# Patient Record
Sex: FEMALE | Race: BLACK OR AFRICAN AMERICAN | Employment: FULL TIME | URBAN - METROPOLITAN AREA
[De-identification: names, ages, dates, MRNs, and addresses within clinical notes are randomized per-mention and may not be internally consistent; named-entity substitution may affect disease eponyms.]

---

## 2017-01-13 ENCOUNTER — HOSPITAL ENCOUNTER (OUTPATIENT)
Dept: MRI IMAGING | Age: 37
Discharge: HOME | End: 2017-01-13
Attending: ORTHOPAEDIC SURGERY
Payer: COMMERCIAL

## 2017-01-13 DIAGNOSIS — M79.641 RIGHT HAND PAIN: ICD-10-CM

## 2017-01-13 PROCEDURE — 73218 MRI UPPER EXTREMITY W/O DYE: CPT

## 2017-08-09 ENCOUNTER — HOSPITAL ENCOUNTER (OUTPATIENT)
Dept: PHYSICAL THERAPY | Age: 37
Discharge: HOME | End: 2017-08-09
Payer: MEDICAID

## 2017-08-09 PROCEDURE — 97530 THERAPEUTIC ACTIVITIES: CPT

## 2017-08-09 PROCEDURE — 97162 PT EVAL MOD COMPLEX 30 MIN: CPT

## 2017-08-09 NOTE — PROGRESS NOTES
PT DAILY TREATMENT NOTE/FOOT AND ANKLE EVAL 3-16    Patient Name: Frances Angelucci  Date:2017  : 1980  [x]  Patient  Verified  Payor: Constance Franco / Plan: Ion Xie / Product Type: HMO /    In time:2:30  Out time: 3:20  Total Treatment Time (min): 50  Visit #: 1 of 16    Treatment Area: Bilateral ankle pain [M25.571, M25.572]    SUBJECTIVE  Pain Level (0-10 scale): 8 right ankle  [x]constant []intermittent []improving []worsening [x]no change since onset    Any medication changes, allergies to medications, adverse drug reactions, diagnosis change, or new procedure performed?: [x] No    [] Yes (see summary sheet for update)  Subjective functional status/changes:     PLOF: Workout 3 times a week with . Limitations to PLOF: Not able to go workout  Mechanism of Injury: Pt reports left foot/ankle has been hurting for a while now with insidious onset and on 17 pt woke up and couldn't put pressure on right foot. Went to PCP 17. Got sent home with ace wrap and pain meds but didn't help. Went to ER 17 due to pain and X ray taken with arthritis result per pt report. Per pt report PCP reported achilles tendonitis in right ankle. Current symptoms/Complaints: Pain increases to 10/10 with all activities. Was given tramadol at ER but not helping. Per pt report to wear boot for 1 mo or D/c per therapist.    Previous Treatment/Compliance: None  PMHx/Surgical Hx:  , Arthroscopic sx right knee  . 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 \"  Barriers: [x]pain []financial []time []transportation []other  Motivation: Fair    OBJECTIVE/EXAMINATION      Modality rationale: decrease edema, decrease inflammation, decrease pain and increase tissue extensibility to improve the patients ability to perform ADLs with decreased pain and symptoms.    Min Type Additional Details    [] Estim:  []Unatt       []IFC  []Premod                        []Other:  []w/ice   []w/heat  Position:  Location:    [] Estim: []Att    []TENS instruct  []NMES                    []Other:  []w/US   []w/ice   []w/heat  Position:  Location:    []  Traction: [] Cervical       []Lumbar                       [] Prone          []Supine                       []Intermittent   []Continuous Lbs:  [] before manual  [] after manual    []  Ultrasound: []Continuous   [] Pulsed                           []1MHz   []3MHz Location:  W/cm2:    []  Iontophoresis with dexamethasone         Location: [] Take home patch   [] In clinic   10 [x]  Ice     []  heat  []  Ice massage  []  Laser   []  Anodyne Position:supine  Location: both ankles    []  Laser with stim  []  Other: Position:  Location:    []  Vasopneumatic Device Pressure:       [] lo [] med [] hi   Temperature: [] lo [] med [] hi   [x] Skin assessment post-treatment:  [x]intact []redness- no adverse reaction    []redness  adverse reaction:     30 min [x]Eval                  []Re-Eval       10 min Therapeutic Activity:  []  See flow sheet : Pt education on anatomy, exam findings, POC. Dispensed HEP. Rationale: increase ROM, increase strength, improve coordination, improve balance and increase proprioception  to improve the patients ability to perform ADLs with decreased pain. With   [] TE   [] TA   [] neuro   [] other: Patient Education: [x] Review HEP    [] Progressed/Changed HEP based on:   [] positioning   [] body mechanics   [] transfers   [] heat/ice application    [] other:      Other Objective/Functional Measures: See Eval    Physical Therapy Evaluation  - Foot and Ankle    Gait: [] Normal    [] Abnormal    [x] Antalgic    [] NWB    Device: CAM boot on right   Describe:Decreased zach, step length bilaterally.  Dec wt shift on right LE    ROM/Strength  [] Unable to assess at this time      AROM        PROM            Strength (1-5)   Left Right Left Right Left  Right   Dorsiflexion -2 -20   4+/5 2/5 p! Plantarflexion 60 34   1 HR p! 0 HR p! Inversion 22 8   4+/5 2/5 p! Eversion 10 12   4+/5 2/5 p! Great Toe Ext Grossly 80* Grossly 45*   4+/5 2/5 p! Flexibility: [] Unable to assess at this time  Gastroc:    (L) Tightness [] WNL   [] Min   [x] Mod   [] Severe    (R) Tightness [] WNL   [] Min   [x] Mod   [] Severe  Soleus:    (L) Tightness [] WNL   [] Min   [x] Mod   [] Severe    (R) Tightness [] WNL   [] Min   [x] Mod   [] Severe    Palpation:   Location:all dorsal aspect of right foot  Patient's Pain Response: [] Min   [] Mod   [x] Severe    Optional Tests:  Balance/Stork Test: touches/60sec (L): 2sec (R):0 sec - pt unable    Single Leg Hop:   (L) Distance(ft):  (R) Distance(ft):  (L)/(R)%:   (L) Time(sec):  (R) Time(sec): (L)/(R)%:        Swelling:   Left (cm) Right (cm)   Figure 8: 54 55.5     Anterior Drawer: [] Neg    [] Pos  Posterior Drawer:  [] Neg    [] Pos  Inversion Stress:  [] Neg    [] Pos  Talar Tilt:   [] Neg    [] Pos  Eversion Stress:  [] Neg    [] Pos  Collins's Sign:  [] Neg    [] Pos  Cisneros Test: [x] Neg    [] Pos    Other tests/ comments: Unable to perform other special tests due to pain and limited ROM bilateraly       Pain Level (0-10 scale) post treatment: 8    ASSESSMENT/Changes in Function: Pt is a 44yo female presenting with bilateral ankle pain right>left that spiked on 7/28/17. Pt reports she woke up that day with the inability to bear weight on right foot and has been in pain since. Pt demonstrates significant swelling in both ankles/feet with right>left with pitting edema. Pt reports she has stage IV CKD as well waiting to on a list for dialysis. Pt demonstrates decreased ankle AROM bilaterally with right > left and pain being a limiting factor. Strength is decreased as well in all planes with pain in all directions on right.  Pt is negative bilaterally for Cisneros's test with no divots palpable on achilles tendon bilaterally. Unable to complete other special tests for ankle/foot due to pain and limited ROM. Pt is unable to complete SLS on either LE >2 seconds without pain or LOB. Pt demonstrates antalgic gait with decreased wt shift on right foot. . Due to the above deficits pt would benefit from PT to allow pt to return to PLOF and working out 3 times a week without pain. Patient will continue to benefit from skilled PT services to modify and progress therapeutic interventions, address functional mobility deficits, address ROM deficits, address strength deficits, analyze and address soft tissue restrictions, analyze and cue movement patterns, analyze and modify body mechanics/ergonomics, assess and modify postural abnormalities, address imbalance/dizziness and instruct in home and community integration to attain remaining goals. [x]  See Plan of Care  []  See progress note/recertification  []  See Discharge Summary         Progress towards goals / Updated goals:  Short Term Goals: STG- To be accomplished in 2 week(s):  1. Pt will be independent with HEP to encourage prophylaxis. Eval: HEP dispensed  Current: NA    Long Term Goals: LTG- To be accomplished in 8 week(s):  1. Pt will demonstrate ability to complete SLS on right/left for >60sec to allow pt to return to gym without pain. Eval: right: 0sec, left 2sec  Current: NA    2. Pt will increase right and left AROM to [facility] to be able to complete ADLs without pain. Eval:   Left Right   Dorsiflexion -2 -20   Plantarflexion 60 34   Inversion 22 8   Eversion 10 12     Current: NA    3. Pt right and left ankle strength will improve to >=4+/5  to allow pt to return to the gym. Eval:  Left  Right   4+/5 2/5 p!   1 HR p! 0 HR p!   4+/5 2/5 p!   4+/5 2/5 p!   4+/5 2/5 p! Current: NA    4. Pt will be able to workout at the gym with pain bilaterally <1/10 in order to return to PLOF. Eval: Pt unable to go to gym due to pain  Current: NA    5.   Pt FOTO score will increase by >12 points to show improvement in subjective function.   Eval:32  Current: will address at visit 5      PLAN  [x]  Upgrade activities as tolerated     [x]  Continue plan of care  []  Update interventions per flow sheet       []  Discharge due to:_  []  Other:_      Felix Last 8/9/2017  2:36 PM

## 2017-08-10 ENCOUNTER — HOSPITAL ENCOUNTER (OUTPATIENT)
Dept: PHYSICAL THERAPY | Age: 37
Discharge: HOME | End: 2017-08-10
Payer: MEDICAID

## 2017-08-10 PROCEDURE — 97110 THERAPEUTIC EXERCISES: CPT

## 2017-08-10 PROCEDURE — 97530 THERAPEUTIC ACTIVITIES: CPT

## 2017-08-10 NOTE — PROGRESS NOTES
PT DAILY TREATMENT NOTE     Patient Name: Elie Talavera  Date:8/10/2017  : 1980  [x]  Patient  Verified  Payor: [employer] / Plan: Roxanna Oak / Product Type: Managed Care Medicaid /    In time:5:50  Out time:7:00  Total Treatment Time (min): 70  Visit #: 2 of 16    Treatment Area: Bilateral ankle pain [M25.571, M25.572]    SUBJECTIVE  Pain Level (0-10 scale): 6  Any medication changes, allergies to medications, adverse drug reactions, diagnosis change, or new procedure performed?: [x] No    [] Yes (see summary sheet for update)  Subjective functional status/changes:   [] No changes reported  \"I did the exercises last night and it was painful but I was able to move my feet better this morning. \"    OBJECTIVE    Modality rationale: decrease edema, decrease inflammation, decrease pain, increase tissue extensibility and increase muscle contraction/control to improve the patients ability to perform ADLs with decreased pain.     Min Type Additional Details    [] Estim:  []Unatt       []IFC  []Premod                        []Other:  []w/ice   []w/heat  Position:  Location:    [] Estim: []Att    []TENS instruct  []NMES                    []Other:  []w/US   []w/ice   []w/heat  Position:  Location:    []  Traction: [] Cervical       []Lumbar                       [] Prone          []Supine                       []Intermittent   []Continuous Lbs:  [] before manual  [] after manual    []  Ultrasound: []Continuous   [] Pulsed                           []1MHz   []3MHz W/cm2:  Location:    []  Iontophoresis with dexamethasone         Location: [] Take home patch   [] In clinic    []  Ice     []  heat  []  Ice massage  []  Laser   []  Anodyne Position:  Location:    []  Laser with stim  []  Other:  Position:  Location:   10 [x]  Vasopneumatic Device Pressure:       [] lo [x] med [] hi   Temperature: [] lo [x] med [] hi   [x] Skin assessment post-treatment:  [x]intact []redness- no adverse reaction    []redness  adverse reaction:       40 min Therapeutic Exercise:  [x] See flow sheet :   Rationale: increase ROM, increase strength and improve coordination to improve the patients ability to perform ADLs with decreased pain    20 min Therapeutic Activity:  [x]  See flow sheet :   Rationale: increase ROM, increase strength, improve coordination, improve balance and increase proprioception  to improve the patients ability to perform ADLs with decreased pain. With   [] TE   [] TA   [] neuro   [] other: Patient Education: [x] Review HEP    [] Progressed/Changed HEP based on:   [] positioning   [] body mechanics   [] transfers   [] heat/ice application    [] other:      Other Objective/Functional Measures: Increased AROM noted at end of session, decreased midfoot mobility bilaterally right > left     Pain Level (0-10 scale) post treatment: 0    ASSESSMENT/Changes in Function: Pt tolerated exercises well with increased AROM noted. Pt had difficulty with marble pickup and towel scrunches due to decreased midfoot mobility. Will start SL balance next visit. Patient will continue to benefit from skilled PT services to modify and progress therapeutic interventions, address functional mobility deficits, address ROM deficits, address strength deficits, analyze and address soft tissue restrictions, analyze and cue movement patterns, analyze and modify body mechanics/ergonomics, assess and modify postural abnormalities, address imbalance/dizziness and instruct in home and community integration to attain remaining goals. [x]  See Plan of Care  []  See progress note/recertification  []  See Discharge Summary         Progress towards goals / Updated goals:  Short Term Goals: STG- To be accomplished in 2 week(s):  1.  Pt will be independent with HEP to encourage prophylaxis.   Eval: HEP dispensed  Current: Compliance per pt report      Long Term Goals: LTG- To be accomplished in 8 week(s):  1.  Pt will demonstrate ability to complete SLS on right/left for >60sec to allow pt to return to gym without pain. Eval: right: 0sec, left 2sec  Current: NA      2.  Pt will increase right and left AROM to [facility] to be able to complete ADLs without pain. Eval:     Left Right   Dorsiflexion -2 -20   Plantarflexion 60 34   Inversion 22 8   Eversion 10 12       Current: NA      3.  Pt right and left ankle strength will improve to >=4+/5  to allow pt to return to the gym. Eval:  Left  Right   4+/5 2/5 p!   1 HR p! 0 HR p!   4+/5 2/5 p!   4+/5 2/5 p!   4+/5 2/5 p! Current: NA      4. Pt will be able to workout at the gym with pain bilaterally <1/10 in order to return to PLOF. Eval: Pt unable to go to gym due to pain  Current: NA      5.  Pt FOTO score will increase by >12 points to show improvement in subjective function.   Eval:32  Current: will address at visit 5         PLAN  [x]  Upgrade activities as tolerated     [x]  Continue plan of care  []  Update interventions per flow sheet       []  Discharge due to:_  []  Other:_      Tamara Palomino 8/10/2017  5:56 PM    Future Appointments  Date Time Provider [place] Olmos   8/10/2017 6:00 PM [facility] PT [facility]ANS 2 [facility]   8/14/2017 6:00 PM [facility] PT MEANS 2 [facility]   8/21/2017 6:00 PM [facility] PT MEANS 2 [facility]   8/24/2017 6:00 PM [facility] PT MEANS 2 [facility]   8/28/2017 6:00 PM [facility] PT MEANS 2 [facility]   8/31/2017 6:00 PM [facility] PT MEANS 2 [facility]

## 2017-08-10 NOTE — PROGRESS NOTES
[facility] at the [address], [place], [address] [facility]  Phone: [phone]      Fax:  [phone]       Plan of Care/ Statement of Necessity for Physical Therapy Services      Patient name: Frances Angelucci Start of Care: 8/10/2017   Referral source: Matt Cancino MD : 1980    Medical Diagnosis: Bilateral ankle pain [M25.571, M25.572]   Onset Date:17    Treatment Diagnosis: bilateral ankle pain   Prior Hospitalization: see medical history Provider#: [id number]   Medications: Verified on Patient summary List    Comorbidities: allergies, arthritis, asthma, CHF, diabetes, heart attack, high blood pressure, stage IV CKD,knee sx, stroke   Prior Level of Function: Completeing daily activites without ankle pain      The Plan of Care and following information is based on the information from the initial evaluation. Assessment/ key information: Pt is a 44yo female presenting with bilateral ankle pain right>left that spiked on 17. Pt reports she woke up that day with the inability to bear weight on right foot and has been in pain since. Pt demonstrates significant swelling in both ankles/feet with right>left with pitting edema. Pt reports she has stage IV CKD as well waiting to on a list for dialysis. Pt demonstrates decreased ankle AROM bilaterally with right > left and pain being a limiting factor. Strength is decreased as well in all planes with pain in all directions on right. Pt is negative bilaterally for Cisneros's test with no divots palpable on achilles tendon bilaterally. Unable to complete other special tests for ankle/foot due to pain and limited ROM. Pt is unable to complete SLS on either LE >2 seconds without pain or LOB. Pt demonstrates antalgic gait with decreased wt shift on right foot. . Due to the above deficits pt would benefit from PT to allow pt to return to PLOF and working out 3 times a week without pain.      Evaluation Complexity History HIGH Complexity :3+ comorbidities / personal factors will impact the outcome/ POC ; Examination MEDIUM Complexity : 3 Standardized tests and measures addressing body structure, function, activity limitation and / or participation in recreation  ;Presentation HIGH Complexity : Unstable and unpredictable characteristics  ; Clinical Decision Making MEDIUM Complexity : FOTO score of 26-74  Overall Complexity Rating: MEDIUM  Problem List: pain affecting function, decrease ROM, decrease strength, edema affecting function, impaired gait/ balance, decrease ADL/ functional abilitiies, decrease activity tolerance, decrease flexibility/ joint mobility and decrease transfer abilities   Treatment Plan may include any combination of the following: Therapeutic exercise, Therapeutic activities, Neuromuscular re-education, Physical agent/modality, Gait/balance training, Manual therapy, Patient education, Self Care training, Functional mobility training, Home safety training and Stair training  Patient / Family readiness to learn indicated by: asking questions  Persons(s) to be included in education: patient (P)  Barriers to Learning/Limitations: None  Patient Goal (s): Get rid of the pain  Patient Self Reported Health Status: good  Rehabilitation Potential: fair     Short Term Goals: STG- To be accomplished in 2 week(s):  1. Pt will be independent with HEP to encourage prophylaxis. Eval: HEP dispensed  Current: NA     Long Term Goals: LTG- To be accomplished in 8 week(s):  1. Pt will demonstrate ability to complete SLS on right/left for >60sec to allow pt to return to gym without pain. Eval: right: 0sec, left 2sec  Current: NA     2.  Pt will increase right and left AROM to [facility] to be able to complete ADLs without pain.   Eval:    Left Right   Dorsiflexion -2 -20   Plantarflexion 60 34   Inversion 22 8   Eversion 10 12      Current: NA     3.  Pt right and left ankle strength will improve to >=4+/5  to allow pt to return to the gym. Eval:  Left  Right   4+/5 2/5 p!   1 HR p! 0 HR p!   4+/5 2/5 p!   4+/5 2/5 p!   4+/5 2/5 p! Current: NA     4. Pt will be able to workout at the gym with pain bilaterally <1/10 in order to return to PLOF. Eval: Pt unable to go to gym due to pain  Current: NA     5. Pt FOTO score will increase by >12 points to show improvement in subjective function. Eval:32  Current: will address at visit 5      Frequency / Duration: Patient to be seen 2 times per week for 8 weeks. Patient/ Caregiver education and instruction: Diagnosis, prognosis, self care, activity modification and exercises   [x]  Plan of care has been reviewed with TARSHA COFFEY Remesic 8/10/2017 2:04 PM  _____________________________________________________________________  I certify that the above Therapy Services are being furnished while the patient is under my care. I agree with the treatment plan and certify that this therapy is necessary.     Physician's Signature:____________________  Date:__________Time:______    Please sign and return to [facility] at the [address], [address] [facility]       Phone: [phone]      Fax:  [phone]

## 2017-08-14 ENCOUNTER — APPOINTMENT (OUTPATIENT)
Dept: PHYSICAL THERAPY | Age: 37
End: 2017-08-14
Payer: MEDICAID

## 2017-08-21 ENCOUNTER — HOSPITAL ENCOUNTER (OUTPATIENT)
Dept: PHYSICAL THERAPY | Age: 37
Discharge: HOME | End: 2017-08-21
Payer: MEDICAID

## 2017-08-21 PROCEDURE — 97110 THERAPEUTIC EXERCISES: CPT

## 2017-08-21 PROCEDURE — 97140 MANUAL THERAPY 1/> REGIONS: CPT

## 2017-08-21 PROCEDURE — 97530 THERAPEUTIC ACTIVITIES: CPT

## 2017-08-21 NOTE — PROGRESS NOTES
PT DAILY TREATMENT NOTE     Patient Name: Nedra Olson  Date:2017  : 1980  [x]  Patient  Verified  Payor: BLUE CROSS MEDICAID / Plan: John Ortiz / Product Type: Managed Care Medicaid /    In time:5:45  Out time:7:00  Total Treatment Time (min): 75  Visit #: 3 of 16    Treatment Area: Bilateral ankle pain [M25.571, M25.572]    SUBJECTIVE  Pain Level (0-10 scale): 0  Any medication changes, allergies to medications, adverse drug reactions, diagnosis change, or new procedure performed?: [x] No    [] Yes (see summary sheet for update)  Subjective functional status/changes:   [] No changes reported  \"My ankles feel so my better. It's like a switch went off.\"    OBJECTIVE    Modality rationale: decrease inflammation, decrease pain and increase tissue extensibility to improve the patients ability to perform daily activities with decreased pain.     Min Type Additional Details    [] Estim:  []Unatt       []IFC  []Premod                        []Other:  []w/ice   []w/heat  Position:  Location:    [] Estim: []Att    []TENS instruct  []NMES                    []Other:  []w/US   []w/ice   []w/heat  Position:  Location:    []  Traction: [] Cervical       []Lumbar                       [] Prone          []Supine                       []Intermittent   []Continuous Lbs:  [] before manual  [] after manual    []  Ultrasound: []Continuous   [] Pulsed                           []1MHz   []3MHz W/cm2:  Location:    []  Iontophoresis with dexamethasone         Location: [] Take home patch   [] In clinic   10 [x]  Ice     []  heat  []  Ice massage  []  Laser   []  Anodyne Position: supine  Location: bilateral ankles    []  Laser with stim  []  Other:  Position:  Location:    []  Vasopneumatic Device Pressure:       [] lo [] med [] hi   Temperature: [] lo [] med [] hi   [x] Skin assessment post-treatment:  [x]intact []redness- no adverse reaction    []redness  adverse reaction:       42 min Therapeutic Exercise:  [x] See flow sheet :   Rationale: increase ROM and increase strength to improve the patients ability to perform daily activities with decreased pain and symptom levels. 15 min Therapeutic Activity:  [x]  See flow sheet :   Rationale: increase strength, improve coordination, improve balance and increase proprioception  to improve the patients ability to perform daily activities with decreased pain and symptom levels. 8 min Manual Therapy:  PROM right ankle, big toe post mob into extension bilaterlaly   Rationale: decrease pain, increase ROM and increase tissue extensibility to perform daily activities with decreased pain and symptom levels. With   [] TE   [] TA   [] neuro   [] other: Patient Education: [x] Review HEP    [] Progressed/Changed HEP based on:   [] positioning   [] body mechanics   [] transfers   [] heat/ice application    [] other:      Other Objective/Functional Measures: Improved gait mechanics today with increased zach and wt shift, improved bilateral ankle AROM during exercises     Pain Level (0-10 scale) post treatment: 0    ASSESSMENT/Changes in Function: Pt is progressing well with increased bilateral ankle AROM and decreased pain. Added SL balance and ankle 4 way today with no reports of pain. Will assess strength and AROM in 1-2 visits. Patient will continue to benefit from skilled PT services to modify and progress therapeutic interventions, address functional mobility deficits, address ROM deficits, address strength deficits, analyze and address soft tissue restrictions, address imbalance/dizziness and instruct in home and community integration to attain remaining goals. []  See Plan of Care  []  See progress note/recertification  []  See Discharge Summary         Progress towards goals / Updated goals:  Short Term Goals: STG- To be accomplished in 2 week(s):  1.  Pt will be independent with HEP to encourage prophylaxis.   Eval: HEP dispensed  Current: Compliance per pt report      Long Term Goals: LTG- To be accomplished in 8 week(s):  1.  Pt will demonstrate ability to complete SLS on right/left for >60sec to allow pt to return to gym without pain. Eval: right: 0sec, left 2sec  Current: 30 seconds on left with 1-2 corrections, 30 seconds on right with at least 4 corrections - progressing      2.  Pt will increase right and left AROM to [facility] to be able to complete ADLs without pain. Eval:     Left Right   Dorsiflexion -2 -20   Plantarflexion 60 34   Inversion 22 8   Eversion 10 12       Current: NA      3.  Pt right and left ankle strength will improve to >=4+/5  to allow pt to return to the gym. Eval:  Left  Right   4+/5 2/5 p!   1 HR p! 0 HR p!   4+/5 2/5 p!   4+/5 2/5 p!   4+/5 2/5 p! Current: NA      4. Pt will be able to workout at the gym with pain bilaterally <1/10 in order to return to PLOF. Eval: Pt unable to go to gym due to pain  Current: NA      5.  Pt FOTO score will increase by >12 points to show improvement in subjective function.   Eval:32  Current: will address at visit 5      PLAN  [x]  Upgrade activities as tolerated     [x]  Continue plan of care  []  Update interventions per flow sheet       []  Discharge due to:_  []  Other:_      Adam Salazar 8/21/2017  6:11 PM    Future Appointments  Date Time Provider Yang Olmos   8/24/2017 6:00 PM THE [facility] PT MEANS 2 [facility]PT[facility]   8/28/2017 6:00 PM THE [facility] PT MEANS 2 [facility]   8/31/2017 6:00 PM THE [facility] PT MEANS 2 [facility]PTBW THE [facility]

## 2017-08-24 ENCOUNTER — HOSPITAL ENCOUNTER (OUTPATIENT)
Dept: PHYSICAL THERAPY | Age: 37
Discharge: HOME | End: 2017-08-24
Payer: MEDICAID

## 2017-08-24 PROCEDURE — 97530 THERAPEUTIC ACTIVITIES: CPT

## 2017-08-24 PROCEDURE — 97110 THERAPEUTIC EXERCISES: CPT

## 2017-08-24 NOTE — PROGRESS NOTES
PT DAILY TREATMENT NOTE     Patient Name: Jeremiah Bazzi  Date:2017  : 1980  [x]  Patient  Verified  Payor: BLUE CROSS MEDICAID / Plan: Deontejason Holliswaldemar / Product Type: Managed Care Medicaid /    In time:5:58  Out time:7:05  Total Treatment Time (min): 79  Visit #: 4 of 16    Treatment Area: Bilateral ankle pain [M25.571, M25.572]    SUBJECTIVE  Pain Level (0-10 scale): 6  Any medication changes, allergies to medications, adverse drug reactions, diagnosis change, or new procedure performed?: [x] No    [] Yes (see summary sheet for update)  Subjective functional status/changes:   [] No changes reported  \"My right foot really hurts today. \"    OBJECTIVE    Modality rationale: decrease edema, decrease inflammation, decrease pain, increase tissue extensibility and increase muscle contraction/control to improve the patients ability to perform daily activities with decreased pain and symptom levels.     Min Type Additional Details    [] Estim:  []Unatt       []IFC  []Premod                        []Other:  []w/ice   []w/heat  Position:  Location:    [] Estim: []Att    []TENS instruct  []NMES                    []Other:  []w/US   []w/ice   []w/heat  Position:  Location:    []  Traction: [] Cervical       []Lumbar                       [] Prone          []Supine                       []Intermittent   []Continuous Lbs:  [] before manual  [] after manual    []  Ultrasound: []Continuous   [] Pulsed                           []1MHz   []3MHz W/cm2:  Location:    []  Iontophoresis with dexamethasone         Location: [] Take home patch   [] In clinic   10 [x]  Ice     []  heat  []  Ice massage  []  Laser   []  Anodyne Position:sitting  Location:left ankle    []  Laser with stim  []  Other:  Position:  Location:   10 [x]  Vasopneumatic Device Pressure:       [] lo [x] med [] hi   Temperature: [] lo [x] med [] hi   [x] Skin assessment post-treatment:  [x]intact []redness- no adverse reaction []redness  adverse reaction:     42 min Therapeutic Exercise:  [x] See flow sheet :   Rationale: increase ROM, increase strength and improve coordination to improve the patients ability to perform daily activities with decreased pain. 15 min Therapeutic Activity:  [x]  See flow sheet :   Rationale: increase ROM, increase strength, improve coordination, improve balance and increase proprioception  to improve the patients ability to perform daily activities        With   [] TE   [] TA   [] neuro   [] other: Patient Education: [x] Review HEP    [] Progressed/Changed HEP based on:   [] positioning   [] body mechanics   [] transfers   [] heat/ice application    [] other:      Other Objective/Functional Measures: Continued swelling in bilateral ankles right > left     Pain Level (0-10 scale) post treatment: 3    ASSESSMENT/Changes in Function: Pt reported increased right foot pain today with unsure of cause. Pt reports she is still on the waiting list for a kidney transplant. Pt educated to ambulate with heel to toe walking versus dragging right foot along. Updated  HEP to include ankle 4 way with band. Patient will continue to benefit from skilled PT services to modify and progress therapeutic interventions, address functional mobility deficits, address ROM deficits, address strength deficits, analyze and cue movement patterns, address imbalance/dizziness and instruct in home and community integration to attain remaining goals. []  See Plan of Care  []  See progress note/recertification  []  See Discharge Summary         Progress towards goals / Updated goals:  Short Term Goals: STG- To be accomplished in 2 week(s):  1.  Pt will be independent with HEP to encourage prophylaxis.   Eval: HEP dispensed  Current: Compliance per pt report      Long Term Goals: LTG- To be accomplished in 8 week(s):  1.  Pt will demonstrate ability to complete SLS on right/left for >60sec to allow pt to return to gym without pain.  Eval: right: 0sec, left 2sec  Current: 30 seconds on left with 1-2 corrections, 30 seconds on right with at least 4 corrections - progressing      2.  Pt will increase right and left AROM to [facility] to be able to complete ADLs without pain. Eval:     Left Right   Dorsiflexion -2 -20   Plantarflexion 60 34   Inversion 22 8   Eversion 10 12       Current: NA      3.  Pt right and left ankle strength will improve to >=4+/5  to allow pt to return to the gym. Eval:  Left  Right   4+/5 2/5 p!   1 HR p! 0 HR p!   4+/5 2/5 p!   4+/5 2/5 p!   4+/5 2/5 p! Current: NA      4. Pt will be able to workout at the gym with pain bilaterally <1/10 in order to return to [facility]. Eval: Pt unable to go to gym due to pain  Current: Continues to have pain even with everyday walking       5.  Pt FOTO score will increase by >12 points to show improvement in subjective function.   Eval:32  Current: will address at visit 5         PLAN  [x]  Upgrade activities as tolerated     [x]  Continue plan of care  []  Update interventions per flow sheet       []  Discharge due to:_  []  Other:_      Adam Salazar 8/24/2017  6:59 PM    Future Appointments  Date Time Provider Yang Olmos   8/28/2017 6:00 PM Carlos Alberto Mustard Remesic [facility]PT[facility]   9/7/2017 6:00 PM Carlos Alberto Mustard Remesic [facility]   9/11/2017 6:00 PM Carlos Alberto Mustard Remesic [facility]   9/14/2017 6:00 PM Carlos Alberto Mustard Remesic [facility]   9/18/2017 6:00 PM Carlos Alberto Mustard Remesic [facility]PTBW [facility]   9/21/2017 6:00 PM Carlos Alberto Mustard Remesic [facility]   9/25/2017 6:00 PM Carlos Alberto Mustard Remesic [facility]PT[facility]   9/28/2017 6:00 PM Carlos Alberto Palomino [facility]PTBW [facility]

## 2017-08-28 ENCOUNTER — HOSPITAL ENCOUNTER (OUTPATIENT)
Dept: PHYSICAL THERAPY | Age: 37
Discharge: HOME | End: 2017-08-28
Payer: MEDICAID

## 2017-08-28 PROCEDURE — 97530 THERAPEUTIC ACTIVITIES: CPT

## 2017-08-28 PROCEDURE — 97110 THERAPEUTIC EXERCISES: CPT

## 2017-08-28 NOTE — PROGRESS NOTES
PT DAILY TREATMENT NOTE     Patient Name: Wilfredo Koenig  Date:2017  : 1980  [x]  Patient  Verified  Payor: BLUE CROSS MEDICAID / Plan: Horacio Velasco / Product Type: Managed Care Medicaid /    In time:6:03  Out time:7:07  Total Treatment Time (min): 65  Visit #: 5 of 16    Treatment Area: Bilateral ankle pain [M25.571, M25.572]    SUBJECTIVE  Pain Level (0-10 scale): 0  Any medication changes, allergies to medications, adverse drug reactions, diagnosis change, or new procedure performed?: [x] No    [] Yes (see summary sheet for update)  Subjective functional status/changes:   [] No changes reported  \"I went for a 1 mile run/jog today and it felt great. My ankles/feet feel so much better and I was able to roll around in the bed last night without pain. I was also able to drive myself today too. \"    OBJECTIVE    Modality rationale: decrease inflammation, decrease pain and increase tissue extensibility to improve the patients ability to perform daily activities with decreased pain and symptom levels   Min Type Additional Details    [] Estim:  []Unatt       []IFC  []Premod                        []Other:  []w/ice   []w/heat  Position:  Location:    [] Estim: []Att    []TENS instruct  []NMES                    []Other:  []w/US   []w/ice   []w/heat  Position:  Location:    []  Traction: [] Cervical       []Lumbar                       [] Prone          []Supine                       []Intermittent   []Continuous Lbs:  [] before manual  [] after manual    []  Ultrasound: []Continuous   [] Pulsed                           []1MHz   []3MHz W/cm2:  Location:    []  Iontophoresis with dexamethasone         Location: [] Take home patch   [] In clinic   10 [x]  Ice     []  heat  []  Ice massage  []  Laser   []  Anodyne Position:long sitting  Location: both ankles     []  Laser with stim  []  Other:  Position:  Location:    []  Vasopneumatic Device Pressure:       [] lo [] med [] hi Temperature: [] lo [] med [] hi   [x] Skin assessment post-treatment:  [x]intact []redness- no adverse reaction    []redness  adverse reaction:     45 min Therapeutic Exercise:  [x] See flow sheet :   Rationale: increase ROM, increase strength and improve coordination to improve the patients ability to perform daily activities with decreased pain and symptom levels. 10 min Therapeutic Activity:  [x]  See flow sheet :   Rationale: increase strength, improve coordination, improve balance and increase proprioception  to improve the patients ability to perform daily activities with decreased pain and symptom levels. With   [] TE   [] TA   [] neuro   [] other: Patient Education: [x] Review HEP    [] Progressed/Changed HEP based on:   [] positioning   [] body mechanics   [] transfers   [] heat/ice application    [] other:      Other Objective/Functional Measures: good AROM with baps board, improved AROM in bilateral ankles, FOTO 83 meeting goal     Pain Level (0-10 scale) post treatment: 0    ASSESSMENT/Changes in Function: Pt able to tolerate all exercises today with no reports of pain. Improved gait noted as well with non antalgic gait pattern, increased zach and step length. Pt reported running/joggin 1 mile without pain today. Improved bilateral ankle AROM progressing towards goal. Will assess strength NV. Patient will continue to benefit from skilled PT services to modify and progress therapeutic interventions, address functional mobility deficits, address ROM deficits, address strength deficits, analyze and address soft tissue restrictions, address imbalance/dizziness and instruct in home and community integration to attain remaining goals.      []  See Plan of Care  []  See progress note/recertification  []  See Discharge Summary         Progress towards goals / Updated goals:  Short Term Goals: STG- To be accomplished in 2 week(s):  1.  Pt will be independent with HEP to encourage prophylaxis. Eval: HEP dispensed  Current: Compliance per pt report      Long Term Goals: LTG- To be accomplished in 8 week(s):  1.  Pt will demonstrate ability to complete SLS on right/left for >60sec to allow pt to return to gym without pain. Eval: right: 0sec, left 2sec  Current: 30 seconds on left with 1-2 corrections, 30 seconds on right with at least 4 corrections - progressing      2.  Pt will increase right and left AROM to [facility] to be able to complete ADLs without pain. Eval:     Left Right   Dorsiflexion -2 -20   Plantarflexion 60 34   Inversion 22 8   Eversion 10 12       Current:      Left Right   Dorsiflexion 2 -2   Plantarflexion 55 50   Inversion 22 18   Eversion 10 25         3.  Pt right and left ankle strength will improve to >=4+/5  to allow pt to return to the gym. Eval:  Left  Right   4+/5 2/5 p!   1 HR p! 0 HR p!   4+/5 2/5 p!   4+/5 2/5 p!   4+/5 2/5 p! Current: NA      4. Pt will be able to workout at the gym with pain bilaterally <1/10 in order to return to OF. Eval: Pt unable to go to gym due to pain  Current: Pt reports 1 mile run/jog today without pain - progressing       5.  Pt FOTO score will increase by >12 points to show improvement in subjective function.   Eval:32  Current: 83 - MET          PLAN   [x]  Upgrade activities as tolerated     [x]  Continue plan of care  []  Update interventions per flow sheet       []  Discharge due to:_  []  Other:_      Casey Chavez 8/28/2017  6:27 PM    Future Appointments  Date Time Provider Yang Olmos   9/7/2017 6:00 PM Nicholas HONEYCUTT [facility]   9/11/2017 6:00 PM Nicholas Palomino [facility]   9/14/2017 6:00 PM Nicholas Palomino [facility]   9/18/2017 6:00 PM Nicholas RICHARDSONHPSYDNEE [facility]   9/21/2017 6:00 PM [facility]   9/25/2017 6:00 PM Nicholas [facility]omino [facility]   9/28/2017 6:00 PM Nicholas Palomino [facility]TO[facility]

## 2017-08-31 ENCOUNTER — APPOINTMENT (OUTPATIENT)
Dept: PHYSICAL THERAPY | Age: 37
End: 2017-08-31
Payer: MEDICAID

## 2017-09-07 ENCOUNTER — HOSPITAL ENCOUNTER (OUTPATIENT)
Dept: PHYSICAL THERAPY | Age: 37
Discharge: HOME | End: 2017-09-07
Payer: MEDICAID

## 2017-09-07 PROCEDURE — 97140 MANUAL THERAPY 1/> REGIONS: CPT

## 2017-09-07 PROCEDURE — 97530 THERAPEUTIC ACTIVITIES: CPT

## 2017-09-07 PROCEDURE — 97110 THERAPEUTIC EXERCISES: CPT

## 2017-09-07 NOTE — PROGRESS NOTES
PT DAILY TREATMENT NOTE     Patient Name: Elie Talavera  Date:2017  : 1980  [x]  Patient  Verified  Payor: BLUE CROSS MEDICAID / Plan: Roxanna Oak / Product Type: Managed Care Medicaid /    In time:5:50  Out time:7:08  Total Treatment Time (min): 78  Visit #: 6 of 16    Treatment Area: Bilateral ankle pain [M25.571, M25.572]    SUBJECTIVE  Pain Level (0-10 scale): 2  Any medication changes, allergies to medications, adverse drug reactions, diagnosis change, or new procedure performed?: [x] No    [] Yes (see summary sheet for update)  Subjective functional status/changes:   [] No changes reported  \"I still feel like I am walking with a limp but my feet are feeling better. My right foot still hurts in the arch\"    OBJECTIVE    Modality rationale: decrease edema, decrease inflammation, decrease pain and increase tissue extensibility to improve the patients ability to tolerate daily activities.     Min Type Additional Details    [] Estim:  []Unatt       []IFC  []Premod                        []Other:  []w/ice   []w/heat  Position:  Location:    [] Estim: []Att    []TENS instruct  []NMES                    []Other:  []w/US   []w/ice   []w/heat  Position:  Location:    []  Traction: [] Cervical       []Lumbar                       [] Prone          []Supine                       []Intermittent   []Continuous Lbs:  [] before manual  [] after manual    []  Ultrasound: []Continuous   [] Pulsed                           []1MHz   []3MHz W/cm2:  Location:    []  Iontophoresis with dexamethasone         Location: [] Take home patch   [] In clinic   10 [x]  Ice     []  heat  []  Ice massage  []  Laser   []  Anodyne Position: long sitting  Location:right ankle     []  Laser with stim  []  Other:  Position:  Location:    []  Vasopneumatic Device Pressure:       [] lo [] med [] hi   Temperature: [] lo [] med [] hi   [x] Skin assessment post-treatment:  [x]intact []redness- no adverse reaction []redness  adverse reaction:     52 min Therapeutic Exercise:  [x] See flow sheet :   Rationale: increase ROM, increase strength and improve coordination to improve the patients ability to complete daily activities with decreased pain and symptom levels. 15 min Therapeutic Activity:  [x]  See flow sheet :   Rationale: increase strength, improve coordination, improve balance and increase proprioception  to improve the patients ability to complete daily activities with decreased pain and symptom levels. 11 min Manual Therapy:  STM to right plantar fascia with retrograde massage of right foot to decrease swelling, big toe mobilization into ext   Rationale: decrease pain, increase ROM, increase tissue extensibility and decrease trigger points to complete daily activities with decreased pain and symptom levels. With   [] TE   [] TA   [] neuro   [] other: Patient Education: [x] Review HEP    [] Progressed/Changed HEP based on:   [] positioning   [] body mechanics   [] transfers   [] heat/ice application    [] other:      Other Objective/Functional Measures: Improved ankle strength bilaterally, pitting edema noted in bilateral feet with right >left     Pain Level (0-10 scale) post treatment: 0    ASSESSMENT/Changes in Function: Pt is progressing well in therapy with decreased pain and improved AROM and strength in both ankles. Pt reports only having pain now in arch of right foot especially when completing SL activities. Difficult to distinguish whether pain is musculoskeletal in nature or from swelling in bilateral LE due to CHF and CKF. Tortuous vein noted in right distal LE as well with inability to palpate dorsalis pedis artery on right. Pt demonstrates pitting edema bilaterally in both feet and ankles with right > left.  Pt educated to discuss with PCP if gets worse with pt reported being followed by MD. Pt will benefit from continued skilled PT to decreased pain and improve AROM in both ankles/feet to allow pt to return to PLOF and playing with son. Patient will continue to benefit from skilled PT services to modify and progress therapeutic interventions, address functional mobility deficits, address ROM deficits, analyze and address soft tissue restrictions, analyze and cue movement patterns, address imbalance/dizziness and instruct in home and community integration to attain remaining goals. []  See Plan of Care  []  See progress note/recertification  []  See Discharge Summary         Progress towards goals / Updated goals:  Short Term Goals: STG- To be accomplished in 2 week(s):  1.  Pt will be independent with HEP to encourage prophylaxis. Eval: HEP dispensed  Current: Compliance per pt report      Long Term Goals: LTG- To be accomplished in 8 week(s):  1.  Pt will demonstrate ability to complete SLS on right/left for >60sec to allow pt to return to gym without pain. Eval: right: 0sec, left 2sec  Current: 30 seconds on left with 1-2 corrections, 30 seconds on right with at least 4 corrections - progressing      2.  Pt will increase right and left AROM to [facility] to be able to complete ADLs without pain. Eval:     Left Right   Dorsiflexion -2 -20   Plantarflexion 60 34   Inversion 22 8   Eversion 10 12       Current:      Left Right   Dorsiflexion 2 -2   Plantarflexion 55 50   Inversion 22 18   Eversion 10 25          3.  Pt right and left ankle strength will improve to >=4+/5  to allow pt to return to the gym. Eval:  Left  Right   4+/5 2/5 p!   1 HR p! 0 HR p!   4+/5 2/5 p!   4+/5 2/5 p!   4+/5 2/5 p! Current: 4+/5 in all planes with p! Only with IR on right      4. Pt will be able to workout at the gym with pain bilaterally <1/10 in order to return to PLOF. Eval: Pt unable to go to gym due to pain  Current: Pt reports 1 mile run/jog today without pain - progressing       5.  Pt FOTO score will increase by >12 points to show improvement in subjective function.   Eval:32  Current: 83 - MET      PLAN  [x]  Upgrade activities as tolerated     [x]  Continue plan of care  []  Update interventions per flow sheet       []  Discharge due to:_  []  Other:_      Suzanne Brown 9/7/2017  5:55 PM    Future Appointments  Date Time Provider Yang Olmos   9/7/2017 6:00 PM [place] Remesic [facility]   9/11/2017 6:00 PM [place] Hillary Remesic [facility]   9/14/2017 6:00 PM [place] Hillary Remesic [facility]   9/18/2017 6:00 PM [place] Hillary Remesic [facility]   9/21/2017 6:00 PM [place] Hillary Remesic [facility]   9/25/2017 6:00 PM [place] Hillary Remesic [facility]   9/28/2017 6:00 PM [place] Hillary Remesic [facility]

## 2017-09-07 NOTE — PROGRESS NOTES
[facility] at the [address], [place], [address] [facility]  Phone: [phone]      Fax:  [phone]    Progress Note  Patient name: Mina Chiang Start of Care: 8/10/17   Referral source: Patrick Rutledge MD : 1980   Medical/Treatment Diagnosis: Bilateral ankle pain [M25.571, M25.572] Onset Date:17     Prior Hospitalization: see medical history Provider#: [id number]   Medications: Verified on Patient Summary List    Comorbidities: allergies, arthritis, asthma, CHF, diabetes, heart attack, high blood pressure, stage IV CKD,knee sx, stroke  Prior Level of Function:completing daily activities without ankle pain    Visits from Start of Care: 6    Missed Visits: 1      Key Functional Changes: Pt is progressing well in therapy with decreased pain and improved AROM and strength in both ankles. Pt reports only having pain now in arch of right foot especially when completing SL activities. Difficult to distinguish whether pain is musculoskeletal in nature or from swelling in bilateral LE due to CHF and CKF. Tortuous vein noted in right distal LE as well with inability to palpate dorsalis pedis artery on right. Pt demonstrates pitting edema bilaterally in both feet and ankles with right > left. Pt educated to discuss with PCP if gets worse with pt reported being followed by MD. Pt will benefit from continued skilled PT to decreased pain and improve AROM in both ankles/feet to allow pt to return to PLOF and playing with son. Updated Goals: to be achieved in 5 weeks:   Short Term Goals: STG- To be accomplished in 2 week(s):  1.  Pt will be independent with HEP to encourage prophylaxis. Eval: HEP dispensed  Current: Compliance per pt report      Long Term Goals: LTG- To be accomplished in 8 week(s):  1.  Pt will demonstrate ability to complete SLS on right/left for >60sec to allow pt to return to gym without pain.   Eval: right: 0sec, left 2sec  Current: 30 seconds on left with 1-2 corrections, 30 seconds on right with at least 4 corrections - progressing      2.  Pt will increase right and left AROM to [facility] to be able to complete ADLs without pain. Eval:     Left Right   Dorsiflexion -2 -20   Plantarflexion 60 34   Inversion 22 8   Eversion 10 12       Current:      Left Right   Dorsiflexion 2 -2   Plantarflexion 55 50   Inversion 22 18   Eversion 10 25           3.  Pt right and left ankle strength will improve to >=4+/5  to allow pt to return to the gym. Eval:  Left  Right   4+/5 2/5 p!   1 HR p! 0 HR p!   4+/5 2/5 p!   4+/5 2/5 p!   4+/5 2/5 p! Current: 4+/5 in all planes with p! Only with IR on right - progressing      4. Pt will be able to workout at the gym with pain bilaterally <1/10 in order to return to PLOF. Eval: Pt unable to go to gym due to pain  Current: Pt reports 1 mile run/jog today without pain - progressing       5.  Pt FOTO score will increase by >12 points to show improvement in subjective function.   Eval:32  Current: 80 - MET    ASSESSMENT/RECOMMENDATIONS:  [x]Continue therapy per initial plan/protocol at a frequency of  2 x per week for 5 weeks  []Continue therapy with the following recommended changes:_____________________      _____________________________________________________________________  []Discontinue therapy progressing towards or have reached established goals  []Discontinue therapy due to lack of appreciable progress towards goals  []Discontinue therapy due to lack of attendance or compliance  []Await Physician's recommendations/decisions regarding therapy  []Other:________________________________________________________________    Thank you for this referral.   Didi COFFEY Remesic 9/7/2017 7:06 PM  NOTE TO PHYSICIAN:  [address]   FAX TO [facility]: [phone]  If you are unable to process this request in 24 hours please contact our office: [phone]        []  I have read the above report and request that my patient continue as recommended. []  I have read the above report and request that my patient continue therapy with the following changes/special instructions:________________________________________  []I have read the above report and request that my patient be discharged from therapy.     Physicians signature: ______________________________Date: ______Time:______

## 2017-09-11 ENCOUNTER — HOSPITAL ENCOUNTER (OUTPATIENT)
Dept: PHYSICAL THERAPY | Age: 37
End: 2017-09-11
Payer: MEDICAID

## 2017-09-14 ENCOUNTER — HOSPITAL ENCOUNTER (OUTPATIENT)
Dept: PHYSICAL THERAPY | Age: 37
Discharge: HOME | End: 2017-09-14
Payer: MEDICAID

## 2017-09-14 PROCEDURE — 97110 THERAPEUTIC EXERCISES: CPT

## 2017-09-14 PROCEDURE — 97530 THERAPEUTIC ACTIVITIES: CPT

## 2017-09-14 NOTE — PROGRESS NOTES
PT DAILY TREATMENT NOTE     Patient Name: Jami Demarco  Date:2017  : 1980  [x]  Patient  Verified  Payor: BLUE CROSS MEDICAID / Plan: [place] [facility] Genia Liz / Product Type: Managed Care Medicaid /    In time:6:03  Out time:6:57  Total Treatment Time (min): 54  Visit #: 7 of 16    Treatment Area: Bilateral ankle pain [M25.571, M25.572]    SUBJECTIVE  Pain Level (0-10 scale): 0  Any medication changes, allergies to medications, adverse drug reactions, diagnosis change, or new procedure performed?: [x] No    [] Yes (see summary sheet for update)  Subjective functional status/changes:   [] No changes reported  \"I feel great today. No pain in either ankle and I have been going to the gym once a week now. \"    OBJECTIVE    44 min Therapeutic Exercise:  [x] See flow sheet :   Rationale: increase ROM and increase strength to improve the patients ability to complete daily activities with decreased pain and symptom levels. 10 min Therapeutic Activity:  [x]  See flow sheet :   Rationale: increase strength, improve coordination, improve balance and increase proprioception  to improve the patients ability to complete daily activities with decreased pain and symptom levels. With   [] TE   [] TA   [] neuro   [] other: Patient Education: [x] Review HEP    [] Progressed/Changed HEP based on:   [] positioning   [] body mechanics   [] transfers   [] heat/ice application    [] other:      Other Objective/Functional Measures: improved ankle AROM with BAPS board     Pain Level (0-10 scale) post treatment: 0    ASSESSMENT/Changes in Function: Pt is progressing well with reports of no pain today and after Rx session. Pt starting to resume gym once per week.      Patient will continue to benefit from skilled PT services to modify and progress therapeutic interventions, address functional mobility deficits, address ROM deficits, address strength deficits, address imbalance/dizziness and instruct in home and community integration to attain remaining goals. []  See Plan of Care  []  See progress note/recertification  []  See Discharge Summary         Progress towards goals / Updated goals:    Long Term Goals: LTG- To be accomplished in 8 week(s):  1.  Pt will demonstrate ability to complete SLS on right/left for >60sec to allow pt to return to gym without pain. Eval: right: 0sec, left 2sec  Last PN: 30 seconds on left with 1-2 corrections, 30 seconds on right with at least 4 corrections - progressing  Current: NA      2.  Pt will increase right and left AROM to [facility] to be able to complete ADLs without pain. Eval:     Left Right   Dorsiflexion -2 -20   Plantarflexion 60 34   Inversion 22 8   Eversion 10 12       Last PN:      Left Right   Dorsiflexion 2 -2   Plantarflexion 55 50   Inversion 22 18   Eversion 10 25     Current: NA        3.  Pt right and left ankle strength will improve to >=4+/5  to allow pt to return to the gym. Eval:  Left  Right   4+/5 2/5 p!   1 HR p! 0 HR p!   4+/5 2/5 p!   4+/5 2/5 p!   4+/5 2/5 p! Current: 4+/5 in all planes with p! Only with IR on right - progressing      4. Pt will be able to workout at the gym with pain bilaterally <1/10 in order to return to PLOF. Eval: Pt unable to go to gym due to pain  Last PN: Pt reports 1 mile run/jog today without pain - progressing   Current: Gym 1x per week without pain - progressing       5.  Pt FOTO score will increase by >12 points to show improvement in subjective function.   Eval:32  LastPN: 83 - MET      PLAN  [x]  Upgrade activities as tolerated     [x]  Continue plan of care  []  Update interventions per flow sheet       []  Discharge due to:_   []  Other:_      Micaela Palomino 9/14/2017  6:58 PM    Future Appointments  Date Time Provider [place]   9/18/2017 6:00 PM Micaela Palomino [facility]   9/21/2017 6:00 PM Maik HONEYCUTT THE [facility]   9/25/2017 6:00 [facility]   9/28/2017 6:00 PM Micaela Kilgore Candelario [facility]PSYDNEE THE [facility]

## 2017-09-18 ENCOUNTER — HOSPITAL ENCOUNTER (OUTPATIENT)
Dept: PHYSICAL THERAPY | Age: 37
Discharge: HOME | End: 2017-09-18
Payer: MEDICAID

## 2017-09-18 PROCEDURE — 97530 THERAPEUTIC ACTIVITIES: CPT

## 2017-09-18 PROCEDURE — 97110 THERAPEUTIC EXERCISES: CPT

## 2017-09-18 NOTE — PROGRESS NOTES
PT DAILY TREATMENT NOTE     Patient Name: Radha Rock  Date:2017  : 1980  [x]  Patient  Verified  Payor: BLUE CROSS MEDICAID / Plan: Aaron Fredricks / Product Type: Managed Care Medicaid /    In time:6:00  Out time:6:50  Total Treatment Time (min): 50  Visit #: 8 of 16    Treatment Area: Bilateral ankle pain [M25.571, M25.572]    SUBJECTIVE  Pain Level (0-10 scale): 8  Any medication changes, allergies to medications, adverse drug reactions, diagnosis change, or new procedure performed?: [x] No    [] Yes (see summary sheet for update)  Subjective functional status/changes:   [] No changes reported  \"My left foot really hurts today and has been since Sat for no reason. My doctor thinks I might diabetic neuropathy so I have an appt soon with my PCP for that. \"    OBJECTIVE      37 min Therapeutic Exercise:  [x] See flow sheet :   Rationale: increase ROM, increase strength and improve coordination to improve the patients ability to complete daily activities with decreased pain and symptom levels. 13 min Therapeutic Activity:  [x]  See flow sheet :   Rationale: increase ROM, increase strength, improve coordination and increase proprioception  to improve the patients ability to complete daily activities with decreased pain and symptom levels. With   [] TE   [] TA   [] neuro   [] other: Patient Education: [x] Review HEP    [] Progressed/Changed HEP based on:   [] positioning   [] body mechanics   [] transfers   [] heat/ice application    [] other:      Other Objective/Functional Measures: 2+ pitting edema bilaterally      Pain Level (0-10 scale) post treatment: 8    ASSESSMENT/Changes in Function: Pt reported increased pain today so held off on step ups and SL balance on left today. Will resume next visit if pain decreases.      Patient will continue to benefit from skilled PT services to modify and progress therapeutic interventions, address functional mobility deficits, address ROM deficits, address strength deficits, analyze and modify body mechanics/ergonomics, address imbalance/dizziness and instruct in home and community integration to attain remaining goals. []  See Plan of Care  []  See progress note/recertification  []  See Discharge Summary         Progress towards goals / Updated goals:  Long Term Goals: LTG- To be accomplished in 8 week(s):  1.  Pt will demonstrate ability to complete SLS on right/left for >60sec to allow pt to return to gym without pain. Eval: right: 0sec, left 2sec  Last PN: 30 seconds on left with 1-2 corrections, 30 seconds on right with at least 4 corrections - progressing  Current: NA      2.  Pt will increase right and left AROM to [facility] to be able to complete ADLs without pain. Eval:     Left Right   Dorsiflexion -2 -20   Plantarflexion 60 34   Inversion 22 8   Eversion 10 12       Last PN:      Left Right   Dorsiflexion 2 -2   Plantarflexion 55 50   Inversion 22 18   Eversion 10 25      Current: NA        3.  Pt right and left ankle strength will improve to >=4+/5  to allow pt to return to the gym. Eval:  Left  Right   4+/5 2/5 p!   1 HR p! 0 HR p!   4+/5 2/5 p!   4+/5 2/5 p!   4+/5 2/5 p! Current: 4+/5 in all planes with p! Only with IR on right - progressing      4. Pt will be able to workout at the gym with pain bilaterally <1/10 in order to return to OF. Eval: Pt unable to go to gym due to pain  Last PN: Pt reports 1 mile run/jog today without pain - progressing   Current: Gym 1x per week without pain - progressing       5.  Pt FOTO score will increase by >12 points to show improvement in subjective function.   Eval:32  LastPN: 80 - MET            PLAN  [x]  Upgrade activities as tolerated     [x]  Continue plan of care  []  Update interventions per flow sheet       []  Discharge due to:_  []  Other:_      Jossue Escobar 9/18/2017  6:59 PM    Future Appointments  Date Time Provider [place]   9/21/2017 6:00 PM Thai Palomino [facility]PTBW THE [facility]   9/25/2017 6:00 PM Thai Palomino [facility]   9/28/2017 6:00 PM Thai Palomino [facility]

## 2017-09-21 ENCOUNTER — HOSPITAL ENCOUNTER (OUTPATIENT)
Dept: PHYSICAL THERAPY | Age: 37
Discharge: HOME | End: 2017-09-21
Payer: MEDICAID

## 2017-09-21 PROCEDURE — 97530 THERAPEUTIC ACTIVITIES: CPT

## 2017-09-21 PROCEDURE — 97110 THERAPEUTIC EXERCISES: CPT

## 2017-09-21 NOTE — PROGRESS NOTES
PT DAILY TREATMENT NOTE     Patient Name: Emmy Dan  Date:2017  : 1980  [x]  Patient  Verified  Payor: BLUE CROSS MEDICAID / [facility] / Product Type: Managed Care Medicaid /    In time:5:52  Out time: 6:45  Total Treatment Time (min): 53  Visit #: 9 of 16    Treatment Area: Bilateral ankle pain [M25.571, M25.572]    SUBJECTIVE  Pain Level (0-10 scale): 0  Any medication changes, allergies to medications, adverse drug reactions, diagnosis change, or new procedure performed?: [x] No    [] Yes (see summary sheet for update)  Subjective functional status/changes:   [] No changes reported  \"Today is a good day since I have no pain. It's crazy how it switches like that. \"    OBJECTIVE  41 min Therapeutic Exercise:  [x] See flow sheet :   Rationale: increase ROM, increase strength and improve coordination to improve the patients ability to complete daily activities with decreased pain and symptom levels. 12 min Therapeutic Activity:  [x]  See flow sheet :   Rationale: increase ROM, increase strength, improve coordination, improve balance and increase proprioception  to improve the patients ability to complete daily activities with decreased pain and symptom levels. With   [] TE   [] TA   [] neuro   [] other: Patient Education: [x] Review HEP    [] Progressed/Changed HEP based on:   [] positioning   [] body mechanics   [] transfers   [] heat/ice application    [] other:      Other Objective/Functional Measures: Improved SL balance bilaterally, able to indep move big toe with left > right     Pain Level (0-10 scale) post treatment: 0    ASSESSMENT/Changes in Function: SL balance is improving with 1-2 corrections for each leg. Added foam to increase challenge today. Updated HEP today as well to include midfoot mobility exercises and plantar fascia stretch. Pt reports she has appt with PCP about possible neuropathy in both feet 10/24/17.      Patient will continue to benefit from skilled PT services to modify and progress therapeutic interventions, address functional mobility deficits, address ROM deficits, address strength deficits, analyze and modify body mechanics/ergonomics, address imbalance/dizziness and instruct in home and community integration to attain remaining goals. []  See Plan of Care  []  See progress note/recertification  []  See Discharge Summary         Progress towards goals / Updated goals:  Long Term Goals: LTG- To be accomplished in 8 week(s):  1.  Pt will demonstrate ability to complete SLS on right/left for >60sec to allow pt to return to gym without pain. Eval: right: 0sec, left 2sec  Last PN: 30 seconds on left with 1-2 corrections, 30 seconds on right with at least 4 corrections - progressing  Current: left 1 correction, right 2 corrections - progressing        2.  Pt will increase right and left AROM to [facility] to be able to complete ADLs without pain. Eval:     Left Right   Dorsiflexion -2 -20   Plantarflexion 60 34   Inversion 22 8   Eversion 10 12       Last PN:      Left Right   Dorsiflexion 2 -2   Plantarflexion 55 50   Inversion 22 18   Eversion 10 25       Current: NA        3.  Pt right and left ankle strength will improve to >=4+/5  to allow pt to return to the gym. Eval:  Left  Right   4+/5 2/5 p!   1 HR p! 0 HR p!   4+/5 2/5 p!   4+/5 2/5 p!   4+/5 2/5 p! Current: 4+/5 in all planes with p! Only with IR on right - progressing      4. Pt will be able to workout at the gym with pain bilaterally <1/10 in order to return to PLOF. Eval: Pt unable to go to gym due to pain  Last PN: Pt reports 1 mile run/jog today without pain - progressing   Current: Gym 1x per week without pain - progressing       5.  Pt FOTO score will increase by >12 points to show improvement in subjective function.   Eval:32  LastPN: 83 - MET      PLAN  [x]  Upgrade activities as tolerated     [x]  Continue plan of care  []  Update interventions per flow sheet       []  Discharge due to:_  []  Other:_      Leon Palomino 9/21/2017  5:55 PM    Future Appointments  Date Time Provider [place]   9/21/2017 6:00 PM Maik HONEYCUTT THE [facility]   9/25/2017 6:00 PM Leon HONE[facility]CUTT THE [facility]   9/28/2017 6:00 PM Leon HONEYCUTT THE [facility]

## 2017-09-25 ENCOUNTER — HOSPITAL ENCOUNTER (OUTPATIENT)
Dept: PHYSICAL THERAPY | Age: 37
Discharge: HOME | End: 2017-09-25
Payer: MEDICAID

## 2017-09-28 ENCOUNTER — HOSPITAL ENCOUNTER (OUTPATIENT)
Dept: PHYSICAL THERAPY | Age: 37
Discharge: HOME | End: 2017-09-28
Payer: MEDICAID

## 2017-09-28 PROCEDURE — 97110 THERAPEUTIC EXERCISES: CPT

## 2017-09-28 PROCEDURE — 97530 THERAPEUTIC ACTIVITIES: CPT

## 2017-09-28 NOTE — PROGRESS NOTES
PT DAILY TREATMENT NOTE     Patient Name: Eunice Marie  Date:2017  : 1980  [x]  Patient  Verified  Payor: BLUE CROSS MEDICAID / Plan: Kavita Cote / Product Type: Managed Care Medicaid /    In time:6:00  Out time:6:53  Total Treatment Time (min): 53  Visit #: 10 of 16    Treatment Area: Bilateral ankle pain [M25.571, M25.572]    SUBJECTIVE  Pain Level (0-10 scale): 0  Any medication changes, allergies to medications, adverse drug reactions, diagnosis change, or new procedure performed?: [x] No    [] Yes (see summary sheet for update)  Subjective functional status/changes:   [] No changes reported  \"When I don;t have any pain I feel great and can do anything. I am still going to the gym 1-2 times a week. \"    OBJECTIVE    37 min Therapeutic Exercise:  [x] See flow sheet :   Rationale: increase ROM, increase strength and improve coordination to improve the patients ability to complete daily activities with decreased pain and symptom levels. 16 min Therapeutic Activity:  [x]  See flow sheet :   Rationale: increase ROM, increase strength, improve coordination, improve balance and increase proprioception  to improve the patients ability to complete daily activities with decreased pain and symptom levels. With   [] TE   [] TA   [] neuro   [] other: Patient Education: [x] Review HEP    [] Progressed/Changed HEP based on:   [] positioning   [] body mechanics   [] transfers   [] heat/ice application    [] other:      Other Objective/Functional Measures: dec midfoot mobility left > right, Pitting edema in bilateral ankles and feet 2+    Pain Level (0-10 scale) post treatment: 0    ASSESSMENT/Changes in Function: Continues to tolerate current program without pain. Midfoot mobility still decreased possibly due to swelling. Discussed decreasing to once per week if continued no pain.       Patient will continue to benefit from skilled PT services to modify and progress therapeutic interventions, address functional mobility deficits, address ROM deficits, address strength deficits, analyze and modify body mechanics/ergonomics, address imbalance/dizziness and instruct in home and community integration to attain remaining goals. []  See Plan of Care  []  See progress note/recertification  []  See Discharge Summary         Progress towards goals / Updated goals:  Long Term Goals: LTG- To be accomplished in 8 week(s):  1.  Pt will demonstrate ability to complete SLS on right/left for >60sec to allow pt to return to gym without pain. Eval: right: 0sec, left 2sec  Last PN: 30 seconds on left with 1-2 corrections, 30 seconds on right with at least 4 corrections - progressing  Current: left 1 correction, right 2 corrections with foam- progressing         2.  Pt will increase right and left AROM to [facility] to be able to complete ADLs without pain. Eval:     Left Right   Dorsiflexion -2 -20   Plantarflexion 60 34   Inversion 22 8   Eversion 10 12       Last PN:      Left Right   Dorsiflexion 2 -2   Plantarflexion 55 50   Inversion 22 18   Eversion 10 25       Current: NA        3.  Pt right and left ankle strength will improve to >=4+/5  to allow pt to return to the gym. Eval:  Left  Right   4+/5 2/5 p!   1 HR p! 0 HR p!   4+/5 2/5 p!   4+/5 2/5 p!   4+/5 2/5 p! Current: 4+/5 in all planes with p! Only with IR on right - progressing      4. Pt will be able to workout at the gym with pain bilaterally <1/10 in order to return to PLOF. Eval: Pt unable to go to gym due to pain  Last PN: Pt reports 1 mile run/jog today without pain - progressing   Current: Gym 1x per week without pain - progressing       5.  Pt FOTO score will increase by >12 points to show improvement in subjective function.   Eval:32  LastPN: 83 - MET  Current: 80 - slight regression       PLAN  [x]  Upgrade activities as tolerated     [x]  Continue plan of care  []  Update interventions per flow sheet       []  Discharge due to:_  []  Other:_      Kortney Cornea Remesic 9/28/2017  6:59 PM    No future appointments.

## 2017-11-07 NOTE — PROGRESS NOTES
[facility] at the [address], [place], [address] [facility]  Phone: [phone]      Fax:  [phone]    Discharge Summary    Patient name: Carissa Robles     Start of Care: 8/10/17  Referral source: Yaakov Prader., MD    : 1980  Medical/Treatment Diagnosis: Bilateral ankle pain [M25.571, M25.572]  Onset Date:17  Prior Hospitalization: see medical history   Provider#: [id number]  Comorbidities: allergies, arthritis, asthma, CHF, diabetes, heart attack, high blood pressure, stage IV CKD,knee sx, stroke  Prior Level of Function:completing daily activities without ankle pain  Medications: Verified on Patient Summary List    Visits from Start of Care: 10    Missed Visits: 3  Reporting Period : 17 to 17    Long Term Goals: LTG- To be accomplished in 8 week(s):  1.  Pt will demonstrate ability to complete SLS on right/left for >60sec to allow pt to return to gym without pain. Current: left 1 correction, right 2 corrections with foam- progressing          2.  Pt will increase right and left AROM to [facility] to be able to complete ADLs without pain. Current: progressing      Left Right   Dorsiflexion 2 -2   Plantarflexion 55 50   Inversion 22 18   Eversion 10 25             3.  Pt right and left ankle strength will improve to >=4+/5  to allow pt to return to the gym. Current: 4+/5 in all planes with p! Only with IR on right - progressing      4. Pt will be able to workout at the gym with pain bilaterally <1/10 in order to return to PLOF. Current: Gym 1x per week without pain - progressing       5.  Pt FOTO score will increase by >12 points to show improvement in subjective function. Eval:32  Current: 80 - MET       Assessment/ Summary of Care: Pt is a 46yo female that presented to therapy with c/c bilateral ankle pain right > left starting 17.  Pt attended 10 sessions making progress towards goals such as improved bilateral ankle AROM, strength, SL balance and being able to return to the gym with less pain. Pt is ready to be discharged at this time due to progress towards goals and pt wanting to be done with therapy at current time. Pt educated to continue with HEP at home and call if any questions or concerns regarding symptoms.   Thanks for the referral!    RECOMMENDATIONS:  [x]Discontinue therapy: [x]Patient has reached or is progressing toward set goals      []Patient is non-compliant or has abdicated      []Due to lack of appreciable progress towards set goals    Jsoe COFFEY Remesic 11/7/2017 9:22 AM

## 2018-09-11 ENCOUNTER — HOSPITAL ENCOUNTER (EMERGENCY)
Age: 38
Discharge: HOME | End: 2018-09-11
Attending: EMERGENCY MEDICINE
Payer: COMMERCIAL

## 2018-09-11 VITALS
HEART RATE: 111 BPM | RESPIRATION RATE: 16 BRPM | BODY MASS INDEX: 33.43 KG/M2 | WEIGHT: 213 LBS | HEIGHT: 67 IN | DIASTOLIC BLOOD PRESSURE: 83 MMHG | SYSTOLIC BLOOD PRESSURE: 148 MMHG | TEMPERATURE: 98.7 F | OXYGEN SATURATION: 100 %

## 2018-09-11 DIAGNOSIS — D64.9 CHRONIC ANEMIA: ICD-10-CM

## 2018-09-11 DIAGNOSIS — R00.0 TACHYCARDIA: ICD-10-CM

## 2018-09-11 DIAGNOSIS — R10.13 ABDOMINAL PAIN, EPIGASTRIC: Primary | ICD-10-CM

## 2018-09-11 DIAGNOSIS — N18.4 CKD (CHRONIC KIDNEY DISEASE) STAGE 4, GFR 15-29 ML/MIN (HCC): ICD-10-CM

## 2018-09-11 LAB
ALBUMIN SERPL-MCNC: 3.5 G/DL (ref 3.4–5)
ALBUMIN/GLOB SERPL: 0.7 {RATIO} (ref 0.8–1.7)
ALP SERPL-CCNC: 76 U/L (ref 45–117)
ALT SERPL-CCNC: 47 U/L (ref 13–56)
ANION GAP SERPL CALC-SCNC: 15 MMOL/L (ref 3–18)
AST SERPL-CCNC: 42 U/L (ref 15–37)
BASOPHILS # BLD: 0 K/UL (ref 0–0.1)
BASOPHILS NFR BLD: 0 % (ref 0–2)
BILIRUB SERPL-MCNC: 0.2 MG/DL (ref 0.2–1)
BUN SERPL-MCNC: 60 MG/DL (ref 7–18)
BUN/CREAT SERPL: 14 (ref 12–20)
CALCIUM SERPL-MCNC: 10.8 MG/DL (ref 8.5–10.1)
CHLORIDE SERPL-SCNC: 96 MMOL/L (ref 100–108)
CK MB CFR SERPL CALC: 1.1 % (ref 0–4)
CK MB SERPL-MCNC: 10.6 NG/ML (ref 5–25)
CK SERPL-CCNC: 991 U/L (ref 26–192)
CO2 SERPL-SCNC: 23 MMOL/L (ref 21–32)
CREAT SERPL-MCNC: 4.17 MG/DL (ref 0.6–1.3)
DIFFERENTIAL METHOD BLD: ABNORMAL
EOSINOPHIL # BLD: 0.2 K/UL (ref 0–0.4)
EOSINOPHIL NFR BLD: 3 % (ref 0–5)
ERYTHROCYTE [DISTWIDTH] IN BLOOD BY AUTOMATED COUNT: 16 % (ref 11.6–14.5)
GLOBULIN SER CALC-MCNC: 4.8 G/DL (ref 2–4)
GLUCOSE SERPL-MCNC: 319 MG/DL (ref 74–99)
HCT VFR BLD AUTO: 29.6 % (ref 35–45)
HGB BLD-MCNC: 9.6 G/DL (ref 12–16)
LYMPHOCYTES # BLD: 3.3 K/UL (ref 0.9–3.6)
LYMPHOCYTES NFR BLD: 35 % (ref 21–52)
MCH RBC QN AUTO: 24.9 PG (ref 24–34)
MCHC RBC AUTO-ENTMCNC: 32.4 G/DL (ref 31–37)
MCV RBC AUTO: 76.9 FL (ref 74–97)
MONOCYTES # BLD: 0.6 K/UL (ref 0.05–1.2)
MONOCYTES NFR BLD: 6 % (ref 3–10)
NEUTS SEG # BLD: 5.3 K/UL (ref 1.8–8)
NEUTS SEG NFR BLD: 56 % (ref 40–73)
PLATELET # BLD AUTO: 330 K/UL (ref 135–420)
PMV BLD AUTO: 10.6 FL (ref 9.2–11.8)
POTASSIUM SERPL-SCNC: 3.8 MMOL/L (ref 3.5–5.5)
PROT SERPL-MCNC: 8.3 G/DL (ref 6.4–8.2)
RBC # BLD AUTO: 3.85 M/UL (ref 4.2–5.3)
SODIUM SERPL-SCNC: 134 MMOL/L (ref 136–145)
TROPONIN I SERPL-MCNC: 0.04 NG/ML (ref 0–0.06)
WBC # BLD AUTO: 9.4 K/UL (ref 4.6–13.2)

## 2018-09-11 PROCEDURE — 80053 COMPREHEN METABOLIC PANEL: CPT | Performed by: EMERGENCY MEDICINE

## 2018-09-11 PROCEDURE — 82550 ASSAY OF CK (CPK): CPT | Performed by: EMERGENCY MEDICINE

## 2018-09-11 PROCEDURE — 99285 EMERGENCY DEPT VISIT HI MDM: CPT

## 2018-09-11 PROCEDURE — 74011000250 HC RX REV CODE- 250: Performed by: EMERGENCY MEDICINE

## 2018-09-11 PROCEDURE — 85025 COMPLETE CBC W/AUTO DIFF WBC: CPT | Performed by: EMERGENCY MEDICINE

## 2018-09-11 PROCEDURE — 96375 TX/PRO/DX INJ NEW DRUG ADDON: CPT

## 2018-09-11 PROCEDURE — 96374 THER/PROPH/DIAG INJ IV PUSH: CPT

## 2018-09-11 PROCEDURE — 93005 ELECTROCARDIOGRAM TRACING: CPT

## 2018-09-11 PROCEDURE — 74011250637 HC RX REV CODE- 250/637: Performed by: EMERGENCY MEDICINE

## 2018-09-11 RX ORDER — METOPROLOL TARTRATE 5 MG/5ML
5 INJECTION INTRAVENOUS
Status: COMPLETED | OUTPATIENT
Start: 2018-09-11 | End: 2018-09-11

## 2018-09-11 RX ORDER — RANITIDINE 150 MG/1
300 TABLET, FILM COATED ORAL 2 TIMES DAILY
Qty: 30 TAB | Refills: 0 | Status: SHIPPED | OUTPATIENT
Start: 2018-09-11 | End: 2018-09-14

## 2018-09-11 RX ORDER — FAMOTIDINE 10 MG/ML
20 INJECTION INTRAVENOUS
Status: COMPLETED | OUTPATIENT
Start: 2018-09-11 | End: 2018-09-11

## 2018-09-11 RX ADMIN — METOPROLOL TARTRATE 5 MG: 5 INJECTION, SOLUTION INTRAVENOUS at 05:55

## 2018-09-11 RX ADMIN — FAMOTIDINE 20 MG: 10 INJECTION, SOLUTION INTRAVENOUS at 05:55

## 2018-09-11 RX ADMIN — Medication 30 ML: at 05:55

## 2018-09-11 NOTE — ED NOTES
Pt hourly rounding competed. Safety Pt (x) resting on stretcher with side rails up and call bell in reach. () in chair 
  () in parents arms. Toileting Pt offered ()Bedpan 
   ()Assistance to Restroom 
   ()Urinal 
Ongoing Updates Updated on plan of care and status of test results. Pain Management Inquired as to comfort and offered comfort measures: 
  () warm blankets (x) dimmed lights

## 2018-09-11 NOTE — ED PROVIDER NOTES
EMERGENCY DEPARTMENT HISTORY AND PHYSICAL EXAM 
 
Date: 9/11/2018 Patient Name: Cole Cook History of Presenting Illness Chief Complaint Patient presents with  Chest Pain History Provided By: Patient Chief Complaint: CP 
Duration: 4 Days Timing:  Acute Location: Central 
Quality: Burning Severity: Mild Modifying Factors: No modifying factors Associated Symptoms: tingling in bilateral arms Additional History (Context):  
4:50 AM 
Cole Cook is a 40 y.o. female with PMHX of HTN, Chronic kidney disease who presents via EMS to the emergency department C/O burning central CP onset days ago. Associated sxs include tingling in bilateral arms. Pt states that she had a stress test 5 months ago. Pt denies leg swelling, cough, diaphoresis, n/v, SOB, and any other sxs or complaints. PCP: Shawanda Almendarez MD 
 
 
 
Past History Past Medical History: 
Past Medical History:  
Diagnosis Date  Chronic kidney disease  Hypertension  Tachycardia Past Surgical History: 
History reviewed. No pertinent surgical history. Family History: 
History reviewed. No pertinent family history. Social History: 
Social History Substance Use Topics  Smoking status: Never Smoker  Smokeless tobacco: Never Used  Alcohol use Yes Comment: rarely Allergies: Allergies Allergen Reactions  Coreg [Carvedilol] Nausea and Vomiting  Lortab [Hydrocodone-Acetaminophen] Nausea and Vomiting  Zofran (As Hydrochloride) [Ondansetron Hcl] Hives Review of Systems Review of Systems Constitutional: Negative for chills, diaphoresis and fever. Respiratory: Negative for cough and shortness of breath. Cardiovascular: Positive for chest pain. Negative for leg swelling. Gastrointestinal: Negative for nausea and vomiting. Neurological:  
     (+) tingling in bilateral arms All other systems reviewed and are negative. Physical Exam  
 
Vitals: 09/11/18 0410 09/11/18 8787 09/11/18 0440 09/11/18 5326 BP: 152/81 134/77 148/74 148/83 Pulse: (!) 102 100 100 (!) 103 Resp: 14 Temp:      
SpO2: 99% 99% 99% Weight:      
Height:      
 
Physical Exam  
Constitutional: She is oriented to person, place, and time. She appears well-developed and well-nourished. No distress. HENT:  
Head: Normocephalic and atraumatic. Right Ear: External ear normal.  
Left Ear: External ear normal.  
Mouth/Throat: Oropharynx is clear and moist. No oropharyngeal exudate. Eyes: Conjunctivae and EOM are normal. Pupils are equal, round, and reactive to light. No scleral icterus. No pallor Neck: Normal range of motion. Neck supple. No JVD present. No tracheal deviation present. No thyromegaly present. Cardiovascular: Regular rhythm and normal heart sounds. Tachycardia present. Pulmonary/Chest: Effort normal and breath sounds normal. No stridor. No respiratory distress. Abdominal: Soft. Bowel sounds are normal. She exhibits no distension. There is no tenderness. There is no rebound and no guarding. Musculoskeletal: Normal range of motion. She exhibits no edema or tenderness. No soft tissue injuries Lymphadenopathy:  
  She has no cervical adenopathy. Neurological: She is alert and oriented to person, place, and time. She has normal reflexes. No cranial nerve deficit. Coordination normal.  
Skin: Skin is warm and dry. No rash noted. She is not diaphoretic. No erythema. Psychiatric: She has a normal mood and affect. Her behavior is normal. Judgment and thought content normal.  
Nursing note and vitals reviewed. Diagnostic Study Results Labs - Recent Results (from the past 12 hour(s)) EKG, 12 LEAD, INITIAL Collection Time: 09/11/18  2:29 AM  
Result Value Ref Range Ventricular Rate 114 BPM  
 Atrial Rate 114 BPM  
 P-R Interval 140 ms QRS Duration 104 ms Q-T Interval 364 ms QTC Calculation (Bezet) 501 ms Calculated P Axis 73 degrees Calculated R Axis -38 degrees Calculated T Axis 73 degrees Diagnosis Sinus tachycardia Left axis deviation Abnormal ECG No previous ECGs available CBC WITH AUTOMATED DIFF Collection Time: 09/11/18  2:34 AM  
Result Value Ref Range WBC 9.4 4.6 - 13.2 K/uL  
 RBC 3.85 (L) 4.20 - 5.30 M/uL HGB 9.6 (L) 12.0 - 16.0 g/dL HCT 29.6 (L) 35.0 - 45.0 % MCV 76.9 74.0 - 97.0 FL  
 MCH 24.9 24.0 - 34.0 PG  
 MCHC 32.4 31.0 - 37.0 g/dL  
 RDW 16.0 (H) 11.6 - 14.5 % PLATELET 586 763 - 944 K/uL MPV 10.6 9.2 - 11.8 FL  
 NEUTROPHILS 56 40 - 73 % LYMPHOCYTES 35 21 - 52 % MONOCYTES 6 3 - 10 % EOSINOPHILS 3 0 - 5 % BASOPHILS 0 0 - 2 %  
 ABS. NEUTROPHILS 5.3 1.8 - 8.0 K/UL  
 ABS. LYMPHOCYTES 3.3 0.9 - 3.6 K/UL  
 ABS. MONOCYTES 0.6 0.05 - 1.2 K/UL  
 ABS. EOSINOPHILS 0.2 0.0 - 0.4 K/UL  
 ABS. BASOPHILS 0.0 0.0 - 0.1 K/UL  
 DF AUTOMATED METABOLIC PANEL, COMPREHENSIVE Collection Time: 09/11/18  2:34 AM  
Result Value Ref Range Sodium 134 (L) 136 - 145 mmol/L Potassium 3.8 3.5 - 5.5 mmol/L Chloride 96 (L) 100 - 108 mmol/L  
 CO2 23 21 - 32 mmol/L Anion gap 15 3.0 - 18 mmol/L Glucose 319 (H) 74 - 99 mg/dL BUN 60 (H) 7.0 - 18 MG/DL Creatinine 4.17 (H) 0.6 - 1.3 MG/DL  
 BUN/Creatinine ratio 14 12 - 20 GFR est AA 15 (L) >60 ml/min/1.73m2 GFR est non-AA 12 (L) >60 ml/min/1.73m2 Calcium 10.8 (H) 8.5 - 10.1 MG/DL Bilirubin, total 0.2 0.2 - 1.0 MG/DL  
 ALT (SGPT) 47 13 - 56 U/L  
 AST (SGOT) 42 (H) 15 - 37 U/L Alk. phosphatase 76 45 - 117 U/L Protein, total 8.3 (H) 6.4 - 8.2 g/dL Albumin 3.5 3.4 - 5.0 g/dL Globulin 4.8 (H) 2.0 - 4.0 g/dL A-G Ratio 0.7 (L) 0.8 - 1.7 CARDIAC PANEL,(CK, CKMB & TROPONIN) Collection Time: 09/11/18  2:34 AM  
Result Value Ref Range  (H) 26 - 192 U/L  
 CK - MB 10.6 (H) <3.6 ng/ml CK-MB Index 1.1 0.0 - 4.0 %  Troponin-I, Qt. 0.04 0.00 - 0.06 NG/ML  
 
 
 Radiologic Studies - No orders to display CT Results  (Last 48 hours) None CXR Results  (Last 48 hours) None Medications given in the ED- Medications  
metoprolol (LOPRESSOR) injection 5 mg (5 mg IntraVENous Given 9/11/18 0555) GI COCKTAIL [facility]) (30 mL Oral Given 9/11/18 0555) famotidine (PF) (PEPCID) injection 20 mg (20 mg IntraVENous Given 9/11/18 0555) Medical Decision Making I am the first provider for this patient. I reviewed the vital signs, available nursing notes, past medical history, past surgical history, family history and social history. Vital Signs-Reviewed the patient's vital signs. Pulse Oximetry Analysis - 99% on RA Cardiac Monitor: 
Rate: 114 bpm 
Rhythm: Sinus Tachycardia EKG interpretation: (Preliminary) 4:39 AM  
Sinus tachycardia, 114 bpm, Left axis deviation, Borderline QRS 
EKG read by Hever Olivo. Genia Petersen MD at 5:15 AM  
 
Records Reviewed: Nursing Notes Provider Notes (Medical Decision Making):  
Ddx: Sxs are likely gastritis and reflux however renal disease, tachycardia are concerning for PE, ACS, arrhythmia,  
 
Procedures: 
Procedures ED Course:  
4:50 AM Initial assessment performed. The patients presenting problems have been discussed, and they are in agreement with the care plan formulated and outlined with them. I have encouraged them to ask questions as they arise throughout their visit. Diagnosis and Disposition DISCHARGE NOTE: 
5:59 AM 
Imani Alexander's  results have been reviewed with her. She has been counseled regarding her diagnosis, treatment, and plan. She verbally conveys understanding and agreement of the signs, symptoms, diagnosis, treatment and prognosis and additionally agrees to follow up as discussed. She also agrees with the care-plan and conveys that all of her questions have been answered.   I have also provided discharge instructions for her that include: educational information regarding their diagnosis and treatment, and list of reasons why they would want to return to the ED prior to their follow-up appointment, should her condition change. She has been provided with education for proper emergency department utilization. CLINICAL IMPRESSION: 
 
1. Abdominal pain, epigastric 2. Tachycardia 3. Chronic anemia 4. CKD (chronic kidney disease) stage 4, GFR 15-29 ml/min ([facility]) PLAN: 
1. D/C Home 2. Current Discharge Medication List  
  
START taking these medications Details  
raNITIdine (ZANTAC) 150 mg tablet Take 2 Tabs by mouth two (2) times a day for 3 days. Then as needed 
Qty: 30 Tab, Refills: 0  
  
  
 
3. Follow-up Information Follow up With Details Comments Contact Info Bennie Sandoval MD Schedule an appointment as soon as possible for a visit in 2 days For follow up with cardiologist [address] [place] [id number] [phone] [facility] EMERGENCY DEPT Go to As needed, if symptoms worsen [address] [place] [id number] 
[phone]  
  
 
_______________________________ Attestations: This note is prepared by Kareen Mc, acting as Scribe for Prabhu Mujica. Natalya Cox MD. Prabhu Mujica. Natalya Cox MD:  The scribe's documentation has been prepared under my direction and personally reviewed by me in its entirety. I confirm that the note above accurately reflects all work, treatment, procedures, and medical decision making performed by me. 
_______________________________

## 2018-09-11 NOTE — ED TRIAGE NOTES
Pt brought to ED by EMS c/c intermittent mid sternal chesdt pain onset Friday. Pt reports pain is a burning sensation that radiates to bilateral arms.

## 2018-09-11 NOTE — DISCHARGE INSTRUCTIONS
Abdominal Pain: Care Instructions  Your Care Instructions    Abdominal pain has many possible causes. Some aren't serious and get better on their own in a few days. Others need more testing and treatment. If your pain continues or gets worse, you need to be rechecked and may need more tests to find out what is wrong. You may need surgery to correct the problem. Don't ignore new symptoms, such as fever, nausea and vomiting, urination problems, pain that gets worse, and dizziness. These may be signs of a more serious problem. Your doctor may have recommended a follow-up visit in the next 8 to 12 hours. If you are not getting better, you may need more tests or treatment. The doctor has checked you carefully, but problems can develop later. If you notice any problems or new symptoms, get medical treatment right away. Follow-up care is a key part of your treatment and safety. Be sure to make and go to all appointments, and call your doctor if you are having problems. It's also a good idea to know your test results and keep a list of the medicines you take. How can you care for yourself at home? · Rest until you feel better. · To prevent dehydration, drink plenty of fluids, enough so that your urine is light yellow or clear like water. Choose water and other caffeine-free clear liquids until you feel better. If you have kidney, heart, or liver disease and have to limit fluids, talk with your doctor before you increase the amount of fluids you drink. · If your stomach is upset, eat mild foods, such as rice, dry toast or crackers, bananas, and applesauce. Try eating several small meals instead of two or three large ones. · Wait until 48 hours after all symptoms have gone away before you have spicy foods, alcohol, and drinks that contain caffeine. · Do not eat foods that are high in fat. · Avoid anti-inflammatory medicines such as aspirin, ibuprofen (Advil, Motrin), and naproxen (Aleve).  These can cause stomach upset. Talk to your doctor if you take daily aspirin for another health problem. When should you call for help? Call 911 anytime you think you may need emergency care. For example, call if:    · You passed out (lost consciousness).     · You pass maroon or very bloody stools.     · You vomit blood or what looks like coffee grounds.     · You have new, severe belly pain.    Call your doctor now or seek immediate medical care if:    · Your pain gets worse, especially if it becomes focused in one area of your belly.     · You have a new or higher fever.     · Your stools are black and look like tar, or they have streaks of blood.     · You have unexpected vaginal bleeding.     · You have symptoms of a urinary tract infection. These may include:  ¨ Pain when you urinate. ¨ Urinating more often than usual.  ¨ Blood in your urine.     · You are dizzy or lightheaded, or you feel like you may faint.    Watch closely for changes in your health, and be sure to contact your doctor if:    · You are not getting better after 1 day (24 hours). Where can you learn more? Go to http://[facility]arrison.info/. Enter I176 in the search box to learn more about \"Abdominal Pain: Care Instructions. \"  Current as of: November 20, 2017  Content Version: 11.7  © 5183-5548 [employer]. Care instructions adapted under license by [employer] (which disclaims liability or warranty for this information). If you have questions about a medical condition or this instruction, always ask your healthcare professional. [place] [address] any warranty or liability for your use of this information. Anemia: Care Instructions  Your Care Instructions    Anemia is a low level of red blood cells, which carry oxygen throughout your body. Many things can cause anemia. Lack of iron is one of the most common causes.  Your body needs iron to make hemoglobin, a substance in red blood cells that carries oxygen from the lungs to your body's cells. Without enough iron, the body produces fewer and smaller red blood cells. As a result, your body's cells do not get enough oxygen, and you feel tired and weak. And you may have trouble concentrating. Bleeding is the most common cause of a lack of iron. You may have heavy menstrual bleeding or bleeding caused by conditions such as ulcers, hemorrhoids, or cancer. Regular use of aspirin or other anti-inflammatory medicines (such as ibuprofen) also can cause bleeding in some people. A lack of iron in your diet also can cause anemia, especially at times when the body needs more iron, such as during pregnancy, infancy, and the teen years. Your doctor may have prescribed iron pills. It may take several months of treatment for your iron levels to return to normal. Your doctor also may suggest that you eat foods that are rich in iron, such as meat and beans. There are many other causes of anemia. It is not always due to a lack of iron. Finding the specific cause of your anemia will help your doctor find the right treatment for you. Follow-up care is a key part of your treatment and safety. Be sure to make and go to all appointments, and call your doctor if you are having problems. It's also a good idea to know your test results and keep a list of the medicines you take. How can you care for yourself at home? · Take your medicines exactly as prescribed. Call your doctor if you think you are having a problem with your medicine. · If your doctor recommends iron pills, take them as directed:  ¨ Try to take the pills on an empty stomach about 1 hour before or 2 hours after meals. But you may need to take iron with food to avoid an upset stomach. ¨ Do not take antacids or drink milk or caffeine drinks (such as coffee, tea, or cola) at the same time or within 2 hours of the time that you take your iron.  They can make it hard for your body to absorb the iron.  ¨ Vitamin C (from food or supplements) helps your body absorb iron. Try taking iron pills with a glass of orange juice or some other food that is high in vitamin C, such as citrus fruits. ¨ Iron pills may cause stomach problems, such as heartburn, nausea, diarrhea, constipation, and cramps. Be sure to drink plenty of fluids, and include fruits, vegetables, and fiber in your diet each day. Iron pills often make your bowel movements dark or green. ¨ If you forget to take an iron pill, do not take a double dose of iron the next time you take a pill. ¨ Keep iron pills out of the reach of small children. An overdose of iron can be very dangerous. · Follow your doctor's advice about eating iron-rich foods. These include red meat, shellfish, poultry, eggs, beans, raisins, whole-grain bread, and leafy green vegetables. · Steam vegetables to help them keep their iron content. When should you call for help? Call 911 anytime you think you may need emergency care. For example, call if:    · You have symptoms of a heart attack. These may include:  ¨ Chest pain or pressure, or a strange feeling in the chest.  ¨ Sweating. ¨ Shortness of breath. ¨ Nausea or vomiting. ¨ Pain, pressure, or a strange feeling in the back, neck, jaw, or upper belly or in one or both shoulders or arms. ¨ Lightheadedness or sudden weakness. ¨ A fast or irregular heartbeat. After you call 911, the  may tell you to chew 1 adult-strength or 2 to 4 low-dose aspirin. Wait for an ambulance. Do not try to drive yourself.     · You passed out (lost consciousness).    Call your doctor now or seek immediate medical care if:    · You have new or increased shortness of breath.     · You are dizzy or lightheaded, or you feel like you may faint.     · Your fatigue and weakness continue or get worse.     · You have any abnormal bleeding, such as:  ¨ Nosebleeds.   ¨ Vaginal bleeding that is different (heavier, more frequent, at a different time of the month) than what you are used to. ¨ Bloody or black stools, or rectal bleeding. ¨ Bloody or pink urine.    Watch closely for changes in your health, and be sure to contact your doctor if:    · You do not get better as expected. Where can you learn more? Go to http://pedro-harrison.info/. Enter R301 in the search box to learn more about \"Anemia: Care Instructions. \"  Current as of: October 9, 2017  Content Version: 11.7  © 2825-1742 [employer]. Care instructions adapted under license by [employer] (which disclaims liability or warranty for this information). If you have questions about a medical condition or this instruction, always ask your healthcare professional. [address] any warranty or liability for your use of this information.

## 2018-10-07 LAB
ATRIAL RATE: 114 BPM
CALCULATED P AXIS, ECG09: 73 DEGREES
CALCULATED R AXIS, ECG10: -38 DEGREES
CALCULATED T AXIS, ECG11: 73 DEGREES
DIAGNOSIS, 93000: NORMAL
P-R INTERVAL, ECG05: 140 MS
Q-T INTERVAL, ECG07: 364 MS
QRS DURATION, ECG06: 104 MS
QTC CALCULATION (BEZET), ECG08: 501 MS
VENTRICULAR RATE, ECG03: 114 BPM

## 2022-11-15 ENCOUNTER — APPOINTMENT (OUTPATIENT)
Dept: CT IMAGING | Age: 42
DRG: 190 | End: 2022-11-15
Attending: EMERGENCY MEDICINE
Payer: MEDICAID

## 2022-11-15 ENCOUNTER — APPOINTMENT (OUTPATIENT)
Dept: GENERAL RADIOLOGY | Age: 42
DRG: 190 | End: 2022-11-15
Attending: EMERGENCY MEDICINE
Payer: MEDICAID

## 2022-11-15 ENCOUNTER — HOSPITAL ENCOUNTER (INPATIENT)
Age: 42
LOS: 3 days | Discharge: HOME | DRG: 190 | End: 2022-11-18
Attending: EMERGENCY MEDICINE | Admitting: INTERNAL MEDICINE
Payer: MEDICAID

## 2022-11-15 ENCOUNTER — APPOINTMENT (OUTPATIENT)
Dept: NON INVASIVE DIAGNOSTICS | Age: 42
DRG: 190 | End: 2022-11-15
Attending: INTERNAL MEDICINE
Payer: MEDICAID

## 2022-11-15 DIAGNOSIS — J81.0 ACUTE PULMONARY EDEMA (HCC): ICD-10-CM

## 2022-11-15 DIAGNOSIS — R07.9 ACUTE CHEST PAIN: Primary | ICD-10-CM

## 2022-11-15 PROBLEM — I42.9 CARDIOMYOPATHY (HCC): Status: ACTIVE | Noted: 2022-11-15

## 2022-11-15 PROBLEM — N18.6 ESRD (END STAGE RENAL DISEASE) ON DIALYSIS (HCC): Status: ACTIVE | Noted: 2022-11-15

## 2022-11-15 PROBLEM — I10 HTN (HYPERTENSION): Status: ACTIVE | Noted: 2022-11-15

## 2022-11-15 PROBLEM — J81.1 PULMONARY EDEMA: Status: ACTIVE | Noted: 2022-11-15

## 2022-11-15 PROBLEM — I73.9 PAD (PERIPHERAL ARTERY DISEASE) (HCC): Status: ACTIVE | Noted: 2022-11-15

## 2022-11-15 PROBLEM — Z99.2 ESRD (END STAGE RENAL DISEASE) ON DIALYSIS (HCC): Status: ACTIVE | Noted: 2022-11-15

## 2022-11-15 PROBLEM — I25.10 CAD (CORONARY ARTERY DISEASE): Status: ACTIVE | Noted: 2022-11-15

## 2022-11-15 PROBLEM — E11.9 DM (DIABETES MELLITUS) (HCC): Status: ACTIVE | Noted: 2022-11-15

## 2022-11-15 LAB
ALBUMIN SERPL-MCNC: 3.3 G/DL (ref 3.4–5)
ALBUMIN/GLOB SERPL: 0.8 {RATIO} (ref 0.8–1.7)
ALP SERPL-CCNC: 139 U/L (ref 45–117)
ALT SERPL-CCNC: 20 U/L (ref 13–56)
ANION GAP SERPL CALC-SCNC: 14 MMOL/L (ref 3–18)
APTT PPP: 29.4 SEC (ref 23–36.4)
AST SERPL-CCNC: 19 U/L (ref 10–38)
ATRIAL RATE: 106 BPM
B PERT DNA SPEC QL NAA+PROBE: NOT DETECTED
BASOPHILS # BLD: 0.1 K/UL (ref 0–0.1)
BASOPHILS # BLD: 0.1 K/UL (ref 0–0.1)
BASOPHILS NFR BLD: 1 % (ref 0–2)
BASOPHILS NFR BLD: 1 % (ref 0–2)
BILIRUB SERPL-MCNC: 0.4 MG/DL (ref 0.2–1)
BNP SERPL-MCNC: ABNORMAL PG/ML (ref 0–450)
BORDETELLA PARAPERTUSSIS PCR, BORPAR: NOT DETECTED
BUN SERPL-MCNC: 97 MG/DL (ref 7–18)
BUN/CREAT SERPL: 5 (ref 12–20)
C PNEUM DNA SPEC QL NAA+PROBE: NOT DETECTED
CALCIUM SERPL-MCNC: 10.1 MG/DL (ref 8.5–10.1)
CALCULATED P AXIS, ECG09: 61 DEGREES
CALCULATED R AXIS, ECG10: -42 DEGREES
CALCULATED T AXIS, ECG11: 116 DEGREES
CHLORIDE SERPL-SCNC: 96 MMOL/L (ref 100–111)
CO2 SERPL-SCNC: 19 MMOL/L (ref 21–32)
CREAT SERPL-MCNC: 18.3 MG/DL (ref 0.6–1.3)
DIAGNOSIS, 93000: NORMAL
DIFFERENTIAL METHOD BLD: ABNORMAL
DIFFERENTIAL METHOD BLD: ABNORMAL
ECHO AO ROOT DIAM: 2.8 CM
ECHO AO ROOT INDEX: 1.4 CM/M2
ECHO AV AREA PEAK VELOCITY: 1.4 CM2
ECHO AV AREA PEAK VELOCITY: 1.5 CM2
ECHO AV AREA VTI: 1.6 CM2
ECHO AV AREA/BSA VTI: 0.8 CM2/M2
ECHO AV MEAN GRADIENT: 18 MMHG
ECHO AV MEAN VELOCITY: 2 M/S
ECHO AV PEAK GRADIENT: 30 MMHG
ECHO AV PEAK GRADIENT: 31 MMHG
ECHO AV PEAK VELOCITY: 2.7 M/S
ECHO AV PEAK VELOCITY: 2.8 M/S
ECHO AV VTI: 58.9 CM
ECHO EST RA PRESSURE: 3 MMHG
ECHO LA DIAMETER INDEX: 2.2 CM/M2
ECHO LA DIAMETER: 4.4 CM
ECHO LA TO AORTIC ROOT RATIO: 1.57
ECHO LA VOL 2C: 107 ML (ref 22–52)
ECHO LA VOL 4C: 137 ML (ref 22–52)
ECHO LA VOL BP: 123 ML (ref 22–52)
ECHO LA VOL/BSA BIPLANE: 62 ML/M2 (ref 16–34)
ECHO LA VOLUME AREA LENGTH: 131 ML
ECHO LA VOLUME INDEX A2C: 54 ML/M2 (ref 16–34)
ECHO LA VOLUME INDEX A4C: 69 ML/M2 (ref 16–34)
ECHO LA VOLUME INDEX AREA LENGTH: 66 ML/M2 (ref 16–34)
ECHO LV E' LATERAL VELOCITY: 7 CM/S
ECHO LV E' SEPTAL VELOCITY: 4 CM/S
ECHO LV EDV A2C: 185 ML
ECHO LV EDV A4C: 186 ML
ECHO LV EDV BP: 192 ML (ref 56–104)
ECHO LV EDV INDEX A4C: 93 ML/M2
ECHO LV EDV INDEX BP: 96 ML/M2
ECHO LV EDV NDEX A2C: 93 ML/M2
ECHO LV EJECTION FRACTION A2C: 29 %
ECHO LV EJECTION FRACTION A4C: 39 %
ECHO LV EJECTION FRACTION BIPLANE: 35 % (ref 55–100)
ECHO LV ESV A2C: 131 ML
ECHO LV ESV A4C: 114 ML
ECHO LV ESV BP: 125 ML (ref 19–49)
ECHO LV ESV INDEX A2C: 66 ML/M2
ECHO LV ESV INDEX A4C: 57 ML/M2
ECHO LV ESV INDEX BP: 63 ML/M2
ECHO LV FRACTIONAL SHORTENING: 33 % (ref 28–44)
ECHO LV INTERNAL DIMENSION DIASTOLE INDEX: 2.3 CM/M2
ECHO LV INTERNAL DIMENSION DIASTOLIC: 4.6 CM (ref 3.9–5.3)
ECHO LV INTERNAL DIMENSION SYSTOLIC INDEX: 1.55 CM/M2
ECHO LV INTERNAL DIMENSION SYSTOLIC: 3.1 CM
ECHO LV IVSD: 1.9 CM (ref 0.6–0.9)
ECHO LV MASS 2D: 430.6 G (ref 67–162)
ECHO LV MASS INDEX 2D: 215.3 G/M2 (ref 43–95)
ECHO LV POSTERIOR WALL DIASTOLIC: 2 CM (ref 0.6–0.9)
ECHO LV RELATIVE WALL THICKNESS RATIO: 0.87
ECHO LVOT AREA: 2.8 CM2
ECHO LVOT AV VTI INDEX: 0.55
ECHO LVOT DIAM: 1.9 CM
ECHO LVOT MEAN GRADIENT: 5 MMHG
ECHO LVOT PEAK GRADIENT: 8 MMHG
ECHO LVOT PEAK VELOCITY: 1.4 M/S
ECHO LVOT STROKE VOLUME INDEX: 46.1 ML/M2
ECHO LVOT SV: 92.1 ML
ECHO LVOT VTI: 32.5 CM
ECHO MV A VELOCITY: 1.73 M/S
ECHO MV AREA PHT: 4.3 CM2
ECHO MV AREA VTI: 1.6 CM2
ECHO MV E DECELERATION TIME (DT): 128.7 MS
ECHO MV E VELOCITY: 2.01 M/S
ECHO MV E/A RATIO: 1.16
ECHO MV E/E' LATERAL: 28.71
ECHO MV E/E' RATIO (AVERAGED): 39.48
ECHO MV E/E' SEPTAL: 50.25
ECHO MV LVOT VTI INDEX: 1.79
ECHO MV MAX VELOCITY: 2.1 M/S
ECHO MV MEAN GRADIENT: 10 MMHG
ECHO MV MEAN VELOCITY: 1.5 M/S
ECHO MV PEAK GRADIENT: 18 MMHG
ECHO MV PRESSURE HALF TIME (PHT): 51 MS
ECHO MV REGURGITANT ALIASING (NYQUIST) VELOCITY: 35 CM/S
ECHO MV REGURGITANT PEAK GRADIENT: 92 MMHG
ECHO MV REGURGITANT PEAK VELOCITY: 4.8 M/S
ECHO MV REGURGITANT VTIA: 158.7 CM
ECHO MV VTI: 58.3 CM
ECHO PULMONARY ARTERY END DIASTOLIC PRESSURE: 10 MMHG
ECHO PULMONARY ARTERY SYSTOLIC PRESSURE (PASP): 40 MMHG
ECHO PV ACCELERATION TIME (AT): 45.7 MS
ECHO PV MAX VELOCITY: 1.4 M/S
ECHO PV PEAK GRADIENT: 7 MMHG
ECHO PV REGURGITANT MAX VELOCITY: 1.6 M/S
ECHO RIGHT VENTRICULAR SYSTOLIC PRESSURE (RVSP): 40 MMHG
ECHO RV FREE WALL PEAK S': 15 CM/S
ECHO RV INTERNAL DIMENSION: 4.5 CM
ECHO RV TAPSE: 2.2 CM (ref 1.7–?)
ECHO TV REGURGITANT MAX VELOCITY: 3.05 M/S
ECHO TV REGURGITANT PEAK GRADIENT: 37 MMHG
EOSINOPHIL # BLD: 0.5 K/UL (ref 0–0.4)
EOSINOPHIL # BLD: 0.8 K/UL (ref 0–0.4)
EOSINOPHIL NFR BLD: 5 % (ref 0–5)
EOSINOPHIL NFR BLD: 9 % (ref 0–5)
ERYTHROCYTE [DISTWIDTH] IN BLOOD BY AUTOMATED COUNT: 14.9 % (ref 11.6–14.5)
ERYTHROCYTE [DISTWIDTH] IN BLOOD BY AUTOMATED COUNT: 15.4 % (ref 11.6–14.5)
EST. AVERAGE GLUCOSE BLD GHB EST-MCNC: 97 MG/DL
FLUAV RNA SPEC QL NAA+PROBE: NOT DETECTED
FLUAV SUBTYP SPEC NAA+PROBE: NOT DETECTED
FLUBV RNA SPEC QL NAA+PROBE: NOT DETECTED
FLUBV RNA SPEC QL NAA+PROBE: NOT DETECTED
GLOBULIN SER CALC-MCNC: 4.2 G/DL (ref 2–4)
GLUCOSE BLD STRIP.AUTO-MCNC: 122 MG/DL (ref 70–110)
GLUCOSE BLD STRIP.AUTO-MCNC: 135 MG/DL (ref 70–110)
GLUCOSE BLD STRIP.AUTO-MCNC: 233 MG/DL (ref 70–110)
GLUCOSE SERPL-MCNC: 105 MG/DL (ref 74–99)
HADV DNA SPEC QL NAA+PROBE: NOT DETECTED
HBA1C MFR BLD: 5 % (ref 4.2–5.6)
HCG SERPL QL: NEGATIVE
HCOV 229E RNA SPEC QL NAA+PROBE: NOT DETECTED
HCOV HKU1 RNA SPEC QL NAA+PROBE: NOT DETECTED
HCOV NL63 RNA SPEC QL NAA+PROBE: NOT DETECTED
HCOV OC43 RNA SPEC QL NAA+PROBE: NOT DETECTED
HCT VFR BLD AUTO: 18.8 % (ref 35–45)
HCT VFR BLD AUTO: 22.7 % (ref 35–45)
HGB BLD-MCNC: 5.8 G/DL (ref 12–16)
HGB BLD-MCNC: 7.3 G/DL (ref 12–16)
HISTORY CHECKED?,CKHIST: NORMAL
HISTORY CHECKED?,CKHIST: NORMAL
HMPV RNA SPEC QL NAA+PROBE: NOT DETECTED
HPIV1 RNA SPEC QL NAA+PROBE: NOT DETECTED
HPIV2 RNA SPEC QL NAA+PROBE: NOT DETECTED
HPIV3 RNA SPEC QL NAA+PROBE: NOT DETECTED
HPIV4 RNA SPEC QL NAA+PROBE: NOT DETECTED
IMM GRANULOCYTES # BLD AUTO: 0 K/UL (ref 0–0.04)
IMM GRANULOCYTES # BLD AUTO: 0.1 K/UL (ref 0–0.04)
IMM GRANULOCYTES NFR BLD AUTO: 1 % (ref 0–0.5)
IMM GRANULOCYTES NFR BLD AUTO: 1 % (ref 0–0.5)
LYMPHOCYTES # BLD: 0.7 K/UL (ref 0.9–3.6)
LYMPHOCYTES # BLD: 1 K/UL (ref 0.9–3.6)
LYMPHOCYTES NFR BLD: 12 % (ref 21–52)
LYMPHOCYTES NFR BLD: 6 % (ref 21–52)
M PNEUMO DNA SPEC QL NAA+PROBE: NOT DETECTED
MAGNESIUM SERPL-MCNC: 3.3 MG/DL (ref 1.6–2.6)
MCH RBC QN AUTO: 28 PG (ref 24–34)
MCH RBC QN AUTO: 28.2 PG (ref 24–34)
MCHC RBC AUTO-ENTMCNC: 30.9 G/DL (ref 31–37)
MCHC RBC AUTO-ENTMCNC: 32.2 G/DL (ref 31–37)
MCV RBC AUTO: 87.6 FL (ref 78–100)
MCV RBC AUTO: 90.8 FL (ref 78–100)
MONOCYTES # BLD: 0.2 K/UL (ref 0.05–1.2)
MONOCYTES # BLD: 0.5 K/UL (ref 0.05–1.2)
MONOCYTES NFR BLD: 2 % (ref 3–10)
MONOCYTES NFR BLD: 6 % (ref 3–10)
NEUTS SEG # BLD: 5.9 K/UL (ref 1.8–8)
NEUTS SEG # BLD: 9 K/UL (ref 1.8–8)
NEUTS SEG NFR BLD: 71 % (ref 40–73)
NEUTS SEG NFR BLD: 86 % (ref 40–73)
NRBC # BLD: 0 K/UL (ref 0–0.01)
NRBC # BLD: 0 K/UL (ref 0–0.01)
NRBC BLD-RTO: 0 PER 100 WBC
NRBC BLD-RTO: 0 PER 100 WBC
P-R INTERVAL, ECG05: 168 MS
PLATELET # BLD AUTO: 321 K/UL (ref 135–420)
PLATELET # BLD AUTO: 372 K/UL (ref 135–420)
PMV BLD AUTO: 10.3 FL (ref 9.2–11.8)
PMV BLD AUTO: 9.9 FL (ref 9.2–11.8)
POTASSIUM SERPL-SCNC: 5.2 MMOL/L (ref 3.5–5.5)
PROT SERPL-MCNC: 7.5 G/DL (ref 6.4–8.2)
Q-T INTERVAL, ECG07: 370 MS
QRS DURATION, ECG06: 116 MS
QTC CALCULATION (BEZET), ECG08: 491 MS
RBC # BLD AUTO: 2.07 M/UL (ref 4.2–5.3)
RBC # BLD AUTO: 2.59 M/UL (ref 4.2–5.3)
RSV RNA SPEC QL NAA+PROBE: NOT DETECTED
RV+EV RNA SPEC QL NAA+PROBE: NOT DETECTED
SARS-COV-2 PCR, COVPCR: NOT DETECTED
SARS-COV-2, COV2: NOT DETECTED
SODIUM SERPL-SCNC: 129 MMOL/L (ref 136–145)
TROPONIN-HIGH SENSITIVITY: 114 NG/L (ref 0–54)
TROPONIN-HIGH SENSITIVITY: 1164 NG/L (ref 0–54)
TROPONIN-HIGH SENSITIVITY: 1318 NG/L (ref 0–54)
TROPONIN-HIGH SENSITIVITY: 1355 NG/L (ref 0–54)
TROPONIN-HIGH SENSITIVITY: 396 NG/L (ref 0–54)
TROPONIN-HIGH SENSITIVITY: 790 NG/L (ref 0–54)
VENTRICULAR RATE, ECG03: 106 BPM
WBC # BLD AUTO: 10.5 K/UL (ref 4.6–13.2)
WBC # BLD AUTO: 8.3 K/UL (ref 4.6–13.2)

## 2022-11-15 PROCEDURE — A4722 DIALYS SOL FLD VOL > 1999CC: HCPCS | Performed by: INTERNAL MEDICINE

## 2022-11-15 PROCEDURE — 36415 COLL VENOUS BLD VENIPUNCTURE: CPT

## 2022-11-15 PROCEDURE — 30233N1 TRANSFUSION OF NONAUTOLOGOUS RED BLOOD CELLS INTO PERIPHERAL VEIN, PERCUTANEOUS APPROACH: ICD-10-PCS | Performed by: INTERNAL MEDICINE

## 2022-11-15 PROCEDURE — 93005 ELECTROCARDIOGRAM TRACING: CPT

## 2022-11-15 PROCEDURE — 86923 COMPATIBILITY TEST ELECTRIC: CPT

## 2022-11-15 PROCEDURE — 96365 THER/PROPH/DIAG IV INF INIT: CPT

## 2022-11-15 PROCEDURE — 74011250636 HC RX REV CODE- 250/636: Performed by: INTERNAL MEDICINE

## 2022-11-15 PROCEDURE — 94762 N-INVAS EAR/PLS OXIMTRY CONT: CPT

## 2022-11-15 PROCEDURE — 74011250636 HC RX REV CODE- 250/636: Performed by: EMERGENCY MEDICINE

## 2022-11-15 PROCEDURE — 84703 CHORIONIC GONADOTROPIN ASSAY: CPT

## 2022-11-15 PROCEDURE — 71045 X-RAY EXAM CHEST 1 VIEW: CPT

## 2022-11-15 PROCEDURE — 85025 COMPLETE CBC W/AUTO DIFF WBC: CPT

## 2022-11-15 PROCEDURE — 74011000636 HC RX REV CODE- 636: Performed by: INTERNAL MEDICINE

## 2022-11-15 PROCEDURE — 77010033678 HC OXYGEN DAILY

## 2022-11-15 PROCEDURE — 74011250637 HC RX REV CODE- 250/637: Performed by: INTERNAL MEDICINE

## 2022-11-15 PROCEDURE — 96366 THER/PROPH/DIAG IV INF ADDON: CPT

## 2022-11-15 PROCEDURE — 96375 TX/PRO/DX INJ NEW DRUG ADDON: CPT

## 2022-11-15 PROCEDURE — 84484 ASSAY OF TROPONIN QUANT: CPT

## 2022-11-15 PROCEDURE — 83880 ASSAY OF NATRIURETIC PEPTIDE: CPT

## 2022-11-15 PROCEDURE — 74011250637 HC RX REV CODE- 250/637: Performed by: EMERGENCY MEDICINE

## 2022-11-15 PROCEDURE — 99285 EMERGENCY DEPT VISIT HI MDM: CPT

## 2022-11-15 PROCEDURE — 0202U NFCT DS 22 TRGT SARS-COV-2: CPT

## 2022-11-15 PROCEDURE — C9113 INJ PANTOPRAZOLE SODIUM, VIA: HCPCS | Performed by: INTERNAL MEDICINE

## 2022-11-15 PROCEDURE — 83735 ASSAY OF MAGNESIUM: CPT

## 2022-11-15 PROCEDURE — 74011000250 HC RX REV CODE- 250: Performed by: INTERNAL MEDICINE

## 2022-11-15 PROCEDURE — 83036 HEMOGLOBIN GLYCOSYLATED A1C: CPT

## 2022-11-15 PROCEDURE — 65610000006 HC RM INTENSIVE CARE

## 2022-11-15 PROCEDURE — 87636 SARSCOV2 & INF A&B AMP PRB: CPT

## 2022-11-15 PROCEDURE — 82962 GLUCOSE BLOOD TEST: CPT

## 2022-11-15 PROCEDURE — 85730 THROMBOPLASTIN TIME PARTIAL: CPT

## 2022-11-15 PROCEDURE — 80053 COMPREHEN METABOLIC PANEL: CPT

## 2022-11-15 PROCEDURE — 86900 BLOOD TYPING SEROLOGIC ABO: CPT

## 2022-11-15 PROCEDURE — 74011636637 HC RX REV CODE- 636/637: Performed by: INTERNAL MEDICINE

## 2022-11-15 PROCEDURE — 93306 TTE W/DOPPLER COMPLETE: CPT

## 2022-11-15 PROCEDURE — 74011000250 HC RX REV CODE- 250: Performed by: EMERGENCY MEDICINE

## 2022-11-15 PROCEDURE — 74011000258 HC RX REV CODE- 258: Performed by: EMERGENCY MEDICINE

## 2022-11-15 PROCEDURE — 71275 CT ANGIOGRAPHY CHEST: CPT

## 2022-11-15 RX ORDER — ONDANSETRON 4 MG/1
4 TABLET, ORALLY DISINTEGRATING ORAL
Status: DISCONTINUED | OUTPATIENT
Start: 2022-11-15 | End: 2022-11-18 | Stop reason: HOSPADM

## 2022-11-15 RX ORDER — ACETAMINOPHEN 325 MG/1
650 TABLET ORAL
Status: DISCONTINUED | OUTPATIENT
Start: 2022-11-15 | End: 2022-11-18 | Stop reason: HOSPADM

## 2022-11-15 RX ORDER — CLOPIDOGREL BISULFATE 75 MG/1
75 TABLET ORAL DAILY
Status: DISCONTINUED | OUTPATIENT
Start: 2022-11-15 | End: 2022-11-18 | Stop reason: HOSPADM

## 2022-11-15 RX ORDER — SODIUM CHLORIDE 9 MG/ML
250 INJECTION, SOLUTION INTRAVENOUS AS NEEDED
Status: DISCONTINUED | OUTPATIENT
Start: 2022-11-15 | End: 2022-11-18 | Stop reason: HOSPADM

## 2022-11-15 RX ORDER — SODIUM CHLORIDE 9 MG/ML
250 INJECTION, SOLUTION INTRAVENOUS AS NEEDED
Status: DISCONTINUED | OUTPATIENT
Start: 2022-11-15 | End: 2022-11-17 | Stop reason: SDUPTHER

## 2022-11-15 RX ORDER — SODIUM CHLORIDE 0.9 % (FLUSH) 0.9 %
5-40 SYRINGE (ML) INJECTION AS NEEDED
Status: DISCONTINUED | OUTPATIENT
Start: 2022-11-15 | End: 2022-11-18 | Stop reason: HOSPADM

## 2022-11-15 RX ORDER — DEXTROSE MONOHYDRATE 100 MG/ML
0-250 INJECTION, SOLUTION INTRAVENOUS AS NEEDED
Status: DISCONTINUED | OUTPATIENT
Start: 2022-11-15 | End: 2022-11-18 | Stop reason: HOSPADM

## 2022-11-15 RX ORDER — BUMETANIDE 0.25 MG/ML
1 INJECTION INTRAMUSCULAR; INTRAVENOUS EVERY 12 HOURS
Status: DISCONTINUED | OUTPATIENT
Start: 2022-11-15 | End: 2022-11-18 | Stop reason: HOSPADM

## 2022-11-15 RX ORDER — NITROGLYCERIN 0.4 MG/1
0.4 TABLET SUBLINGUAL
Status: COMPLETED | OUTPATIENT
Start: 2022-11-15 | End: 2022-11-15

## 2022-11-15 RX ORDER — INSULIN LISPRO 100 [IU]/ML
INJECTION, SOLUTION INTRAVENOUS; SUBCUTANEOUS
Status: DISCONTINUED | OUTPATIENT
Start: 2022-11-15 | End: 2022-11-18 | Stop reason: HOSPADM

## 2022-11-15 RX ORDER — MORPHINE SULFATE 10 MG/ML
6 INJECTION, SOLUTION INTRAMUSCULAR; INTRAVENOUS
Status: COMPLETED | OUTPATIENT
Start: 2022-11-15 | End: 2022-11-15

## 2022-11-15 RX ORDER — ONDANSETRON 2 MG/ML
4 INJECTION INTRAMUSCULAR; INTRAVENOUS
Status: DISCONTINUED | OUTPATIENT
Start: 2022-11-15 | End: 2022-11-18 | Stop reason: HOSPADM

## 2022-11-15 RX ORDER — HEPARIN SODIUM 5000 [USP'U]/ML
5000 INJECTION, SOLUTION INTRAVENOUS; SUBCUTANEOUS EVERY 8 HOURS
Status: DISCONTINUED | OUTPATIENT
Start: 2022-11-15 | End: 2022-11-15

## 2022-11-15 RX ORDER — METOPROLOL SUCCINATE 100 MG/1
100 TABLET, EXTENDED RELEASE ORAL DAILY
Status: DISCONTINUED | OUTPATIENT
Start: 2022-11-15 | End: 2022-11-15

## 2022-11-15 RX ORDER — ACETAMINOPHEN 650 MG/1
650 SUPPOSITORY RECTAL
Status: DISCONTINUED | OUTPATIENT
Start: 2022-11-15 | End: 2022-11-18 | Stop reason: HOSPADM

## 2022-11-15 RX ORDER — POLYETHYLENE GLYCOL 3350 17 G/17G
17 POWDER, FOR SOLUTION ORAL DAILY PRN
Status: DISCONTINUED | OUTPATIENT
Start: 2022-11-15 | End: 2022-11-18 | Stop reason: HOSPADM

## 2022-11-15 RX ORDER — METOPROLOL SUCCINATE 100 MG/1
100 TABLET, EXTENDED RELEASE ORAL DAILY
Status: DISCONTINUED | OUTPATIENT
Start: 2022-11-15 | End: 2022-11-18 | Stop reason: HOSPADM

## 2022-11-15 RX ORDER — ONDANSETRON 2 MG/ML
4 INJECTION INTRAMUSCULAR; INTRAVENOUS
Status: COMPLETED | OUTPATIENT
Start: 2022-11-15 | End: 2022-11-15

## 2022-11-15 RX ORDER — GUAIFENESIN 100 MG/5ML
81 LIQUID (ML) ORAL DAILY
Status: DISCONTINUED | OUTPATIENT
Start: 2022-11-15 | End: 2022-11-18 | Stop reason: HOSPADM

## 2022-11-15 RX ORDER — SODIUM CHLORIDE 0.9 % (FLUSH) 0.9 %
5-40 SYRINGE (ML) INJECTION EVERY 8 HOURS
Status: DISCONTINUED | OUTPATIENT
Start: 2022-11-15 | End: 2022-11-18 | Stop reason: HOSPADM

## 2022-11-15 RX ORDER — HEPARIN SODIUM 10000 [USP'U]/100ML
11-25 INJECTION, SOLUTION INTRAVENOUS
Status: DISCONTINUED | OUTPATIENT
Start: 2022-11-15 | End: 2022-11-16

## 2022-11-15 RX ORDER — ATORVASTATIN CALCIUM 20 MG/1
80 TABLET, FILM COATED ORAL
Status: DISCONTINUED | OUTPATIENT
Start: 2022-11-15 | End: 2022-11-18 | Stop reason: HOSPADM

## 2022-11-15 RX ORDER — ISOSORBIDE MONONITRATE 60 MG/1
120 TABLET, EXTENDED RELEASE ORAL DAILY
Status: DISCONTINUED | OUTPATIENT
Start: 2022-11-15 | End: 2022-11-18 | Stop reason: HOSPADM

## 2022-11-15 RX ORDER — MAGNESIUM SULFATE 100 %
4 CRYSTALS MISCELLANEOUS AS NEEDED
Status: DISCONTINUED | OUTPATIENT
Start: 2022-11-15 | End: 2022-11-18 | Stop reason: HOSPADM

## 2022-11-15 RX ORDER — DOXYCYCLINE 100 MG/1
100 CAPSULE ORAL EVERY 12 HOURS
Status: DISCONTINUED | OUTPATIENT
Start: 2022-11-15 | End: 2022-11-18 | Stop reason: HOSPADM

## 2022-11-15 RX ORDER — ACETAMINOPHEN 325 MG/1
650 TABLET ORAL
Status: COMPLETED | OUTPATIENT
Start: 2022-11-15 | End: 2022-11-15

## 2022-11-15 RX ADMIN — BUMETANIDE 1 MG: 0.25 INJECTION INTRAMUSCULAR; INTRAVENOUS at 06:14

## 2022-11-15 RX ADMIN — ONDANSETRON 4 MG: 2 INJECTION INTRAMUSCULAR; INTRAVENOUS at 02:09

## 2022-11-15 RX ADMIN — SODIUM CHLORIDE, PRESERVATIVE FREE 10 ML: 5 INJECTION INTRAVENOUS at 13:53

## 2022-11-15 RX ADMIN — SODIUM CHLORIDE, SODIUM LACTATE, CALCIUM CHLORIDE, MAGNESIUM CHLORIDE AND DEXTROSE 2000 ML: 2.5; 538; 448; 18.3; 5.08 INJECTION, SOLUTION INTRAPERITONEAL at 18:58

## 2022-11-15 RX ADMIN — ASPIRIN 81 MG: 81 TABLET, CHEWABLE ORAL at 11:23

## 2022-11-15 RX ADMIN — SODIUM CHLORIDE 5 MG/HR: 9 INJECTION, SOLUTION INTRAVENOUS at 02:22

## 2022-11-15 RX ADMIN — EPOETIN ALFA-EPBX 20000 UNITS: 20000 INJECTION, SOLUTION INTRAVENOUS; SUBCUTANEOUS at 11:24

## 2022-11-15 RX ADMIN — SODIUM CHLORIDE, SODIUM LACTATE, CALCIUM CHLORIDE, MAGNESIUM CHLORIDE AND DEXTROSE 2500 ML: 2.5; 538; 448; 18.3; 5.08 INJECTION, SOLUTION INTRAPERITONEAL at 13:42

## 2022-11-15 RX ADMIN — SODIUM CHLORIDE, PRESERVATIVE FREE 40 MG: 5 INJECTION INTRAVENOUS at 11:24

## 2022-11-15 RX ADMIN — NITROGLYCERIN 0.4 MG: 0.4 TABLET, ORALLY DISINTEGRATING SUBLINGUAL at 01:47

## 2022-11-15 RX ADMIN — BUMETANIDE 1 MG: 0.25 INJECTION INTRAMUSCULAR; INTRAVENOUS at 17:57

## 2022-11-15 RX ADMIN — SODIUM CHLORIDE 5 MG/HR: 9 INJECTION, SOLUTION INTRAVENOUS at 07:40

## 2022-11-15 RX ADMIN — ISOSORBIDE MONONITRATE 120 MG: 60 TABLET, EXTENDED RELEASE ORAL at 11:23

## 2022-11-15 RX ADMIN — IOPAMIDOL 100 ML: 755 INJECTION, SOLUTION INTRAVENOUS at 06:45

## 2022-11-15 RX ADMIN — SODIUM CHLORIDE, SODIUM LACTATE, CALCIUM CHLORIDE, MAGNESIUM CHLORIDE AND DEXTROSE 2000 ML: 2.5; 538; 448; 18.3; 5.08 INJECTION, SOLUTION INTRAPERITONEAL at 11:05

## 2022-11-15 RX ADMIN — HEPARIN SODIUM 5000 UNITS: 5000 INJECTION INTRAVENOUS; SUBCUTANEOUS at 13:58

## 2022-11-15 RX ADMIN — HEPARIN SODIUM 11 UNITS/KG/HR: 10000 INJECTION, SOLUTION INTRAVENOUS at 18:58

## 2022-11-15 RX ADMIN — CLOPIDOGREL BISULFATE 75 MG: 75 TABLET ORAL at 11:23

## 2022-11-15 RX ADMIN — INSULIN LISPRO 4 UNITS: 100 INJECTION, SOLUTION INTRAVENOUS; SUBCUTANEOUS at 13:58

## 2022-11-15 RX ADMIN — SODIUM CHLORIDE 2.5 MG/HR: 9 INJECTION, SOLUTION INTRAVENOUS at 13:52

## 2022-11-15 RX ADMIN — DOXYCYCLINE 100 MG: 100 CAPSULE ORAL at 11:23

## 2022-11-15 RX ADMIN — NIFEDIPINE 90 MG: 30 TABLET, FILM COATED, EXTENDED RELEASE ORAL at 17:57

## 2022-11-15 RX ADMIN — MORPHINE SULFATE 6 MG: 10 INJECTION INTRAVENOUS at 02:09

## 2022-11-15 RX ADMIN — NITROGLYCERIN 0.4 MG: 0.4 TABLET, ORALLY DISINTEGRATING SUBLINGUAL at 01:41

## 2022-11-15 RX ADMIN — ACETAMINOPHEN 650 MG: 325 TABLET ORAL at 01:43

## 2022-11-15 NOTE — CONSULTS
[facility] Consult Note      Patient: Klever Beyer MRN: [id number]  SSN: [id number]    YOB: 1980  Age: 39 y.o. Sex: female    Date of Consultation: 11/15/2020    Reason for Consultation: CP/NSTEMI    HPI:  I was asked by Artis Ratliff to see this pleasant patient for chest pain. Klever Beyer is a 45-year-old pleasant patient with multiple comorbidities came to the hospital with chest pain burning in character along with uncontrolled hypertension diagnosed with non-STEMI. CTA chest was negative for pulmonary embolism. Cardiology consult was called. I have discussed with Dr. Thang Manuel they will management plan at [phone] AM including starting heparin, and a Cardene and CTA chest.  Patient in ICU chest pain-free and comfortable on a Cardene drip. Patient history is significant for CAD, multiple stent recent cardiac catheterization was done on November 4, 2022 diagnosed with progressive CAD not intervene able recommended medical treatment. Patient also have history of cardiomyopathy ejection fraction 40% with history of end-stage renal disease on dialysis with hypertension. Patient is following with [facility] for cardiac standpoint. Past Medical History:   Diagnosis Date    Cardiomyopathy [facility])     Chronic kidney disease     Coronary arteriosclerosis     DM (diabetes mellitus) ([facility] [address]     Hypertension     Tachycardia      No past surgical history on file.   Current Facility-Administered Medications   Medication Dose Route Frequency    niCARdipine (CARDENE) 25 mg in 0.9% sodium chloride 250 mL infusion  0-15 mg/hr IntraVENous TITRATE    0.9% sodium chloride infusion 250 mL  250 mL IntraVENous PRN    bumetanide (BUMEX) injection 1 mg  1 mg IntraVENous Q12H    pantoprazole (PROTONIX) 40 mg in 0.9% sodium chloride 10 mL injection  40 mg IntraVENous Q12H    insulin lispro (HUMALOG) injection   SubCUTAneous AC&HS    glucose chewable tablet 16 g  4 Tablet Oral PRN    glucagon (GLUCAGEN) injection 1 mg  1 mg IntraMUSCular PRN    dextrose 10% infusion 0-250 mL  0-250 mL IntraVENous PRN    sodium chloride (NS) flush 5-40 mL  5-40 mL IntraVENous Q8H    sodium chloride (NS) flush 5-40 mL  5-40 mL IntraVENous PRN    acetaminophen (TYLENOL) tablet 650 mg  650 mg Oral Q6H PRN    Or    acetaminophen (TYLENOL) suppository 650 mg  650 mg Rectal Q6H PRN    polyethylene glycol (MIRALAX) packet 17 g  17 g Oral DAILY PRN    ondansetron (ZOFRAN ODT) tablet 4 mg  4 mg Oral Q8H PRN    Or    ondansetron (ZOFRAN) injection 4 mg  4 mg IntraVENous Q6H PRN    clopidogreL (PLAVIX) tablet 75 mg  75 mg Oral DAILY    aspirin chewable tablet 81 mg  81 mg Oral DAILY    atorvastatin (LIPITOR) tablet 80 mg  80 mg Oral QHS    isosorbide mononitrate ER (IMDUR) tablet 120 mg  120 mg Oral DAILY    peritoneal dialysis DEXTROSE 2.5% (2.5 mEq/L low calcium) solution 2,500 mL  2,500 mL IntraPERitoneal QID    doxycycline (MONODOX) capsule 100 mg  100 mg Oral Q12H    epoetin lucinda-epbx (RETACRIT) injection 20,000 Units  20,000 Units SubCUTAneous Q7D    metoprolol succinate (TOPROL-XL) tablet 100 mg  100 mg Oral DAILY    NIFEdipine ER (PROCARDIA XL) tablet 90 mg  90 mg Oral Q24H    heparin 25,000 units in D5W 250 ml infusion  12-25 Units/kg/hr IntraVENous TITRATE       Allergies and Intolerances: Allergies   Allergen Reactions    Coreg [Carvedilol] Nausea and Vomiting    Lortab [Hydrocodone-Acetaminophen] Nausea and Vomiting       Family History:   Family History   Problem Relation Age of Onset    Diabetes Mother     Heart Disease Mother     Heart Attack Mother     Heart Attack Father     Heart Disease Father     Diabetes Father        Social History:   She  reports that she has never smoked. She has never used smokeless tobacco.  She  reports current alcohol use. Review of Systems  Gen: No fever, chills, malaise, weight loss/gain.    Heent: No headache, rhinorrhea, epistaxis, ear pain, hearing loss, sinus pain, neck pain/stiffness, sore throat. Heart: + chest pain, palpitations, COUCH, pnd, or orthopnea. Resp: No cough, hemoptysis, wheezing and shortness of breath. GI: No nausea, vomiting, diarrhea, constipation, melena or hematochezia. : No urinary obstruction, dysuria or hematuria. Derm: No rash, new skin lesion or pruritis. Musc/skeletal: no bone or joint complains. Vasc: No edema, cyanosis or claudication. Endo: No heat/cold intolerance, no polyuria,polydipsia or polyphagia. Neuro: No unilateral weakness, numbness, tingling. No seizures. Heme: No easy bruising or bleeding. Physical:   Patient Vitals for the past 6 hrs:   Temp Pulse Resp BP SpO2   11/15/22 1630 -- 86 12 138/73 96 %   11/15/22 1600 97.8 °F (36.6 °C) 83 11 132/81 100 %   11/15/22 1530 -- 84 12 128/78 100 %   11/15/22 1500 -- 84 14 (!) 146/79 100 %   11/15/22 1430 -- 80 12 (!) 147/79 100 %   11/15/22 1417 -- 78 -- (!) 162/77 --   11/15/22 1352 -- 78 -- (!) 146/96 --   11/15/22 1123 97.9 °F (36.6 °C) 82 14 (!) 142/78 98 %         Exam:   General Appearance: Comfortable, not using accessory muscles of respiration. HEENT: SARMAD. HEAD: Atraumatic  NECK: No JVD, no thyroidomeglay. LUNGS: Clear bilaterally. HEART: H2+P1, ejection systolic murmur grade 1 in A2 and systolic murmur in the mitral/tricuspid area    ABD: Non-tender, BS Audible    EXT: No edema, and no cysnosis. VASCULAR EXAM: Pulses are intact. PSYCHIATRIC EXAM: Mood is appropriate. MUSCULOSKELETAL: Grossly no joint deformity. NEUROLOGICAL: Motor and sensory sytem intact and Cranial nerves II-XII intact.     Review of Data:   LABS:   Lab Results   Component Value Date/Time    WBC 10.5 11/15/2022 01:45 AM    HGB 7.3 (L) 11/15/2022 01:45 AM    HCT 22.7 (L) 11/15/2022 01:45 AM    PLATELET 535 59/76/6389 01:45 AM     Lab Results   Component Value Date/Time    Sodium 129 (L) 11/15/2022 01:45 AM    Potassium 5.2 11/15/2022 01:45 AM    Chloride 96 (L) 11/15/2022 01:45 AM    CO2 19 (L) 11/15/2022 01:45 AM    Glucose 105 (H) 11/15/2022 01:45 AM    BUN 97 (H) 11/15/2022 01:45 AM    Creatinine 18.30 (H) 11/15/2022 01:45 AM     No results found for: CHOL, CHOLX, CHLST, CHOLV, HDL, HDLP, LDL, LDLC, DLDLP, TGLX, TRIGL, TRIGP  No components found for: GPT  Lab Results   Component Value Date/Time    Hemoglobin A1c 5.0 11/15/2022 01:45 AM       RADIOLOGY:  CT Results  (Last 48 hours)                 11/15/22 0644  CTA CHEST W OR W WO CONT Final result    Impression:      1. No evidence of pulmonary embolism or right heart strain. 2.  Cardiomegaly, bilateral diffuse groundglass opacity, bilateral small pleural   effusions, right more than left and bibasilar dependent atelectasis. Findings   most consistent with pulmonary edema. 3. Small lipid Rich left adrenal adenoma. Nonspecific 7 mm hypodensity gastric   fundus, possible small aneurysm or bowel content. Narrative:  CTA chest       INDICATION: Pain. COMPARISON: None       TECHNIQUE: Axial CT imaging from the thoracic inlet through the diaphragm with   intravenous contrast. Coronal and sagittal MIP reformats were generated. One or   more dose reduction techniques were used on this CT: automated exposure control,   adjustment of the mAs and/or kVp according to patient size, and iterative   reconstruction techniques. The specific techniques used on this CT exam have   been documented in the patient's electronic medical record. Digital Imaging and   Communications in Medicine (DICOM) format image data are available to   nonaffiliated external healthcare facilities or entities on a secure, media   free, reciprocally searchable basis with patient authorization for at least a   12-month period after this study. _______________       FINDINGS:       EXAM QUALITY: Adequate       PULMONARY ARTERIES: Mild motion degradation. No central or segmental filling   defects identified to suggest pulmonary embolism.  Peripheral branches are obscured by motion and airspace disease. MEDIASTINUM: Cardiomegaly without evidence for pericardial effusion. Coronary   atherosclerosis noted. There is no evidence for right heart strain. Aorta is   normal in caliber with mild atherosclerosis. No evidence for dissection. LUNGS: Diffuse groundglass opacity with mild patchy opacities in the upper lung   zone. There is bilateral dependent consolidation. PLEURA: Small bilateral pleural effusions, right more than left. AIRWAY: Patent. LYMPH NODES: No enlarged nodes. Slightly prominent mediastinal lymph nodes,   likely reactive. No axillary or hilar adenopathy. UPPER ABDOMEN: Small left adrenal lipid rich adenoma. 7 mm hypodensity at the   gallbladder fundus, possible aneurysm or bowel content. OTHER: No acute or aggressive osseous abnormalities identified. _______________                 CXR Results  (Last 48 hours)                 11/15/22 0200  XR CHEST PORT Final result    Impression:      Cardiomegaly with findings of pulmonary edema from CHF versus fluid overload. Narrative:  EXAM: XR CHEST PORT       CLINICAL INDICATION/HISTORY: CP   -Additional: None       COMPARISON: None       TECHNIQUE: Frontal view of the chest       _______________       FINDINGS:       HEART AND MEDIASTINUM: Cardiomegaly with hilar congestion and cephalization       LUNGS AND PLEURAL SPACES: Diffuse bilateral indistinct interstitial and hazy   confluent alveolar opacities. No dense consolidation, effusion or pneumothorax. BONY THORAX AND SOFT TISSUES: No acute or destructive osseous abnormality.        _______________                     Cardiology Procedures:   Results for orders placed or performed during the hospital encounter of 11/15/22   EKG, 12 LEAD, INITIAL   Result Value Ref Range    Ventricular Rate 106 BPM    Atrial Rate 106 BPM    P-R Interval 168 ms    QRS Duration 116 ms    Q-T Interval 370 ms    QTC Calculation (Bezet) 491 ms    Calculated P Axis 61 degrees    Calculated R Axis -42 degrees    Calculated T Axis 116 degrees    Diagnosis       Sinus tachycardia  Possible Left atrial enlargement  Left axis deviation  Left ventricular hypertrophy with QRS widening ( Sulaiman product )  ST & T wave abnormality, consider lateral ischemia  Abnormal ECG  When compared with ECG of 11-SEP-2018 02:29,  No significant change was found        Echo Results  (Last 48 hours)      None         Cardiolite (Tc-99m Sestamibi) stress test        Impression / Plan:    Patient Active Problem List   Diagnosis Code    Acute pulmonary edema ([facility]) J81.0    Chest pain R07.9    ESRD (end stage renal disease) on dialysis ([facility]) N18.6, Z99.2    Pulmonary edema J81.1    Cardiomyopathy ([facility]) I42.9    HTN (hypertension) I10    DM (diabetes mellitus) ([facility]) E11.9    PAD (peripheral artery disease) ([facility]) I73.9    CAD (coronary artery disease) I25.10       A/P    NSTEMI  Severe hypertension  Acute pulmonary edema  End-stage renal disease on dialysis  CAD with history of multiple stent and not intervene-able diffuse disease recent cath 11/4/2022  Cardiomyopathy ejection fraction 40%  Mild to moderate mitral valve gradient on the echocardiogram possible mild mitral stenosis  Moderate MR  Minimal aortic valve gradient      Plan. Continue with heparin, no Cardene drip, aspirin Plavix antihypertensive treatment  I have discussed in detail with patient and I have recommended cardiac cath possible PCI if indicated  At this point patient preferred not to undergo cath procedure she preferred medical treatment and subsequently she wants to have a follow-up with her primary cardiologist after discharge from this hospital  I will resume her home metoprolol succinate, nifedipine  Continue with aspirin, Plavix  Continue with statin  Thank you for allowing me to participate in management of this pleasant patient.   I will continue to follow this patient during this hospitalization. He is feel free to contact me if you have any questions or concerns.         Signed By: Aime Peres MD     November 15, 2022

## 2022-11-15 NOTE — ACP (ADVANCE CARE PLANNING)
Advance Care Planning   Advance Care Planning Inpatient Note  [address] Department    Today's Date: 11/15/2022  Unit: THE [facility] INTENSIVE CARE    Received request from rounding. Upon review of chart and communication with care team, patient's decision making abilities are not in question. Patient was alone in the room during visit. Goals of ACP Conversation:  Discuss Advance Care planning documents    Health Care Decision Makers:    No healthcare decision makers have been documented. Click here to complete [address] including selection of the Healthcare Decision Maker Relationship (ie \"Primary\")    Summary:  No Decision Maker named by patient at this time    Advance Care Planning Documents (Patient Wishes) on file:  None     Assessment:    Patient was overwhelmed about all that is going on with her. She is supposed to be closing on a house tomorrow. She has rescheduled that. Patient stated that she is not  and her children all below 18. Patient's next of kin therefore are her mother and father. She is fine with them being her \"people\" if needed but will consider completing an Advance Medical Directive at another time. Patient states that her family know her medical issues and wishes.       Interventions:  Provided education on documents for clarity and greater understanding  Discussed and provided education on state decision maker hierarchy  Encouraged ongoing ACP conversation with future decision makers and loved ones    Care Preferences Communicated:  No    Outcomes/Plan:  ACP Discussion Postponed    MALINI Matta on 11/15/2022 at 3:48 PM no abdominal distension/no blood in stool/no burning urination/no chills/no diarrhea/no dysuria/no fever/no hematuria/no vomiting

## 2022-11-15 NOTE — PROGRESS NOTES
TRANSFER - IN REPORT:    Verbal report received from POLO Gr RN(name) on Imani Benjamin being received from ED(unit) for routine progression of care      Report consisted of patients Situation, Background, Assessment and   Recommendations(SBAR). Information from the following report(s) SBAR, Kardex, ED Summary, Intake/Output, MAR, Recent Results, Med Rec Status, Cardiac Rhythm NSR, and Alarm Parameters  was reviewed with the receiving nurse. Opportunity for questions and clarification was provided. Assessment completed upon patients arrival to unit and care assumed. 0645: Pt arrived from ED/CT via stretcher with RNx2. Assessment completed. Pt A&Ox4, tolerating 3LNC with SPo2>95%. Pt denies chest pain/discomfort, generalized pain, hemoptysis, hematemesis, hematuria. N/V witnessed at bedside. ED RN stated pt desat on RA to 80s during transport to CT. Pt is great historian and self-advocate. Skin intact, PIVx2 in place and patent. Pt resting comfortably at this time. 0710: Bedside and Verbal shift change report given to Americo Siddiqi RN (oncoming nurse) by Ramos Larose RN (offgoing nurse). Report included the following information SBAR, Kardex, ED Summary, Intake/Output, MAR, Recent Results, Med Rec Status, Cardiac Rhythm NSR, and Alarm Parameters .

## 2022-11-15 NOTE — H&P
History & Physical    Patient: Abhay Miranda MRN: [id number]  CSN: [id number]    YOB: 1980  Age: 39 y.o. Sex: female      DOA: 11/15/2022    Chief Complaint:   Chief Complaint   Patient presents with    Chest Pain (Angina)          HPI:     Abhay Miranda is a 39 y.o.  female who diabetes, end-stage renal disease on peritoneal dialysis, cardiomyopathy EF of 36, coronary artery disease with failed stenting recommending medical management presents to the emergency room with crushing chest painRadiating to her left arm and blood pressure that was elevated at 275 systolic; she recently had diagnostic and not therapeutic cath recommending medical management please see report below  In the emergency room she was given nitro, morphine Zofran and started immediately on a Cardene drip this improved her chest pain chest x-ray suggested fluid overload and EKG showed no acute ST elevation initial troponin was elevated around 100  Patient states that she has been compliant with her dialysis 4 times a day  She also states that she took all her hypertensive regimen but did receive some bad news about her father and is anxious over closing on her house tomorrow she is currently chest pain-free denies smoking alcohol or drugs for her diabetes she takes quick acting insulin 15 units 3 times daily there is a strong family history of diabetes high blood pressure and heart disease on both sides of her family but no one is on dialysis except for her      Cardiac catheter results diagnostic from November 4      Findings:     Hemodynamics: Ao: 158/81 mmHg. Mean 112 mmHg. LV: 162/12 mmHg. LVEDP: 25   mmHg. No aortic valve gradient was noted on the pullback of the   pigtail catheter. Coronary Arteries:        Left Main Artery: Widely patent     Left Anterior Descending Artery: Proximal segments patent. Mid   vessel contains prior stents which are patent. Distal segments   patent.   Moderate sized first diagonal branch which is patent. Moderate-sized second diagonal branch which contains prior stents   is 100% occluded. Left Circumflex Artery: Moderate size AV vessel. Proximal and   mid are patent. Distal segment contains a 20% stenosis just   after the origin of an obtuse marginal branch. Large branching   obtuse marginal branch from the mid AV groove vessel. Proximal   segment contains a concentric discrete 70% stenosis. Distal   vessel contains luminal irregularities. Right Coronary Artery: Dominant, moderate size vessel. Proximal    and mid segments patent. Distal vessel bifurcates into 2   branches at the acute margin. The ongoing right branch is   diffusely diseased with a long 80% stenosis, followed by a   discrete 90% stenosis. The acute marginal branch contains   proximal and mid 99% stenoses. Coronary Intervention: None     Left ventriculogram: EF 40% moderate anterolateral hypokinesis. Mild mitral regurgitation     EBL: minimal     Blood products: None   Past Medical History:   Diagnosis Date    Chronic kidney disease     Hypertension     Tachycardia        No past surgical history on file. No family history on file. Social History     Socioeconomic History    Marital status:    Tobacco Use    Smoking status: Never    Smokeless tobacco: Never   Substance and Sexual Activity    Alcohol use: Yes     Comment: rarely       Prior to Admission medications    Not on File       Allergies   Allergen Reactions    Coreg [Carvedilol] Nausea and Vomiting    Lortab [Hydrocodone-Acetaminophen] Nausea and Vomiting         Review of Systems  GENERAL: Patient alert, awake and oriented times 3, able to communicate full sentences and not in distress. HEENT: No change in vision, no earache, tinnitus, sore throat or sinus congestion. NECK: No pain or stiffness. PULMONARY: + shortness of breath, cough or wheeze.    Cardiovascular: no pnd or orthopnea, +CP  GASTROINTESTINAL: No abdominal pain, nausea, vomiting or diarrhea, melena or bright red blood per rectum. GENITOURINARY: No urinary frequency, urgency, hesitancy or dysuria. MUSCULOSKELETAL: No joint or muscle pain, no back pain, no recent trauma. DERMATOLOGIC: No rash, no itching, no lesions. ENDOCRINE: No polyuria, polydipsia, no heat or cold intolerance. +recent change in weight. Weight gain with edema  HEMATOLOGICAL: No anemia or easy bruising or bleeding. NEUROLOGIC: No headache, seizures, numbness, tingling or weakness. Physical Exam:     Physical Exam:  Visit Vitals  BP (!) 144/80   Pulse 79   Temp 98 °F (36.7 °C)   Resp 12   Ht 5' 7\" (1.702 m)   Wt 88.9 kg (196 lb)   SpO2 100%   BMI 30.70 kg/m²      O2 Device: Nasal cannula (for comfort)    Temp (24hrs), Av °F (36.7 °C), Min:98 °F (36.7 °C), Max:98 °F (36.7 °C)    No intake/output data recorded. No intake/output data recorded. General:  Alert, cooperative, no distress, appears stated age. Head: Normocephalic, without obvious abnormality, atraumatic. Eyes:  Conjunctivae/corneas clear. PERRL, EOMs intact. Nose: Nares normal. No drainage or sinus tenderness. Neck: Supple, symmetrical, trachea midline, no adenopathy, thyroid: no enlargement, no carotid bruit and no JVD. Lungs:   Clear to auscultation bilaterally. Diminished sounds throughout   Heart:  Regular rate and rhythm, S1, S2 normal.  Soft systolic murmur     Abdomen: Soft, non-tender. Bowel sounds normal.  No distention   Extremities: Extremities normal, atraumatic, no cyanosis +3 edema venous stasis changes noted edema. Pulses: 2+ and symmetric all extremities. Skin:  No rashes or lesions   Neurologic: AAOx3, No focal motor or sensory deficit. Labs Reviewed    All lab results for the last 24 hours reviewed.    and EKG    Procedures/imaging: see electronic medical records for all procedures/Xrays and details which were not copied into this note but were reviewed prior to creation of Plan      Assessment/Plan     Active Problems:    Acute pulmonary edema ([facility] [address] (11/15/2022)      Chest pain (11/15/2022)      ESRD (end stage renal disease) on dialysis [facility]) (11/15/2022)      Pulmonary edema (11/15/2022)    Cardiovascular:  NSTEMI with cardiomyopathy fluid overload  Obtaining CTA to exclude dissection before starting heparin  Trend cardiac enzymes  Repeat echo  Cardiology consulted her recent catheterization that was diagnostic and not therapeutic showed occlusion of her previous stents and small vessel disease not amenable to PCI or CABG without being high risk she is normally followed by the heart failure clinic in [place]  Discussed with cardiology who will try to medically optimize her  She is currently on a Cardene drip  Pulmonary:  Currently on 2 L of oxygen with fluid overload nephrology is consulted for dialysis    : End-stage renal disease  On PD she appears to be a candidate that may be better with HD   Will defer to nephrology  She has been on the transplant list since 2017 and on dialysis for a year and a half    Endocrine:  Diabetes placed on sliding scale insulin TSH diabetic diet  Resume Synthroid  Hold insulin sliding scale and Lantus diabetic education  FEN: +  Electrolyte protocol renal dosing    Hematology: Likely will start on heparin drip pending her second troponin and CTA that is still pending for PE and dissection  Patient is anemic at 7.2 likely will need blood and Procrit check stool cards not on menses    ID: No evidence of infection  Monitor for fever leukocytosis hold off on antibiotics  DVT/GI Prophylaxis: Hep SQ/PPI        Raj Alva MD  11/15/2022 5:54 AM

## 2022-11-15 NOTE — CONSULTS
Consult Note  Consult requested by:Dr Enrique Leal Benjamin is a 39 y.o. female BLACK/ who is being seen on consult for ESRD. Chief Complaint   Patient presents with    Chest Pain (Angina)     Admission diagnosis: Acute pulmonary edema (HCC)    HPI:  38 yo PMH DM, HTN, CM EF 40%,CAD hx failed stent, ESRD on CAPD followed by [facility] in [place] admitted for chest pain, SOB, HTN urgency admitted to ICU. Pt started on cardene drip, BP controlled today. She has been compliant with dialysis, has been under a lot of stress at home. Past Medical History:   Diagnosis Date    Cardiomyopathy [facility])     Chronic kidney disease     Coronary arteriosclerosis     DM (diabetes mellitus) ([facility] [address]     Hypertension     Tachycardia      No past surgical history on file.   Social History     Socioeconomic History    Marital status:      Spouse name: Not on file    Number of children: Not on file    Years of education: Not on file    Highest education level: Not on file   Occupational History    Not on file   Tobacco Use    Smoking status: Never    Smokeless tobacco: Never   Substance and Sexual Activity    Alcohol use: Yes     Comment: rarely    Drug use: Not on file    Sexual activity: Not on file   Other Topics Concern    Not on file   Social History Narrative    Not on file     Social Determinants of Health     Financial Resource Strain: Not on file   Food Insecurity: Not on file   Transportation Needs: Not on file   Physical Activity: Not on file   Stress: Not on file   Social Connections: Not on file   Intimate Partner Violence: Not on file   Housing Stability: Not on file   No tobacco or Etoh  Lives with fiance and son  Family Hx:+kidney disease  Allergies   Allergen Reactions    Coreg [Carvedilol] Nausea and Vomiting    Lortab [Hydrocodone-Acetaminophen] Nausea and Vomiting       Home Medications:     reviewed  Review of Systems:       Visit Vitals  BP (!) 144/80   Pulse 86   Temp 97.7 °F (36.5 °C) Resp 12   Ht 5' 7\" (1.702 m)   Wt 88.9 kg (196 lb)   SpO2 100%   BMI 30.70 kg/m²   GENERAL: Patient alert, awake and oriented times 3, able to communicate full sentences and not in distress. HEENT: No change in vision, no earache, tinnitus, sore throat or sinus congestion. NECK: No pain or stiffness. PULMONARY: + shortness of breath, cough or wheeze. Cardiovascular: no pnd or orthopnea, +CP  GASTROINTESTINAL: No abdominal pain, nausea, vomiting or diarrhea, melena or bright red blood per rectum. GENITOURINARY: No urinary frequency, urgency, hesitancy or dysuria. MUSCULOSKELETAL: No joint or muscle pain, no back pain, no recent trauma. DERMATOLOGIC: No rash, no itching, no lesions. ENDOCRINE: No polyuria, polydipsia, no heat or cold intolerance. +recent change in weight. Weight gain with edema  HEMATOLOGICAL: No anemia or easy bruising or bleeding. NEUROLOGIC: No headache, seizures, numbness, tingling or weakness        Physical Assessment:     Wt Readings from Last 3 Encounters:   11/15/22 88.9 kg (196 lb)   09/11/18 96.6 kg (213 lb)   01/13/17 100.7 kg (222 lb)     Temp Readings from Last 3 Encounters:   11/15/22 97.7 °F (36.5 °C)   09/11/18 98.7 °F (37.1 °C)     BP Readings from Last 3 Encounters:   11/15/22 (!) 144/80   09/11/18 148/83     Pulse Readings from Last 3 Encounters:   11/15/22 86   09/11/18 (!) 111      General:  Alert, cooperative, no distress, appears stated age. Head: Normocephalic, without obvious abnormality, atraumatic. Eyes:  Conjunctivae/corneas clear. PERRL, EOMs intact. Nose: Nares normal. No drainage or sinus tenderness. Neck: Supple, symmetrical, trachea midline, no adenopathy, thyroid: no enlargement, no carotid bruit and no JVD. Lungs:   Clear to auscultation bilaterally. Diminished sounds throughout   Heart:  Regular rate and rhythm, S1, S2 normal.  Soft systolic murmur     Abdomen: Soft, non-tender.  Bowel sounds normal.  No distention Extremities: Extremities normal, atraumatic, no cyanosis +3 edema venous stasis changes noted edema. Pulses: 2+ and symmetric all extremities.    Skin:  No rashes or lesions   Neurologic: AAOx3, No focal motor or sensory deficit     WBC 10.5 H/H 7.3/22.7 plt 372  Na 129 K 5.2 Cl 96 CO2 19 BUN/Cr 97/18.3  Ca 10.1  BNP 64,352  Trop 114=>396  Impression:   ESRD on CAPD at home  NSTEMI  HTN urgency  DM  CAD hx failed stents  Anemia  CM EF 40%      Plan:  Resume PD 2.5% 4XD  Epo  Cardiology consulted  Wean raza Rios MD  W- [phone]  [phone]

## 2022-11-15 NOTE — CONSULTS
[facility]  Pulmonary, Critical Care, and Sleep Medicine    Name: Abhay Miranda MRN: [id number]   : 1980 Hospital: [facility] [place]    Date: 11/15/2022  Room: [address] [place] [address] Note                                              Consult requesting physician: Dr. Molly Dubois  Reason for Consult: Acute pulmonary edema and chest pain    IMPRESSION:       Active Hospital Problems    Diagnosis Date Noted    Acute pulmonary edema ([address] 11/15/2022    Chest pain 11/15/2022    ESRD (end stage renal disease) on dialysis ([address] 11/15/2022    Pulmonary edema 11/15/2022    Cardiomyopathy ([address] 11/15/2022    HTN (hypertension) 11/15/2022    DM (diabetes mellitus) ([address] 11/15/2022    PAD (peripheral artery disease) ([address] 11/15/2022    CAD (coronary artery disease) 11/15/2022        Patient Active Problem List   Diagnosis Code    Acute pulmonary edema ([address] J81.0    Chest pain R07.9    ESRD (end stage renal disease) on dialysis ([address] N18.6, Z99.2    Pulmonary edema J81.1    Cardiomyopathy ([address] I42.9    HTN (hypertension) I10    DM (diabetes mellitus) ([address] E11.9    PAD (peripheral artery disease) ([address] I73.9    CAD (coronary artery disease) I25.10       Code status: Full Code       RECOMMENDATIONS:     Respiratory:   Acute pulmonary edema, likely due to CHF/CMP/CAD. CTA chest 11/15/2022:  No evidence of pulmonary embolism or right heart strain. 2.  Cardiomegaly, bilateral diffuse groundglass opacity, bilateral small pleural effusions, right more than left and bibasilar dependent atelectasis. Findings most consistent with pulmonary edema. 3. Small lipid Rich left adrenal adenoma. Nonspecific 7 mm hypodensity gastric fundus, possible small aneurysm or bowel content. Keep SPO2 >=92%. HOB 30 degree elevation all the time. Aggressive pulmonary toileting. Aspiration precautions. Incentive spirometry.     CVS:   History of CAD with multiple stents in the past; last cardiac catheterization 11/4/2022 [facility] showed significant progressive CAD; recommended medical management. Admitted with chest pain and elevated troponin, NSTEMI and flash pulmonary edema with hypertensive urgency. Elevated troponin; check serial cardiac enzymes. Elevated BNP. Echo pending. CTA negative for PE. Continue Cardene drip and titrate down. Continue aspirin, Lipitor 80, Plavix 75, Imdur 120 daily. Continue Bumex 1 mg every 12 hours; strict I's and O's; monitor electrolytes. Echo 8/12/2022 [facility]: LV severely enlarged. Severe concentric LVH. LVEF 66%. Grade 2 diastolic dysfunction. RV size and systolic function normal.  Trace MR and TR. Estimated PASP 40 mmHg. Dr. Joe Mckeon was consulted from ER. ID:   Left dorsum of the foot near the range of fourth and fifth toe mild skin ulceration; was seen by podiatrist yesterday and was supposed to start doxycycline; start doxycycline 100 mg twice daily x10 days, prescribed. Rapid influenza and COVID-19 ordered from ICU; pending. Hematology/Oncology:   Anemia due to ESRD. No external bleeding. With CAD and NSTEMI and hemoglobin 7.3; patient received 1 PRBC. On epoetin by nephrologist.    Renal:   ESRD on PD. Hyponatremia, likely due to hypervolemia. Nephrologist on board. GI/: No acute issues. Endocrine: Monitor BS. SSI. Continue Synthroid. hCG negative. Neurology: Alert awake oriented x3. No focal deficit. Psychiatry: No acute issues. Toxicology: No acute issues. Pain/Sedation: No acute issues. Skin/Wound: Local care at the left foot. Electrolytes: Replace electrolytes per ICU electrolyte replacement protocol. IVF: None    Nutrition: P.o. diabetic, hypertensive and cardiac diet. Prophylaxis: DVT Prophylaxis: SCD/Heparin. GI Prophylaxis: Protonix. Restraints: none    Lines/Tubes: PIV  Patient does not have Torres catheter or central line. PT/OT eval and treat. OOB.      Advance Directive/Palliative Care: Per clinical course. Will defer respective systems problem management to primary and other respective consultant and follow patient in ICU with primary and other medical team.  Further recommendations will be based on the patient's response to recommended treatment and results of the investigation ordered. Quality Care: PPI, DVT prophylaxis, HOB elevated, Infection control all reviewed and addressed. Care of plan d/w RN, RT, MDR, hospitalist team.  D/w patient (answered all questions to satisfaction). High complexity decision making was performed during the evaluation of this patient at high risk for decompensation with multiple organ involvement. Total critical care time spent rendering care exclusive of procedures/family discussion/coordination of care: 48 minutes. Subjective/History of Present Illness:     Patient is a 39 y.o. female with PMHx significant for HTN, DM, ESRD on PD, CAD, CMP; admitted with hypertensive urgency with acute pulmonary edema and chest pain. Patient had extensive cardiac work-up in the past including multiple cardiac catheterization and stent placements in the past; last cardiac catheterization on 11/4/2022 at [facility] which reportedly showed significant CAD; recommended medical management. Came to ER with retrosternal chest pain initially thought to be burning heartburn sensation; later on left arm tingling and numbness. Denies any chest pain radiating to the back or shoulder. Denies any associated nausea, diaphoresis, dyspnea, lightheadedness, dizziness. The chest pain was 10 out of 10 in severity; now improved to 1 out of 10 in severity. In ER patient was hypotensive; required nitro followed by Cardene drip.    11/15/2022 :     Seen in ICU room 108. On Cardene drip. Headache after receiving nitro in ER. Alert awake oriented x3. Telemetry: NSR. On 3 LPM NC. Chest pain 1 out of 10 in severity now; significantly improved.   Denies any dyspnea, cough, diaphoresis, nausea, vomiting, calf pain, abdominal pain. No other overnight issues reported. [facility] was not called for any issues overnight. I/O last 24 hrs: No intake or output data in the 24 hours ending 11/15/22 1239      The patient is critically ill     History taken from patient, EMR     Review of Systems:   HEENT: No epistaxis, no nasal drainage, no difficulty in swallowing, no redness in eyes  Respiratory: as above  Cardiovascular: no palpitations, no syncope  Gastrointestinal: no abd pain, no vomiting, no diarrhea, no bleeding symptoms  Genitourinary: No urinary symptoms or hematuria  Musculoskeletal: Neg  Neurological: No focal weakness, no seizures, no headaches  Behvioral/Psych: No anxiety, no depression  Constitutional: No fever, no chills, no weight loss, no night sweats     Allergies   Allergen Reactions    Coreg [Carvedilol] Nausea and Vomiting    Lortab [Hydrocodone-Acetaminophen] Nausea and Vomiting      Past Medical History:   Diagnosis Date    Cardiomyopathy ([facility]     Chronic kidney disease     Coronary arteriosclerosis     DM (diabetes mellitus) ([facility]     Hypertension     Tachycardia       No past surgical history on file.    Social History     Tobacco Use    Smoking status: Never    Smokeless tobacco: Never   Substance Use Topics    Alcohol use: Yes     Comment: rarely      Family History   Problem Relation Age of Onset    Diabetes Mother     Heart Disease Mother     Heart Attack Mother     Heart Attack Father     Heart Disease Father     Diabetes Father       Prior to Admission medications    Not on File     Current Facility-Administered Medications   Medication Dose Route Frequency    niCARdipine (CARDENE) 25 mg in 0.9% sodium chloride 250 mL infusion  0-15 mg/hr IntraVENous TITRATE    bumetanide (BUMEX) injection 1 mg  1 mg IntraVENous Q12H    pantoprazole (PROTONIX) 40 mg in 0.9% sodium chloride 10 mL injection  40 mg IntraVENous Q12H    insulin lispro (HUMALOG) injection   SubCUTAneous AC&HS    sodium chloride (NS) flush 5-40 mL  5-40 mL IntraVENous Q8H    heparin (porcine) injection 5,000 Units  5,000 Units SubCUTAneous Q8H    clopidogreL (PLAVIX) tablet 75 mg  75 mg Oral DAILY    aspirin chewable tablet 81 mg  81 mg Oral DAILY    atorvastatin (LIPITOR) tablet 80 mg  80 mg Oral QHS    isosorbide mononitrate ER (IMDUR) tablet 120 mg  120 mg Oral DAILY    peritoneal dialysis DEXTROSE 2.5% (2.5 mEq/L low calcium) solution 2,500 mL  2,500 mL IntraPERitoneal QID    doxycycline (MONODOX) capsule 100 mg  100 mg Oral Q12H    epoetin lucinda-epbx (RETACRIT) injection 20,000 Units  20,000 Units SubCUTAneous Q7D         Objective:   Vital Signs:    Visit Vitals  BP (!) 142/78   Pulse 82   Temp 97.9 °F (36.6 °C)   Resp 12   Ht 5' 7\" (1.702 m)   Wt 88.9 kg (196 lb)   SpO2 100%   BMI 30.70 kg/m²       O2 Device: Nasal cannula (for comfort)       Temp (24hrs), Av.8 °F (36.6 °C), Min:97.7 °F (36.5 °C), Max:98 °F (36.7 °C)       Intake/Output:   Last shift:      No intake/output data recorded. Last 3 shifts: No intake/output data recorded. No intake or output data in the 24 hours ending 11/15/22 1239    Last 3 Recorded Weights in this Encounter    11/15/22 0134 11/15/22 0824   Weight: 88.9 kg (196 lb) 88.9 kg (196 lb)       Physical Exam:     General/Neurology: Alert, Awake, NAD. O2 NC. Obese. Head:   Normocephalic, without obvious abnormality, atraumatic. Eye:   EOM intact. No scleral icterus, no pallor, no cyanosis. Nose:   No sinus tenderness  Throat:  Lips, mucosa, and tongue normal. No oral thrush. Neck:   Supple, symmetric. No lymphadenopathy. Trachea midline  Lung: Moderate air entry bilateral equal.  Mild bilateral scattered crackles. No wheezing. No stridors. No prolongded expiration. No accessory muscle use. Heart:   Regular rate & rhythm. S1 S2 present. No murmur. No JVD. Abdomen:  Soft. NT. ND. +BS. No masses. PD catheter in place.   Extremities:  Mild bilateral leg pitting edema. No cyanosis. No clubbing. Pulses: 2+ and symmetric in DP. Capillary refill: normal  Lymphatic:  No cervical or supraclavicular palpable lymphadenopathy. Musculoskeletal: No joint swelling. No tenderness. Skin:   Left dorsum of the foot near the range of fourth and fifth toe mild skin ulceration; without induration or discharge. Data:       Recent Results (from the past 24 hour(s))   EKG, 12 LEAD, INITIAL    Collection Time: 11/15/22  1:34 AM   Result Value Ref Range    Ventricular Rate 106 BPM    Atrial Rate 106 BPM    P-R Interval 168 ms    QRS Duration 116 ms    Q-T Interval 370 ms    QTC Calculation (Bezet) 491 ms    Calculated P Axis 61 degrees    Calculated R Axis -42 degrees    Calculated T Axis 116 degrees    Diagnosis       Sinus tachycardia  Possible Left atrial enlargement  Left axis deviation  Left ventricular hypertrophy with QRS widening ( Sulaiman product )  ST & T wave abnormality, consider lateral ischemia  Abnormal ECG  When compared with ECG of 11-SEP-2018 02:29,  No significant change was found     CBC WITH AUTOMATED DIFF    Collection Time: 11/15/22  1:45 AM   Result Value Ref Range    WBC 10.5 4.6 - 13.2 K/uL    RBC 2.59 (L) 4.20 - 5.30 M/uL    HGB 7.3 (L) 12.0 - 16.0 g/dL    HCT 22.7 (L) 35.0 - 45.0 %    MCV 87.6 78.0 - 100.0 FL    MCH 28.2 24.0 - 34.0 PG    MCHC 32.2 31.0 - 37.0 g/dL    RDW 14.9 (H) 11.6 - 14.5 %    PLATELET 865 323 - 459 K/uL    MPV 9.9 9.2 - 11.8 FL    NRBC 0.0 0  WBC    ABSOLUTE NRBC 0.00 0.00 - 0.01 K/uL    NEUTROPHILS 86 (H) 40 - 73 %    LYMPHOCYTES 6 (L) 21 - 52 %    MONOCYTES 2 (L) 3 - 10 %    EOSINOPHILS 5 0 - 5 %    BASOPHILS 1 0 - 2 %    IMMATURE GRANULOCYTES 1 (H) 0.0 - 0.5 %    ABS. NEUTROPHILS 9.0 (H) 1.8 - 8.0 K/UL    ABS. LYMPHOCYTES 0.7 (L) 0.9 - 3.6 K/UL    ABS. MONOCYTES 0.2 0.05 - 1.2 K/UL    ABS. EOSINOPHILS 0.5 (H) 0.0 - 0.4 K/UL    ABS. BASOPHILS 0.1 0.0 - 0.1 K/UL    ABS. IMM.  GRANS. 0.1 (H) 0.00 - 0.04 K/UL DF AUTOMATED     METABOLIC PANEL, COMPREHENSIVE    Collection Time: 11/15/22  1:45 AM   Result Value Ref Range    Sodium 129 (L) 136 - 145 mmol/L    Potassium 5.2 3.5 - 5.5 mmol/L    Chloride 96 (L) 100 - 111 mmol/L    CO2 19 (L) 21 - 32 mmol/L    Anion gap 14 3.0 - 18 mmol/L    Glucose 105 (H) 74 - 99 mg/dL    BUN 97 (H) 7.0 - 18 MG/DL    Creatinine 18.30 (H) 0.6 - 1.3 MG/DL    BUN/Creatinine ratio 5 (L) 12 - 20      eGFR 2 (L) >60 ml/min/1.73m2    Calcium 10.1 8.5 - 10.1 MG/DL    Bilirubin, total 0.4 0.2 - 1.0 MG/DL    ALT (SGPT) 20 13 - 56 U/L    AST (SGOT) 19 10 - 38 U/L    Alk. phosphatase 139 (H) 45 - 117 U/L    Protein, total 7.5 6.4 - 8.2 g/dL    Albumin 3.3 (L) 3.4 - 5.0 g/dL    Globulin 4.2 (H) 2.0 - 4.0 g/dL    A-G Ratio 0.8 0.8 - 1.7     TROPONIN-HIGH SENSITIVITY    Collection Time: 11/15/22  1:45 AM   Result Value Ref Range    Troponin-High Sensitivity 114 (H) 0 - 54 ng/L   NT-PRO BNP    Collection Time: 11/15/22  1:45 AM   Result Value Ref Range    NT pro-BNP 64,352 (H) 0 - 450 PG/ML   TYPE & SCREEN    Collection Time: 11/15/22  1:45 AM   Result Value Ref Range    Crossmatch Expiration 11/18/2022,2359     ABO/Rh(D) B POSITIVE     Antibody screen NEG     Unit number [id number]     Blood component type RC LR     Unit division 00     Status of unit ALLOCATED     Crossmatch result Compatible    MAGNESIUM    Collection Time: 11/15/22  1:45 AM   Result Value Ref Range    Magnesium 3.3 (H) 1.6 - 2.6 mg/dL   HEMOGLOBIN A1C WITH EAG    Collection Time: 11/15/22  1:45 AM   Result Value Ref Range    Hemoglobin A1c 5.0 4.2 - 5.6 %    Est. average glucose 97 mg/dL   HCG QL SERUM    Collection Time: 11/15/22  1:45 AM   Result Value Ref Range    HCG, Ql. Negative NEG     RBC, ALLOCATE    Collection Time: 11/15/22  6:00 AM   Result Value Ref Range    HISTORY CHECKED?  Historical check performed    TROPONIN-HIGH SENSITIVITY    Collection Time: 11/15/22  6:10 AM   Result Value Ref Range    Troponin-High Sensitivity 396 (HH) 0 - 54 ng/L   GLUCOSE, POC    Collection Time: 11/15/22  6:54 AM   Result Value Ref Range    Glucose (POC) 135 (H) 70 - 110 mg/dL   ECHO ADULT COMPLETE    Collection Time: 11/15/22  8:41 AM   Result Value Ref Range    IVSd 1.9 (A) 0.6 - 0.9 cm    LVIDd 4.6 3.9 - 5.3 cm    LVIDs 3.1 cm    LVOT Diameter 1.9 cm    LVPWd 2.0 (A) 0.6 - 0.9 cm    EF BP 35 (A) 55 - 100 %    LV Ejection Fraction A2C 29 %    LV Ejection Fraction A4C 39 %    LV EDV A2C 185 mL    LV EDV A4C 186 mL    LV EDV  (A) 56 - 104 mL    LV ESV A2C 131 mL    LV ESV A4C 114 mL    LV ESV  (A) 19 - 49 mL    LVOT Peak Gradient 8 mmHg    LVOT Mean Gradient 5 mmHg    LVOT SV 92.1 ml    LVOT Peak Velocity 1.4 m/s    LVOT VTI 32.5 cm    RVIDd 4.5 cm    RV Free Wall Peak S' 15 cm/s    LA Diameter 4.4 cm    LA Volume A/L 131 mL    LA Volume 2C 107 (A) 22 - 52 mL    LA Volume 4C 137 (A) 22 - 52 mL    LA Volume  (A) 22 - 52 mL    AV Area by Peak Velocity 1.4 cm2    AV Area by Peak Velocity 1.5 cm2    AV Area by VTI 1.6 cm2    AV Peak Gradient 31 mmHg    AV Peak Gradient 30 mmHg    AV Mean Gradient 18 mmHg    AV Peak Velocity 2.8 m/s    AV Peak Velocity 2.7 m/s    AV Mean Velocity 2.0 m/s    AV VTI 58.9 cm    MV Nyquist Velocity 35 cm/s    LV E' Lateral Velocity 7 cm/s    LV E' Septal Velocity 4 cm/s    MV Area by VTI 1.6 cm2    MR Peak Gradient 92 mmHg    MR Peak Velocity 4.8 m/s    MR .7 cm    MV A Velocity 1.73 m/s    MV E Wave Deceleration Time 128.7 ms    MV E Velocity 2.01 m/s    MV Peak Gradient 18 mmHg    MV Mean Gradient 10 mmHg    MV PHT 51.0 ms    MV Max Velocity 2.1 m/s    MV Mean Velocity 1.5 m/s    MV VTI 58.3 cm    MV Area by PHT 4.3 cm2    PV AT 45.7 ms    Pulmonary Artery EDP 10 mmHg    SD Max Velocity 1.6 m/s    PV Peak Gradient 7 mmHg    PV Max Velocity 1.4 m/s    TAPSE 2.2 1.7 cm    TR Peak Gradient 37 mmHg    TR Max Velocity 3.05 m/s    Aortic Root 2.8 cm    Fractional Shortening 2D 33 28 - 44 %    LV ESV Index BP 63 mL/m2    LV EDV Index BP 96 mL/m2    LV ESV Index A4C 57 mL/m2    LV EDV Index A4C 93 mL/m2    LV ESV Index A2C 66 mL/m2    LV EDV Index A2C 93 mL/m2    LVIDd Index 2.30 cm/m2    LVIDs Index 1.55 cm/m2    LV RWT Ratio 0.87     LV Mass 2D 430.6 (A) 67 - 162 g    LV Mass 2D Index 215.3 (A) 43 - 95 g/m2    MV E/A 1.16     E/E' Ratio (Averaged) 39.48     E/E' Lateral 28.71     E/E' Septal 50.25     LA Volume Index BP 62 (A) 16 - 34 ml/m2    LA Volume Index A/L 66 16 - 34 mL/m2    LVOT Stroke Volume Index 46.1 mL/m2    LVOT Area 2.8 cm2    LA Volume Index 2C 54 (A) 16 - 34 mL/m2    LA Volume Index 4C 69 (A) 16 - 34 mL/m2    LA Size Index 2.20 cm/m2    LA/AO Root Ratio 1.57     Ao Root Index 1.40 cm/m2    LVOT:AV VTI Index 0.55     DANIEL/BSA VTI 0.8 cm2/m2    MV:LVOT VTI Index 1.79     Est. RA Pressure 3 mmHg    RVSP 40 mmHg    PASP 40 mmHg   TROPONIN-HIGH SENSITIVITY    Collection Time: 11/15/22  9:05 AM   Result Value Ref Range    Troponin-High Sensitivity 790 (HH) 0 - 54 ng/L   GLUCOSE, POC    Collection Time: 11/15/22 12:12 PM   Result Value Ref Range    Glucose (POC) 233 (H) 70 - 110 mg/dL         Chemistry Recent Labs     11/15/22  0145   *   *   K 5.2   CL 96*   CO2 19*   BUN 97*   CREA 18.30*   CA 10.1   MG 3.3*   AGAP 14   BUCR 5*   *   TP 7.5   ALB 3.3*   GLOB 4.2*   AGRAT 0.8        Lactic Acid No results found for: LAC  No results for input(s): LAC in the last 72 hours. Liver Enzymes Protein, total   Date Value Ref Range Status   11/15/2022 7.5 6.4 - 8.2 g/dL Final     Albumin   Date Value Ref Range Status   11/15/2022 3.3 (L) 3.4 - 5.0 g/dL Final     Globulin   Date Value Ref Range Status   11/15/2022 4.2 (H) 2.0 - 4.0 g/dL Final     A-G Ratio   Date Value Ref Range Status   11/15/2022 0.8 0.8 - 1.7   Final     Alk.  phosphatase   Date Value Ref Range Status   11/15/2022 139 (H) 45 - 117 U/L Final     Recent Labs     11/15/22  0145   TP 7.5   ALB 3.3*   GLOB 4.2*   AGRAT 0.8   AP 139*        CBC w/Diff Recent Labs     11/15/22  0145   WBC 10.5   RBC 2.59*   HGB 7.3*   HCT 22.7*      GRANS 86*   LYMPH 6*   EOS 5        Cardiac Enzymes No results found for: CPK, CK, CKMMB, CKMB, RCK3, CKMBT, CKNDX, CKND1, NAVIN, TROPT, TROIQ, JUAN CARLOS, TROPT, TNIPOC, BNP, BNPP     BNP No results found for: BNP, BNPP, XBNPT     Coagulation No results for input(s): PTP, INR, APTT, INREXT, INREXT in the last 72 hours. Thyroid  No results found for: T4, T3U, TSH, TSHEXT, TSHEXT    No results found for: T4     Urinalysis No results found for: COLOR, APPRN, SPGRU, REFSG, MIRANDA, PROTU, GLUCU, KETU, BILU, UROU, YUNIEL, LEUKU, GLUKE, EPSU, BACTU, WBCU, RBCU, CASTS, UCRY     Micro  No results for input(s): SDES, CULT in the last 72 hours. No results for input(s): CULT in the last 72 hours. Culture data during this hospitalization. All Micro Results       Procedure Component Value Units Date/Time    RESPIRATORY VIRUS PANEL W/COVID-19, PCR [[id number]]     Order Status: Sent Specimen: NASOPHARYNGEAL SWAB     COVID-19 WITH INFLUENZA A/B [[id number]]     Order Status: Sent Specimen: Nasopharyngeal                CTA CHEST W OR W WO CONT    Result Date: 11/15/2022  CTA chest INDICATION: Pain. COMPARISON: None TECHNIQUE: Axial CT imaging from the thoracic inlet through the diaphragm with intravenous contrast. Coronal and sagittal MIP reformats were generated. One or more dose reduction techniques were used on this CT: automated exposure control, adjustment of the mAs and/or kVp according to patient size, and iterative reconstruction techniques. The specific techniques used on this CT exam have been documented in the patient's electronic medical record.  Digital Imaging and Communications in Medicine (DICOM) format image data are available to nonaffiliated external healthcare facilities or entities on a secure, media free, reciprocally searchable basis with patient authorization for at least a 12-month period after this study. _______________ FINDINGS: EXAM QUALITY: Adequate PULMONARY ARTERIES: Mild motion degradation. No central or segmental filling defects identified to suggest pulmonary embolism. Peripheral branches are obscured by motion and airspace disease. MEDIASTINUM: Cardiomegaly without evidence for pericardial effusion. Coronary atherosclerosis noted. There is no evidence for right heart strain. Aorta is normal in caliber with mild atherosclerosis. No evidence for dissection. LUNGS: Diffuse groundglass opacity with mild patchy opacities in the upper lung zone. There is bilateral dependent consolidation. PLEURA: Small bilateral pleural effusions, right more than left. AIRWAY: Patent. LYMPH NODES: No enlarged nodes. Slightly prominent mediastinal lymph nodes, likely reactive. No axillary or hilar adenopathy. UPPER ABDOMEN: Small left adrenal lipid rich adenoma. 7 mm hypodensity at the gallbladder fundus, possible aneurysm or bowel content. OTHER: No acute or aggressive osseous abnormalities identified. _______________     1. No evidence of pulmonary embolism or right heart strain. 2.  Cardiomegaly, bilateral diffuse groundglass opacity, bilateral small pleural effusions, right more than left and bibasilar dependent atelectasis. Findings most consistent with pulmonary edema. 3. Small lipid Rich left adrenal adenoma. Nonspecific 7 mm hypodensity gastric fundus, possible small aneurysm or bowel content. XR CHEST PORT    Result Date: 11/15/2022  EXAM: XR CHEST PORT CLINICAL INDICATION/HISTORY: CP -Additional: None COMPARISON: None TECHNIQUE: Frontal view of the chest _______________ FINDINGS: HEART AND MEDIASTINUM: Cardiomegaly with hilar congestion and cephalization LUNGS AND PLEURAL SPACES: Diffuse bilateral indistinct interstitial and hazy confluent alveolar opacities. No dense consolidation, effusion or pneumothorax. BONY THORAX AND SOFT TISSUES: No acute or destructive osseous abnormality.  _______________ Cardiomegaly with findings of pulmonary edema from CHF versus fluid overload. Echo 8/12/22 [facility]:  CONCLUSIONS     * Left ventricular chamber size is severely enlarged. * There is severe concentric left ventricular wall thickness. * Left ventricular systolic function is normal with an ejection fraction of   66 % by Schulte's biplane. * Left ventricular diastolic function: Grade II diastolic dysfunction. * Left atrial chamber is severely enlarged with a left atrial volume index   of 64.26 ml/m^2 by BP MOD. * Right ventricular chamber dimension is normal.     * Right ventricular systolic function is normal.     * The aortic valve has a restrictive left coronary cusp and calcified non   coronary cusp. * There is mild aortic valve stenosis with a peak velocity of 2.25 m/s, mean   gradient of 11 mmHg, aortic valve area of 1.52 cm2, and a dimensionless index   of 0.54. * There is trace mitral and tricuspid valve regurgitation. * Mild pulmonary hypertension, estimated pulmonary arterial systolic   pressure is 40 mmHg. Comparison     * Compared to prior study from 4/15/22 there was an EF of 55%, indeterminate   diastolic function, moderately dilated RA, trace AI, no evidence of AS, mild   to moderate MR and TR, and a PAP of 53 mmHg. [facility] 11/4/22 [facility]:  Hemodynamics: Ao: 158/81 mmHg. Mean 112 mmHg. LV: 162/12 mmHg. LVEDP: 25   mmHg. No aortic valve gradient was noted on the pullback of the   pigtail catheter. Coronary Arteries:        Left Main Artery: Widely patent     Left Anterior Descending Artery: Proximal segments patent. Mid   vessel contains prior stents which are patent. Distal segments   patent. Moderate sized first diagonal branch which is patent. Moderate-sized second diagonal branch which contains prior stents   is 100% occluded. Left Circumflex Artery: Moderate size AV vessel. Proximal and   mid are patent.   Distal segment contains a 20% stenosis just   after the origin of an obtuse marginal branch. Large branching   obtuse marginal branch from the mid AV groove vessel. Proximal   segment contains a concentric discrete 70% stenosis. Distal   vessel contains luminal irregularities. Right Coronary Artery: Dominant, moderate size vessel. Proximal    and mid segments patent. Distal vessel bifurcates into 2   branches at the acute margin. The ongoing right branch is   diffusely diseased with a long 80% stenosis, followed by a   discrete 90% stenosis. The acute marginal branch contains   proximal and mid 99% stenoses. Coronary Intervention: None     Left ventriculogram: EF 40% moderate anterolateral hypokinesis. Mild mitral regurgitation     Imp:   1. Progressive coronary disease since prior catheterization   7/14/2021. Second diagonal branch which was previously stented   is now 100% occluded. First obtuse marginal branch contains a   ostial/proximal 70% stenosis. Distal right coronary artery   demonstrates 2 branches with the ongoing vessel diffusely   diseased with discrete distal 90% stenosis. The acute marginal   branch contains proximal and mid 99% stenoses. 2.  Ischemic cardiomyopathy ejection fraction 40%   3. No aortic stenosis   4. Mild mitral regurgitation   5. Successful administration of moderate sedation without   complications     Plan: Routine post cardiac cath orders. Patient's disease is   mostly branch vessel and distal vessel disease. The diagonal is   100% chronic total occlusion, the distal right coronary artery   disease is very diffuse. Thus, this anatomy is unfavorable for   PCI and CABG. The obtuse marginal branch potentially could be   considered for PCI although would leave behind significant   residual coronary disease. Patient needs aggressive guideline   directed risk factor modification.   In addition with her LV   systolic dysfunction she will also need guideline directed   medical therapy for LV dysfunction and chronic systolic heart   failure. Bri Arreaga MD, [facility]   [phone]   November 4, 2022       Images report reviewed by me:  CT (Most Recent) (CT chest reviewed by me) Results from [place] encounter on 11/15/22    CTA CHEST W OR W WO CONT    Narrative  CTA chest    INDICATION: Pain. COMPARISON: None    TECHNIQUE: Axial CT imaging from the thoracic inlet through the diaphragm with  intravenous contrast. Coronal and sagittal MIP reformats were generated. One or  more dose reduction techniques were used on this CT: automated exposure control,  adjustment of the mAs and/or kVp according to patient size, and iterative  reconstruction techniques. The specific techniques used on this CT exam have  been documented in the patient's electronic medical record. Digital Imaging and  Communications in Medicine (DICOM) format image data are available to  nonaffiliated external healthcare facilities or entities on a secure, media  free, reciprocally searchable basis with patient authorization for at least a  12-month period after this study. _______________    FINDINGS:    EXAM QUALITY: Adequate    PULMONARY ARTERIES: Mild motion degradation. No central or segmental filling  defects identified to suggest pulmonary embolism. Peripheral branches are  obscured by motion and airspace disease. MEDIASTINUM: Cardiomegaly without evidence for pericardial effusion. Coronary  atherosclerosis noted. There is no evidence for right heart strain. Aorta is  normal in caliber with mild atherosclerosis. No evidence for dissection. LUNGS: Diffuse groundglass opacity with mild patchy opacities in the upper lung  zone. There is bilateral dependent consolidation. PLEURA: Small bilateral pleural effusions, right more than left. AIRWAY: Patent. LYMPH NODES: No enlarged nodes. Slightly prominent mediastinal lymph nodes,  likely reactive. No axillary or hilar adenopathy.     UPPER ABDOMEN: Small left adrenal lipid rich adenoma. 7 mm hypodensity at the  gallbladder fundus, possible aneurysm or bowel content. OTHER: No acute or aggressive osseous abnormalities identified. _______________    Impression  1. No evidence of pulmonary embolism or right heart strain. 2.  Cardiomegaly, bilateral diffuse groundglass opacity, bilateral small pleural  effusions, right more than left and bibasilar dependent atelectasis. Findings  most consistent with pulmonary edema. 3. Small lipid Rich left adrenal adenoma. Nonspecific 7 mm hypodensity gastric  fundus, possible small aneurysm or bowel content. CXR reviewed by me:  XR (Most Recent). CXR  reviewed by me and compared with previous CXR Results from Hospital Encounter encounter on 11/15/22    XR CHEST PORT    Narrative  EXAM: XR CHEST PORT    CLINICAL INDICATION/HISTORY: CP  -Additional: None    COMPARISON: None    TECHNIQUE: Frontal view of the chest    _______________    FINDINGS:    HEART AND MEDIASTINUM: Cardiomegaly with hilar congestion and cephalization    LUNGS AND PLEURAL SPACES: Diffuse bilateral indistinct interstitial and hazy  confluent alveolar opacities. No dense consolidation, effusion or pneumothorax. BONY THORAX AND SOFT TISSUES: No acute or destructive osseous abnormality. _______________    Impression  Cardiomegaly with findings of pulmonary edema from CHF versus fluid overload. ·Please note: Voice-recognition software may have been used to generate this report, which may have resulted in some phonetic-based errors in grammar and contents. Even though attempts were made to correct all the mistakes, some may have been missed, and remained in the body of the document.       Bia Garza MD  11/15/2022

## 2022-11-15 NOTE — ED TRIAGE NOTES
Pt arrived via EMS with c.o crushing CP that started around 9pm this evening. Pt had 3 stents placed in 2007, 2008 and 2019. Pt had recent heart cath 2 weeks ago as well. Pt is a PD pt as well and does PD 3-4 times a day. +SOB with the CP. Pt received 1 SL nitro by EMS and 324mg ASA PTA. MD at bedside. Pt on the monitor, BP high.

## 2022-11-15 NOTE — ROUTINE PROCESS
0405- Patient to begin heparin gtt. Phlebotomy notified for baseline ptt.      1902- Baseline ptt drawn. Heparin gtt started.

## 2022-11-15 NOTE — Clinical Note
Status[de-identified] INPATIENT [101]   Type of Bed: Intensive Care [6]   Cardiac Monitoring Required?: Yes   Inpatient Hospitalization Certified Necessary for the Following Reasons: 4.  Patient requires ICU level of care interventions (further clarification in H&P documentation)   Admitting Diagnosis: Chest pain [724843]   Admitting Diagnosis: Pulmonary edema [569516]   Admitting Physician: Darlin Meeks [[id number]]   Attending Physician: Darlin Meeks [[id number]]   Estimated Length of Stay: 2 Midnights   Discharge Plan[de-identified] Home with Office Follow-up

## 2022-11-16 LAB
ALBUMIN SERPL-MCNC: 2.7 G/DL (ref 3.4–5)
ALBUMIN/GLOB SERPL: 0.6 {RATIO} (ref 0.8–1.7)
ALP SERPL-CCNC: 119 U/L (ref 45–117)
ALT SERPL-CCNC: 14 U/L (ref 13–56)
ANION GAP SERPL CALC-SCNC: 11 MMOL/L (ref 3–18)
ANION GAP SERPL CALC-SCNC: 13 MMOL/L (ref 3–18)
APTT PPP: 33.1 SEC (ref 23–36.4)
APTT PPP: 45.4 SEC (ref 23–36.4)
AST SERPL-CCNC: 15 U/L (ref 10–38)
BASOPHILS # BLD: 0.1 K/UL (ref 0–0.1)
BASOPHILS NFR BLD: 1 % (ref 0–2)
BILIRUB SERPL-MCNC: 0.4 MG/DL (ref 0.2–1)
BUN SERPL-MCNC: 86 MG/DL (ref 7–18)
BUN SERPL-MCNC: 94 MG/DL (ref 7–18)
BUN/CREAT SERPL: 5 (ref 12–20)
BUN/CREAT SERPL: 6 (ref 12–20)
CALCIUM SERPL-MCNC: 9.6 MG/DL (ref 8.5–10.1)
CALCIUM SERPL-MCNC: 9.8 MG/DL (ref 8.5–10.1)
CHLORIDE SERPL-SCNC: 97 MMOL/L (ref 100–111)
CHLORIDE SERPL-SCNC: 97 MMOL/L (ref 100–111)
CO2 SERPL-SCNC: 22 MMOL/L (ref 21–32)
CO2 SERPL-SCNC: 23 MMOL/L (ref 21–32)
CREAT SERPL-MCNC: 16.5 MG/DL (ref 0.6–1.3)
CREAT SERPL-MCNC: 16.7 MG/DL (ref 0.6–1.3)
DIFFERENTIAL METHOD BLD: ABNORMAL
EOSINOPHIL # BLD: 0.7 K/UL (ref 0–0.4)
EOSINOPHIL NFR BLD: 9 % (ref 0–5)
ERYTHROCYTE [DISTWIDTH] IN BLOOD BY AUTOMATED COUNT: 15.1 % (ref 11.6–14.5)
GLOBULIN SER CALC-MCNC: 4.2 G/DL (ref 2–4)
GLUCOSE BLD STRIP.AUTO-MCNC: 113 MG/DL (ref 70–110)
GLUCOSE BLD STRIP.AUTO-MCNC: 117 MG/DL (ref 70–110)
GLUCOSE BLD STRIP.AUTO-MCNC: 136 MG/DL (ref 70–110)
GLUCOSE BLD STRIP.AUTO-MCNC: 237 MG/DL (ref 70–110)
GLUCOSE SERPL-MCNC: 103 MG/DL (ref 74–99)
GLUCOSE SERPL-MCNC: 127 MG/DL (ref 74–99)
HCT VFR BLD AUTO: 18.3 % (ref 35–45)
HCT VFR BLD AUTO: 22.3 % (ref 35–45)
HGB BLD-MCNC: 5.8 G/DL (ref 12–16)
HGB BLD-MCNC: 7.1 G/DL (ref 12–16)
IMM GRANULOCYTES # BLD AUTO: 0 K/UL (ref 0–0.04)
IMM GRANULOCYTES NFR BLD AUTO: 0 % (ref 0–0.5)
LYMPHOCYTES # BLD: 1.1 K/UL (ref 0.9–3.6)
LYMPHOCYTES NFR BLD: 14 % (ref 21–52)
MAGNESIUM SERPL-MCNC: 3.2 MG/DL (ref 1.6–2.6)
MCH RBC QN AUTO: 28.2 PG (ref 24–34)
MCHC RBC AUTO-ENTMCNC: 31.7 G/DL (ref 31–37)
MCV RBC AUTO: 88.8 FL (ref 78–100)
MONOCYTES # BLD: 0.4 K/UL (ref 0.05–1.2)
MONOCYTES NFR BLD: 5 % (ref 3–10)
NEUTS SEG # BLD: 5.5 K/UL (ref 1.8–8)
NEUTS SEG NFR BLD: 71 % (ref 40–73)
NRBC # BLD: 0 K/UL (ref 0–0.01)
NRBC BLD-RTO: 0 PER 100 WBC
PLATELET # BLD AUTO: 307 K/UL (ref 135–420)
PMV BLD AUTO: 9.6 FL (ref 9.2–11.8)
POTASSIUM SERPL-SCNC: 4.7 MMOL/L (ref 3.5–5.5)
POTASSIUM SERPL-SCNC: 5 MMOL/L (ref 3.5–5.5)
PROT SERPL-MCNC: 6.9 G/DL (ref 6.4–8.2)
RBC # BLD AUTO: 2.06 M/UL (ref 4.2–5.3)
SODIUM SERPL-SCNC: 130 MMOL/L (ref 136–145)
SODIUM SERPL-SCNC: 133 MMOL/L (ref 136–145)
TROPONIN-HIGH SENSITIVITY: 1078 NG/L (ref 0–54)
TROPONIN-HIGH SENSITIVITY: 1091 NG/L (ref 0–54)
WBC # BLD AUTO: 7.7 K/UL (ref 4.6–13.2)

## 2022-11-16 PROCEDURE — 65610000006 HC RM INTENSIVE CARE

## 2022-11-16 PROCEDURE — 83540 ASSAY OF IRON: CPT

## 2022-11-16 PROCEDURE — 74011636637 HC RX REV CODE- 636/637: Performed by: HOSPITALIST

## 2022-11-16 PROCEDURE — 82728 ASSAY OF FERRITIN: CPT

## 2022-11-16 PROCEDURE — 93005 ELECTROCARDIOGRAM TRACING: CPT

## 2022-11-16 PROCEDURE — 74011250636 HC RX REV CODE- 250/636: Performed by: INTERNAL MEDICINE

## 2022-11-16 PROCEDURE — 36415 COLL VENOUS BLD VENIPUNCTURE: CPT

## 2022-11-16 PROCEDURE — 85025 COMPLETE CBC W/AUTO DIFF WBC: CPT

## 2022-11-16 PROCEDURE — 74011250637 HC RX REV CODE- 250/637: Performed by: INTERNAL MEDICINE

## 2022-11-16 PROCEDURE — 85018 HEMOGLOBIN: CPT

## 2022-11-16 PROCEDURE — C9113 INJ PANTOPRAZOLE SODIUM, VIA: HCPCS | Performed by: INTERNAL MEDICINE

## 2022-11-16 PROCEDURE — P9016 RBC LEUKOCYTES REDUCED: HCPCS

## 2022-11-16 PROCEDURE — 77010033678 HC OXYGEN DAILY

## 2022-11-16 PROCEDURE — 83735 ASSAY OF MAGNESIUM: CPT

## 2022-11-16 PROCEDURE — 80053 COMPREHEN METABOLIC PANEL: CPT

## 2022-11-16 PROCEDURE — 74011000250 HC RX REV CODE- 250: Performed by: INTERNAL MEDICINE

## 2022-11-16 PROCEDURE — 85730 THROMBOPLASTIN TIME PARTIAL: CPT

## 2022-11-16 PROCEDURE — 84484 ASSAY OF TROPONIN QUANT: CPT

## 2022-11-16 PROCEDURE — 82962 GLUCOSE BLOOD TEST: CPT

## 2022-11-16 PROCEDURE — 74011636637 HC RX REV CODE- 636/637: Performed by: INTERNAL MEDICINE

## 2022-11-16 PROCEDURE — A4722 DIALYS SOL FLD VOL > 1999CC: HCPCS | Performed by: INTERNAL MEDICINE

## 2022-11-16 PROCEDURE — 36430 TRANSFUSION BLD/BLD COMPNT: CPT

## 2022-11-16 RX ORDER — HEPARIN SODIUM 1000 [USP'U]/ML
3000 INJECTION, SOLUTION INTRAVENOUS; SUBCUTANEOUS ONCE
Status: COMPLETED | OUTPATIENT
Start: 2022-11-16 | End: 2022-11-16

## 2022-11-16 RX ORDER — INSULIN LISPRO 100 [IU]/ML
8 INJECTION, SOLUTION INTRAVENOUS; SUBCUTANEOUS
Status: DISCONTINUED | OUTPATIENT
Start: 2022-11-16 | End: 2022-11-17

## 2022-11-16 RX ORDER — MORPHINE SULFATE 2 MG/ML
2 INJECTION, SOLUTION INTRAMUSCULAR; INTRAVENOUS ONCE
Status: COMPLETED | OUTPATIENT
Start: 2022-11-16 | End: 2022-11-16

## 2022-11-16 RX ORDER — INSULIN GLARGINE 100 [IU]/ML
5 INJECTION, SOLUTION SUBCUTANEOUS
Status: DISCONTINUED | OUTPATIENT
Start: 2022-11-16 | End: 2022-11-17

## 2022-11-16 RX ADMIN — METOPROLOL SUCCINATE 100 MG: 100 TABLET, EXTENDED RELEASE ORAL at 01:13

## 2022-11-16 RX ADMIN — ATORVASTATIN CALCIUM 80 MG: 20 TABLET, FILM COATED ORAL at 21:02

## 2022-11-16 RX ADMIN — INSULIN LISPRO 8 UNITS: 100 INJECTION, SOLUTION INTRAVENOUS; SUBCUTANEOUS at 18:12

## 2022-11-16 RX ADMIN — MORPHINE SULFATE 2 MG: 2 INJECTION, SOLUTION INTRAMUSCULAR; INTRAVENOUS at 02:15

## 2022-11-16 RX ADMIN — ASPIRIN 81 MG: 81 TABLET, CHEWABLE ORAL at 10:13

## 2022-11-16 RX ADMIN — SODIUM CHLORIDE, PRESERVATIVE FREE 10 ML: 5 INJECTION INTRAVENOUS at 21:10

## 2022-11-16 RX ADMIN — DOXYCYCLINE 100 MG: 100 CAPSULE ORAL at 10:13

## 2022-11-16 RX ADMIN — CLOPIDOGREL BISULFATE 75 MG: 75 TABLET ORAL at 10:13

## 2022-11-16 RX ADMIN — BUMETANIDE 1 MG: 0.25 INJECTION INTRAMUSCULAR; INTRAVENOUS at 07:04

## 2022-11-16 RX ADMIN — SODIUM CHLORIDE, SODIUM LACTATE, CALCIUM CHLORIDE, MAGNESIUM CHLORIDE AND DEXTROSE 2000 ML: 2.5; 538; 448; 18.3; 5.08 INJECTION, SOLUTION INTRAPERITONEAL at 09:01

## 2022-11-16 RX ADMIN — SODIUM CHLORIDE, SODIUM LACTATE, CALCIUM CHLORIDE, MAGNESIUM CHLORIDE AND DEXTROSE 2000 ML: 2.5; 538; 448; 18.3; 5.08 INJECTION, SOLUTION INTRAPERITONEAL at 21:09

## 2022-11-16 RX ADMIN — SODIUM CHLORIDE, PRESERVATIVE FREE 10 ML: 5 INJECTION INTRAVENOUS at 14:20

## 2022-11-16 RX ADMIN — SODIUM CHLORIDE, PRESERVATIVE FREE 40 MG: 5 INJECTION INTRAVENOUS at 10:13

## 2022-11-16 RX ADMIN — SODIUM CHLORIDE, SODIUM LACTATE, CALCIUM CHLORIDE, MAGNESIUM CHLORIDE AND DEXTROSE 2000 ML: 2.5; 538; 448; 18.3; 5.08 INJECTION, SOLUTION INTRAPERITONEAL at 13:42

## 2022-11-16 RX ADMIN — ISOSORBIDE MONONITRATE 120 MG: 60 TABLET, EXTENDED RELEASE ORAL at 10:13

## 2022-11-16 RX ADMIN — INSULIN LISPRO 4 UNITS: 100 INJECTION, SOLUTION INTRAVENOUS; SUBCUTANEOUS at 11:36

## 2022-11-16 RX ADMIN — SODIUM CHLORIDE, SODIUM LACTATE, CALCIUM CHLORIDE, MAGNESIUM CHLORIDE AND DEXTROSE 2000 ML: 2.5; 538; 448; 18.3; 5.08 INJECTION, SOLUTION INTRAPERITONEAL at 18:13

## 2022-11-16 RX ADMIN — NIFEDIPINE 90 MG: 30 TABLET, FILM COATED, EXTENDED RELEASE ORAL at 18:12

## 2022-11-16 RX ADMIN — BUMETANIDE 1 MG: 0.25 INJECTION INTRAMUSCULAR; INTRAVENOUS at 18:12

## 2022-11-16 RX ADMIN — SODIUM CHLORIDE, PRESERVATIVE FREE 40 MG: 5 INJECTION INTRAVENOUS at 01:13

## 2022-11-16 RX ADMIN — DOXYCYCLINE 100 MG: 100 CAPSULE ORAL at 20:58

## 2022-11-16 RX ADMIN — HEPARIN SODIUM 3000 UNITS: 1000 INJECTION INTRAVENOUS; SUBCUTANEOUS at 03:00

## 2022-11-16 RX ADMIN — SODIUM CHLORIDE, PRESERVATIVE FREE 10 ML: 5 INJECTION INTRAVENOUS at 01:12

## 2022-11-16 RX ADMIN — DOXYCYCLINE 100 MG: 100 CAPSULE ORAL at 01:13

## 2022-11-16 RX ADMIN — ATORVASTATIN CALCIUM 80 MG: 20 TABLET, FILM COATED ORAL at 01:13

## 2022-11-16 RX ADMIN — INSULIN GLARGINE 5 UNITS: 100 INJECTION, SOLUTION SUBCUTANEOUS at 21:02

## 2022-11-16 RX ADMIN — ONDANSETRON 4 MG: 2 INJECTION INTRAMUSCULAR; INTRAVENOUS at 02:15

## 2022-11-16 RX ADMIN — SODIUM CHLORIDE, PRESERVATIVE FREE 40 MG: 5 INJECTION INTRAVENOUS at 20:59

## 2022-11-16 RX ADMIN — SODIUM CHLORIDE, SODIUM LACTATE, CALCIUM CHLORIDE, MAGNESIUM CHLORIDE AND DEXTROSE 2000 ML: 2.5; 538; 448; 18.3; 5.08 INJECTION, SOLUTION INTRAPERITONEAL at 00:55

## 2022-11-16 NOTE — PROGRESS NOTES
Hospitalist Progress Note-critical care note     Patient: Dilip Cole MRN: [id number]  [facility]: [id number]    YOB: 1980  Age: 39 y.o.   Sex: female    DOA: 11/15/2022 LOS:  LOS: 1 day            Chief complaint: Acute pulmonary edema, chest pain, NSTEMI, end-stage renal disease on PD, cardiomyopathy, diabetes, hypertension    Assessment/Plan         Hospital Problems  Date Reviewed: 11/15/2022            Codes Class Noted POA    * (Principal) Acute pulmonary edema ([facility] ICD-10-CM: J81.0  ICD-9-CM: 518.4  11/15/2022 Unknown        Chest pain ICD-10-CM: R07.9  ICD-9-CM: 786.50  11/15/2022 Unknown        ESRD (end stage renal disease) on dialysis [facility]) ICD-10-CM: N18.6, Z99.2  ICD-9-CM: 585.6, V45.11  11/15/2022 Unknown        Pulmonary edema ICD-10-CM: J81.1  ICD-9-CM: 436  11/15/2022 Unknown        Cardiomyopathy ([facility] ICD-10-CM: I42.9  ICD-9-CM: 425.4  11/15/2022 Unknown        HTN (hypertension) ICD-10-CM: I10  ICD-9-CM: 401.9  11/15/2022 Unknown        DM (diabetes mellitus) ([facility] ICD-10-CM: E11.9  ICD-9-CM: 250.00  11/15/2022 Unknown        PAD (peripheral artery disease) ([facility] ICD-10-CM: I73.9  ICD-9-CM: 443.9  11/15/2022 Unknown        CAD (coronary artery disease) ICD-10-CM: I25.10  ICD-9-CM: 414.00  11/15/2022 Unknown            NSTEMI with cardiomyopathy   Cta chest no PE , trop flat   Cardiologist was on board, pt wants to have medical treatment   Echo ef 35 %   Currently on 2 L of oxygen  On aspirin lipitor plavix and isosorbide metoprolol, bumex       Hypertensive emergency  On  nicardipine gtt  now , continue current regimen      End-stage renal disease on PD   Renal on board for PD   She has been on the transplant list since 2017 and on dialysis for a year and a half       Diabetes placed on sliding scale insulin add lantus 5 units at the night time     Hypothyroidism  Synthroid    Anemia, no bleeding reported, will do occult stool and blood transfusion epotin per renal Subjective feel dizziness, no bleeding, I will have blood transfusion         Disposition :tbd,   Review of systems:    General: No fevers or chills. Cardiovascular: No chest pain or pressure. No palpitations. Pulmonary: No shortness of breath. Gastrointestinal: No nausea, vomiting. Neuro: dizziness     Vital signs/Intake and Output:  Visit Vitals  BP (!) 147/80   Pulse 87   Temp 98.6 °F (37 °C)   Resp 11   Ht 5' 7\" (1.702 m)   Wt 91.5 kg (201 lb 11.5 oz)   SpO2 100%   BMI 31.59 kg/m²     Current Shift:  11/16 0701 - 11/16 1900  In: 2000   Out: 800 [Urine:800]  Last three shifts:  11/14 1901 - 11/16 0700  In: 00881.7 [I.V.:91.7]  Out: 07042 [Urine:800]    Physical Exam:  General: WD, WN. Alert, cooperative, no acute distress    HEENT: NC, Atraumatic. PERRLA, anicteric sclerae. Lungs: CTA Bilaterally. No Wheezing/Rhonchi/Rales. Heart:  Regular  rhythm,  No murmur, No Rubs, No Gallops  Abdomen: Soft, + distended, Non tender. +Bowel sounds, PD catheter noted   Extremities: No c/c/e  Psych:   Not anxious or agitated. Neurologic:  No acute neurological deficit. Labs: Results:       Chemistry Recent Labs     11/16/22  0755 11/16/22  0130 11/15/22  0145   * 127* 105*   * 130* 129*   K 4.7 5.0 5.2   CL 97* 97* 96*   CO2 23 22 19*   BUN 86* 94* 97*   CREA 16.50* 16.70* 18.30*   CA 9.8 9.6 10.1   AGAP 13 11 14   BUCR 5* 6* 5*   AP  --  119* 139*   TP  --  6.9 7.5   ALB  --  2.7* 3.3*   GLOB  --  4.2* 4.2*   AGRAT  --  0.6* 0.8      CBC w/Diff Recent Labs     11/16/22  0130 11/15/22  1904 11/15/22  0145   WBC 7.7 8.3 10.5   RBC 2.06* 2.07* 2.59*   HGB 5.8* 5.8* 7.3*   HCT 18.3* 18.8* 22.7*    321 372   GRANS 71 71 86*   LYMPH 14* 12* 6*   EOS 9* 9* 5      Cardiac Enzymes No results for input(s): CPK, CKND1, NAVIN in the last 72 hours.     No lab exists for component: CKRMB, TROIP   Coagulation Recent Labs     11/16/22  0755 11/16/22  0130   APTT 45.4* 33.1       Lipid Panel No results found for: CHOL, CHOLPOCT, CHOLX, CHLST, CHOLV, 440516, HDL, HDLP, LDL, LDLC, DLDLP, 387703, VLDLC, VLDL, TGLX, TRIGL, TRIGP, TGLPOCT, CHHD, CHHDX   BNP No results for input(s): BNPP in the last 72 hours. Liver Enzymes Recent Labs     11/16/22  0130   TP 6.9   ALB 2.7*   *      Thyroid Studies No results found for: T4, T3U, TSH, TSHEXT     Procedures/imaging: see electronic medical records for all procedures/Xrays and details which were not copied into this note but were reviewed prior to creation of Plan    CTA CHEST W OR W WO CONT    Result Date: 11/15/2022  CTA chest INDICATION: Pain. COMPARISON: None TECHNIQUE: Axial CT imaging from the thoracic inlet through the diaphragm with intravenous contrast. Coronal and sagittal MIP reformats were generated. One or more dose reduction techniques were used on this CT: automated exposure control, adjustment of the mAs and/or kVp according to patient size, and iterative reconstruction techniques. The specific techniques used on this CT exam have been documented in the patient's electronic medical record. Digital Imaging and Communications in Medicine (DICOM) format image data are available to nonaffiliated external healthcare facilities or entities on a secure, media free, reciprocally searchable basis with patient authorization for at least a 12-month period after this study. _______________ FINDINGS: EXAM QUALITY: Adequate PULMONARY ARTERIES: Mild motion degradation. No central or segmental filling defects identified to suggest pulmonary embolism. Peripheral branches are obscured by motion and airspace disease. MEDIASTINUM: Cardiomegaly without evidence for pericardial effusion. Coronary atherosclerosis noted. There is no evidence for right heart strain. Aorta is normal in caliber with mild atherosclerosis. No evidence for dissection. LUNGS: Diffuse groundglass opacity with mild patchy opacities in the upper lung zone.  There is bilateral dependent consolidation. PLEURA: Small bilateral pleural effusions, right more than left. AIRWAY: Patent. LYMPH NODES: No enlarged nodes. Slightly prominent mediastinal lymph nodes, likely reactive. No axillary or hilar adenopathy. UPPER ABDOMEN: Small left adrenal lipid rich adenoma. 7 mm hypodensity at the gallbladder fundus, possible aneurysm or bowel content. OTHER: No acute or aggressive osseous abnormalities identified. _______________     1. No evidence of pulmonary embolism or right heart strain. 2.  Cardiomegaly, bilateral diffuse groundglass opacity, bilateral small pleural effusions, right more than left and bibasilar dependent atelectasis. Findings most consistent with pulmonary edema. 3. Small lipid Rich left adrenal adenoma. Nonspecific 7 mm hypodensity gastric fundus, possible small aneurysm or bowel content. XR CHEST PORT    Result Date: 11/15/2022  EXAM: XR CHEST PORT CLINICAL INDICATION/HISTORY: CP -Additional: None COMPARISON: None TECHNIQUE: Frontal view of the chest _______________ FINDINGS: HEART AND MEDIASTINUM: Cardiomegaly with hilar congestion and cephalization LUNGS AND PLEURAL SPACES: Diffuse bilateral indistinct interstitial and hazy confluent alveolar opacities. No dense consolidation, effusion or pneumothorax. BONY THORAX AND SOFT TISSUES: No acute or destructive osseous abnormality. _______________     Cardiomegaly with findings of pulmonary edema from CHF versus fluid overload. ECHO ADULT COMPLETE    Result Date: 11/15/2022    Left Ventricle: Moderately reduced left ventricular systolic function with a visually estimated EF of 35 - 40%. Left ventricle size is normal. Moderate septal thickening. Severe posterior thickening. Moderate global hypokinesis present. Grade II diastolic dysfunction with increased LAP. Right Ventricle: Right ventricle size is normal. Mildly increased wall thickness. Aortic Valve: Severely thickened cusp. Moderately calcified right and noncoronary cusps. Minimal to moderate stenosis of the aortic valve. Mitral Valve: Mildly thickened leaflet, at the posterior leaflet. Severe annular calcification at the posterior leaflet of the mitral valve. Moderate regurgitation. Mild to moderate stenosis noted, measurement is compromised with severe calcification. Planimetry was not performed. Left Atrium: Left atrium is severely dilated. Right Atrium: Right atrium is moderately dilated. PA systolic pressure is 40 mmHg.        Jun Abdul MD

## 2022-11-16 NOTE — PROGRESS NOTES
1930 - Bedside and Verbal shift change report given to Sue Leblanc RN (oncoming nurse) by Ilia Bonilla RN (offgoing nurse). Report included the following information SBAR, Kardex, ED Summary, Procedure Summary, Intake/Output, MAR, Recent Results, Med Rec Status, and Cardiac Rhythm NSR .     2000 - Shift assessment completed. Patient alert and oriented, no needs/complaints. PD site to [facility]: CDI. 2130 - Critical HGB of 5.8 reported to Hospitalist. Order placed by physician to transfuse 1 unit of PRBCs. 2200 - Attempted to get consent from patient for blood transfusion. Patient refusing to give consent for transfusion stating: \"I usually just receive iron infusions, even when my HGB is 5.4\". Patient educated on importance of transfusion and maintains her position not to receive any blood products at this time. 2300 - PD exchange performed. 2800 ml of fluid drained at this time. Then 2000 ml instilled. Patient states that she typically only dwells approximately 4 hours before bedtime and remains dry the remainder of the night; per her nephrologist due to reabsorption. 0000 - Reassessment completed, no changes to previous assessment. 0817 - Patient complaining of sudden onset of chest pain. EKG performed, placed in paper chart. On call Cardiologist informed. One time dose of Morphine administered for chest pain and 2L NC placed on patient for comfort.      0300 - 2100 ml of PD fluid drained at this time. Patient is now dry until morning exchange. 0400 - Reassessment completed, no changes to previous assessment. 0730 - Bedside and Verbal shift change report given to Ilia Bonilla RN (oncoming nurse) by Sue Leblanc RN (offgoing nurse). Report included the following information SBAR, Kardex, ED Summary, Procedure Summary, Intake/Output, MAR, Recent Results, Med Rec Status, and Cardiac Rhythm NSR .

## 2022-11-16 NOTE — DIABETES MGMT
Diabetes/ Glycemic Control Plan of Care  Recommendations:   mealtime Humalog - 8 units TIDAC    Assessment:  Per chart review - 38 y.o. female with known h/o T2DM, ESRD on dialysis. Home management with Novolog, 15 units TIDAC - verified with patient. A1c not accurate for assessing glycemic control in renal disease. Blood sugars 103-237 mg/dL since admission, with a total of 8 units corrective insulin given.  mg/dL this AM.  Diet initiated - post prandial hyperglycemia noted. Spoke with patient at bedside regarding home and hospital insulin - patient requests no basal insulin - states that her blood sugars are usually within normal range until she eats. She requests resuming her home prandial regimen. Discussed starting with a reduced mealtime dose with her, she is agreeable. Patient discussed with MD - above mealtime insulin order entered with MD permission. Continue monitoring BG and consider increasing scheduled mealtime insulin if postprandial hyperglycemia continues. Patient Active Problem List   Diagnosis Code    Acute pulmonary edema ([facility]) J81.0    Chest pain R07.9    ESRD (end stage renal disease) on dialysis ([facility] N18.6, Z99.2    Pulmonary edema J81.1    Cardiomyopathy ([facility]) I42.9    HTN (hypertension) I10    DM (diabetes mellitus) ([facility] E11.9    PAD (peripheral artery disease) ([facility]) I73.9    CAD (coronary artery disease) I25.10      Recent Glucose Results:   Lab Results   Component Value Date/Time     (H) 11/16/2022 07:55 AM     (H) 11/16/2022 01:30 AM    GLUCPOC 237 (H) 11/16/2022 11:05 AM    GLUCPOC 113 (H) 11/16/2022 01:11 AM    GLUCPOC 122 (H) 11/15/2022 04:38 PM      IV Fluids containing dextrose: none  Steroids:  none    Blood glucose values:        Within target range (70-180mg/dL):  NO  Current insulin orders:    Corrective Humalog ACHS - normal sensitivity scale  Total Daily Dose previous 24 hours = 8 units    Nutrition/Diet:   Active Orders   Diet    ADULT DIET Regular; 4 carb choices (60 gm/meal); No Salt Added (3-4 gm); 1200 ml      Plan/Goals:   Blood glucose will be within target of 70 - 180 mg/dl within 72 hours  Reinforce dietary and medication compliance at home.        Education:  [] Refer to Diabetes Education Record                       [x] Education not indicated at this time     Yi Will RN, BSN, [facility]  Professional   Glycemic Control Team   Phone:  [phone]  Tues - Thurs 8:30 - 4:30

## 2022-11-16 NOTE — PROGRESS NOTES
[facility]  Pulmonary, Critical Care, and Sleep Medicine    Name: Edi Yang MRN: [id number]   : 1980 Hospital: [facility] [place]    Date: 2022  Room: [address] [place] [address] Note                                              Consult requesting physician: Dr. Bandar Muniz  Reason for Consult: Acute pulmonary edema and chest pain    IMPRESSION:       Active Hospital Problems    Diagnosis Date Noted    Acute pulmonary edema ([facility] 11/15/2022    Chest pain 11/15/2022    ESRD (end stage renal disease) on dialysis ([address] 11/15/2022    Pulmonary edema 11/15/2022    Cardiomyopathy ([address] 11/15/2022    HTN (hypertension) 11/15/2022    DM (diabetes mellitus) ([address] 11/15/2022    PAD (peripheral artery disease) ([address] 11/15/2022    CAD (coronary artery disease) 11/15/2022        Patient Active Problem List   Diagnosis Code    Acute pulmonary edema ([address] J81.0    Chest pain R07.9    ESRD (end stage renal disease) on dialysis ([address] N18.6, Z99.2    Pulmonary edema J81.1    Cardiomyopathy ([address] I42.9    HTN (hypertension) I10    DM (diabetes mellitus) ([address] E11.9    PAD (peripheral artery disease) ([address] I73.9    CAD (coronary artery disease) I25.10       Code status: Full Code       RECOMMENDATIONS:     Respiratory:   Acute pulmonary edema, likely due to CHF/CMP/CAD. CTA chest 11/15/2022: No evidence of pulmonary embolism or right heart strain. 2.  Cardiomegaly, bilateral diffuse groundglass opacity, bilateral small pleural effusions, right more than left and bibasilar dependent atelectasis. Findings most consistent with pulmonary edema. 3. Small lipid Rich left adrenal adenoma. Nonspecific 7 mm hypodensity gastric fundus, possible small aneurysm or bowel content. Protecting airway well. Continue O2 NC; titrate to keep SPO2 > 91%. Troponin downtrending. Keep SPO2 >=92%. HOB 30 degree elevation all the time. Aggressive pulmonary toileting. Aspiration precautions.  Incentive spirometry. CVS:   History of CAD with multiple stents in the past; last cardiac catheterization 11/4/2022 [facility] showed significant progressive CAD; recommended medical management. Admitted with chest pain and elevated troponin, NSTEMI and flash pulmonary edema with hypertensive urgency. Elevated troponin; check serial cardiac enzymes. Elevated BNP. Echo 8/12/2022 [facility]: LV severely enlarged. Severe concentric LVH. LVEF 66%. Grade 2 diastolic dysfunction. RV size and systolic function normal.  Trace MR and TR. Estimated PASP 40 mmHg. Echo 11/15/2022: LVEF 35 to 40%. Grade 2 diastolic dysfunction. Severe septal thickening. Moderate global hypokinesis. Minimal to moderate aortic stenosis. Moderate MR. Severely dilated LA. Moderately dilated RA. Estimated PASP 40 mmHg. CTA negative for PE. Off Cardene drip since 11/15/2022. Continue aspirin, Lipitor 80, Plavix 75, Imdur 120 daily, Toprol- mg daily, nifedipine ER 90 mg daily. Continue Bumex 1 mg every 12 hours; strict I's and O's; monitor electrolytes. Heparin drip stopped 11/16/2022 due to troponin downtrending and drop in hemoglobin. Discussed with Dr. Jamie Yepez. ID:   Left dorsum of the foot near the range of fourth and fifth toe mild skin ulceration; was seen by podiatrist yesterday and was supposed to start doxycycline; complete doxycycline 100 mg twice daily x10 days. Respiratory viral panel, RSV PCR, rapid influenza and COVID-19 11/15/2022: Negative. Hematology/Oncology:   Anemia due to ESRD. Sudden drop in hemoglobin noted but no external bleeding. Stool occult blood ordered. Patient refused PRBC transfusion on 11/15/2022; but drop in hemoglobin with elevation in troponin and MI, after long discussion, patient agreed for PRBC transfusion; she is receiving it now. On epoetin by nephrologist.    Renal:   ESRD on PD. Hyponatremia, likely due to hypervolemia.   Deferred to nephrologist.    GI/: No acute issues. Endocrine: Monitor BS. SSI. Continue Synthroid. hCG negative. Neurology: Alert awake oriented x3. No focal deficit. Psychiatry: No acute issues. Toxicology: No acute issues. Pain/Sedation: No acute issues. Skin/Wound: Local care at the left foot. Electrolytes: Replace electrolytes per ICU electrolyte replacement protocol. IVF: None    Nutrition: P.o. diabetic, hypertensive and cardiac diet. Prophylaxis: DVT Prophylaxis: SCD/Heparin. GI Prophylaxis: Protonix. Restraints: none    Lines/Tubes: PIV  Patient does not have Torres catheter or central line. PT/OT eval and treat. OOB. Advance Directive/Palliative Care: Per clinical course. Will defer respective systems problem management to primary and other respective consultant and follow patient in ICU. Primary and other medical team.  Further recommendations will be based on the patient's response to recommended treatment and results of the investigation ordered. Quality Care: PPI, DVT prophylaxis, HOB elevated, Infection control all reviewed and addressed. Care of plan d/w RN, RT, MDR, hospitalist team.  D/w patient (answered all questions to satisfaction). High complexity decision making was performed during the evaluation of this patient at high risk for decompensation with multiple organ involvement. Total critical care time spent rendering care exclusive of procedures/family discussion/coordination of care: 42 minutes. Subjective/History of Present Illness:     Patient is a 39 y.o. female with PMHx significant for HTN, DM, ESRD on PD, CAD, CMP; admitted with hypertensive urgency with acute pulmonary edema and chest pain.     Patient had extensive cardiac work-up in the past including multiple cardiac catheterization and stent placements in the past; last cardiac catheterization on 11/4/2022 at [facility] which reportedly showed significant CAD; recommended medical management. Came to ER with retrosternal chest pain initially thought to be burning heartburn sensation; later on left arm tingling and numbness. Denies any chest pain radiating to the back or shoulder. Denies any associated nausea, diaphoresis, dyspnea, lightheadedness, dizziness. The chest pain was 10 out of 10 in severity; now improved to 1 out of 10 in severity. In ER patient was hypotensive; required nitro followed by Cardene drip.    11/16/2022 :     Remains in ICU room 107. Off Cardene drip since 6 PM yesterday. On heparin drip per cardiologist since yesterday; stopped today since troponin is trending down and patient had drop in hemoglobin. No external bleeding but hemoglobin drop noted. Patient refused PRBC transfusion yesterday; but after long discussion with patient today again, she agreed for 1 PRBC transfusion; ordered. Stool occult blood ordered. Troponin increased; now downtrending. Blood pressure stable. Patient had chest pain last night; EKG without ST elevation. BNP elevated. ESRD on PD; not putting out too much of PD fluid; on Bumex; making some urine. Alert awake oriented x3. Not in acute distress. Home O2 2-3 LPM NC. At present, denies any chest pain, dyspnea, cough, nausea, vomiting, leg edema, calf pain, abdominal pain. Patient wants to go home; recommended that she is not ready for discharge from medical perspective. No other overnight issues reported. PCCM was not called for any issues overnight. I/O last 24 hrs:    Intake/Output Summary (Last 24 hours) at 11/16/2022 1056  Last data filed at 11/16/2022 1019  Gross per 24 hour   Intake 71546.65 ml   Output 70974 ml   Net 1291.65 ml         The patient is critically ill     History taken from patient, EMR     Review of Systems:   HEENT: No epistaxis, no nasal drainage, no difficulty in swallowing, no redness in eyes  Respiratory: as above  Cardiovascular: no palpitations, no syncope  Gastrointestinal: no abd pain, no vomiting, no diarrhea, no bleeding symptoms  Genitourinary: No urinary symptoms or hematuria  Musculoskeletal: Neg  Neurological: No focal weakness, no seizures, no headaches  Behvioral/Psych: No anxiety, no depression  Constitutional: No fever, no chills, no weight loss, no night sweats     Allergies   Allergen Reactions    Coreg [Carvedilol] Nausea and Vomiting    Lortab [Hydrocodone-Acetaminophen] Nausea and Vomiting      Past Medical History:   Diagnosis Date    Cardiomyopathy ([facility]     Chronic kidney disease     Coronary arteriosclerosis     DM (diabetes mellitus) ([facility]     Hypertension     Tachycardia       No past surgical history on file.    Social History     Tobacco Use    Smoking status: Never    Smokeless tobacco: Never   Substance Use Topics    Alcohol use: Yes     Comment: rarely      Family History   Problem Relation Age of Onset    Diabetes Mother     Heart Disease Mother     Heart Attack Mother     Heart Attack Father     Heart Disease Father     Diabetes Father       Prior to Admission medications    Not on File     Current Facility-Administered Medications   Medication Dose Route Frequency    niCARdipine (CARDENE) 25 mg in 0.9% sodium chloride 250 mL infusion  0-15 mg/hr IntraVENous TITRATE    bumetanide (BUMEX) injection 1 mg  1 mg IntraVENous Q12H    pantoprazole (PROTONIX) 40 mg in 0.9% sodium chloride 10 mL injection  40 mg IntraVENous Q12H    insulin lispro (HUMALOG) injection   SubCUTAneous AC&HS    sodium chloride (NS) flush 5-40 mL  5-40 mL IntraVENous Q8H    clopidogreL (PLAVIX) tablet 75 mg  75 mg Oral DAILY    aspirin chewable tablet 81 mg  81 mg Oral DAILY    atorvastatin (LIPITOR) tablet 80 mg  80 mg Oral QHS    isosorbide mononitrate ER (IMDUR) tablet 120 mg  120 mg Oral DAILY    peritoneal dialysis DEXTROSE 2.5% (2.5 mEq/L low calcium) solution 2,500 mL  2,500 mL IntraPERitoneal QID    doxycycline (MONODOX) capsule 100 mg  100 mg Oral Q12H    epoetin lucinda-epbx (RETACRIT) injection 20,000 Units  20,000 Units SubCUTAneous Q7D    metoprolol succinate (TOPROL-XL) tablet 100 mg  100 mg Oral DAILY    NIFEdipine ER (PROCARDIA XL) tablet 90 mg  90 mg Oral Q24H         Objective:   Vital Signs:    Visit Vitals  BP (!) 147/80   Pulse 87   Temp 98 °F (36.7 °C)   Resp 11   Ht 5' 7\" (1.702 m)   Wt 91.5 kg (201 lb 11.5 oz)   SpO2 100%   BMI 31.59 kg/m²       O2 Device: Nasal cannula   O2 Flow Rate (L/min): 2 l/min   Temp (24hrs), Av.3 °F (36.8 °C), Min:97.8 °F (36.6 °C), Max:98.8 °F (37.1 °C)       Intake/Output:   Last shift:       07 - 1900  In: 2000   Out: 800 [Urine:800]    Last 3 shifts: 1901 -  07  In: 31812.7 [I.V.:91.7]  Out: 36172 [Urine:800]      Intake/Output Summary (Last 24 hours) at 2022 1056  Last data filed at 2022 1019  Gross per 24 hour   Intake 74544.65 ml   Output 34383 ml   Net 1291.65 ml       Last 3 Recorded Weights in this Encounter    11/15/22 0134 11/15/22 0824 22 0400   Weight: 88.9 kg (196 lb) 88.9 kg (196 lb) 91.5 kg (201 lb 11.5 oz)       Physical Exam:     General/Neurology: Alert, awake and oriented. NAD. Head:   NCAT. Eye:   EOM intact. No icterus/pallor/cyanosis. Nose:   No nasal drainage/discharge. Neck:   Trachea midline. Lung: Moderate air entry bilateral equal.  No rales, rhonchi. No wheezing or stridor. No prolonged expiration or accessory muscle use. Heart:   S1 S2 present. No murmur or JVD. Abdomen:  Soft. NT. ND. No palpable masses. PD catheter in place. Extremities:  Mild bilateral leg edema. No cyanosis or clubbing. Pulses:  2+ and symmetric in DP. Skin:   Left dorsum of the foot near the range of fourth and fifth toe mild skin ulceration; without induration or discharge.       Data:       Recent Results (from the past 24 hour(s))   GLUCOSE, POC    Collection Time: 11/15/22 12:12 PM   Result Value Ref Range    Glucose (POC) 233 (H) 70 - 110 mg/dL   TROPONIN-HIGH SENSITIVITY    Collection Time: 11/15/22  1:13 PM   Result Value Ref Range    Troponin-High Sensitivity 1,318 (HH) 0 - 54 ng/L   COVID-19 WITH INFLUENZA A/B    Collection Time: 11/15/22  4:00 PM   Result Value Ref Range    SARS-CoV-2 by PCR Not detected NOTD      Influenza A by PCR Not detected NOTD      Influenza B by PCR Not detected NOTD     RESPIRATORY VIRUS PANEL W/COVID-19, PCR    Collection Time: 11/15/22  4:15 PM    Specimen: Nasopharyngeal   Result Value Ref Range    Adenovirus Not detected NOTD      Coronavirus 229E Not detected NOTD      Coronavirus HKU1 Not detected NOTD      Coronavirus CVNL63 Not detected NOTD      Coronavirus OC43 Not detected NOTD      SARS-CoV-2, PCR Not detected NOTD      Metapneumovirus Not detected NOTD      Rhinovirus and Enterovirus Not detected NOTD      Influenza A Not detected NOTD      Influenza B Not detected NOTD      Parainfluenza 1 Not detected NOTD      Parainfluenza 2 Not detected NOTD      Parainfluenza 3 Not detected NOTD      Parainfluenza virus 4 Not detected NOTD      RSV by PCR Not detected NOTD      B. parapertussis, PCR Not detected NOTD      Bordetella pertussis - PCR Not detected NOTD      Chlamydophila pneumoniae DNA, QL, PCR Not detected NOTD      Mycoplasma pneumoniae DNA, QL, PCR Not detected NOTD     GLUCOSE, POC    Collection Time: 11/15/22  4:38 PM   Result Value Ref Range    Glucose (POC) 122 (H) 70 - 110 mg/dL   TROPONIN-HIGH SENSITIVITY    Collection Time: 11/15/22  7:04 PM   Result Value Ref Range    Troponin-High Sensitivity 1,355 (HH) 0 - 54 ng/L   CBC WITH AUTOMATED DIFF    Collection Time: 11/15/22  7:04 PM   Result Value Ref Range    WBC 8.3 4.6 - 13.2 K/uL    RBC 2.07 (L) 4.20 - 5.30 M/uL    HGB 5.8 (LL) 12.0 - 16.0 g/dL    HCT 18.8 (L) 35.0 - 45.0 %    MCV 90.8 78.0 - 100.0 FL    MCH 28.0 24.0 - 34.0 PG    MCHC 30.9 (L) 31.0 - 37.0 g/dL    RDW 15.4 (H) 11.6 - 14.5 %    PLATELET 582 682 - 966 K/uL    MPV 10.3 9.2 - 11.8 FL    NRBC 0.0 0  WBC    ABSOLUTE NRBC 0.00 0.00 - 0.01 K/uL    NEUTROPHILS 71 40 - 73 %    LYMPHOCYTES 12 (L) 21 - 52 %    MONOCYTES 6 3 - 10 %    EOSINOPHILS 9 (H) 0 - 5 %    BASOPHILS 1 0 - 2 %    IMMATURE GRANULOCYTES 1 (H) 0.0 - 0.5 %    ABS. NEUTROPHILS 5.9 1.8 - 8.0 K/UL    ABS. LYMPHOCYTES 1.0 0.9 - 3.6 K/UL    ABS. MONOCYTES 0.5 0.05 - 1.2 K/UL    ABS. EOSINOPHILS 0.8 (H) 0.0 - 0.4 K/UL    ABS. BASOPHILS 0.1 0.0 - 0.1 K/UL    ABS. IMM. GRANS. 0.0 0.00 - 0.04 K/UL    DF AUTOMATED     PTT    Collection Time: 11/15/22  7:04 PM   Result Value Ref Range    aPTT 29.4 23.0 - 36.4 SEC   RBC, ALLOCATE    Collection Time: 11/15/22  9:45 PM   Result Value Ref Range    HISTORY CHECKED? Historical check performed    TROPONIN-HIGH SENSITIVITY    Collection Time: 11/15/22 10:44 PM   Result Value Ref Range    Troponin-High Sensitivity 1,164 (HH) 0 - 54 ng/L   GLUCOSE, POC    Collection Time: 11/16/22  1:11 AM   Result Value Ref Range    Glucose (POC) 113 (H) 70 - 225 mg/dL   METABOLIC PANEL, COMPREHENSIVE    Collection Time: 11/16/22  1:30 AM   Result Value Ref Range    Sodium 130 (L) 136 - 145 mmol/L    Potassium 5.0 3.5 - 5.5 mmol/L    Chloride 97 (L) 100 - 111 mmol/L    CO2 22 21 - 32 mmol/L    Anion gap 11 3.0 - 18 mmol/L    Glucose 127 (H) 74 - 99 mg/dL    BUN 94 (H) 7.0 - 18 MG/DL    Creatinine 16.70 (H) 0.6 - 1.3 MG/DL    BUN/Creatinine ratio 6 (L) 12 - 20      eGFR 2 (L) >60 ml/min/1.73m2    Calcium 9.6 8.5 - 10.1 MG/DL    Bilirubin, total 0.4 0.2 - 1.0 MG/DL    ALT (SGPT) 14 13 - 56 U/L    AST (SGOT) 15 10 - 38 U/L    Alk.  phosphatase 119 (H) 45 - 117 U/L    Protein, total 6.9 6.4 - 8.2 g/dL    Albumin 2.7 (L) 3.4 - 5.0 g/dL    Globulin 4.2 (H) 2.0 - 4.0 g/dL    A-G Ratio 0.6 (L) 0.8 - 1.7     CBC WITH AUTOMATED DIFF    Collection Time: 11/16/22  1:30 AM   Result Value Ref Range    WBC 7.7 4.6 - 13.2 K/uL    RBC 2.06 (L) 4.20 - 5.30 M/uL    HGB 5.8 (LL) 12.0 - 16.0 g/dL    HCT 18.3 (L) 35.0 - 45.0 %    MCV 88.8 78.0 - 100.0 FL    MCH 28.2 24.0 - 34.0 PG MCHC 31.7 31.0 - 37.0 g/dL    RDW 15.1 (H) 11.6 - 14.5 %    PLATELET 654 884 - 145 K/uL    MPV 9.6 9.2 - 11.8 FL    NRBC 0.0 0  WBC    ABSOLUTE NRBC 0.00 0.00 - 0.01 K/uL    NEUTROPHILS 71 40 - 73 %    LYMPHOCYTES 14 (L) 21 - 52 %    MONOCYTES 5 3 - 10 %    EOSINOPHILS 9 (H) 0 - 5 %    BASOPHILS 1 0 - 2 %    IMMATURE GRANULOCYTES 0 0.0 - 0.5 %    ABS. NEUTROPHILS 5.5 1.8 - 8.0 K/UL    ABS. LYMPHOCYTES 1.1 0.9 - 3.6 K/UL    ABS. MONOCYTES 0.4 0.05 - 1.2 K/UL    ABS. EOSINOPHILS 0.7 (H) 0.0 - 0.4 K/UL    ABS. BASOPHILS 0.1 0.0 - 0.1 K/UL    ABS. IMM.  GRANS. 0.0 0.00 - 0.04 K/UL    DF AUTOMATED     MAGNESIUM    Collection Time: 11/16/22  1:30 AM   Result Value Ref Range    Magnesium 3.2 (H) 1.6 - 2.6 mg/dL   PTT    Collection Time: 11/16/22  1:30 AM   Result Value Ref Range    aPTT 33.1 23.0 - 36.4 SEC   TROPONIN-HIGH SENSITIVITY    Collection Time: 11/16/22  1:30 AM   Result Value Ref Range    Troponin-High Sensitivity 1,078 (HH) 0 - 54 ng/L   EKG, 12 LEAD, SUBSEQUENT    Collection Time: 11/16/22  1:59 AM   Result Value Ref Range    Ventricular Rate 108 BPM    Atrial Rate 108 BPM    P-R Interval 176 ms    QRS Duration 114 ms    Q-T Interval 372 ms    QTC Calculation (Bezet) 498 ms    Calculated P Axis 47 degrees    Calculated R Axis -14 degrees    Calculated T Axis 118 degrees    Diagnosis       Sinus tachycardia  Possible Left atrial enlargement  T wave abnormality, consider lateral ischemia  Abnormal ECG  When compared with ECG of 15-NOV-2022 01:34,  No significant change was found     TROPONIN-HIGH SENSITIVITY    Collection Time: 11/16/22  7:55 AM   Result Value Ref Range    Troponin-High Sensitivity 1,091 (HH) 0 - 54 ng/L   PTT    Collection Time: 11/16/22  7:55 AM   Result Value Ref Range    aPTT 45.4 (H) 23.0 - 24.2 SEC   METABOLIC PANEL, BASIC    Collection Time: 11/16/22  7:55 AM   Result Value Ref Range    Sodium 133 (L) 136 - 145 mmol/L    Potassium 4.7 3.5 - 5.5 mmol/L    Chloride 97 (L) 100 - 111 mmol/L    CO2 23 21 - 32 mmol/L    Anion gap 13 3.0 - 18 mmol/L    Glucose 103 (H) 74 - 99 mg/dL    BUN 86 (H) 7.0 - 18 MG/DL    Creatinine 16.50 (H) 0.6 - 1.3 MG/DL    BUN/Creatinine ratio 5 (L) 12 - 20      eGFR 3 (L) >60 ml/min/1.73m2    Calcium 9.8 8.5 - 10.1 MG/DL         Chemistry Recent Labs     11/16/22  0755 11/16/22  0130 11/15/22  0145   * 127* 105*   * 130* 129*   K 4.7 5.0 5.2   CL 97* 97* 96*   CO2 23 22 19*   BUN 86* 94* 97*   CREA 16.50* 16.70* 18.30*   CA 9.8 9.6 10.1   MG  --  3.2* 3.3*   AGAP 13 11 14   BUCR 5* 6* 5*   AP  --  119* 139*   TP  --  6.9 7.5   ALB  --  2.7* 3.3*   GLOB  --  4.2* 4.2*   AGRAT  --  0.6* 0.8          Lactic Acid No results found for: LAC  No results for input(s): LAC in the last 72 hours. Liver Enzymes Protein, total   Date Value Ref Range Status   11/16/2022 6.9 6.4 - 8.2 g/dL Final     Albumin   Date Value Ref Range Status   11/16/2022 2.7 (L) 3.4 - 5.0 g/dL Final     Globulin   Date Value Ref Range Status   11/16/2022 4.2 (H) 2.0 - 4.0 g/dL Final     A-G Ratio   Date Value Ref Range Status   11/16/2022 0.6 (L) 0.8 - 1.7   Final     Alk.  phosphatase   Date Value Ref Range Status   11/16/2022 119 (H) 45 - 117 U/L Final     Recent Labs     11/16/22  0130 11/15/22  0145   TP 6.9 7.5   ALB 2.7* 3.3*   GLOB 4.2* 4.2*   AGRAT 0.6* 0.8   * 139*          CBC w/Diff Recent Labs     11/16/22  0130 11/15/22  1904 11/15/22  0145   WBC 7.7 8.3 10.5   RBC 2.06* 2.07* 2.59*   HGB 5.8* 5.8* 7.3*   HCT 18.3* 18.8* 22.7*    321 372   GRANS 71 71 86*   LYMPH 14* 12* 6*   EOS 9* 9* 5          Cardiac Enzymes No results found for: CPK, CK, CKMMB, CKMB, RCK3, CKMBT, CKNDX, CKND1, NAVIN, TROPT, TROIQ, JUAN CARLOS, TROPT, TNIPOC, BNP, BNPP     BNP No results found for: BNP, BNPP, XBNPT     Coagulation Recent Labs     11/16/22  0755 11/16/22  0130 11/15/22  1904   APTT 45.4* 33.1 29.4           Thyroid  No results found for: T4, T3U, TSH, TSHEXT, TSHEXT    No results found for: T4     Urinalysis No results found for: COLOR, APPRN, SPGRU, REFSG, MIRANDA, PROTU, GLUCU, KETU, BILU, UROU, YUNIEL, LEUKU, GLUKE, EPSU, BACTU, WBCU, RBCU, CASTS, UCRY     Micro  No results for input(s): SDES, CULT in the last 72 hours. No results for input(s): CULT in the last 72 hours. Culture data during this hospitalization. All Micro Results       Procedure Component Value Units Date/Time    RESPIRATORY VIRUS PANEL W/COVID-19, PCR [[id number]] Collected: 11/15/22 1615    Order Status: Completed Specimen: Nasopharyngeal Updated: 11/15/22 2202     Adenovirus Not detected        Coronavirus 229E Not detected        Coronavirus HKU1 Not detected        Coronavirus CVNL63 Not detected        Coronavirus OC43 Not detected        SARS-CoV-2, PCR Not detected        Metapneumovirus Not detected        Rhinovirus and Enterovirus Not detected        Influenza A Not detected        Influenza B Not detected        Parainfluenza 1 Not detected        Parainfluenza 2 Not detected        Parainfluenza 3 Not detected        Parainfluenza virus 4 Not detected        RSV by PCR Not detected        B. parapertussis, PCR Not detected        Bordetella pertussis - PCR Not detected        Chlamydophila pneumoniae DNA, QL, PCR Not detected        Mycoplasma pneumoniae DNA, QL, PCR Not detected       COVID-19 WITH INFLUENZA A/B [[id number]] Collected: 11/15/22 1600    Order Status: Completed Specimen: Nasopharyngeal Updated: 11/15/22 1655     SARS-CoV-2 by PCR Not detected        Comment: Not Detected results do not preclude SARS-CoV-2 infection and should not be used as the sole basis for patient management decisions. Results must be combined with clinical observations, patient history, and epidemiological information. Influenza A by PCR Not detected        Influenza B by PCR Not detected        Comment: Testing was performed using yunior Josie SARS-CoV-2 and Influenza A/B nucleic acid assay.   This test is a multiplex Real-Time Reverse Transcriptase Polymerase Chain Reaction (RT-PCR) based in vitro diagnostic test intended for the qualitative detection of nucleic acids from SARS-CoV-2, Influenza A, and Influenza B in nasopharyngeal for use under the FDA's Emergency Use Authorization(EAU) only. Fact sheet for Patients: FindDrives.pl  Fact sheet for Healthcare Providers: FindDrives[employer]pl                    CTA CHEST W OR W WO CONT    Result Date: 11/15/2022  CTA chest INDICATION: Pain. COMPARISON: None TECHNIQUE: Axial CT imaging from the thoracic inlet through the diaphragm with intravenous contrast. Coronal and sagittal MIP reformats were generated. One or more dose reduction techniques were used on this CT: automated exposure control, adjustment of the mAs and/or kVp according to patient size, and iterative reconstruction techniques. The specific techniques used on this CT exam have been documented in the patient's electronic medical record. Digital Imaging and Communications in Medicine (DICOM) format image data are available to nonaffiliated external healthcare facilities or entities on a secure, media free, reciprocally searchable basis with patient authorization for at least a 12-month period after this study. _______________ FINDINGS: EXAM QUALITY: Adequate PULMONARY ARTERIES: Mild motion degradation. No central or segmental filling defects identified to suggest pulmonary embolism. Peripheral branches are obscured by motion and airspace disease. MEDIASTINUM: Cardiomegaly without evidence for pericardial effusion. Coronary atherosclerosis noted. There is no evidence for right heart strain. Aorta is normal in caliber with mild atherosclerosis. No evidence for dissection. LUNGS: Diffuse groundglass opacity with mild patchy opacities in the upper lung zone. There is bilateral dependent consolidation. PLEURA: Small bilateral pleural effusions, right more than left. AIRWAY: Patent. LYMPH NODES: No enlarged nodes. Slightly prominent mediastinal lymph nodes, likely reactive. No axillary or hilar adenopathy. UPPER ABDOMEN: Small left adrenal lipid rich adenoma. 7 mm hypodensity at the gallbladder fundus, possible aneurysm or bowel content. OTHER: No acute or aggressive osseous abnormalities identified. _______________     1. No evidence of pulmonary embolism or right heart strain. 2.  Cardiomegaly, bilateral diffuse groundglass opacity, bilateral small pleural effusions, right more than left and bibasilar dependent atelectasis. Findings most consistent with pulmonary edema. 3. Small lipid Rich left adrenal adenoma. Nonspecific 7 mm hypodensity gastric fundus, possible small aneurysm or bowel content. XR CHEST PORT    Result Date: 11/15/2022  EXAM: XR CHEST PORT CLINICAL INDICATION/HISTORY: CP -Additional: None COMPARISON: None TECHNIQUE: Frontal view of the chest _______________ FINDINGS: HEART AND MEDIASTINUM: Cardiomegaly with hilar congestion and cephalization LUNGS AND PLEURAL SPACES: Diffuse bilateral indistinct interstitial and hazy confluent alveolar opacities. No dense consolidation, effusion or pneumothorax. BONY THORAX AND SOFT TISSUES: No acute or destructive osseous abnormality. _______________     Cardiomegaly with findings of pulmonary edema from CHF versus fluid overload. Echo 8/12/22 [facility]:  CONCLUSIONS     * Left ventricular chamber size is severely enlarged. * There is severe concentric left ventricular wall thickness. * Left ventricular systolic function is normal with an ejection fraction of   66 % by Schulte's biplane. * Left ventricular diastolic function: Grade II diastolic dysfunction. * Left atrial chamber is severely enlarged with a left atrial volume index   of 64.26 ml/m^2 by BP MOD.      * Right ventricular chamber dimension is normal.     * Right ventricular systolic function is normal.     * The aortic valve has a restrictive left coronary cusp and calcified non   coronary cusp. * There is mild aortic valve stenosis with a peak velocity of 2.25 m/s, mean   gradient of 11 mmHg, aortic valve area of 1.52 cm2, and a dimensionless index   of 0.54. * There is trace mitral and tricuspid valve regurgitation. * Mild pulmonary hypertension, estimated pulmonary arterial systolic   pressure is 40 mmHg. Comparison     * Compared to prior study from 4/15/22 there was an EF of 55%, indeterminate   diastolic function, moderately dilated RA, trace AI, no evidence of AS, mild   to moderate MR and TR, and a PAP of 53 mmHg. [facility] 11/4/22 [facility]:  Hemodynamics: Ao: 158/81 mmHg. Mean 112 mmHg. LV: 162/12 mmHg. LVEDP: 25   mmHg. No aortic valve gradient was noted on the pullback of the   pigtail catheter. Coronary Arteries:        Left Main Artery: Widely patent     Left Anterior Descending Artery: Proximal segments patent. Mid   vessel contains prior stents which are patent. Distal segments   patent. Moderate sized first diagonal branch which is patent. Moderate-sized second diagonal branch which contains prior stents   is 100% occluded. Left Circumflex Artery: Moderate size AV vessel. Proximal and   mid are patent. Distal segment contains a 20% stenosis just   after the origin of an obtuse marginal branch. Large branching   obtuse marginal branch from the mid AV groove vessel. Proximal   segment contains a concentric discrete 70% stenosis. Distal   vessel contains luminal irregularities. Right Coronary Artery: Dominant, moderate size vessel. Proximal    and mid segments patent. Distal vessel bifurcates into 2   branches at the acute margin. The ongoing right branch is   diffusely diseased with a long 80% stenosis, followed by a   discrete 90% stenosis. The acute marginal branch contains   proximal and mid 99% stenoses.      Coronary Intervention: None     Left ventriculogram: EF 40% moderate anterolateral hypokinesis. Mild mitral regurgitation     Imp:   1. Progressive coronary disease since prior catheterization   7/14/2021. Second diagonal branch which was previously stented   is now 100% occluded. First obtuse marginal branch contains a   ostial/proximal 70% stenosis. Distal right coronary artery   demonstrates 2 branches with the ongoing vessel diffusely   diseased with discrete distal 90% stenosis. The acute marginal   branch contains proximal and mid 99% stenoses. 2.  Ischemic cardiomyopathy ejection fraction 40%   3. No aortic stenosis   4. Mild mitral regurgitation   5. Successful administration of moderate sedation without   complications     Plan: Routine post cardiac cath orders. Patient's disease is   mostly branch vessel and distal vessel disease. The diagonal is   100% chronic total occlusion, the distal right coronary artery   disease is very diffuse. Thus, this anatomy is unfavorable for   PCI and CABG. The obtuse marginal branch potentially could be   considered for PCI although would leave behind significant   residual coronary disease. Patient needs aggressive guideline   directed risk factor modification. In addition with her LV   systolic dysfunction she will also need guideline directed   medical therapy for LV dysfunction and chronic systolic heart   failure. Sowmya Liriano MD, [facility] [place]   [phone]   November 4, 2022         Echo 11/15/2022:  Result status: Final result       Left Ventricle: Moderately reduced left ventricular systolic function with a visually estimated EF of 35 - 40%. Left ventricle size is normal. Moderate septal thickening. Severe posterior thickening. Moderate global hypokinesis present. Grade II diastolic dysfunction with increased LAP. Right Ventricle: Right ventricle size is normal. Mildly increased wall thickness. Aortic Valve: Severely thickened cusp. Moderately calcified right and noncoronary cusps.  Minimal to moderate stenosis of the aortic valve. Mitral Valve: Mildly thickened leaflet, at the posterior leaflet. Severe annular calcification at the posterior leaflet of the mitral valve. Moderate regurgitation. Mild to moderate stenosis noted, measurement is compromised with severe calcification. Planimetry was not performed. Left Atrium: Left atrium is severely dilated. Right Atrium: Right atrium is moderately dilated. PA systolic pressure is 40 mmHg. Images report reviewed by me:  CT (Most Recent) (CT chest reviewed by me) [place] [facility] [place]15[place]22    CTA CHEST W OR W WO CONT    Narrative  CTA chest    INDICATION: Pain. COMPARISON: None    TECHNIQUE: Axial CT imaging from the thoracic inlet through the diaphragm with  intravenous contrast. Coronal and sagittal MIP reformats were generated. One or  more dose reduction techniques were used on this CT: automated exposure control,  adjustment of the mAs and/or kVp according to patient size, and iterative  reconstruction techniques. The specific techniques used on this CT exam have  been documented in the patient's electronic medical record. Digital Imaging and  Communications in Medicine (DICOM) format image data are available to  nonaffiliated external healthcare facilities or entities on a secure, media  free, reciprocally searchable basis with patient authorization for at least a  12-month period after this study. _______________    FINDINGS:    EXAM QUALITY: Adequate    PULMONARY ARTERIES: Mild motion degradation. No central or segmental filling  defects identified to suggest pulmonary embolism. Peripheral branches are  obscured by motion and airspace disease. MEDIASTINUM: Cardiomegaly without evidence for pericardial effusion. Coronary  atherosclerosis noted. There is no evidence for right heart strain. Aorta is  normal in caliber with mild atherosclerosis. No evidence for dissection.     LUNGS: Diffuse groundglass opacity with mild patchy opacities in the upper lung  zone. There is bilateral dependent consolidation. PLEURA: Small bilateral pleural effusions, right more than left. AIRWAY: Patent. LYMPH NODES: No enlarged nodes. Slightly prominent mediastinal lymph nodes,  likely reactive. No axillary or hilar adenopathy. UPPER ABDOMEN: Small left adrenal lipid rich adenoma. 7 mm hypodensity at the  gallbladder fundus, possible aneurysm or bowel content. OTHER: No acute or aggressive osseous abnormalities identified. _______________    Impression  1. No evidence of pulmonary embolism or right heart strain. 2.  Cardiomegaly, bilateral diffuse groundglass opacity, bilateral small pleural  effusions, right more than left and bibasilar dependent atelectasis. Findings  most consistent with pulmonary edema. 3. Small lipid Rich left adrenal adenoma. Nonspecific 7 mm hypodensity gastric  fundus, possible small aneurysm or bowel content. CXR reviewed by me:  XR (Most Recent). CXR  reviewed by me and compared with previous CXR Results from Hospital Encounter encounter on 11/15/22    XR CHEST PORT    Narrative  EXAM: XR CHEST PORT    CLINICAL INDICATION/HISTORY: CP  -Additional: None    COMPARISON: None    TECHNIQUE: Frontal view of the chest    _______________    FINDINGS:    HEART AND MEDIASTINUM: Cardiomegaly with hilar congestion and cephalization    LUNGS AND PLEURAL SPACES: Diffuse bilateral indistinct interstitial and hazy  confluent alveolar opacities. No dense consolidation, effusion or pneumothorax. BONY THORAX AND SOFT TISSUES: No acute or destructive osseous abnormality. _______________    Impression  Cardiomegaly with findings of pulmonary edema from CHF versus fluid overload. ·Please note: Voice-recognition software may have been used to generate this report, which may have resulted in some phonetic-based errors in grammar and contents.  Even though attempts were made to correct all the mistakes, some may have been missed, and remained in the body of the document.       Malorie Archibald MD  11/16/2022

## 2022-11-16 NOTE — PROGRESS NOTES
Reason for Admission:  Chest Pain (Angina)                     RUR Score:   Low; 11%                 Plan for utilizing home health:     Unlikely      PCP: First and Last name:  Flaca Encinas     Name of Practice:    Are you a current patient: Yes/No:    Approximate date of last visit:    Can you participate in a virtual visit with your PCP:                     Current Advanced Directive/Advance Care Plan: Full Code      Healthcare Decision Maker:   Click here to complete [address] including selection of the Healthcare Decision Maker Relationship (ie \"Primary\")                             Transition of Care Plan:    Home with physician follow up pending clinical progression                  Chart reviewed.   Per H&P Monica Beaver is a 39 y.o.  female who diabetes, end-stage renal disease on peritoneal dialysis, cardiomyopathy EF of 40, coronary artery disease with failed stenting recommending medical management presents to the emergency room with crushing chest painRadiating to her left arm and blood pressure that was elevated at 513 systolic; she recently had diagnostic and not therapeutic cath recommending medical management please see report below  In the emergency room she was given nitro, morphine Zofran and started immediately on a Cardene drip this improved her chest pain chest x-ray suggested fluid overload and EKG showed no acute ST elevation initial troponin was elevated around 100  Patient states that she has been compliant with her dialysis 4 times a day  She also states that she took all her hypertensive regimen but did receive some bad news about her father and is anxious over closing on her house tomorrow she is currently chest pain-free denies smoking alcohol or drugs for her diabetes she takes quick acting insulin 15 units 3 times daily there is a strong family history of diabetes high blood pressure and heart disease on both sides of her family but no one is on dialysis except for her.\"    3289:  CM met with pt at bedside to discuss care transition. Pt lives with her significant other and is independent. Pt does not have or use DME. Please consider weaning O2 as appropriate as pt does not use home O2. Anticipate pt will transition home with physician follow up when medically stable. CM to continue to follow and assist as needed. Care Management Interventions  Mode of Transport at Discharge:  Other (see comment) (Family/significant other)  Transition of Care Consult (CM Consult): Discharge Planning  Health Maintenance Reviewed: Yes  Support Systems: Spouse/Significant Other  The Plan for Transition of Care is Related to the Following Treatment Goals : [facility] with physician follow up pending clinial progression  Discharge Location  Patient Expects to be Discharged to[de-identified] Home with family assistance (vs )

## 2022-11-16 NOTE — PROGRESS NOTES
Renal Progress Note    Consult requested by:Dr Linda Jenkins Benjamin is a 39 y.o. female BLACK/ who is being seen on consult for ESRD. Chief Complaint   Patient presents with    Chest Pain (Angina)     Admission diagnosis: Acute pulmonary edema ([facility] [address]    Subjectives: 38 yo PMH DM, HTN, CM EF 40%,CAD hx failed stent, ESRD on CAPD followed by [facility] in [facility] admitted for chest pain, SOB, HTN urgency admitted to ICU. Pt started on cardene drip, BP controlled today. She has been compliant with dialysis, has been under a lot of stress at home. Past Medical History:   Diagnosis Date    Cardiomyopathy [facility])     Chronic kidney disease     Coronary arteriosclerosis     DM (diabetes mellitus) ([facility] [address]     Hypertension     Tachycardia      No past surgical history on file.   Social History     Socioeconomic History    Marital status:      Spouse name: Not on file    Number of children: Not on file    Years of education: Not on file    Highest education level: Not on file   Occupational History    Not on file   Tobacco Use    Smoking status: Never    Smokeless tobacco: Never   Substance and Sexual Activity    Alcohol use: Yes     Comment: rarely    Drug use: Not on file    Sexual activity: Not on file   Other Topics Concern    Not on file   Social History Narrative    Not on file     Social Determinants of Health     Financial Resource Strain: Not on file   Food Insecurity: Not on file   Transportation Needs: Not on file   Physical Activity: Not on file   Stress: Not on file   Social Connections: Not on file   Intimate Partner Violence: Not on file   Housing Stability: Not on file   No tobacco or Etoh  Lives with fiance and son  Family Hx:+kidney disease  Allergies   Allergen Reactions    Coreg [Carvedilol] Nausea and Vomiting    Lortab [Hydrocodone-Acetaminophen] Nausea and Vomiting       Inpatient Medications:     Reviewed      PE:       Visit Vitals  BP (!) 147/80   Pulse 87   Temp 98.8 °F (37.1 °C)   Resp 11   Ht 5' 7\" (1.702 m)   Wt 91.5 kg (201 lb 11.5 oz)   SpO2 100%   BMI 31.59 kg/m²   GENERAL: Patient alert, awake and oriented times 3, NAD today  HEENT: No change in vision, no earache, tinnitus, sore throat or sinus congestion. NECK: No pain or stiffness. PULMONARY: + shortness of breath, cough or wheeze. Cardiovascular: no pnd or orthopnea, +CP  GASTROINTESTINAL: No abdominal pain, nausea, vomiting or diarrhea, melena or bright red blood per rectum. GENITOURINARY: No urinary frequency, urgency, hesitancy or dysuria. MUSCULOSKELETAL: No joint or muscle pain, no back pain, no recent trauma. DERMATOLOGIC: No rash, no itching, no lesions. ENDOCRINE: No polyuria, polydipsia, no heat or cold intolerance. +recent change in weight. Weight gain with edema  HEMATOLOGICAL: No anemia or easy bruising or bleeding.    NEUROLOGIC: No headache, seizures, numbness, tingling or weakness    Labs:  Latest labs reviewed    Rad:  Reviewed    Impression:   ESRD on CAPD at home  NSTEMI  HTN urgency  DM  CAD hx failed stents  Anemia  CM EF 40%      Plan:  Continue PD 2.5% dextrose  Do 4 exchanges per day  Epo weekly  Cardiology consulted  AM renal panel and cbc  D/w pt      Signed By: Roger Charles MD     November 16, 2022

## 2022-11-16 NOTE — PROGRESS NOTES
Cardiology Progress Note        Patient: Edi Yang        Sex: female          DOA: 11/15/2022  YOB: 1980      Age:  39 y.o.        LOS:  LOS: 1 day   Assessment/Plan     Principal Problem:    Acute pulmonary edema ([address] (11/15/2022)    Active Problems:    Chest pain (11/15/2022)      ESRD (end stage renal disease) on dialysis ([address] (11/15/2022)      Pulmonary edema (11/15/2022)      Cardiomyopathy ([address] (11/15/2022)      HTN (hypertension) (11/15/2022)      DM (diabetes mellitus) ([facility] [address] (11/15/2022)      PAD (peripheral artery disease) ([facility] [address] (11/15/2022)      CAD (coronary artery disease) (11/15/2022)        Plan:    Chest pain  Non-STEMI  CAD  PAD  End-stage renal disease  Cardiomyopathy      No chest pain  No shortness of breath blood pressure has improved   Hemoglobin is significantly low patient is going to receive 1 unit of blood,  no obvious source of bleeding, MCV is normal  Troponins are trending down  DC heparin for ACS protocol  Continue with DVT prophylaxis  Off Cardene drip  Continue supportive treatment  Management plan was discussed in detail with the patient                Subjective:    cc:  Chest pain  Non-STEMI  CAD  PAD  End-stage renal disease  Cardiomyopathy        REVIEW OF SYSTEMS:     General: No fevers or chills. Cardiovascular: No chest pain or pressure. No palpitations. No ankle swelling  Pulmonary: No SOB, orthopnea, PND  Gastrointestinal: No nausea, vomiting or diarrhea      Objective:      Visit Vitals  BP (!) 147/80   Pulse 87   Temp 98.8 °F (37.1 °C)   Resp 11   Ht 5' 7\" (1.702 m)   Wt 91.5 kg (201 lb 11.5 oz)   SpO2 100%   BMI 31.59 kg/m²     Body mass index is 31.59 kg/m². Physical Exam:  General Appearance: Comfortable, not using accessory muscles of respiration. NECK: No JVD, no thyroidomeglay. LUNGS: Clear bilaterally.    HEART: S1+S2 audible, mild systolic grade 1 murmur    ABD: Non-tender, BS Audible    EXT: No edema, and no cysnosis. VASCULAR EXAM: Pulses are intact. PSYCHIATRIC EXAM: Mood is appropriate.     Medication:  Current Facility-Administered Medications   Medication Dose Route Frequency    niCARdipine (CARDENE) 25 mg in 0.9% sodium chloride 250 mL infusion  0-15 mg/hr IntraVENous TITRATE    0.9% sodium chloride infusion 250 mL  250 mL IntraVENous PRN    bumetanide (BUMEX) injection 1 mg  1 mg IntraVENous Q12H    pantoprazole (PROTONIX) 40 mg in 0.9% sodium chloride 10 mL injection  40 mg IntraVENous Q12H    insulin lispro (HUMALOG) injection   SubCUTAneous AC&HS    glucose chewable tablet 16 g  4 Tablet Oral PRN    glucagon (GLUCAGEN) injection 1 mg  1 mg IntraMUSCular PRN    dextrose 10% infusion 0-250 mL  0-250 mL IntraVENous PRN    sodium chloride (NS) flush 5-40 mL  5-40 mL IntraVENous Q8H    sodium chloride (NS) flush 5-40 mL  5-40 mL IntraVENous PRN    acetaminophen (TYLENOL) tablet 650 mg  650 mg Oral Q6H PRN    Or    acetaminophen (TYLENOL) suppository 650 mg  650 mg Rectal Q6H PRN    polyethylene glycol (MIRALAX) packet 17 g  17 g Oral DAILY PRN    ondansetron (ZOFRAN ODT) tablet 4 mg  4 mg Oral Q8H PRN    Or    ondansetron (ZOFRAN) injection 4 mg  4 mg IntraVENous Q6H PRN    clopidogreL (PLAVIX) tablet 75 mg  75 mg Oral DAILY    aspirin chewable tablet 81 mg  81 mg Oral DAILY    atorvastatin (LIPITOR) tablet 80 mg  80 mg Oral QHS    isosorbide mononitrate ER (IMDUR) tablet 120 mg  120 mg Oral DAILY    peritoneal dialysis DEXTROSE 2.5% (2.5 mEq/L low calcium) solution 2,500 mL  2,500 mL IntraPERitoneal QID    doxycycline (MONODOX) capsule 100 mg  100 mg Oral Q12H    epoetin lucinda-epbx (RETACRIT) injection 20,000 Units  20,000 Units SubCUTAneous Q7D    metoprolol succinate (TOPROL-XL) tablet 100 mg  100 mg Oral DAILY    NIFEdipine ER (PROCARDIA XL) tablet 90 mg  90 mg Oral Q24H    heparin 25,000 units in D5W 250 ml infusion  11-25 Units/kg/hr IntraVENous TITRATE    0.9% sodium chloride infusion 250 mL  250 mL IntraVENous PRN               Lab/Data Reviewed:  Procedures/imaging: see electronic medical records for all procedures/Xrays   and details which were not copied into this note but were reviewed prior to creation of Plan       All lab results for the last 24 hours reviewed. Recent Labs     11/16/22  0130 11/15/22  1904 11/15/22  0145   WBC 7.7 8.3 10.5   HGB 5.8* 5.8* 7.3*   HCT 18.3* 18.8* 22.7*    321 372     Recent Labs     11/16/22  0755 11/16/22  0130 11/15/22  0145   * 130* 129*   K 4.7 5.0 5.2   CL 97* 97* 96*   CO2 23 22 19*   * 127* 105*   BUN 86* 94* 97*   CREA 16.50* 16.70* 18.30*   CA 9.8 9.6 10.1       RADIOLOGY:  CT Results  (Last 48 hours)                 11/15/22 0644  CTA CHEST W OR W WO CONT Final result    Impression:      1. No evidence of pulmonary embolism or right heart strain. 2.  Cardiomegaly, bilateral diffuse groundglass opacity, bilateral small pleural   effusions, right more than left and bibasilar dependent atelectasis. Findings   most consistent with pulmonary edema. 3. Small lipid Rich left adrenal adenoma. Nonspecific 7 mm hypodensity gastric   fundus, possible small aneurysm or bowel content. Narrative:  CTA chest       INDICATION: Pain. COMPARISON: None       TECHNIQUE: Axial CT imaging from the thoracic inlet through the diaphragm with   intravenous contrast. Coronal and sagittal MIP reformats were generated. One or   more dose reduction techniques were used on this CT: automated exposure control,   adjustment of the mAs and/or kVp according to patient size, and iterative   reconstruction techniques. The specific techniques used on this CT exam have   been documented in the patient's electronic medical record.  Digital Imaging and   Communications in Medicine (DICOM) format image data are available to   nonaffiliated external healthcare facilities or entities on a secure, media   free, reciprocally searchable basis with patient authorization for at least a   12-month period after this study. _______________       FINDINGS:       EXAM QUALITY: Adequate       PULMONARY ARTERIES: Mild motion degradation. No central or segmental filling   defects identified to suggest pulmonary embolism. Peripheral branches are   obscured by motion and airspace disease. MEDIASTINUM: Cardiomegaly without evidence for pericardial effusion. Coronary   atherosclerosis noted. There is no evidence for right heart strain. Aorta is   normal in caliber with mild atherosclerosis. No evidence for dissection. LUNGS: Diffuse groundglass opacity with mild patchy opacities in the upper lung   zone. There is bilateral dependent consolidation. PLEURA: Small bilateral pleural effusions, right more than left. AIRWAY: Patent. LYMPH NODES: No enlarged nodes. Slightly prominent mediastinal lymph nodes,   likely reactive. No axillary or hilar adenopathy. UPPER ABDOMEN: Small left adrenal lipid rich adenoma. 7 mm hypodensity at the   gallbladder fundus, possible aneurysm or bowel content. OTHER: No acute or aggressive osseous abnormalities identified. _______________                 CXR Results  (Last 48 hours)                 11/15/22 0200  XR CHEST PORT Final result    Impression:      Cardiomegaly with findings of pulmonary edema from CHF versus fluid overload. Narrative:  EXAM: XR CHEST PORT       CLINICAL INDICATION/HISTORY: CP   -Additional: None       COMPARISON: None       TECHNIQUE: Frontal view of the chest       _______________       FINDINGS:       HEART AND MEDIASTINUM: Cardiomegaly with hilar congestion and cephalization       LUNGS AND PLEURAL SPACES: Diffuse bilateral indistinct interstitial and hazy   confluent alveolar opacities. No dense consolidation, effusion or pneumothorax. BONY THORAX AND SOFT TISSUES: No acute or destructive osseous abnormality.        _______________ Cardiology Procedures:   Results for orders placed or performed during the hospital encounter of 11/15/22   EKG, 12 LEAD, INITIAL   Result Value Ref Range    Ventricular Rate 106 BPM    Atrial Rate 106 BPM    P-R Interval 168 ms    QRS Duration 116 ms    Q-T Interval 370 ms    QTC Calculation (Bezet) 491 ms    Calculated P Axis 61 degrees    Calculated R Axis -42 degrees    Calculated T Axis 116 degrees    Diagnosis       Sinus tachycardia  Possible Left atrial enlargement  Left axis deviation  Left ventricular hypertrophy with QRS widening ( Sulaiman product )  ST & T wave abnormality, consider lateral ischemia  Abnormal ECG  When compared with ECG of 11-SEP-2018 02:29,  No significant change was found  Confirmed by Allison Cote MD. ([id number]) on 11/15/2022 10:35:42 PM        Echo Results  (Last 48 hours)      None         Cardiolite (Tc-99m Sestamibi) stress test    Signed By: Gabriel Dominguez MD     November 16, 2022

## 2022-11-17 LAB
ALBUMIN SERPL-MCNC: 2.4 G/DL (ref 3.4–5)
ALBUMIN/GLOB SERPL: 0.6 {RATIO} (ref 0.8–1.7)
ALP SERPL-CCNC: 108 U/L (ref 45–117)
ALT SERPL-CCNC: 13 U/L (ref 13–56)
ANION GAP SERPL CALC-SCNC: 10 MMOL/L (ref 3–18)
AST SERPL-CCNC: 13 U/L (ref 10–38)
BASOPHILS # BLD: 0.1 K/UL (ref 0–0.1)
BASOPHILS NFR BLD: 1 % (ref 0–2)
BILIRUB SERPL-MCNC: 0.5 MG/DL (ref 0.2–1)
BUN SERPL-MCNC: 79 MG/DL (ref 7–18)
BUN/CREAT SERPL: 5 (ref 12–20)
CA-I SERPL-SCNC: 1.25 MMOL/L (ref 1.12–1.32)
CALCIUM SERPL-MCNC: 9.9 MG/DL (ref 8.5–10.1)
CHLORIDE SERPL-SCNC: 97 MMOL/L (ref 100–111)
CO2 SERPL-SCNC: 24 MMOL/L (ref 21–32)
CREAT SERPL-MCNC: 15 MG/DL (ref 0.6–1.3)
DIFFERENTIAL METHOD BLD: ABNORMAL
EOSINOPHIL # BLD: 0.9 K/UL (ref 0–0.4)
EOSINOPHIL NFR BLD: 9 % (ref 0–5)
ERYTHROCYTE [DISTWIDTH] IN BLOOD BY AUTOMATED COUNT: 16 % (ref 11.6–14.5)
GLOBULIN SER CALC-MCNC: 4.1 G/DL (ref 2–4)
GLUCOSE BLD STRIP.AUTO-MCNC: 100 MG/DL (ref 70–110)
GLUCOSE BLD STRIP.AUTO-MCNC: 111 MG/DL (ref 70–110)
GLUCOSE BLD STRIP.AUTO-MCNC: 143 MG/DL (ref 70–110)
GLUCOSE BLD STRIP.AUTO-MCNC: 179 MG/DL (ref 70–110)
GLUCOSE BLD STRIP.AUTO-MCNC: 90 MG/DL (ref 70–110)
GLUCOSE SERPL-MCNC: 89 MG/DL (ref 74–99)
HCT VFR BLD AUTO: 21 % (ref 35–45)
HGB BLD-MCNC: 6.7 G/DL (ref 12–16)
IMM GRANULOCYTES # BLD AUTO: 0.1 K/UL (ref 0–0.04)
IMM GRANULOCYTES NFR BLD AUTO: 1 % (ref 0–0.5)
LYMPHOCYTES # BLD: 1.5 K/UL (ref 0.9–3.6)
LYMPHOCYTES NFR BLD: 16 % (ref 21–52)
MAGNESIUM SERPL-MCNC: 2.9 MG/DL (ref 1.6–2.6)
MCH RBC QN AUTO: 29.6 PG (ref 24–34)
MCHC RBC AUTO-ENTMCNC: 31.9 G/DL (ref 31–37)
MCV RBC AUTO: 92.9 FL (ref 78–100)
MONOCYTES # BLD: 0.6 K/UL (ref 0.05–1.2)
MONOCYTES NFR BLD: 6 % (ref 3–10)
NEUTS SEG # BLD: 6.2 K/UL (ref 1.8–8)
NEUTS SEG NFR BLD: 66 % (ref 40–73)
NRBC # BLD: 0 K/UL (ref 0–0.01)
NRBC BLD-RTO: 0 PER 100 WBC
PHOSPHATE SERPL-MCNC: 8.2 MG/DL (ref 2.5–4.9)
PLATELET # BLD AUTO: 295 K/UL (ref 135–420)
PMV BLD AUTO: 10.1 FL (ref 9.2–11.8)
POTASSIUM SERPL-SCNC: 4.7 MMOL/L (ref 3.5–5.5)
PROT SERPL-MCNC: 6.5 G/DL (ref 6.4–8.2)
RBC # BLD AUTO: 2.26 M/UL (ref 4.2–5.3)
SODIUM SERPL-SCNC: 131 MMOL/L (ref 136–145)
WBC # BLD AUTO: 9.3 K/UL (ref 4.6–13.2)

## 2022-11-17 PROCEDURE — 74011250636 HC RX REV CODE- 250/636: Performed by: INTERNAL MEDICINE

## 2022-11-17 PROCEDURE — 85025 COMPLETE CBC W/AUTO DIFF WBC: CPT

## 2022-11-17 PROCEDURE — 74011636637 HC RX REV CODE- 636/637: Performed by: INTERNAL MEDICINE

## 2022-11-17 PROCEDURE — 65270000046 HC RM TELEMETRY

## 2022-11-17 PROCEDURE — 74011000250 HC RX REV CODE- 250: Performed by: INTERNAL MEDICINE

## 2022-11-17 PROCEDURE — 83735 ASSAY OF MAGNESIUM: CPT

## 2022-11-17 PROCEDURE — 82330 ASSAY OF CALCIUM: CPT

## 2022-11-17 PROCEDURE — 80053 COMPREHEN METABOLIC PANEL: CPT

## 2022-11-17 PROCEDURE — A4722 DIALYS SOL FLD VOL > 1999CC: HCPCS | Performed by: INTERNAL MEDICINE

## 2022-11-17 PROCEDURE — 74011250637 HC RX REV CODE- 250/637: Performed by: INTERNAL MEDICINE

## 2022-11-17 PROCEDURE — 84100 ASSAY OF PHOSPHORUS: CPT

## 2022-11-17 PROCEDURE — C9113 INJ PANTOPRAZOLE SODIUM, VIA: HCPCS | Performed by: INTERNAL MEDICINE

## 2022-11-17 PROCEDURE — 74011636637 HC RX REV CODE- 636/637: Performed by: HOSPITALIST

## 2022-11-17 PROCEDURE — 36415 COLL VENOUS BLD VENIPUNCTURE: CPT

## 2022-11-17 PROCEDURE — 82962 GLUCOSE BLOOD TEST: CPT

## 2022-11-17 RX ORDER — CLONIDINE HYDROCHLORIDE 0.1 MG/1
0.2 TABLET ORAL 2 TIMES DAILY
Status: DISCONTINUED | OUTPATIENT
Start: 2022-11-17 | End: 2022-11-18 | Stop reason: HOSPADM

## 2022-11-17 RX ORDER — INSULIN GLARGINE 100 [IU]/ML
8 INJECTION, SOLUTION SUBCUTANEOUS
Status: DISCONTINUED | OUTPATIENT
Start: 2022-11-17 | End: 2022-11-17

## 2022-11-17 RX ORDER — SODIUM CHLORIDE 9 MG/ML
250 INJECTION, SOLUTION INTRAVENOUS AS NEEDED
Status: DISCONTINUED | OUTPATIENT
Start: 2022-11-17 | End: 2022-11-18 | Stop reason: HOSPADM

## 2022-11-17 RX ORDER — SEVELAMER CARBONATE 800 MG/1
1600 TABLET, FILM COATED ORAL
Status: DISCONTINUED | OUTPATIENT
Start: 2022-11-17 | End: 2022-11-18 | Stop reason: HOSPADM

## 2022-11-17 RX ORDER — INSULIN LISPRO 100 [IU]/ML
4 INJECTION, SOLUTION INTRAVENOUS; SUBCUTANEOUS
Status: DISCONTINUED | OUTPATIENT
Start: 2022-11-17 | End: 2022-11-18 | Stop reason: HOSPADM

## 2022-11-17 RX ADMIN — BUMETANIDE 1 MG: 0.25 INJECTION INTRAMUSCULAR; INTRAVENOUS at 19:21

## 2022-11-17 RX ADMIN — SODIUM CHLORIDE, PRESERVATIVE FREE 40 MG: 5 INJECTION INTRAVENOUS at 21:18

## 2022-11-17 RX ADMIN — CLONIDINE HYDROCHLORIDE 0.2 MG: 0.1 TABLET ORAL at 21:17

## 2022-11-17 RX ADMIN — SODIUM CHLORIDE, SODIUM LACTATE, CALCIUM CHLORIDE, MAGNESIUM CHLORIDE AND DEXTROSE 2500 ML: 2.5; 538; 448; 18.3; 5.08 INJECTION, SOLUTION INTRAPERITONEAL at 21:15

## 2022-11-17 RX ADMIN — SODIUM CHLORIDE, SODIUM LACTATE, CALCIUM CHLORIDE, MAGNESIUM CHLORIDE AND DEXTROSE 2500 ML: 2.5; 538; 448; 18.3; 5.08 INJECTION, SOLUTION INTRAPERITONEAL at 13:29

## 2022-11-17 RX ADMIN — SODIUM CHLORIDE, SODIUM LACTATE, CALCIUM CHLORIDE, MAGNESIUM CHLORIDE AND DEXTROSE 2500 ML: 2.5; 538; 448; 18.3; 5.08 INJECTION, SOLUTION INTRAPERITONEAL at 17:06

## 2022-11-17 RX ADMIN — INSULIN LISPRO 4 UNITS: 100 INJECTION, SOLUTION INTRAVENOUS; SUBCUTANEOUS at 17:07

## 2022-11-17 RX ADMIN — SODIUM CHLORIDE, SODIUM LACTATE, CALCIUM CHLORIDE, MAGNESIUM CHLORIDE AND DEXTROSE 2500 ML: 2.5; 538; 448; 18.3; 5.08 INJECTION, SOLUTION INTRAPERITONEAL at 10:05

## 2022-11-17 RX ADMIN — DOXYCYCLINE 100 MG: 100 CAPSULE ORAL at 21:18

## 2022-11-17 RX ADMIN — NIFEDIPINE 90 MG: 30 TABLET, FILM COATED, EXTENDED RELEASE ORAL at 19:21

## 2022-11-17 RX ADMIN — ISOSORBIDE MONONITRATE 120 MG: 60 TABLET, EXTENDED RELEASE ORAL at 09:08

## 2022-11-17 RX ADMIN — SODIUM CHLORIDE, PRESERVATIVE FREE 10 ML: 5 INJECTION INTRAVENOUS at 14:00

## 2022-11-17 RX ADMIN — SODIUM CHLORIDE, PRESERVATIVE FREE 40 MG: 5 INJECTION INTRAVENOUS at 09:08

## 2022-11-17 RX ADMIN — INSULIN LISPRO 2 UNITS: 100 INJECTION, SOLUTION INTRAVENOUS; SUBCUTANEOUS at 21:17

## 2022-11-17 RX ADMIN — ATORVASTATIN CALCIUM 80 MG: 20 TABLET, FILM COATED ORAL at 21:17

## 2022-11-17 RX ADMIN — CLOPIDOGREL BISULFATE 75 MG: 75 TABLET ORAL at 09:08

## 2022-11-17 RX ADMIN — SODIUM CHLORIDE, PRESERVATIVE FREE 10 ML: 5 INJECTION INTRAVENOUS at 05:58

## 2022-11-17 RX ADMIN — DOXYCYCLINE 100 MG: 100 CAPSULE ORAL at 09:08

## 2022-11-17 RX ADMIN — SEVELAMER CARBONATE 1600 MG: 800 TABLET, FILM COATED ORAL at 17:09

## 2022-11-17 RX ADMIN — METOPROLOL SUCCINATE 100 MG: 100 TABLET, EXTENDED RELEASE ORAL at 09:08

## 2022-11-17 RX ADMIN — BUMETANIDE 1 MG: 0.25 INJECTION INTRAMUSCULAR; INTRAVENOUS at 05:58

## 2022-11-17 RX ADMIN — INSULIN LISPRO 8 UNITS: 100 INJECTION, SOLUTION INTRAVENOUS; SUBCUTANEOUS at 12:55

## 2022-11-17 RX ADMIN — INSULIN LISPRO 8 UNITS: 100 INJECTION, SOLUTION INTRAVENOUS; SUBCUTANEOUS at 09:09

## 2022-11-17 RX ADMIN — SODIUM CHLORIDE, PRESERVATIVE FREE 10 ML: 5 INJECTION INTRAVENOUS at 21:23

## 2022-11-17 RX ADMIN — ASPIRIN 81 MG: 81 TABLET, CHEWABLE ORAL at 09:08

## 2022-11-17 NOTE — PROGRESS NOTES
Hospitalist Progress Note-critical care note     Patient: Dionne Phan MRN: [id number]  CSN: [id number]    YOB: 1980  Age: 39 y.o.   Sex: female    DOA: 11/15/2022 LOS:  LOS: 2 days            Chief complaint: Acute pulmonary edema, chest pain, NSTEMI, end-stage renal disease on PD, cardiomyopathy, diabetes, hypertension    Assessment/Plan         Hospital Problems  Date Reviewed: 11/15/2022            Codes Class Noted POA    * (Principal) Acute pulmonary edema ([facility] ICD-10-CM: J81.0  ICD-9-CM: 518.4  11/15/2022 Unknown        Chest pain ICD-10-CM: R07.9  ICD-9-CM: 786.50  11/15/2022 Unknown        ESRD (end stage renal disease) on dialysis [facility]) ICD-10-CM: N18.6, Z99.2  ICD-9-CM: 585.6, V45.11  11/15/2022 Unknown        Pulmonary edema ICD-10-CM: J81.1  ICD-9-CM: 585  11/15/2022 Unknown        Cardiomyopathy ([facility] ICD-10-CM: I42.9  ICD-9-CM: 425.4  11/15/2022 Unknown        HTN (hypertension) ICD-10-CM: I10  ICD-9-CM: 401.9  11/15/2022 Unknown        DM (diabetes mellitus) ([facility] ICD-10-CM: E11.9  ICD-9-CM: 250.00  11/15/2022 Unknown        PAD (peripheral artery disease) ([facility] ICD-10-CM: I73.9  ICD-9-CM: 443.9  11/15/2022 Unknown        CAD (coronary artery disease) ICD-10-CM: I25.10  ICD-9-CM: 414.00  11/15/2022 Unknown          NSTEMI with cardiomyopathy   Cta chest no PE , trop flat   No chest pain   Cardiologist was on board, pt wants to have medical treatment   Echo ef 35 %   Currently on 2 L of oxygen-weaning off   On aspirin lipitor plavix and isosorbide metoprolol, bumex       Hypertensive emergency  Off  nicardipine gtt , continue current regimen      End-stage renal disease on PD   Renal on board for PD , no abdomen pain   She has been on the transplant list since 2017 and on dialysis for a year and a half       Diabetes placed on sliding scale insulin add lantus 5 units at the night time     Hypothyroidism  Synthroid    Anemia, no bleeding reported, epotin per renal , will have another unit prbc        Subjective feel better today  no bleeding, want to go home     Rn stable overnight,     Disposition :1-2 days, needs cleared per cardiologist   Review of systems:    General: No fevers or chills. Cardiovascular: No chest pain or pressure. No palpitations. Pulmonary: No shortness of breath. Gastrointestinal: No nausea, vomiting. Vital signs/Intake and Output:  Visit Vitals  BP (!) 147/81   Pulse 92   Temp 98.2 °F (36.8 °C)   Resp 13   Ht 5' 7\" (1.702 m)   Wt 91.5 kg (201 lb 11.5 oz)   SpO2 100%   BMI 31.59 kg/m²     Current Shift:  No intake/output data recorded. Last three shifts:  11/15 1901 - 11/17 0700  In: 04916.6 [P.O.:820; I.V.:164.6]  Out: 44637 [Urine:1300]    Physical Exam:  General: WD, WN. Alert, cooperative, no acute distress    HEENT: NC, Atraumatic. PERRLA, anicteric sclerae. Lungs: CTA Bilaterally. No Wheezing/Rhonchi/Rales. Heart:  Regular  rhythm,  No murmur, No Rubs, No Gallops  Abdomen: Soft, non distended, Non tender. +Bowel sounds, PD catheter noted   Extremities: No c/c/e  Psych:   Not anxious or agitated. Neurologic:  No acute neurological deficit.              Labs: Results:       Chemistry Recent Labs     11/17/22  0545 11/16/22  0755 11/16/22  0130 11/15/22  0145   GLU 89 103* 127* 105*   * 133* 130* 129*   K 4.7 4.7 5.0 5.2   CL 97* 97* 97* 96*   CO2 24 23 22 19*   BUN 79* 86* 94* 97*   CREA 15.00* 16.50* 16.70* 18.30*   CA 9.9 9.8 9.6 10.1   AGAP 10 13 11 14   BUCR 5* 5* 6* 5*     --  119* 139*   TP 6.5  --  6.9 7.5   ALB 2.4*  --  2.7* 3.3*   GLOB 4.1*  --  4.2* 4.2*   AGRAT 0.6*  --  0.6* 0.8        CBC w/Diff Recent Labs     11/17/22  0545 11/16/22  1415 11/16/22  0130 11/15/22  1904   WBC 9.3  --  7.7 8.3   RBC 2.26*  --  2.06* 2.07*   HGB 6.7* 7.1* 5.8* 5.8*   HCT 21.0* 22.3* 18.3* 18.8*     --  307 321   GRANS 66  --  71 71   LYMPH 16*  --  14* 12*   EOS 9*  --  9* 9*        Cardiac Enzymes No results for input(s): CPK, CKND1, NAVIN in the last 72 hours. No lab exists for component: CKRMB, TROIP   Coagulation Recent Labs     11/16/22  0755 11/16/22  0130   APTT 45.4* 33.1         Lipid Panel No results found for: CHOL, CHOLPOCT, CHOLX, CHLST, CHOLV, 824580, HDL, HDLP, LDL, LDLC, DLDLP, 158878, VLDLC, VLDL, TGLX, TRIGL, TRIGP, TGLPOCT, CHHD, CHHDX   BNP No results for input(s): BNPP in the last 72 hours. Liver Enzymes Recent Labs     11/17/22  0545   TP 6.5   ALB 2.4*           Thyroid Studies No results found for: T4, T3U, TSH, TSHEXT, TSHEXT     Procedures/imaging: see electronic medical records for all procedures/Xrays and details which were not copied into this note but were reviewed prior to creation of Plan    CTA CHEST W OR W WO CONT    Result Date: 11/15/2022  CTA chest INDICATION: Pain. COMPARISON: None TECHNIQUE: Axial CT imaging from the thoracic inlet through the diaphragm with intravenous contrast. Coronal and sagittal MIP reformats were generated. One or more dose reduction techniques were used on this CT: automated exposure control, adjustment of the mAs and/or kVp according to patient size, and iterative reconstruction techniques. The specific techniques used on this CT exam have been documented in the patient's electronic medical record. Digital Imaging and Communications in Medicine (DICOM) format image data are available to nonaffiliated external healthcare facilities or entities on a secure, media free, reciprocally searchable basis with patient authorization for at least a 12-month period after this study. _______________ FINDINGS: EXAM QUALITY: Adequate PULMONARY ARTERIES: Mild motion degradation. No central or segmental filling defects identified to suggest pulmonary embolism. Peripheral branches are obscured by motion and airspace disease. MEDIASTINUM: Cardiomegaly without evidence for pericardial effusion. Coronary atherosclerosis noted. There is no evidence for right heart strain.  Aorta is normal in caliber with mild atherosclerosis. No evidence for dissection. LUNGS: Diffuse groundglass opacity with mild patchy opacities in the upper lung zone. There is bilateral dependent consolidation. PLEURA: Small bilateral pleural effusions, right more than left. AIRWAY: Patent. LYMPH NODES: No enlarged nodes. Slightly prominent mediastinal lymph nodes, likely reactive. No axillary or hilar adenopathy. UPPER ABDOMEN: Small left adrenal lipid rich adenoma. 7 mm hypodensity at the gallbladder fundus, possible aneurysm or bowel content. OTHER: No acute or aggressive osseous abnormalities identified. _______________     1. No evidence of pulmonary embolism or right heart strain. 2.  Cardiomegaly, bilateral diffuse groundglass opacity, bilateral small pleural effusions, right more than left and bibasilar dependent atelectasis. Findings most consistent with pulmonary edema. 3. Small lipid Rich left adrenal adenoma. Nonspecific 7 mm hypodensity gastric fundus, possible small aneurysm or bowel content. XR CHEST PORT    Result Date: 11/15/2022  EXAM: XR CHEST PORT CLINICAL INDICATION/HISTORY: CP -Additional: None COMPARISON: None TECHNIQUE: Frontal view of the chest _______________ FINDINGS: HEART AND MEDIASTINUM: Cardiomegaly with hilar congestion and cephalization LUNGS AND PLEURAL SPACES: Diffuse bilateral indistinct interstitial and hazy confluent alveolar opacities. No dense consolidation, effusion or pneumothorax. BONY THORAX AND SOFT TISSUES: No acute or destructive osseous abnormality. _______________     Cardiomegaly with findings of pulmonary edema from CHF versus fluid overload. ECHO ADULT COMPLETE    Result Date: 11/15/2022    Left Ventricle: Moderately reduced left ventricular systolic function with a visually estimated EF of 35 - 40%. Left ventricle size is normal. Moderate septal thickening. Severe posterior thickening. Moderate global hypokinesis present.  Grade II diastolic dysfunction with increased LAP.   Right Ventricle: Right ventricle size is normal. Mildly increased wall thickness. Aortic Valve: Severely thickened cusp. Moderately calcified right and noncoronary cusps. Minimal to moderate stenosis of the aortic valve. Mitral Valve: Mildly thickened leaflet, at the posterior leaflet. Severe annular calcification at the posterior leaflet of the mitral valve. Moderate regurgitation. Mild to moderate stenosis noted, measurement is compromised with severe calcification. Planimetry was not performed. Left Atrium: Left atrium is severely dilated. Right Atrium: Right atrium is moderately dilated. PA systolic pressure is 40 mmHg.        Bucky Saxena MD

## 2022-11-17 NOTE — PROGRESS NOTES
Problem: Anemia Care Plan (Adult and Pediatric)  Goal: *Labs within defined limits  Outcome: Progressing Towards Goal  Goal: *Tolerates increased activity  Outcome: Progressing Towards Goal     Problem: Chronic Renal Failure  Goal: *Fluid and electrolytes stabilized  Outcome: Progressing Towards Goal     Problem: Falls - Risk of  Goal: *Absence of Falls  Description: Document Skyla Fall Risk and appropriate interventions in the flowsheet.   Outcome: Progressing Towards Goal  Note: Fall Risk Interventions:            Medication Interventions: Assess postural VS orthostatic hypotension, Bed/chair exit alarm, Evaluate medications/consider consulting pharmacy    Elimination Interventions: Bed/chair exit alarm, Call light in reach, Toilet paper/wipes in reach, Toileting schedule/hourly rounds              Problem: General Medical Care Plan  Goal: *Vital signs within specified parameters  Outcome: Progressing Towards Goal  Goal: *Labs within defined limits  Outcome: Progressing Towards Goal  Goal: *Absence of infection signs and symptoms  Outcome: Progressing Towards Goal  Goal: *Optimal pain control at patient's stated goal  Outcome: Progressing Towards Goal  Goal: *Skin integrity maintained  Outcome: Progressing Towards Goal  Goal: *Fluid volume balance  Outcome: Progressing Towards Goal  Goal: *Optimize nutritional status  Outcome: Progressing Towards Goal  Goal: *Anxiety reduced or absent  Outcome: Progressing Towards Goal  Goal: *Progressive mobility and function (eg: ADL's)  Outcome: Progressing Towards Goal     Problem: Hypertension  Goal: *Blood pressure within specified parameters  Outcome: Progressing Towards Goal  Goal: *Fluid volume balance  Outcome: Progressing Towards Goal  Goal: *Labs within defined limits  Outcome: Progressing Towards Goal

## 2022-11-17 NOTE — PROGRESS NOTES
Renal Progress Note    Consult requested by:Dr Governor Patricia Benjamin is a 39 y.o. female BLACK/ who is being seen on consult for ESRD. Chief Complaint   Patient presents with    Chest Pain (Angina)     Admission diagnosis: Acute pulmonary edema ([facility]    Subjectives: 38 yo PMH DM, HTN, CM EF 40%,CAD hx failed stent, ESRD on CAPD followed by [facility] in [facility] admitted for chest pain, SOB, HTN urgency admitted to ICU. Pt started on cardene drip, BP controlled today. She has been compliant with dialysis, has been under a lot of stress at home. -PD in progress. SOB improving. No CP. Past Medical History:   Diagnosis Date    Cardiomyopathy [facility])     Chronic kidney disease     Coronary arteriosclerosis     DM (diabetes mellitus) ([facility]     Hypertension     Tachycardia      No past surgical history on file.   Social History     Socioeconomic History    Marital status:      Spouse name: Not on file    Number of children: Not on file    Years of education: Not on file    Highest education level: Not on file   Occupational History    Not on file   Tobacco Use    Smoking status: Never    Smokeless tobacco: Never   Substance and Sexual Activity    Alcohol use: Yes     Comment: rarely    Drug use: Not on file    Sexual activity: Not on file   Other Topics Concern    Not on file   Social History Narrative    Not on file     Social Determinants of Health     Financial Resource Strain: Not on file   Food Insecurity: Not on file   Transportation Needs: Not on file   Physical Activity: Not on file   Stress: Not on file   Social Connections: Not on file   Intimate Partner Violence: Not on file   Housing Stability: Not on file   No tobacco or Etoh  Lives with fiance and son  Family Hx:+kidney disease  Allergies   Allergen Reactions    Coreg [Carvedilol] Nausea and Vomiting    Lortab [Hydrocodone-Acetaminophen] Nausea and Vomiting       Inpatient Medications:     Reviewed      PE:       Visit Vitals  BP (!) 171/86 (BP 1 Location: Left upper arm, BP Patient Position: At rest;Semi fowlers)   Pulse 93   Temp 98.5 °F (36.9 °C)   Resp 16   Ht 5' 7\" (1.702 m)   Wt 91.5 kg (201 lb 11.5 oz)   SpO2 97%   BMI 31.59 kg/m²   GENERAL: Patient alert, awake and oriented times 3, NAD today  HEENT: No change in vision, no earache, tinnitus, sore throat or sinus congestion. NECK: No pain or stiffness. PULMONARY: + shortness of breath, cough or wheeze. Cardiovascular: no pnd or orthopnea, +CP  GASTROINTESTINAL: No abdominal pain, nausea, vomiting or diarrhea, melena or bright red blood per rectum. GENITOURINARY: No urinary frequency, urgency, hesitancy or dysuria. MUSCULOSKELETAL: No joint or muscle pain, no back pain, no recent trauma. DERMATOLOGIC: No rash, no itching, no lesions. ENDOCRINE: No polyuria, polydipsia, no heat or cold intolerance. +recent change in weight. Weight gain with edema  HEMATOLOGICAL: No anemia or easy bruising or bleeding.    NEUROLOGIC: No headache, seizures, numbness, tingling or weakness    Labs:  Latest labs reviewed    Rad:  Reviewed    Impression:   ESRD on CAPD at home  NSTEMI  HTN urgency  DM  CAD hx failed stents  Anemia s/p prbc tx   CM EF 40%      Plan:  Continue PD 2.5% dextrose  Do 4 exchanges per day  Epo weekly  Cardiology following   D/w pt      Signed By: Shaun James MD     November 17, 2022

## 2022-11-17 NOTE — PROGRESS NOTES
[facility]  Pulmonary, Critical Care, and Sleep Medicine    Name: Frutoso Gowers MRN: [id number]   : 1980 Hospital: [facility] [place]    Date: 2022  Room: [address] [place] [address] Note                                              Consult requesting physician: Dr. Aimee Mcmahan  Reason for Consult: Acute pulmonary edema and chest pain    IMPRESSION:       Active Hospital Problems    Diagnosis Date Noted    Acute pulmonary edema ([address] 11/15/2022    Chest pain 11/15/2022    ESRD (end stage renal disease) on dialysis ([address] 11/15/2022    Pulmonary edema 11/15/2022    Cardiomyopathy ([address] 11/15/2022    HTN (hypertension) 11/15/2022    DM (diabetes mellitus) ([address] 11/15/2022    PAD (peripheral artery disease) ([address] 11/15/2022    CAD (coronary artery disease) 11/15/2022        Patient Active Problem List   Diagnosis Code    Acute pulmonary edema ([address] J81.0    Chest pain R07.9    ESRD (end stage renal disease) on dialysis ([address] N18.6, Z99.2    Pulmonary edema J81.1    Cardiomyopathy ([address] I42.9    HTN (hypertension) I10    DM (diabetes mellitus) ([address] E11.9    PAD (peripheral artery disease) ([address] I73.9    CAD (coronary artery disease) I25.10       Code status: Full Code       RECOMMENDATIONS:     Respiratory:   Acute pulmonary edema, likely due to CHF/CMP/CAD. CTA chest 11/15/2022: No evidence of pulmonary embolism or right heart strain. 2.  Cardiomegaly, bilateral diffuse groundglass opacity, bilateral small pleural effusions, right more than left and bibasilar dependent atelectasis. Findings most consistent with pulmonary edema. 3. Small lipid Rich left adrenal adenoma. Nonspecific 7 mm hypodensity gastric fundus, possible small aneurysm or bowel content. Protecting airway well. Remains on O2 2 LPM NC; but when off O2, her SPO2 is 99 to 100%; titrate to keep SPO2 > 91%. Keep SPO2 >=92%. HOB 30 degree elevation all the time. Aggressive pulmonary toileting.  Aspiration precautions. Incentive spirometry. CVS:   History of CAD with multiple stents in the past; last cardiac catheterization 11/4/2022 [facility] showed significant progressive CAD; recommended medical management. Admitted with chest pain and elevated troponin, NSTEMI and flash pulmonary edema with hypertensive urgency. Elevated troponin; check serial cardiac enzymes. Elevated BNP. Echo 8/12/2022 [facility]: LV severely enlarged. Severe concentric LVH. LVEF 66%. Grade 2 diastolic dysfunction. RV size and systolic function normal.  Trace MR and TR. Estimated PASP 40 mmHg. Echo 11/15/2022: LVEF 35 to 40%. Grade 2 diastolic dysfunction. Severe septal thickening. Moderate global hypokinesis. Minimal to moderate aortic stenosis. Moderate MR. Severely dilated LA. Moderately dilated RA. Estimated PASP 40 mmHg. CTA negative for PE. Off Cardene drip since 11/15/2022. Continue aspirin, Lipitor 80, Plavix 75, Imdur 120 daily, Toprol- mg daily, nifedipine ER 90 mg daily. Continue Bumex 1 mg every 12 hours; strict I's and O's; monitor electrolytes. Heparin drip stopped 11/16/2022 due to troponin downtrending and drop in hemoglobin. Dr. Thompson Frames on board. ID:   Left dorsum of the foot near the range of fourth and fifth toe mild skin ulceration; was seen by podiatrist yesterday and was supposed to start doxycycline; complete doxycycline 100 mg twice daily x10 days. Respiratory viral panel, RSV PCR, rapid influenza and COVID-19 11/15/2022: Negative. Hematology/Oncology:   Anemia due to ESRD. Sudden drop in hemoglobin noted but no external bleeding. Stool occult blood pending. S/p 1 PRBC on 11/16/2022 with hemoglobin improved from 5.8 to 7.1; again dropped to 6.7 without any external bleeding. After long discussion, patient agreed for another 1 PRBC due to known history of CAD and NSTEMI.   On Epoetin by nephrologist.  Further management per hospitalist and nephrologist.    Renal:   ESRD on PD.  Hyponatremia, likely due to hypervolemia. Bumex as above. Deferred to nephrologist.    GI/: No acute issues. Endocrine: Monitor BS. SSI. Continue Synthroid. hCG negative. Neurology: Alert awake oriented x3. No focal deficit. Psychiatry: No acute issues. Toxicology: No acute issues. Pain/Sedation: No acute issues. Skin/Wound: Local care at the left foot. Electrolytes: Replace electrolytes per ICU electrolyte replacement protocol. IVF: None    Nutrition: P.o. diabetic, hypertensive and cardiac diet. Prophylaxis: DVT Prophylaxis: SCD/Heparin. GI Prophylaxis: Protonix. Restraints: none    Lines/Tubes: PIV  Patient does not have Torres catheter or central line. PT/OT eval and treat. OOB. Advance Directive/Palliative Care: Per clinical course. Will defer respective systems problem management to primary and other respective consultant and follow patient in ICU. Primary and other medical team.  Further recommendations will be based on the patient's response to recommended treatment and results of the investigation ordered. Quality Care: PPI, DVT prophylaxis, HOB elevated, Infection control all reviewed and addressed. Care of plan d/w RN, RT, MDR, hospitalist team.  D/w patient (answered all questions to satisfaction). High complexity decision making was performed during the evaluation of this patient at high risk for decompensation with multiple organ involvement. Total critical care time spent rendering care exclusive of procedures/family discussion/coordination of care: 35 minutes. Transfer to telemetry. Once transferred, PCCM will sign off. Call us with any questions. Subjective/History of Present Illness:     Patient is a 39 y.o. female with PMHx significant for HTN, DM, ESRD on PD, CAD, CMP; admitted with hypertensive urgency with acute pulmonary edema and chest pain.     Patient had extensive cardiac work-up in the past including multiple cardiac catheterization and stent placements in the past; last cardiac catheterization on 11/4/2022 at [facility] which reportedly showed significant CAD; recommended medical management. Came to ER with retrosternal chest pain initially thought to be burning heartburn sensation; later on left arm tingling and numbness. Denies any chest pain radiating to the back or shoulder. Denies any associated nausea, diaphoresis, dyspnea, lightheadedness, dizziness. The chest pain was 10 out of 10 in severity; now improved to 1 out of 10 in severity. In ER patient was hypotensive; required nitro followed by Cardene drip.    11/17/2022 :     Remains in ICU room 107. Remains off Cardene drip since 11/15/2022; hemodynamically stable. S/p 1 PRBC transfusion yesterday with hemoglobin improved from 5.8-7.1; now again dropped to 6.7. No external bleeding noted. Denies dark stool, bloody stool, nausea, vomiting, abdominal pain, hematuria, hemoptysis. Denies cough, dyspnea, chest pain, lightheadedness, dizziness. Leg edema improving. No other overnight issues reported. I/O last 24 hrs:    Intake/Output Summary (Last 24 hours) at 11/17/2022 1308  Last data filed at 11/17/2022 3707  Gross per 24 hour   Intake 4138.17 ml   Output 4700 ml   Net -561.83 ml           The patient is critically ill     History taken from patient, EMR     Review of Systems:   HEENT: No epistaxis, no nasal drainage, no difficulty in swallowing, no redness in eyes  Respiratory: as above  Cardiovascular: no palpitations, no syncope  Gastrointestinal: no abd pain, no vomiting, no diarrhea, no bleeding symptoms  Genitourinary: No urinary symptoms or hematuria  Musculoskeletal: Neg  Neurological: No focal weakness, no seizures, no headaches  Behvioral/Psych: No anxiety, no depression  Constitutional: No fever, no chills, no weight loss, no night sweats     Allergies   Allergen Reactions    Coreg [Carvedilol] Nausea and Vomiting Lortab [Hydrocodone-Acetaminophen] Nausea and Vomiting      Past Medical History:   Diagnosis Date    Cardiomyopathy [address]     Chronic kidney disease     Coronary arteriosclerosis     DM (diabetes mellitus) ([facility]     Hypertension     Tachycardia       No past surgical history on file.    Social History     Tobacco Use    Smoking status: Never    Smokeless tobacco: Never   Substance Use Topics    Alcohol use: Yes     Comment: rarely      Family History   Problem Relation Age of Onset    Diabetes Mother     Heart Disease Mother     Heart Attack Mother     Heart Attack Father     Heart Disease Father     Diabetes Father       Prior to Admission medications    Not on File     Current Facility-Administered Medications   Medication Dose Route Frequency    insulin glargine (LANTUS) injection 8 Units  8 Units SubCUTAneous QHS    insulin lispro (HUMALOG) injection 8 Units  8 Units SubCUTAneous TIDAC    bumetanide (BUMEX) injection 1 mg  1 mg IntraVENous Q12H    pantoprazole (PROTONIX) 40 mg in 0.9% sodium chloride 10 mL injection  40 mg IntraVENous Q12H    insulin lispro (HUMALOG) injection   SubCUTAneous AC&HS    sodium chloride (NS) flush 5-40 mL  5-40 mL IntraVENous Q8H    clopidogreL (PLAVIX) tablet 75 mg  75 mg Oral DAILY    aspirin chewable tablet 81 mg  81 mg Oral DAILY    atorvastatin (LIPITOR) tablet 80 mg  80 mg Oral QHS    isosorbide mononitrate ER (IMDUR) tablet 120 mg  120 mg Oral DAILY    peritoneal dialysis DEXTROSE 2.5% (2.5 mEq/L low calcium) solution 2,500 mL  2,500 mL IntraPERitoneal QID    doxycycline (MONODOX) capsule 100 mg  100 mg Oral Q12H    epoetin lucinda-epbx (RETACRIT) injection 20,000 Units  20,000 Units SubCUTAneous Q7D    metoprolol succinate (TOPROL-XL) tablet 100 mg  100 mg Oral DAILY    NIFEdipine ER (PROCARDIA XL) tablet 90 mg  90 mg Oral Q24H         Objective:   Vital Signs:    Visit Vitals  BP (!) 171/86 (BP 1 Location: Left upper arm, BP Patient Position: At rest;Semi fowlers)   Pulse 93 Temp 98.5 °F (36.9 °C)   Resp 16   Ht 5' 7\" (1.702 m)   Wt 91.5 kg (201 lb 11.5 oz)   SpO2 97%   BMI 31.59 kg/m²       O2 Device: None (Room air)   O2 Flow Rate (L/min): 2 l/min   Temp (24hrs), Av.4 °F (36.9 °C), Min:98 °F (36.7 °C), Max:98.6 °F (37 °C)       Intake/Output:   Last shift:      No intake/output data recorded. Last 3 shifts: 11/15 1901 -  0700  In: 39257.6 [P.O.:820; I.V.:164.6]  Out: 07435 [Urine:1300]      Intake/Output Summary (Last 24 hours) at 2022 1308  Last data filed at 2022 0313  Gross per 24 hour   Intake 4138.17 ml   Output 4700 ml   Net -561.83 ml         Last 3 Recorded Weights in this Encounter    11/15/22 0134 11/15/22 0824 22 0400   Weight: 88.9 kg (196 lb) 88.9 kg (196 lb) 91.5 kg (201 lb 11.5 oz)       Physical Exam:     General/Neurology: Alert, awake and oriented. NAD. Head:   NCAT. Eye:   EOM intact. No icterus/pallor/cyanosis. Nose:   No nasal drainage/discharge. Neck:   Trachea midline. Lung: Moderate air entry bilateral equal.  No rales, rhonchi. No wheezing or stridor. No prolonged expiration or accessory muscle use. Heart:   S1 S2 present. No murmur or JVD. Abdomen:  Soft. NT. ND. No palpable masses. Extremities:  Very mild bilateral leg edema. No cyanosis or clubbing. Pulses:  2+ and symmetric in DP. Skin:   Left dorsum of the foot near the range of fourth and fifth toe mild skin ulceration; without induration or discharge.       Data:       Recent Results (from the past 24 hour(s))   HGB & HCT    Collection Time: 22  2:15 PM   Result Value Ref Range    HGB 7.1 (L) 12.0 - 16.0 g/dL    HCT 22.3 (L) 35.0 - 45.0 %   GLUCOSE, POC    Collection Time: 22  4:07 PM   Result Value Ref Range    Glucose (POC) 136 (H) 70 - 110 mg/dL   GLUCOSE, POC    Collection Time: 22  9:07 PM   Result Value Ref Range    Glucose (POC) 117 (H) 70 - 870 mg/dL   METABOLIC PANEL, COMPREHENSIVE    Collection Time: 22  5:45 AM   Result Value Ref Range    Sodium 131 (L) 136 - 145 mmol/L    Potassium 4.7 3.5 - 5.5 mmol/L    Chloride 97 (L) 100 - 111 mmol/L    CO2 24 21 - 32 mmol/L    Anion gap 10 3.0 - 18 mmol/L    Glucose 89 74 - 99 mg/dL    BUN 79 (H) 7.0 - 18 MG/DL    Creatinine 15.00 (H) 0.6 - 1.3 MG/DL    BUN/Creatinine ratio 5 (L) 12 - 20      eGFR 3 (L) >60 ml/min/1.73m2    Calcium 9.9 8.5 - 10.1 MG/DL    Bilirubin, total 0.5 0.2 - 1.0 MG/DL    ALT (SGPT) 13 13 - 56 U/L    AST (SGOT) 13 10 - 38 U/L    Alk. phosphatase 108 45 - 117 U/L    Protein, total 6.5 6.4 - 8.2 g/dL    Albumin 2.4 (L) 3.4 - 5.0 g/dL    Globulin 4.1 (H) 2.0 - 4.0 g/dL    A-G Ratio 0.6 (L) 0.8 - 1.7     CBC WITH AUTOMATED DIFF    Collection Time: 11/17/22  5:45 AM   Result Value Ref Range    WBC 9.3 4.6 - 13.2 K/uL    RBC 2.26 (L) 4.20 - 5.30 M/uL    HGB 6.7 (L) 12.0 - 16.0 g/dL    HCT 21.0 (L) 35.0 - 45.0 %    MCV 92.9 78.0 - 100.0 FL    MCH 29.6 24.0 - 34.0 PG    MCHC 31.9 31.0 - 37.0 g/dL    RDW 16.0 (H) 11.6 - 14.5 %    PLATELET 454 655 - 857 K/uL    MPV 10.1 9.2 - 11.8 FL    NRBC 0.0 0  WBC    ABSOLUTE NRBC 0.00 0.00 - 0.01 K/uL    NEUTROPHILS 66 40 - 73 %    LYMPHOCYTES 16 (L) 21 - 52 %    MONOCYTES 6 3 - 10 %    EOSINOPHILS 9 (H) 0 - 5 %    BASOPHILS 1 0 - 2 %    IMMATURE GRANULOCYTES 1 (H) 0.0 - 0.5 %    ABS. NEUTROPHILS 6.2 1.8 - 8.0 K/UL    ABS. LYMPHOCYTES 1.5 0.9 - 3.6 K/UL    ABS. MONOCYTES 0.6 0.05 - 1.2 K/UL    ABS. EOSINOPHILS 0.9 (H) 0.0 - 0.4 K/UL    ABS. BASOPHILS 0.1 0.0 - 0.1 K/UL    ABS. IMM.  GRANS. 0.1 (H) 0.00 - 0.04 K/UL    DF AUTOMATED     MAGNESIUM    Collection Time: 11/17/22  5:45 AM   Result Value Ref Range    Magnesium 2.9 (H) 1.6 - 2.6 mg/dL   PHOSPHORUS    Collection Time: 11/17/22  5:45 AM   Result Value Ref Range    Phosphorus 8.2 (H) 2.5 - 4.9 MG/DL   CALCIUM, IONIZED    Collection Time: 11/17/22  5:45 AM   Result Value Ref Range    Ionized Calcium 1.25 1.12 - 1.32 MMOL/L   GLUCOSE, POC    Collection Time: 11/17/22  5:56 AM Result Value Ref Range    Glucose (POC) 100 70 - 110 mg/dL   GLUCOSE, POC    Collection Time: 11/17/22  7:48 AM   Result Value Ref Range    Glucose (POC) 90 70 - 110 mg/dL   GLUCOSE, POC    Collection Time: 11/17/22 11:52 AM   Result Value Ref Range    Glucose (POC) 143 (H) 70 - 110 mg/dL         Chemistry Recent Labs     11/17/22  0545 11/16/22  0755 11/16/22  0130 11/15/22  0145   GLU 89 103* 127* 105*   * 133* 130* 129*   K 4.7 4.7 5.0 5.2   CL 97* 97* 97* 96*   CO2 24 23 22 19*   BUN 79* 86* 94* 97*   CREA 15.00* 16.50* 16.70* 18.30*   CA 9.9 9.8 9.6 10.1   MG 2.9*  --  3.2* 3.3*   PHOS 8.2*  --   --   --    AGAP 10 13 11 14   BUCR 5* 5* 6* 5*     --  119* 139*   TP 6.5  --  6.9 7.5   ALB 2.4*  --  2.7* 3.3*   GLOB 4.1*  --  4.2* 4.2*   AGRAT 0.6*  --  0.6* 0.8          Lactic Acid No results found for: LAC  No results for input(s): LAC in the last 72 hours. Liver Enzymes Protein, total   Date Value Ref Range Status   11/17/2022 6.5 6.4 - 8.2 g/dL Final     Albumin   Date Value Ref Range Status   11/17/2022 2.4 (L) 3.4 - 5.0 g/dL Final     Globulin   Date Value Ref Range Status   11/17/2022 4.1 (H) 2.0 - 4.0 g/dL Final     A-G Ratio   Date Value Ref Range Status   11/17/2022 0.6 (L) 0.8 - 1.7   Final     Alk.  phosphatase   Date Value Ref Range Status   11/17/2022 108 45 - 117 U/L Final     Recent Labs     11/17/22  0545 11/16/22  0130 11/15/22  0145   TP 6.5 6.9 7.5   ALB 2.4* 2.7* 3.3*   GLOB 4.1* 4.2* 4.2*   AGRAT 0.6* 0.6* 0.8    119* 139*          CBC w/Diff Recent Labs     11/17/22  0545 11/16/22  1415 11/16/22  0130 11/15/22  1904   WBC 9.3  --  7.7 8.3   RBC 2.26*  --  2.06* 2.07*   HGB 6.7* 7.1* 5.8* 5.8*   HCT 21.0* 22.3* 18.3* 18.8*     --  307 321   GRANS 66  --  71 71   LYMPH 16*  --  14* 12*   EOS 9*  --  9* 9*          Cardiac Enzymes No results found for: CPK, CK, CKMMB, CKMB, RCK3, CKMBT, CKNDX, CKND1, NAVIN, TROPT, TROIQ, JUAN CARLOS, TROPT, TNIPOC, BNP, BNPP     BNP No results found for: BNP, BNPP, XBNPT     Coagulation Recent Labs     11/16/22  0755 11/16/22  0130 11/15/22  1904   APTT 45.4* 33.1 29.4           Thyroid  No results found for: T4, T3U, TSH, TSHEXT, TSHEXT    No results found for: T4     Urinalysis No results found for: COLOR, APPRN, SPGRU, REFSG, MIRANDA, PROTU, GLUCU, KETU, BILU, UROU, YUNIEL, LEUKU, GLUKE, EPSU, BACTU, WBCU, RBCU, CASTS, UCRY     Micro  No results for input(s): SDES, CULT in the last 72 hours. No results for input(s): CULT in the last 72 hours. Culture data during this hospitalization. All Micro Results       Procedure Component Value Units Date/Time    RESPIRATORY VIRUS PANEL W/COVID-19, PCR [[id number]] Collected: 11/15/22 1615    Order Status: Completed Specimen: Nasopharyngeal Updated: 11/15/22 2202     Adenovirus Not detected        Coronavirus 229E Not detected        Coronavirus HKU1 Not detected        Coronavirus CVNL63 Not detected        Coronavirus OC43 Not detected        SARS-CoV-2, PCR Not detected        Metapneumovirus Not detected        Rhinovirus and Enterovirus Not detected        Influenza A Not detected        Influenza B Not detected        Parainfluenza 1 Not detected        Parainfluenza 2 Not detected        Parainfluenza 3 Not detected        Parainfluenza virus 4 Not detected        RSV by PCR Not detected        B. parapertussis, PCR Not detected        Bordetella pertussis - PCR Not detected        Chlamydophila pneumoniae DNA, QL, PCR Not detected        Mycoplasma pneumoniae DNA, QL, PCR Not detected       COVID-19 WITH INFLUENZA A/B [[id number]] Collected: 11/15/22 1600    Order Status: Completed Specimen: Nasopharyngeal Updated: 11/15/22 1655     SARS-CoV-2 by PCR Not detected        Comment: Not Detected results do not preclude SARS-CoV-2 infection and should not be used as the sole basis for patient management decisions. Results must be combined with clinical observations, patient history, and epidemiological information. Influenza A by PCR Not detected        Influenza B by PCR Not detected        Comment: Testing was performed using yunior Josie SARS-CoV-2 and Influenza A/B nucleic acid assay. This test is a multiplex Real-Time Reverse Transcriptase Polymerase Chain Reaction (RT-PCR) based in vitro diagnostic test intended for the qualitative detection of nucleic acids from SARS-CoV-2, Influenza A, and Influenza B in nasopharyngeal for use under the FDA's Emergency Use Authorization(EAU) only. Fact sheet for Patients: FindDrives.pl  Fact sheet for Healthcare Providers: FindDrives.pl                    CTA CHEST W OR W WO CONT    Result Date: 11/15/2022  CTA chest INDICATION: Pain. COMPARISON: None TECHNIQUE: Axial CT imaging from the thoracic inlet through the diaphragm with intravenous contrast. Coronal and sagittal MIP reformats were generated. One or more dose reduction techniques were used on this CT: automated exposure control, adjustment of the mAs and/or kVp according to patient size, and iterative reconstruction techniques. The specific techniques used on this CT exam have been documented in the patient's electronic medical record. Digital Imaging and Communications in Medicine (DICOM) format image data are available to nonaffiliated external healthcare facilities or entities on a secure, media free, reciprocally searchable basis with patient authorization for at least a 12-month period after this study. _______________ FINDINGS: EXAM QUALITY: Adequate PULMONARY ARTERIES: Mild motion degradation. No central or segmental filling defects identified to suggest pulmonary embolism. Peripheral branches are obscured by motion and airspace disease. MEDIASTINUM: Cardiomegaly without evidence for pericardial effusion. Coronary atherosclerosis noted. There is no evidence for right heart strain. Aorta is normal in caliber with mild atherosclerosis.  No evidence for dissection. LUNGS: Diffuse groundglass opacity with mild patchy opacities in the upper lung zone. There is bilateral dependent consolidation. PLEURA: Small bilateral pleural effusions, right more than left. AIRWAY: Patent. LYMPH NODES: No enlarged nodes. Slightly prominent mediastinal lymph nodes, likely reactive. No axillary or hilar adenopathy. UPPER ABDOMEN: Small left adrenal lipid rich adenoma. 7 mm hypodensity at the gallbladder fundus, possible aneurysm or bowel content. OTHER: No acute or aggressive osseous abnormalities identified. _______________     1. No evidence of pulmonary embolism or right heart strain. 2.  Cardiomegaly, bilateral diffuse groundglass opacity, bilateral small pleural effusions, right more than left and bibasilar dependent atelectasis. Findings most consistent with pulmonary edema. 3. Small lipid Rich left adrenal adenoma. Nonspecific 7 mm hypodensity gastric fundus, possible small aneurysm or bowel content. XR CHEST PORT    Result Date: 11/15/2022  EXAM: XR CHEST PORT CLINICAL INDICATION/HISTORY: CP -Additional: None COMPARISON: None TECHNIQUE: Frontal view of the chest _______________ FINDINGS: HEART AND MEDIASTINUM: Cardiomegaly with hilar congestion and cephalization LUNGS AND PLEURAL SPACES: Diffuse bilateral indistinct interstitial and hazy confluent alveolar opacities. No dense consolidation, effusion or pneumothorax. BONY THORAX AND SOFT TISSUES: No acute or destructive osseous abnormality. _______________     Cardiomegaly with findings of pulmonary edema from CHF versus fluid overload. Echo 8/12/22 [facility]:  CONCLUSIONS     * Left ventricular chamber size is severely enlarged. * There is severe concentric left ventricular wall thickness. * Left ventricular systolic function is normal with an ejection fraction of   66 % by Schulte's biplane. * Left ventricular diastolic function: Grade II diastolic dysfunction.      * Left atrial chamber is severely enlarged with a left atrial volume index   of 64.26 ml/m^2 by BP MOD. * Right ventricular chamber dimension is normal.     * Right ventricular systolic function is normal.     * The aortic valve has a restrictive left coronary cusp and calcified non   coronary cusp. * There is mild aortic valve stenosis with a peak velocity of 2.25 m/s, mean   gradient of 11 mmHg, aortic valve area of 1.52 cm2, and a dimensionless index   of 0.54. * There is trace mitral and tricuspid valve regurgitation. * Mild pulmonary hypertension, estimated pulmonary arterial systolic   pressure is 40 mmHg. Comparison     * Compared to prior study from 4/15/22 there was an EF of 55%, indeterminate   diastolic function, moderately dilated RA, trace AI, no evidence of AS, mild   to moderate MR and TR, and a PAP of 53 mmHg. [facility] 11/4/22 [facility]:  Hemodynamics: Ao: 158/81 mmHg. Mean 112 mmHg. LV: 162/12 mmHg. LVEDP: 25   mmHg. No aortic valve gradient was noted on the pullback of the   pigtail catheter. Coronary Arteries:        Left Main Artery: Widely patent     Left Anterior Descending Artery: Proximal segments patent. Mid   vessel contains prior stents which are patent. Distal segments   patent. Moderate sized first diagonal branch which is patent. Moderate-sized second diagonal branch which contains prior stents   is 100% occluded. Left Circumflex Artery: Moderate size AV vessel. Proximal and   mid are patent. Distal segment contains a 20% stenosis just   after the origin of an obtuse marginal branch. Large branching   obtuse marginal branch from the mid AV groove vessel. Proximal   segment contains a concentric discrete 70% stenosis. Distal   vessel contains luminal irregularities. Right Coronary Artery: Dominant, moderate size vessel. Proximal    and mid segments patent. Distal vessel bifurcates into 2   branches at the acute margin.   The ongoing right branch is   diffusely diseased with a long 80% stenosis, followed by a   discrete 90% stenosis. The acute marginal branch contains   proximal and mid 99% stenoses. Coronary Intervention: None     Left ventriculogram: EF 40% moderate anterolateral hypokinesis. Mild mitral regurgitation     Imp:   1. Progressive coronary disease since prior catheterization   7/14/2021. Second diagonal branch which was previously stented   is now 100% occluded. First obtuse marginal branch contains a   ostial/proximal 70% stenosis. Distal right coronary artery   demonstrates 2 branches with the ongoing vessel diffusely   diseased with discrete distal 90% stenosis. The acute marginal   branch contains proximal and mid 99% stenoses. 2.  Ischemic cardiomyopathy ejection fraction 40%   3. No aortic stenosis   4. Mild mitral regurgitation   5. Successful administration of moderate sedation without   complications     Plan: Routine post cardiac cath orders. Patient's disease is   mostly branch vessel and distal vessel disease. The diagonal is   100% chronic total occlusion, the distal right coronary artery   disease is very diffuse. Thus, this anatomy is unfavorable for   PCI and CABG. The obtuse marginal branch potentially could be   considered for PCI although would leave behind significant   residual coronary disease. Patient needs aggressive guideline   directed risk factor modification. In addition with her LV   systolic dysfunction she will also need guideline directed   medical therapy for LV dysfunction and chronic systolic heart   failure. Noemí Gonzales MD, [facility]   [phone]   November 4, 2022         Echo 11/15/2022:  Result status: Final result       Left Ventricle: Moderately reduced left ventricular systolic function with a visually estimated EF of 35 - 40%. Left ventricle size is normal. Moderate septal thickening. Severe posterior thickening. Moderate global hypokinesis present.  Grade II diastolic dysfunction with increased LAP. Right Ventricle: Right ventricle size is normal. Mildly increased wall thickness. Aortic Valve: Severely thickened cusp. Moderately calcified right and noncoronary cusps. Minimal to moderate stenosis of the aortic valve. Mitral Valve: Mildly thickened leaflet, at the posterior leaflet. Severe annular calcification at the posterior leaflet of the mitral valve. Moderate regurgitation. Mild to moderate stenosis noted, measurement is compromised with severe calcification. Planimetry was not performed. Left Atrium: Left atrium is severely dilated. Right Atrium: Right atrium is moderately dilated. PA systolic pressure is 40 mmHg. Images report reviewed by me:  CT (Most Recent) (CT chest reviewed by me) Results from [facility] Encounter encounter on 11/15/22    CTA CHEST W OR W WO CONT    Narrative  CTA chest    INDICATION: Pain. COMPARISON: None    TECHNIQUE: Axial CT imaging from the thoracic inlet through the diaphragm with  intravenous contrast. Coronal and sagittal MIP reformats were generated. One or  more dose reduction techniques were used on this CT: automated exposure control,  adjustment of the mAs and/or kVp according to patient size, and iterative  reconstruction techniques. The specific techniques used on this CT exam have  been documented in the patient's electronic medical record. Digital Imaging and  Communications in Medicine (DICOM) format image data are available to  nonaffiliated external healthcare facilities or entities on a secure, media  free, reciprocally searchable basis with patient authorization for at least a  12-month period after this study. _______________    FINDINGS:    EXAM QUALITY: Adequate    PULMONARY ARTERIES: Mild motion degradation. No central or segmental filling  defects identified to suggest pulmonary embolism. Peripheral branches are  obscured by motion and airspace disease.     MEDIASTINUM: Cardiomegaly without evidence for pericardial effusion. Coronary  atherosclerosis noted. There is no evidence for right heart strain. Aorta is  normal in caliber with mild atherosclerosis. No evidence for dissection. LUNGS: Diffuse groundglass opacity with mild patchy opacities in the upper lung  zone. There is bilateral dependent consolidation. PLEURA: Small bilateral pleural effusions, right more than left. AIRWAY: Patent. LYMPH NODES: No enlarged nodes. Slightly prominent mediastinal lymph nodes,  likely reactive. No axillary or hilar adenopathy. UPPER ABDOMEN: Small left adrenal lipid rich adenoma. 7 mm hypodensity at the  gallbladder fundus, possible aneurysm or bowel content. OTHER: No acute or aggressive osseous abnormalities identified. _______________    Impression  1. No evidence of pulmonary embolism or right heart strain. 2.  Cardiomegaly, bilateral diffuse groundglass opacity, bilateral small pleural  effusions, right more than left and bibasilar dependent atelectasis. Findings  most consistent with pulmonary edema. 3. Small lipid Rich left adrenal adenoma. Nonspecific 7 mm hypodensity gastric  fundus, possible small aneurysm or bowel content. CXR reviewed by me:  XR (Most Recent). CXR  reviewed by me and compared with previous CXR Results from Hospital Encounter encounter on 11/15/22    XR CHEST PORT    Narrative  EXAM: XR CHEST PORT    CLINICAL INDICATION/HISTORY: CP  -Additional: None    COMPARISON: None    TECHNIQUE: Frontal view of the chest    _______________    FINDINGS:    HEART AND MEDIASTINUM: Cardiomegaly with hilar congestion and cephalization    LUNGS AND PLEURAL SPACES: Diffuse bilateral indistinct interstitial and hazy  confluent alveolar opacities. No dense consolidation, effusion or pneumothorax. BONY THORAX AND SOFT TISSUES: No acute or destructive osseous abnormality. _______________    Impression  Cardiomegaly with findings of pulmonary edema from CHF versus fluid overload. ·Please note: Voice-recognition software may have been used to generate this report, which may have resulted in some phonetic-based errors in grammar and contents. Even though attempts were made to correct all the mistakes, some may have been missed, and remained in the body of the document.       Karan Ingram MD  11/17/2022

## 2022-11-17 NOTE — PROGRESS NOTES
Physician Progress Note      Amarilys Gonzales  CSN #:                  [id number]  :                       1980  ADMIT DATE:       11/15/2022 1:32 AM  DISCH DATE:  RESPONDING  PROVIDER #:        Carol JANSEN MD          QUERY TEXT:    Pt admitted with NSTEMI with cardiomyopathy and has CHF documented. If possible, please document in progress notes and discharge summary further specificity regarding the type and acuity of CHF:    The medical record reflects the following:  Risk Factors:  Clinical Indicators: 11/15  Pulmonology consult:  Acute pulmonary edema, likely due to CHF/CMP/CAD.   Query response note: This patient has acute pulmonary edema due to heart failure.   Medical note:  Echo ef 35 %  Treatment: Cardiology following; ECHO; aspirin lipitor plavix and isosorbide metoprolol, bumex    Thank you,  Savannah Hopkins [email or web address]  Options provided:  -- Acute on Chronic Systolic CHF/HFrEF  -- Acute on Chronic Diastolic CHF/HFpEF  -- Acute on Chronic Systolic and Diastolic CHF  -- Acute Systolic CHF/HFrEF  -- Acute Diastolic CHF/HFpEF  -- Acute Systolic and Diastolic CHF  -- Chronic Systolic CHF/HFrEF  -- Chronic Diastolic CHF/HFpEF  -- Chronic Systolic and Diastolic CHF  -- Other - I will add my own diagnosis  -- Disagree - Not applicable / Not valid  -- Disagree - Clinically unable to determine / Unknown  -- Refer to Clinical Documentation Reviewer    PROVIDER RESPONSE TEXT:    This patient has chronic systolic and diastolic CHF.     Query created by: Geovanna Luu on 2022 10:35 AM      Electronically signed by:  Carol JANSEN MD 2022 2:50 PM

## 2022-11-17 NOTE — PROGRESS NOTES
Bedside and Verbal shift change report given to Gaviota Mandel RN (oncoming nurse) by Adrian Lopez. Rohini Queen RN (offgoing nurse). Report included the following information SBAR, Kardex, Intake/Output, MAR, Recent Results, and Cardiac Rhythm SR . Shift assessment completed and documented per relevant flow sheet, pt is oriented x4, denies pain, no s/s of distress, VSS on 2LNC, care given to PD site, new dressing applied, CDI, pt is oliguric, no additional needs at this time    Pt had an episode of emesis, pt states \"the chicken from dinner did not agree with me\" zofran given see mar    Reassessment completed, VSS, no s/s of distress, pt states she no longer feels nauseous, no additional needs at this time    Reassessment completed, VSS, no change    Pt remains calm and cooperative throughout shift, no s/s of distress, VSS on 2LNC, tolerating PD well, no additional needs at this time    Bedside and Verbal shift change report given to Jorge Luis Garcia Rn (oncoming nurse) by Gaviota Mandel RN (offgoing nurse). Report included the following information SBAR, Kardex, Intake/Output, MAR, Recent Results, Cardiac Rhythm SR, and Alarm Parameters .

## 2022-11-17 NOTE — DIABETES MGMT
Glycemic Control recommending the following for hospital BG management:    - Scheduled mealtime Humalog - 8 units QAC  (see addendum below)  - Corrective Humalog (ssi) ACHS - normal sensitivity scale    Per patient - she does not take long-acting insulin (Lantus) at home because it makes her go too low overnight. She states that her BG only goes high when she eats, and manages at home with Novolog 15 units QAC only. Addendum:  Mealtime Humalog order changed to 4 units TIDAC per MD.   PD dextrose decreased today, this may be adequate mealtime coverage at this time. Recommend assessing coverage with HS BG, and adjust as necessary.           Lloyd Almazan RN, BSN, [facility]  Professional   Glycemic Control Team   Phone:  [phone]  Tues - Thurs 8:30 - 4:30

## 2022-11-17 NOTE — PROGRESS NOTES
Physician Progress Note      Amy Churchill  CSN #:                  [id number]  :                       1980  ADMIT DATE:       11/15/2022 1:32 AM  DISCH DATE:  RESPONDING  PROVIDER #:        Ruben Allen MD          QUERY TEXT:    Pt admitted with NSTEMI, ESRD. Pt noted to have Pulmonary edema. If possible, please document in the progress notes and discharge summary if you are evaluating and/or treating any of the following:[[Acute pulmonary edema due to other ##(please specify). This patient has acute pulmonary edema to other##(Please specify.)    The medical record reflects the following:  Risk Factors: 41yo female with PMH ESRD, NSTEMI    Clinical Indicators: Per Pulmonary note  Acute pulmonary edema, likely due to CHF/CMP/CAD  Per Card Non-STEMI; CAD  BNP 64,352  EF 35-40%  Treatment: Cardiology and Pulmonary consults, IV Bumex, ECHO, Tele monitoring      Thank you for your time,    Yamileth BONILLA, RN, [address], [facility] [address]  C: [phone]    [email or web address]  Options provided:  -- Acute pulmonary edema due to heart failure  -- Other - I will add my own diagnosis  -- Disagree - Not applicable / Not valid  -- Disagree - Clinically unable to determine / Unknown  -- Refer to Clinical Documentation Reviewer    PROVIDER RESPONSE TEXT:    This patient has acute pulmonary edema due to heart failure.     Query created by: Piper Altamirano on 2022 5:19 PM      Electronically signed by:  Ruben Allen MD 2022 7:27 PM

## 2022-11-17 NOTE — PROGRESS NOTES
Cardiology Progress Note        Patient: Dilip Cole        Sex: female          DOA: 11/15/2022  YOB: 1980      Age:  39 y.o.        LOS:  LOS: 2 days   Assessment/Plan     Principal Problem:    Acute pulmonary edema ([facility] [address] (11/15/2022)    Active Problems:    Chest pain (11/15/2022)      ESRD (end stage renal disease) on dialysis ([facility] [address] (11/15/2022)      Pulmonary edema (11/15/2022)      Cardiomyopathy ([facility] [address] (11/15/2022)      HTN (hypertension) (11/15/2022)      DM (diabetes mellitus) ([facility] [address] (11/15/2022)      PAD (peripheral artery disease) ([facility] [address] (11/15/2022)      CAD (coronary artery disease) (11/15/2022)      Plan:  11/17/2022  Patient is feeling better blood pressure is somewhat still elevated  No chest pain or shortness of breath  I will resume patient's home dose of clonidine 0.2 mg twice daily  Continue with rest of the treatment. If patient stable tomorrow can be discharged from cardiac standpoint and can continue to follow with primary cardiologist and PMD.  Management plan was discussed in detail with patient. Chest pain  Non-STEMI  CAD  PAD  End-stage renal disease  Cardiomyopathy      No chest pain  No shortness of breath blood pressure has improved   Hemoglobin is significantly low patient is going to receive 1 unit of blood,  no obvious source of bleeding, MCV is normal  Troponins are trending down  DC heparin for ACS protocol  Continue with DVT prophylaxis  Off Cardene drip  Continue supportive treatment  Management plan was discussed in detail with the patient                Subjective:    cc:  Chest pain  Non-STEMI  CAD  PAD  End-stage renal disease  Cardiomyopathy        REVIEW OF SYSTEMS:     General: No fevers or chills. Cardiovascular: No chest pain or pressure. No palpitations.  No ankle swelling  Pulmonary: No SOB, orthopnea, PND  Gastrointestinal: No nausea, vomiting or diarrhea      Objective:      Visit Vitals  BP (!) 171/86 (BP 1 Location: Left upper arm, BP Patient Position: At rest;Semi fowlers)   Pulse 93   Temp 98.5 °F (36.9 °C)   Resp 16   Ht 5' 7\" (1.702 m)   Wt 91.5 kg (201 lb 11.5 oz)   SpO2 97%   BMI 31.59 kg/m²     Body mass index is 31.59 kg/m². Physical Exam:  General Appearance: Comfortable, not using accessory muscles of respiration. NECK: No JVD, no thyroidomeglay. LUNGS: Clear bilaterally. HEART: S1+S2 audible, mild systolic grade 1 murmur    ABD: Non-tender, BS Audible    EXT: No edema, and no cysnosis. VASCULAR EXAM: Pulses are intact. PSYCHIATRIC EXAM: Mood is appropriate.     Medication:  Current Facility-Administered Medications   Medication Dose Route Frequency    0.9% sodium chloride infusion 250 mL  250 mL IntraVENous PRN    sevelamer carbonate (RENVELA) tab 1,600 mg  1,600 mg Oral TID WITH MEALS    insulin lispro (HUMALOG) injection 4 Units  4 Units SubCUTAneous TIDAC    bumetanide (BUMEX) injection 1 mg  1 mg IntraVENous Q12H    pantoprazole (PROTONIX) 40 mg in 0.9% sodium chloride 10 mL injection  40 mg IntraVENous Q12H    insulin lispro (HUMALOG) injection   SubCUTAneous AC&HS    glucose chewable tablet 16 g  4 Tablet Oral PRN    glucagon (GLUCAGEN) injection 1 mg  1 mg IntraMUSCular PRN    dextrose 10% infusion 0-250 mL  0-250 mL IntraVENous PRN    sodium chloride (NS) flush 5-40 mL  5-40 mL IntraVENous Q8H    sodium chloride (NS) flush 5-40 mL  5-40 mL IntraVENous PRN    acetaminophen (TYLENOL) tablet 650 mg  650 mg Oral Q6H PRN    Or    acetaminophen (TYLENOL) suppository 650 mg  650 mg Rectal Q6H PRN    polyethylene glycol (MIRALAX) packet 17 g  17 g Oral DAILY PRN    ondansetron (ZOFRAN ODT) tablet 4 mg  4 mg Oral Q8H PRN    Or    ondansetron (ZOFRAN) injection 4 mg  4 mg IntraVENous Q6H PRN    clopidogreL (PLAVIX) tablet 75 mg  75 mg Oral DAILY    aspirin chewable tablet 81 mg  81 mg Oral DAILY    atorvastatin (LIPITOR) tablet 80 mg  80 mg Oral QHS    isosorbide mononitrate ER (IMDUR) tablet 120 mg  120 mg Oral DAILY    peritoneal dialysis DEXTROSE 2.5% (2.5 mEq/L low calcium) solution 2,500 mL  2,500 mL IntraPERitoneal QID    doxycycline (MONODOX) capsule 100 mg  100 mg Oral Q12H    epoetin lucinda-epbx (RETACRIT) injection 20,000 Units  20,000 Units SubCUTAneous Q7D    metoprolol succinate (TOPROL-XL) tablet 100 mg  100 mg Oral DAILY    NIFEdipine ER (PROCARDIA XL) tablet 90 mg  90 mg Oral Q24H    0.9% sodium chloride infusion 250 mL  250 mL IntraVENous PRN               Lab/Data Reviewed:  Procedures/imaging: see electronic medical records for all procedures/Xrays   and details which were not copied into this note but were reviewed prior to creation of Plan       All lab results for the last 24 hours reviewed.      Recent Labs     11/17/22  0545 11/16/22  1415 11/16/22  0130 11/15/22  1904   WBC 9.3  --  7.7 8.3   HGB 6.7* 7.1* 5.8* 5.8*   HCT 21.0* 22.3* 18.3* 18.8*     --  307 321       Recent Labs     11/17/22  0545 11/16/22  0755 11/16/22  0130   * 133* 130*   K 4.7 4.7 5.0   CL 97* 97* 97*   CO2 24 23 22   GLU 89 103* 127*   BUN 79* 86* 94*   CREA 15.00* 16.50* 16.70*   CA 9.9 9.8 9.6         RADIOLOGY:  CT Results  (Last 48 hours)      None          CXR Results  (Last 48 hours)      None              Cardiology Procedures:   Results for orders placed or performed during the hospital encounter of 11/15/22   EKG, 12 LEAD, INITIAL   Result Value Ref Range    Ventricular Rate 106 BPM    Atrial Rate 106 BPM    P-R Interval 168 ms    QRS Duration 116 ms    Q-T Interval 370 ms    QTC Calculation (Bezet) 491 ms    Calculated P Axis 61 degrees    Calculated R Axis -42 degrees    Calculated T Axis 116 degrees    Diagnosis       Sinus tachycardia  Possible Left atrial enlargement  Left axis deviation  Left ventricular hypertrophy with QRS widening ( Sulaiman product )  ST & T wave abnormality, consider lateral ischemia  Abnormal ECG  When compared with ECG of 11-SEP-2018 02:29,  No significant change was found  Confirmed by Michael Goddard MD. ([id number]) on 11/15/2022 10:35:42 PM        Echo Results  (Last 48 hours)      None         Cardiolite (Tc-99m Sestamibi) stress test    Signed By: My Case MD     November 17, 2022

## 2022-11-17 NOTE — PROGRESS NOTES
Discussed with diabetic educator recommend ssi for glucose control and premeal insulin.  Pt dose not want lantus

## 2022-11-18 VITALS
BODY MASS INDEX: 32.11 KG/M2 | SYSTOLIC BLOOD PRESSURE: 136 MMHG | OXYGEN SATURATION: 100 % | WEIGHT: 204.59 LBS | HEIGHT: 67 IN | TEMPERATURE: 97.7 F | HEART RATE: 96 BPM | RESPIRATION RATE: 18 BRPM | DIASTOLIC BLOOD PRESSURE: 76 MMHG

## 2022-11-18 LAB
ALBUMIN SERPL-MCNC: 2.3 G/DL (ref 3.4–5)
ALBUMIN/GLOB SERPL: 0.7 {RATIO} (ref 0.8–1.7)
ALP SERPL-CCNC: 112 U/L (ref 45–117)
ALT SERPL-CCNC: 13 U/L (ref 13–56)
ANION GAP SERPL CALC-SCNC: 10 MMOL/L (ref 3–18)
AST SERPL-CCNC: 16 U/L (ref 10–38)
ATRIAL RATE: 108 BPM
BASOPHILS # BLD: 0.1 K/UL (ref 0–0.1)
BASOPHILS NFR BLD: 1 % (ref 0–2)
BILIRUB SERPL-MCNC: 0.3 MG/DL (ref 0.2–1)
BUN SERPL-MCNC: 71 MG/DL (ref 7–18)
BUN/CREAT SERPL: 5 (ref 12–20)
CA-I SERPL-SCNC: 1.29 MMOL/L (ref 1.12–1.32)
CALCIUM SERPL-MCNC: 9.7 MG/DL (ref 8.5–10.1)
CALCULATED P AXIS, ECG09: 47 DEGREES
CALCULATED R AXIS, ECG10: -14 DEGREES
CALCULATED T AXIS, ECG11: 118 DEGREES
CHLORIDE SERPL-SCNC: 99 MMOL/L (ref 100–111)
CO2 SERPL-SCNC: 23 MMOL/L (ref 21–32)
CREAT SERPL-MCNC: 13.7 MG/DL (ref 0.6–1.3)
DIAGNOSIS, 93000: NORMAL
DIFFERENTIAL METHOD BLD: ABNORMAL
EOSINOPHIL # BLD: 0.9 K/UL (ref 0–0.4)
EOSINOPHIL NFR BLD: 10 % (ref 0–5)
ERYTHROCYTE [DISTWIDTH] IN BLOOD BY AUTOMATED COUNT: 15.9 % (ref 11.6–14.5)
GLOBULIN SER CALC-MCNC: 3.2 G/DL (ref 2–4)
GLUCOSE BLD STRIP.AUTO-MCNC: 100 MG/DL (ref 70–110)
GLUCOSE BLD STRIP.AUTO-MCNC: 146 MG/DL (ref 70–110)
GLUCOSE SERPL-MCNC: 80 MG/DL (ref 74–99)
HCT VFR BLD AUTO: 19.6 % (ref 35–45)
HGB BLD-MCNC: 6.3 G/DL (ref 12–16)
IMM GRANULOCYTES # BLD AUTO: 0.1 K/UL (ref 0–0.04)
IMM GRANULOCYTES NFR BLD AUTO: 1 % (ref 0–0.5)
LYMPHOCYTES # BLD: 1.7 K/UL (ref 0.9–3.6)
LYMPHOCYTES NFR BLD: 20 % (ref 21–52)
MAGNESIUM SERPL-MCNC: 2.6 MG/DL (ref 1.6–2.6)
MCH RBC QN AUTO: 30 PG (ref 24–34)
MCHC RBC AUTO-ENTMCNC: 32.1 G/DL (ref 31–37)
MCV RBC AUTO: 93.3 FL (ref 78–100)
MONOCYTES # BLD: 0.5 K/UL (ref 0.05–1.2)
MONOCYTES NFR BLD: 6 % (ref 3–10)
NEUTS SEG # BLD: 5.1 K/UL (ref 1.8–8)
NEUTS SEG NFR BLD: 61 % (ref 40–73)
NRBC # BLD: 0 K/UL (ref 0–0.01)
NRBC BLD-RTO: 0 PER 100 WBC
P-R INTERVAL, ECG05: 176 MS
PHOSPHATE SERPL-MCNC: 7.1 MG/DL (ref 2.5–4.9)
PLATELET # BLD AUTO: 288 K/UL (ref 135–420)
PMV BLD AUTO: 9.9 FL (ref 9.2–11.8)
POTASSIUM SERPL-SCNC: 4.7 MMOL/L (ref 3.5–5.5)
PROT SERPL-MCNC: 5.5 G/DL (ref 6.4–8.2)
Q-T INTERVAL, ECG07: 372 MS
QRS DURATION, ECG06: 114 MS
QTC CALCULATION (BEZET), ECG08: 498 MS
RBC # BLD AUTO: 2.1 M/UL (ref 4.2–5.3)
SODIUM SERPL-SCNC: 132 MMOL/L (ref 136–145)
VENTRICULAR RATE, ECG03: 108 BPM
WBC # BLD AUTO: 8.3 K/UL (ref 4.6–13.2)

## 2022-11-18 PROCEDURE — 83735 ASSAY OF MAGNESIUM: CPT

## 2022-11-18 PROCEDURE — 74011636637 HC RX REV CODE- 636/637: Performed by: HOSPITALIST

## 2022-11-18 PROCEDURE — 74011250637 HC RX REV CODE- 250/637: Performed by: INTERNAL MEDICINE

## 2022-11-18 PROCEDURE — 84100 ASSAY OF PHOSPHORUS: CPT

## 2022-11-18 PROCEDURE — 82728 ASSAY OF FERRITIN: CPT

## 2022-11-18 PROCEDURE — 74011250636 HC RX REV CODE- 250/636: Performed by: INTERNAL MEDICINE

## 2022-11-18 PROCEDURE — 80053 COMPREHEN METABOLIC PANEL: CPT

## 2022-11-18 PROCEDURE — 74011000250 HC RX REV CODE- 250: Performed by: INTERNAL MEDICINE

## 2022-11-18 PROCEDURE — 83540 ASSAY OF IRON: CPT

## 2022-11-18 PROCEDURE — 82962 GLUCOSE BLOOD TEST: CPT

## 2022-11-18 PROCEDURE — C9113 INJ PANTOPRAZOLE SODIUM, VIA: HCPCS | Performed by: INTERNAL MEDICINE

## 2022-11-18 PROCEDURE — 36415 COLL VENOUS BLD VENIPUNCTURE: CPT

## 2022-11-18 PROCEDURE — 82330 ASSAY OF CALCIUM: CPT

## 2022-11-18 PROCEDURE — 85025 COMPLETE CBC W/AUTO DIFF WBC: CPT

## 2022-11-18 RX ORDER — CINACALCET 30 MG/1
60 TABLET, FILM COATED ORAL DAILY
COMMUNITY

## 2022-11-18 RX ORDER — SEVELAMER CARBONATE 800 MG/1
1600 TABLET, FILM COATED ORAL
Qty: 180 TABLET | Refills: 0 | Status: SHIPPED | OUTPATIENT
Start: 2022-11-18 | End: 2022-12-18

## 2022-11-18 RX ORDER — CANDESARTAN 32 MG/1
32 TABLET ORAL DAILY
COMMUNITY

## 2022-11-18 RX ORDER — CLOPIDOGREL BISULFATE 75 MG/1
75 TABLET ORAL DAILY
COMMUNITY

## 2022-11-18 RX ORDER — HYDRALAZINE HYDROCHLORIDE 100 MG/1
100 TABLET, FILM COATED ORAL 3 TIMES DAILY
COMMUNITY

## 2022-11-18 RX ORDER — NIFEDIPINE 90 MG/1
90 TABLET, EXTENDED RELEASE ORAL DAILY
COMMUNITY

## 2022-11-18 RX ORDER — LEVOTHYROXINE SODIUM 200 UG/1
200 TABLET ORAL
COMMUNITY

## 2022-11-18 RX ORDER — METOPROLOL SUCCINATE 100 MG/1
100 TABLET, EXTENDED RELEASE ORAL 2 TIMES DAILY
COMMUNITY

## 2022-11-18 RX ORDER — GUAIFENESIN 100 MG/5ML
81 LIQUID (ML) ORAL DAILY
COMMUNITY

## 2022-11-18 RX ORDER — METOCLOPRAMIDE 5 MG/1
5 TABLET ORAL AS NEEDED
COMMUNITY

## 2022-11-18 RX ORDER — MUPIROCIN 20 MG/G
OINTMENT TOPICAL 3 TIMES DAILY
COMMUNITY

## 2022-11-18 RX ORDER — BUMETANIDE 2 MG/1
2 TABLET ORAL 2 TIMES DAILY
COMMUNITY

## 2022-11-18 RX ORDER — ISOSORBIDE MONONITRATE 120 MG/1
120 TABLET, EXTENDED RELEASE ORAL
COMMUNITY

## 2022-11-18 RX ORDER — CLONIDINE HYDROCHLORIDE 0.2 MG/1
0.2 TABLET ORAL 2 TIMES DAILY
COMMUNITY

## 2022-11-18 RX ORDER — ATORVASTATIN CALCIUM 80 MG/1
80 TABLET, FILM COATED ORAL DAILY
COMMUNITY

## 2022-11-18 RX ORDER — PANTOPRAZOLE SODIUM 40 MG/1
40 TABLET, DELAYED RELEASE ORAL EVERY 12 HOURS
Status: DISCONTINUED | OUTPATIENT
Start: 2022-11-18 | End: 2022-11-18 | Stop reason: HOSPADM

## 2022-11-18 RX ORDER — CALCIUM ACETATE 667 MG/1
2 CAPSULE ORAL
COMMUNITY

## 2022-11-18 RX ORDER — ALBUTEROL SULFATE 90 UG/1
2 AEROSOL, METERED RESPIRATORY (INHALATION)
COMMUNITY

## 2022-11-18 RX ORDER — INSULIN ASPART 100 [IU]/ML
15 INJECTION, SOLUTION INTRAVENOUS; SUBCUTANEOUS
COMMUNITY

## 2022-11-18 RX ORDER — SODIUM BICARBONATE 650 MG/1
650 TABLET ORAL 2 TIMES DAILY
COMMUNITY

## 2022-11-18 RX ORDER — DOXYCYCLINE 100 MG/1
100 CAPSULE ORAL EVERY 12 HOURS
Qty: 13 CAPSULE | Refills: 0 | Status: SHIPPED | OUTPATIENT
Start: 2022-11-18 | End: 2022-11-25

## 2022-11-18 RX ORDER — FLUTICASONE PROPIONATE 44 UG/1
1 AEROSOL, METERED RESPIRATORY (INHALATION) AS NEEDED
COMMUNITY

## 2022-11-18 RX ORDER — CALCITRIOL 0.25 UG/1
0.25 CAPSULE ORAL DAILY
COMMUNITY

## 2022-11-18 RX ADMIN — CLOPIDOGREL BISULFATE 75 MG: 75 TABLET ORAL at 09:20

## 2022-11-18 RX ADMIN — DOXYCYCLINE 100 MG: 100 CAPSULE ORAL at 09:20

## 2022-11-18 RX ADMIN — SODIUM CHLORIDE, PRESERVATIVE FREE 40 MG: 5 INJECTION INTRAVENOUS at 09:20

## 2022-11-18 RX ADMIN — BUMETANIDE 1 MG: 0.25 INJECTION INTRAMUSCULAR; INTRAVENOUS at 05:23

## 2022-11-18 RX ADMIN — ISOSORBIDE MONONITRATE 120 MG: 60 TABLET, EXTENDED RELEASE ORAL at 09:20

## 2022-11-18 RX ADMIN — INSULIN LISPRO 4 UNITS: 100 INJECTION, SOLUTION INTRAVENOUS; SUBCUTANEOUS at 09:26

## 2022-11-18 RX ADMIN — ASPIRIN 81 MG: 81 TABLET, CHEWABLE ORAL at 09:20

## 2022-11-18 RX ADMIN — CLONIDINE HYDROCHLORIDE 0.2 MG: 0.1 TABLET ORAL at 09:20

## 2022-11-18 RX ADMIN — METOPROLOL SUCCINATE 100 MG: 100 TABLET, EXTENDED RELEASE ORAL at 09:20

## 2022-11-18 RX ADMIN — SODIUM CHLORIDE, PRESERVATIVE FREE 10 ML: 5 INJECTION INTRAVENOUS at 05:23

## 2022-11-18 RX ADMIN — SEVELAMER CARBONATE 1600 MG: 800 TABLET, FILM COATED ORAL at 09:20

## 2022-11-18 NOTE — PROGRESS NOTES
Patient transferred out of ICU. No acute pulmonary issues. PCCM will sign off. Please call us with any questions.      Samir Harrell MD 11/18/2022 7:21 AM

## 2022-11-18 NOTE — PROGRESS NOTES
Problem: Anemia Care Plan (Adult and Pediatric)  Goal: *Labs within defined limits  Outcome: Resolved/Met  Goal: *Tolerates increased activity  Outcome: Resolved/Met     Problem: Chronic Renal Failure  Goal: *Fluid and electrolytes stabilized  Outcome: Resolved/Met     Problem: Falls - Risk of  Goal: *Absence of Falls  Description: Document Skyla Fall Risk and appropriate interventions in the flowsheet.   Outcome: Resolved/Met  Note: Fall Risk Interventions:            Medication Interventions: Evaluate medications/consider consulting pharmacy, Patient to call before getting OOB, Teach patient to arise slowly    Elimination Interventions: Bed/chair exit alarm, Call light in reach, Toilet paper/wipes in reach, Toileting schedule/hourly rounds              Problem: Patient Education: Go to Patient Education Activity  Goal: Patient/Family Education  Outcome: Resolved/Met     Problem: General Medical Care Plan  Goal: *Vital signs within specified parameters  Outcome: Resolved/Met  Goal: *Labs within defined limits  Outcome: Resolved/Met  Goal: *Absence of infection signs and symptoms  Outcome: Resolved/Met  Goal: *Optimal pain control at patient's stated goal  Outcome: Resolved/Met  Goal: *Skin integrity maintained  Outcome: Resolved/Met  Goal: *Fluid volume balance  Outcome: Resolved/Met  Goal: *Optimize nutritional status  Outcome: Resolved/Met  Goal: *Anxiety reduced or absent  Outcome: Resolved/Met  Goal: *Progressive mobility and function (eg: ADL's)  Outcome: Resolved/Met     Problem: Patient Education: Go to Patient Education Activity  Goal: Patient/Family Education  Outcome: Resolved/Met     Problem: Patient Education: Go to Patient Education Activity  Goal: Patient/Family Education  Outcome: Resolved/Met     Problem: Hypertension  Goal: *Blood pressure within specified parameters  Outcome: Resolved/Met  Goal: *Fluid volume balance  Outcome: Resolved/Met  Goal: *Labs within defined limits  Outcome: Resolved/Met     Problem: Patient Education: Go to Patient Education Activity  Goal: Patient/Family Education  Outcome: Resolved/Met

## 2022-11-18 NOTE — DISCHARGE SUMMARY
Discharge Summary    Patient: Rivera Donnelly MRN: [id number]  CSN: [id number]    YOB: 1980  Age: 39 y.o. Sex: female    DOA: 11/15/2022 LOS:  LOS: 3 days   Discharge Date:      Primary Care Provider:  Ozzie Massey MD    Admission Diagnoses: Chest pain [R07.9]  Acute pulmonary edema ([facility] [J81.0]  ESRD (end stage renal disease) on dialysis ([facility] [N18.6, Z99.2]  Pulmonary edema [J81.1]    Discharge Diagnoses:    Hospital Problems  Date Reviewed: 11/15/2022            Codes Class Noted POA    * (Principal) Acute pulmonary edema ([place] [id number].) ICD-10-CM: J81.0  ICD-9-CM: 518.4  11/15/2022 Unknown        Chest pain ICD-10-CM: R07.9  ICD-9-CM: 786.50  11/15/2022 Unknown        ESRD (end stage renal disease) on dialysis [facility]) ICD-10-CM: N18.6, Z99.2  ICD-9-CM: 585.6, V45.11  11/15/2022 Unknown        Pulmonary edema ICD-10-CM: J81.1  ICD-9-CM: 780  11/15/2022 Unknown        Cardiomyopathy ([facility] ICD-10-CM: I42.9  ICD-9-CM: 425.4  11/15/2022 Unknown        HTN (hypertension) ICD-10-CM: I10  ICD-9-CM: 401.9  11/15/2022 Unknown        DM (diabetes mellitus) ([facility] ICD-10-CM: E11.9  ICD-9-CM: 250.00  11/15/2022 Unknown        PAD (peripheral artery disease) ([facility] ICD-10-CM: I73.9  ICD-9-CM: 443.9  11/15/2022 Unknown        CAD (coronary artery disease) ICD-10-CM: I25.10  ICD-9-CM: 414.00  11/15/2022 Unknown           Discharge Condition: stable     Discharge Medications:     Current Discharge Medication List        START taking these medications    Details   doxycycline (MONODOX) 100 mg capsule Take 1 Capsule by mouth every twelve (12) hours for 13 doses. Qty: 13 Capsule, Refills: 0  Start date: 11/18/2022, End date: 11/25/2022      sevelamer carbonate (RENVELA) 800 mg tab tab Take 2 Tablets by mouth three (3) times daily (with meals) for 30 days.   Qty: 180 Tablet, Refills: 0  Start date: 11/18/2022, End date: 12/18/2022           CONTINUE these medications which have NOT CHANGED    Details   bisacodyL 5 mg tab Take 5 mg by mouth two (2) times a day. metoclopramide HCl (REGLAN) 5 mg tablet Take 5 mg by mouth as needed for Nausea or Vomiting.      sodium bicarbonate 650 mg tablet Take 650 mg by mouth two (2) times a day. albuterol (Ventolin HFA) 90 mcg/actuation inhaler Take 2 Puffs by inhalation every six (6) hours as needed for Wheezing. atorvastatin (LIPITOR) 80 mg tablet Take 80 mg by mouth daily. clopidogreL (Plavix) 75 mg tab Take 75 mg by mouth daily. cloNIDine HCL (CATAPRES) 0.2 mg tablet Take 0.2 mg by mouth two (2) times a day. hydrALAZINE (APRESOLINE) 100 mg tablet Take 100 mg by mouth three (3) times daily. mupirocin (BACTROBAN) 2 % ointment Apply  to affected area three (3) times daily. candesartan (ATACAND) 32 mg tablet Take 32 mg by mouth daily. Indications: high blood pressure      isosorbide mononitrate ER (IMDUR) 120 mg CR tablet Take 120 mg by mouth every morning. metoprolol succinate (TOPROL-XL) 100 mg tablet Take 100 mg by mouth two (2) times a day. insulin aspart U-100 (NOVOLOG) 100 unit/mL injection 15 Units by SubCUTAneous route Before breakfast, lunch, and dinner. fluticasone propionate (FLOVENT HFA) 44 mcg/actuation inhaler Take 1 Puff by inhalation as needed. calcium acetate,phosphat bind, (PHOSLO) 667 mg cap Take 2 Capsules by mouth three (3) times daily (with meals). calcitRIOL (ROCALTROL) 0.25 mcg capsule Take 0.25 mcg by mouth daily. NIFEdipine ER (PROCARDIA XL) 90 mg ER tablet Take 90 mg by mouth daily. levothyroxine (SYNTHROID) 200 mcg tablet Take 200 mcg by mouth Daily (before breakfast). cinacalcet (SENSIPAR) 30 mg tablet Take 60 mg by mouth daily. bumetanide (BUMEX) 2 mg tablet Take 2 mg by mouth two (2) times a day. aspirin 81 mg chewable tablet Take 81 mg by mouth daily.              Procedures : none     Consults: Cardiology, Nephrology, and [address] [place] [address]   Visit Vitals  /76   Pulse 96   Temp 97.7 °F (36.5 °C)   Resp 18   Ht 5' 7\" (1.702 m)   Wt 92.8 kg (204 lb 9.4 oz)   SpO2 100%   BMI 32.04 kg/m²     General: Awake, cooperative, no acute distress    HEENT: NC, Atraumatic. PERRLA, EOMI. Anicteric sclerae. Lungs:  CTA Bilaterally. No Wheezing/Rhonchi/Rales. Heart:  Regular  rhythm,  No murmur, No Rubs, No Gallops  Abdomen: Soft, Non distended, Non tender. +Bowel sounds, pd catheter   Extremities: No c/c/e  Psych:   Not anxious or agitated. Neurologic:  No acute neurological deficits. Admission HPI :   Alexander Matos is a 39 y.o.  female who diabetes, end-stage renal disease on peritoneal dialysis, cardiomyopathy EF of 40, coronary artery disease with failed stenting recommending medical management presents to the emergency room with crushing chest painRadiating to her left arm and blood pressure that was elevated at 289 systolic; she recently had diagnostic and not therapeutic cath recommending medical management please see report below  In the emergency room she was given nitro, morphine Zofran and started immediately on a Cardene drip this improved her chest pain chest x-ray suggested fluid overload and EKG showed no acute ST elevation initial troponin was elevated around 100  Patient states that she has been compliant with her dialysis 4 times a day  She also states that she took all her hypertensive regimen but did receive some bad news about her father and is anxious over closing on her house tomorrow she is currently chest pain-free denies smoking alcohol or drugs for her diabetes she takes quick acting insulin 15 units 3 times daily there is a strong family history of diabetes high blood pressure and heart disease on both sides of her family but no one is on dialysis except for her  Hospital Course :   Pt was admitted for nstemi and  fluid overloaded. She was admitted to icu.  She was put on heparin gtt and cardiologist was consulted. Recommend to have medical treatment. Nephologist is consulted for PD. Heparin gtt was hold due to  low h/h, no bleeding noted. She received 1 units prbc transfusion and epotin per nephrologist. She does not want to have blood transfusion any more. She has no chest pain and no sob on discharge. She will see nephrologist on Monday and she will continue epogen per nephrologist. She had hypertensive emergency, and put on nicardipine gtt on admission, her bp is well controlled per po medication. She was cleared to be dc per cardiologist.     Discharge planning discussed with patient, pt agrees  with the plan and no questions and concerns at this point. Activity: Activity as tolerated    Diet: Cardiac Diet, Diabetic Diet, and Renal Diet    Follow-up: PCP , cardiologist, nephrologist     Disposition: home     Minutes spent on discharge: 45 min       Labs: Results:       Chemistry Recent Labs     11/18/22  0240 11/17/22  0545 11/16/22  0755 11/16/22  0130   GLU 80 89 103* 127*   * 131* 133* 130*   K 4.7 4.7 4.7 5.0   CL 99* 97* 97* 97*   CO2 23 24 23 22   BUN 71* 79* 86* 94*   CREA 13.70* 15.00* 16.50* 16.70*   CA 9.7 9.9 9.8 9.6   AGAP 10 10 13 11   BUCR 5* 5* 5* 6*    108  --  119*   TP 5.5* 6.5  --  6.9   ALB 2.3* 2.4*  --  2.7*   GLOB 3.2 4.1*  --  4.2*   AGRAT 0.7* 0.6*  --  0.6*      CBC w/Diff Recent Labs     11/18/22  0240 11/17/22  0545 11/16/22  1415 11/16/22  0130   WBC 8.3 9.3  --  7.7   RBC 2.10* 2.26*  --  2.06*   HGB 6.3* 6.7* 7.1* 5.8*   HCT 19.6* 21.0* 22.3* 18.3*    295  --  307   GRANS 61 66  --  71   LYMPH 20* 16*  --  14*   EOS 10* 9*  --  9*      Cardiac Enzymes No results for input(s): CPK, CKND1, NAVIN in the last 72 hours.     No lab exists for component: CKRMB, TROIP   Coagulation Recent Labs     11/16/22  0755 11/16/22  0130   APTT 45.4* 33.1       Lipid Panel No results found for: CHOL, CHOLPOCT, CHOLX, CHLST, CHOLV, 591335, HDL, HDLP, LDL, LDLC, DLDLP, 799251, VLDLC, VLDL, TGLX, TRIGL, TRIGP, TGLPOCT, CHHD, CHHDX   BNP No results for input(s): BNPP in the last 72 hours. Liver Enzymes Recent Labs     11/18/22  0240   TP 5.5*   ALB 2.3*         Thyroid Studies No results found for: T4, T3U, TSH, TSHEXT           Significant Diagnostic Studies: CTA CHEST W OR W WO CONT    Result Date: 11/15/2022  CTA chest INDICATION: Pain. COMPARISON: None TECHNIQUE: Axial CT imaging from the thoracic inlet through the diaphragm with intravenous contrast. Coronal and sagittal MIP reformats were generated. One or more dose reduction techniques were used on this CT: automated exposure control, adjustment of the mAs and/or kVp according to patient size, and iterative reconstruction techniques. The specific techniques used on this CT exam have been documented in the patient's electronic medical record. Digital Imaging and Communications in Medicine (DICOM) format image data are available to nonaffiliated external healthcare facilities or entities on a secure, media free, reciprocally searchable basis with patient authorization for at least a 12-month period after this study. _______________ FINDINGS: EXAM QUALITY: Adequate PULMONARY ARTERIES: Mild motion degradation. No central or segmental filling defects identified to suggest pulmonary embolism. Peripheral branches are obscured by motion and airspace disease. MEDIASTINUM: Cardiomegaly without evidence for pericardial effusion. Coronary atherosclerosis noted. There is no evidence for right heart strain. Aorta is normal in caliber with mild atherosclerosis. No evidence for dissection. LUNGS: Diffuse groundglass opacity with mild patchy opacities in the upper lung zone. There is bilateral dependent consolidation. PLEURA: Small bilateral pleural effusions, right more than left. AIRWAY: Patent. LYMPH NODES: No enlarged nodes. Slightly prominent mediastinal lymph nodes, likely reactive. No axillary or hilar adenopathy.  UPPER ABDOMEN: Small left adrenal lipid rich adenoma. 7 mm hypodensity at the gallbladder fundus, possible aneurysm or bowel content. OTHER: No acute or aggressive osseous abnormalities identified. _______________     1. No evidence of pulmonary embolism or right heart strain. 2.  Cardiomegaly, bilateral diffuse groundglass opacity, bilateral small pleural effusions, right more than left and bibasilar dependent atelectasis. Findings most consistent with pulmonary edema. 3. Small lipid Rich left adrenal adenoma. Nonspecific 7 mm hypodensity gastric fundus, possible small aneurysm or bowel content. XR CHEST PORT    Result Date: 11/15/2022  EXAM: XR CHEST PORT CLINICAL INDICATION/HISTORY: CP -Additional: None COMPARISON: None TECHNIQUE: Frontal view of the chest _______________ FINDINGS: HEART AND MEDIASTINUM: Cardiomegaly with hilar congestion and cephalization LUNGS AND PLEURAL SPACES: Diffuse bilateral indistinct interstitial and hazy confluent alveolar opacities. No dense consolidation, effusion or pneumothorax. BONY THORAX AND SOFT TISSUES: No acute or destructive osseous abnormality. _______________     Cardiomegaly with findings of pulmonary edema from CHF versus fluid overload. ECHO ADULT COMPLETE    Result Date: 11/15/2022    Left Ventricle: Moderately reduced left ventricular systolic function with a visually estimated EF of 35 - 40%. Left ventricle size is normal. Moderate septal thickening. Severe posterior thickening. Moderate global hypokinesis present. Grade II diastolic dysfunction with increased LAP. Right Ventricle: Right ventricle size is normal. Mildly increased wall thickness. Aortic Valve: Severely thickened cusp. Moderately calcified right and noncoronary cusps. Minimal to moderate stenosis of the aortic valve. Mitral Valve: Mildly thickened leaflet, at the posterior leaflet. Severe annular calcification at the posterior leaflet of the mitral valve. Moderate regurgitation.  Mild to moderate stenosis noted, measurement is compromised with severe calcification. Planimetry was not performed. Left Atrium: Left atrium is severely dilated. Right Atrium: Right atrium is moderately dilated. PA systolic pressure is 40 mmHg.              [facility]     CC: Brandt Montero MD

## 2022-11-18 NOTE — PROGRESS NOTES
Problem: Anemia Care Plan (Adult and Pediatric)  Goal: *Labs within defined limits  Outcome: Progressing Towards Goal  Goal: *Tolerates increased activity  Outcome: Progressing Towards Goal     Problem: Chronic Renal Failure  Goal: *Fluid and electrolytes stabilized  Outcome: Progressing Towards Goal     Problem: Falls - Risk of  Goal: *Absence of Falls  Description: Document Skyla Fall Risk and appropriate interventions in the flowsheet.   Outcome: Progressing Towards Goal  Note: Fall Risk Interventions:            Medication Interventions: Evaluate medications/consider consulting pharmacy, Patient to call before getting OOB, Teach patient to arise slowly    Elimination Interventions: Bed/chair exit alarm, Call light in reach, Toilet paper/wipes in reach, Toileting schedule/hourly rounds              Problem: Patient Education: Go to Patient Education Activity  Goal: Patient/Family Education  Outcome: Progressing Towards Goal     Problem: General Medical Care Plan  Goal: *Vital signs within specified parameters  Outcome: Progressing Towards Goal  Goal: *Labs within defined limits  Outcome: Progressing Towards Goal  Goal: *Absence of infection signs and symptoms  Outcome: Progressing Towards Goal  Goal: *Optimal pain control at patient's stated goal  Outcome: Progressing Towards Goal  Goal: *Skin integrity maintained  Outcome: Progressing Towards Goal  Goal: *Fluid volume balance  Outcome: Progressing Towards Goal  Goal: *Optimize nutritional status  Outcome: Progressing Towards Goal  Goal: *Anxiety reduced or absent  Outcome: Progressing Towards Goal  Goal: *Progressive mobility and function (eg: ADL's)  Outcome: Progressing Towards Goal     Problem: Patient Education: Go to Patient Education Activity  Goal: Patient/Family Education  Outcome: Progressing Towards Goal     Problem: Patient Education: Go to Patient Education Activity  Goal: Patient/Family Education  Outcome: Progressing Towards Goal     Problem: Hypertension  Goal: *Blood pressure within specified parameters  Outcome: Progressing Towards Goal  Goal: *Fluid volume balance  Outcome: Progressing Towards Goal  Goal: *Labs within defined limits  Outcome: Progressing Towards Goal     Problem: Patient Education: Go to Patient Education Activity  Goal: Patient/Family Education  Outcome: Progressing Towards Goal

## 2022-11-18 NOTE — PROGRESS NOTES
Bedside and Verbal shift change report given to Dulce Cintron (oncoming nurse) by [facility] (offgoing nurse). Report included the following information SBAR and MAR.

## 2022-11-19 LAB
ABO + RH BLD: NORMAL
BLD PROD TYP BPU: NORMAL
BLD PROD TYP BPU: NORMAL
BLOOD GROUP ANTIBODIES SERPL: NORMAL
BPU ID: NORMAL
BPU ID: NORMAL
CROSSMATCH RESULT,%XM: NORMAL
CROSSMATCH RESULT,%XM: NORMAL
SPECIMEN EXP DATE BLD: NORMAL
STATUS OF UNIT,%ST: NORMAL
STATUS OF UNIT,%ST: NORMAL
UNIT DIVISION, %UDIV: 0
UNIT DIVISION, %UDIV: 0

## 2022-11-21 LAB
FERRITIN SERPL-MCNC: 779 NG/ML (ref 8–388)
IRON SATN MFR SERPL: 23 % (ref 20–50)
IRON SERPL-MCNC: 50 UG/DL (ref 50–175)
TIBC SERPL-MCNC: 220 UG/DL (ref 250–450)

## 2023-01-28 ENCOUNTER — HOSPITAL ENCOUNTER (INPATIENT)
Age: 43
LOS: 5 days | Discharge: HOME HEALTH | End: 2023-02-03
Attending: FAMILY MEDICINE | Admitting: FAMILY MEDICINE
Payer: MEDICAID

## 2023-01-28 ENCOUNTER — APPOINTMENT (OUTPATIENT)
Dept: GENERAL RADIOLOGY | Age: 43
End: 2023-01-28
Attending: FAMILY MEDICINE
Payer: MEDICAID

## 2023-01-28 DIAGNOSIS — J81.0 ACUTE PULMONARY EDEMA (HCC): ICD-10-CM

## 2023-01-28 DIAGNOSIS — R06.02 SOBOE (SHORTNESS OF BREATH ON EXERTION): ICD-10-CM

## 2023-01-28 DIAGNOSIS — J96.01 ACUTE RESPIRATORY FAILURE WITH HYPOXIA (HCC): Primary | ICD-10-CM

## 2023-01-28 DIAGNOSIS — I25.10 CORONARY ARTERY DISEASE INVOLVING NATIVE CORONARY ARTERY OF NATIVE HEART WITHOUT ANGINA PECTORIS: ICD-10-CM

## 2023-01-28 LAB
ARTERIAL PATENCY WRIST A: ABNORMAL
BASE DEFICIT BLD-SCNC: 2.4 MMOL/L
BASOPHILS # BLD: 0.1 K/UL (ref 0–0.1)
BASOPHILS NFR BLD: 1 % (ref 0–2)
BDY SITE: ABNORMAL
BODY TEMPERATURE: 98
DIFFERENTIAL METHOD BLD: ABNORMAL
EOSINOPHIL # BLD: 1.7 K/UL (ref 0–0.4)
EOSINOPHIL NFR BLD: 14 % (ref 0–5)
ERYTHROCYTE [DISTWIDTH] IN BLOOD BY AUTOMATED COUNT: 14.7 % (ref 11.6–14.5)
FLUAV RNA SPEC QL NAA+PROBE: NOT DETECTED
FLUBV RNA SPEC QL NAA+PROBE: NOT DETECTED
GAS FLOW.O2 O2 DELIVERY SYS: ABNORMAL
GAS FLOW.O2 SETTING OXYMISER: 15 BPM
HCO3 BLD-SCNC: 24.4 MMOL/L (ref 22–26)
HCT VFR BLD AUTO: 26.2 % (ref 35–45)
HGB BLD-MCNC: 7.7 G/DL (ref 12–16)
IMM GRANULOCYTES # BLD AUTO: 0.1 K/UL (ref 0–0.04)
IMM GRANULOCYTES NFR BLD AUTO: 1 % (ref 0–0.5)
LACTATE SERPL-SCNC: 6.6 MMOL/L (ref 0.4–2)
LYMPHOCYTES # BLD: 1.1 K/UL (ref 0.9–3.6)
LYMPHOCYTES NFR BLD: 9 % (ref 21–52)
MCH RBC QN AUTO: 28.5 PG (ref 24–34)
MCHC RBC AUTO-ENTMCNC: 29.4 G/DL (ref 31–37)
MCV RBC AUTO: 97 FL (ref 78–100)
MONOCYTES # BLD: 0.1 K/UL (ref 0.05–1.2)
MONOCYTES NFR BLD: 1 % (ref 3–10)
NEUTS SEG # BLD: 9.3 K/UL (ref 1.8–8)
NEUTS SEG NFR BLD: 75 % (ref 40–73)
NRBC # BLD: 0.02 K/UL (ref 0–0.01)
NRBC BLD-RTO: 0.2 PER 100 WBC
O2/TOTAL GAS SETTING VFR VENT: 100 %
PCO2 BLD: 51.1 MMHG (ref 35–45)
PEEP RESPIRATORY: 5 CMH2O
PH BLD: 7.29 (ref 7.35–7.45)
PLATELET # BLD AUTO: 390 K/UL (ref 135–420)
PMV BLD AUTO: 10.2 FL (ref 9.2–11.8)
PO2 BLD: 103 MMHG (ref 80–100)
RBC # BLD AUTO: 2.7 M/UL (ref 4.2–5.3)
SAO2 % BLD: 97.1 % (ref 92–97)
SARS-COV-2, COV2: NOT DETECTED
SERVICE CMNT-IMP: ABNORMAL
SPECIMEN TYPE: ABNORMAL
VENTILATION MODE VENT: ABNORMAL
VT SETTING VENT: 400 ML
WBC # BLD AUTO: 12.5 K/UL (ref 4.6–13.2)

## 2023-01-28 PROCEDURE — 74011250636 HC RX REV CODE- 250/636: Performed by: FAMILY MEDICINE

## 2023-01-28 PROCEDURE — 83880 ASSAY OF NATRIURETIC PEPTIDE: CPT

## 2023-01-28 PROCEDURE — 83605 ASSAY OF LACTIC ACID: CPT

## 2023-01-28 PROCEDURE — 94660 CPAP INITIATION&MGMT: CPT

## 2023-01-28 PROCEDURE — 96365 THER/PROPH/DIAG IV INF INIT: CPT

## 2023-01-28 PROCEDURE — 77030035694 HC MSK BIPAP FLL FAC PERFMAX PHIL -B

## 2023-01-28 PROCEDURE — 87040 BLOOD CULTURE FOR BACTERIA: CPT

## 2023-01-28 PROCEDURE — 82803 BLOOD GASES ANY COMBINATION: CPT

## 2023-01-28 PROCEDURE — 96375 TX/PRO/DX INJ NEW DRUG ADDON: CPT

## 2023-01-28 PROCEDURE — 74011250636 HC RX REV CODE- 250/636

## 2023-01-28 PROCEDURE — 99285 EMERGENCY DEPT VISIT HI MDM: CPT

## 2023-01-28 PROCEDURE — 85025 COMPLETE CBC W/AUTO DIFF WBC: CPT

## 2023-01-28 PROCEDURE — 74011000258 HC RX REV CODE- 258: Performed by: FAMILY MEDICINE

## 2023-01-28 PROCEDURE — 84484 ASSAY OF TROPONIN QUANT: CPT

## 2023-01-28 PROCEDURE — 87070 CULTURE OTHR SPECIMN AEROBIC: CPT

## 2023-01-28 PROCEDURE — 80053 COMPREHEN METABOLIC PANEL: CPT

## 2023-01-28 PROCEDURE — 36600 WITHDRAWAL OF ARTERIAL BLOOD: CPT

## 2023-01-28 PROCEDURE — 71045 X-RAY EXAM CHEST 1 VIEW: CPT

## 2023-01-28 PROCEDURE — 94002 VENT MGMT INPAT INIT DAY: CPT

## 2023-01-28 PROCEDURE — 87636 SARSCOV2 & INF A&B AMP PRB: CPT

## 2023-01-28 PROCEDURE — 96376 TX/PRO/DX INJ SAME DRUG ADON: CPT

## 2023-01-28 PROCEDURE — 74011000250 HC RX REV CODE- 250: Performed by: FAMILY MEDICINE

## 2023-01-28 PROCEDURE — 5A1945Z RESPIRATORY VENTILATION, 24-96 CONSECUTIVE HOURS: ICD-10-PCS | Performed by: FAMILY MEDICINE

## 2023-01-28 PROCEDURE — 0BH17EZ INSERTION OF ENDOTRACHEAL AIRWAY INTO TRACHEA, VIA NATURAL OR ARTIFICIAL OPENING: ICD-10-PCS | Performed by: FAMILY MEDICINE

## 2023-01-28 PROCEDURE — 30233N1 TRANSFUSION OF NONAUTOLOGOUS RED BLOOD CELLS INTO PERIPHERAL VEIN, PERCUTANEOUS APPROACH: ICD-10-PCS | Performed by: FAMILY MEDICINE

## 2023-01-28 PROCEDURE — 31500 INSERT EMERGENCY AIRWAY: CPT

## 2023-01-28 PROCEDURE — 93005 ELECTROCARDIOGRAM TRACING: CPT

## 2023-01-28 PROCEDURE — 5A09457 ASSISTANCE WITH RESPIRATORY VENTILATION, 24-96 CONSECUTIVE HOURS, CONTINUOUS POSITIVE AIRWAY PRESSURE: ICD-10-PCS | Performed by: FAMILY MEDICINE

## 2023-01-28 RX ORDER — FUROSEMIDE 10 MG/ML
80 INJECTION INTRAMUSCULAR; INTRAVENOUS
Status: COMPLETED | OUTPATIENT
Start: 2023-01-28 | End: 2023-01-28

## 2023-01-28 RX ORDER — VECURONIUM BROMIDE FOR INJECTION 1 MG/ML
INJECTION, POWDER, LYOPHILIZED, FOR SOLUTION INTRAVENOUS
Status: COMPLETED | OUTPATIENT
Start: 2023-01-28 | End: 2023-01-28

## 2023-01-28 RX ORDER — LORAZEPAM 2 MG/ML
INJECTION INTRAMUSCULAR
Status: COMPLETED
Start: 2023-01-28 | End: 2023-01-28

## 2023-01-28 RX ORDER — PROPOFOL 10 MG/ML
0.5 INJECTION, EMULSION INTRAVENOUS
Status: DISCONTINUED | OUTPATIENT
Start: 2023-01-28 | End: 2023-01-29

## 2023-01-28 RX ORDER — LORAZEPAM 2 MG/ML
2 INJECTION INTRAMUSCULAR ONCE
Status: COMPLETED | OUTPATIENT
Start: 2023-01-28 | End: 2023-01-28

## 2023-01-28 RX ORDER — SODIUM CHLORIDE 0.9 % (FLUSH) 0.9 %
5-10 SYRINGE (ML) INJECTION AS NEEDED
Status: DISCONTINUED | OUTPATIENT
Start: 2023-01-28 | End: 2023-02-03 | Stop reason: HOSPADM

## 2023-01-28 RX ORDER — PROPOFOL 10 MG/ML
INJECTION, EMULSION INTRAVENOUS
Status: DISPENSED
Start: 2023-01-28 | End: 2023-01-29

## 2023-01-28 RX ORDER — PROPOFOL 10 MG/ML
INJECTION, EMULSION INTRAVENOUS
Status: COMPLETED | OUTPATIENT
Start: 2023-01-28 | End: 2023-01-28

## 2023-01-28 RX ADMIN — LORAZEPAM 2 MG: 2 INJECTION INTRAMUSCULAR; INTRAVENOUS at 22:34

## 2023-01-28 RX ADMIN — FUROSEMIDE 80 MG: 10 INJECTION, SOLUTION INTRAVENOUS at 22:35

## 2023-01-28 RX ADMIN — PROPOFOL 100 MG: 10 INJECTION, EMULSION INTRAVENOUS at 22:56

## 2023-01-28 RX ADMIN — VECURONIUM BROMIDE 10 MG: 1 INJECTION, POWDER, LYOPHILIZED, FOR SOLUTION INTRAVENOUS at 22:56

## 2023-01-28 RX ADMIN — PIPERACILLIN AND TAZOBACTAM 3.38 G: 3; .375 INJECTION, POWDER, FOR SOLUTION INTRAVENOUS at 23:35

## 2023-01-28 RX ADMIN — PROPOFOL 10 MCG/KG/MIN: 10 INJECTION, EMULSION INTRAVENOUS at 23:06

## 2023-01-28 RX ADMIN — LORAZEPAM 2 MG: 2 INJECTION INTRAMUSCULAR at 22:34

## 2023-01-29 ENCOUNTER — APPOINTMENT (OUTPATIENT)
Dept: GENERAL RADIOLOGY | Age: 43
End: 2023-01-29
Attending: FAMILY MEDICINE
Payer: MEDICAID

## 2023-01-29 ENCOUNTER — APPOINTMENT (OUTPATIENT)
Dept: CT IMAGING | Age: 43
End: 2023-01-29
Attending: FAMILY MEDICINE
Payer: MEDICAID

## 2023-01-29 PROBLEM — L97.522 DIABETIC ULCER OF LEFT FOOT ASSOCIATED WITH TYPE 2 DIABETES MELLITUS, WITH FAT LAYER EXPOSED (HCC): Status: ACTIVE | Noted: 2023-01-29

## 2023-01-29 PROBLEM — I50.9 ACUTE EXACERBATION OF CHF (CONGESTIVE HEART FAILURE) (HCC): Status: ACTIVE | Noted: 2023-01-29

## 2023-01-29 PROBLEM — D64.9 CHRONIC ANEMIA: Status: ACTIVE | Noted: 2023-01-29

## 2023-01-29 PROBLEM — E11.621 DIABETIC ULCER OF LEFT FOOT ASSOCIATED WITH TYPE 2 DIABETES MELLITUS, WITH FAT LAYER EXPOSED (HCC): Status: ACTIVE | Noted: 2023-01-29

## 2023-01-29 PROBLEM — A41.9 SEPSIS (HCC): Status: ACTIVE | Noted: 2023-01-29

## 2023-01-29 PROBLEM — I16.1 HYPERTENSIVE EMERGENCY: Status: ACTIVE | Noted: 2023-01-29

## 2023-01-29 PROBLEM — J96.02 ACUTE RESPIRATORY FAILURE WITH HYPOXIA AND HYPERCAPNIA (HCC): Status: ACTIVE | Noted: 2023-01-29

## 2023-01-29 PROBLEM — Z99.2 PERITONEAL DIALYSIS STATUS (HCC): Status: ACTIVE | Noted: 2023-01-29

## 2023-01-29 PROBLEM — J96.01 ACUTE HYPOXEMIC RESPIRATORY FAILURE (HCC): Status: ACTIVE | Noted: 2023-01-29

## 2023-01-29 PROBLEM — L03.032: Status: ACTIVE | Noted: 2023-01-29

## 2023-01-29 LAB
ALBUMIN SERPL-MCNC: 2.1 G/DL (ref 3.4–5)
ALBUMIN SERPL-MCNC: 2.6 G/DL (ref 3.4–5)
ALBUMIN/GLOB SERPL: 0.6 (ref 0.8–1.7)
ALBUMIN/GLOB SERPL: 0.6 (ref 0.8–1.7)
ALP SERPL-CCNC: 103 U/L (ref 45–117)
ALP SERPL-CCNC: 127 U/L (ref 45–117)
ALT SERPL-CCNC: 15 U/L (ref 13–56)
ALT SERPL-CCNC: 17 U/L (ref 13–56)
AMPHET UR QL SCN: NEGATIVE
ANION GAP SERPL CALC-SCNC: 14 MMOL/L (ref 3–18)
ANION GAP SERPL CALC-SCNC: 18 MMOL/L (ref 3–18)
APPEARANCE UR: CLEAR
APTT PPP: <20 SEC (ref 23–36.4)
ARTERIAL PATENCY WRIST A: ABNORMAL
ARTERIAL PATENCY WRIST A: POSITIVE
AST SERPL-CCNC: 20 U/L (ref 10–38)
AST SERPL-CCNC: 35 U/L (ref 10–38)
ATRIAL RATE: 116 BPM
BACTERIA URNS QL MICRO: ABNORMAL /HPF
BARBITURATES UR QL SCN: NEGATIVE
BASE EXCESS BLD CALC-SCNC: 2.1 MMOL/L
BASE EXCESS BLD CALC-SCNC: 2.6 MMOL/L
BDY SITE: ABNORMAL
BDY SITE: ABNORMAL
BENZODIAZ UR QL: NEGATIVE
BILIRUB SERPL-MCNC: 0.4 MG/DL (ref 0.2–1)
BILIRUB SERPL-MCNC: 0.4 MG/DL (ref 0.2–1)
BILIRUB UR QL: NEGATIVE
BNP SERPL-MCNC: ABNORMAL PG/ML (ref 0–450)
BODY TEMPERATURE: 98
BUN SERPL-MCNC: 71 MG/DL (ref 7–18)
BUN SERPL-MCNC: 73 MG/DL (ref 7–18)
BUN/CREAT SERPL: 5 (ref 12–20)
BUN/CREAT SERPL: 5 (ref 12–20)
CALCIUM SERPL-MCNC: 10.1 MG/DL (ref 8.5–10.1)
CALCIUM SERPL-MCNC: 9.6 MG/DL (ref 8.5–10.1)
CALCULATED P AXIS, ECG09: 63 DEGREES
CALCULATED R AXIS, ECG10: -40 DEGREES
CALCULATED T AXIS, ECG11: 94 DEGREES
CANNABINOIDS UR QL SCN: NEGATIVE
CHLORIDE SERPL-SCNC: 96 MMOL/L (ref 100–111)
CHLORIDE SERPL-SCNC: 97 MMOL/L (ref 100–111)
CO2 SERPL-SCNC: 23 MMOL/L (ref 21–32)
CO2 SERPL-SCNC: 25 MMOL/L (ref 21–32)
COCAINE UR QL SCN: NEGATIVE
COLOR UR: YELLOW
CREAT SERPL-MCNC: 14.2 MG/DL (ref 0.6–1.3)
CREAT SERPL-MCNC: 14.2 MG/DL (ref 0.6–1.3)
DIAGNOSIS, 93000: NORMAL
EPITH CASTS URNS QL MICRO: ABNORMAL /LPF (ref 0–5)
ERYTHROCYTE [DISTWIDTH] IN BLOOD BY AUTOMATED COUNT: 14.5 % (ref 11.6–14.5)
GAS FLOW.O2 O2 DELIVERY SYS: ABNORMAL
GAS FLOW.O2 O2 DELIVERY SYS: ABNORMAL
GAS FLOW.O2 SETTING OXYMISER: 14 BPM
GAS FLOW.O2 SETTING OXYMISER: 25 BPM
GLOBULIN SER CALC-MCNC: 3.4 G/DL (ref 2–4)
GLOBULIN SER CALC-MCNC: 4.1 G/DL (ref 2–4)
GLUCOSE BLD STRIP.AUTO-MCNC: 120 MG/DL (ref 70–110)
GLUCOSE BLD STRIP.AUTO-MCNC: 130 MG/DL (ref 70–110)
GLUCOSE BLD STRIP.AUTO-MCNC: 138 MG/DL (ref 70–110)
GLUCOSE BLD STRIP.AUTO-MCNC: 150 MG/DL (ref 70–110)
GLUCOSE BLD STRIP.AUTO-MCNC: 158 MG/DL (ref 70–110)
GLUCOSE SERPL-MCNC: 160 MG/DL (ref 74–99)
GLUCOSE SERPL-MCNC: 162 MG/DL (ref 74–99)
GLUCOSE UR STRIP.AUTO-MCNC: 100 MG/DL
HCO3 BLD-SCNC: 24.5 MMOL/L (ref 22–26)
HCO3 BLD-SCNC: 26.2 MMOL/L (ref 22–26)
HCT VFR BLD AUTO: 20.7 % (ref 35–45)
HCT VFR BLD AUTO: 26.6 % (ref 35–45)
HDSCOM,HDSCOM: NORMAL
HGB BLD-MCNC: 6.3 G/DL (ref 12–16)
HGB BLD-MCNC: 8.2 G/DL (ref 12–16)
HGB UR QL STRIP: ABNORMAL
HISTORY CHECKED?,CKHIST: NORMAL
INR PPP: 1.1 (ref 0.8–1.2)
KETONES UR QL STRIP.AUTO: NEGATIVE MG/DL
LACTATE SERPL-SCNC: 1.8 MMOL/L (ref 0.4–2)
LEUKOCYTE ESTERASE UR QL STRIP.AUTO: NEGATIVE
MCH RBC QN AUTO: 28 PG (ref 24–34)
MCHC RBC AUTO-ENTMCNC: 30.4 G/DL (ref 31–37)
MCV RBC AUTO: 92 FL (ref 78–100)
METHADONE UR QL: NEGATIVE
NITRITE UR QL STRIP.AUTO: NEGATIVE
NRBC # BLD: 0 K/UL (ref 0–0.01)
NRBC BLD-RTO: 0 PER 100 WBC
O2/TOTAL GAS SETTING VFR VENT: 100 %
O2/TOTAL GAS SETTING VFR VENT: 50 %
OPIATES UR QL: NEGATIVE
P-R INTERVAL, ECG05: 154 MS
PCO2 BLD: 30.2 MMHG (ref 35–45)
PCO2 BLD: 37.6 MMHG (ref 35–45)
PCP UR QL: NEGATIVE
PEEP RESPIRATORY: 12 CMH2O
PEEP RESPIRATORY: 8 CMH2O
PH BLD: 7.45 (ref 7.35–7.45)
PH BLD: 7.52 (ref 7.35–7.45)
PH UR STRIP: 8 (ref 5–8)
PLATELET # BLD AUTO: 251 K/UL (ref 135–420)
PMV BLD AUTO: 9.7 FL (ref 9.2–11.8)
PO2 BLD: 145 MMHG (ref 80–100)
PO2 BLD: 149 MMHG (ref 80–100)
POTASSIUM SERPL-SCNC: 5 MMOL/L (ref 3.5–5.5)
POTASSIUM SERPL-SCNC: 5.7 MMOL/L (ref 3.5–5.5)
PROT SERPL-MCNC: 5.5 G/DL (ref 6.4–8.2)
PROT SERPL-MCNC: 6.7 G/DL (ref 6.4–8.2)
PROT UR STRIP-MCNC: 300 MG/DL
PROTHROMBIN TIME: 14.6 SEC (ref 11.5–15.2)
Q-T INTERVAL, ECG07: 330 MS
QRS DURATION, ECG06: 96 MS
QTC CALCULATION (BEZET), ECG08: 458 MS
RBC # BLD AUTO: 2.25 M/UL (ref 4.2–5.3)
RBC #/AREA URNS HPF: ABNORMAL /HPF (ref 0–5)
SAO2 % BLD: 99.4 % (ref 92–97)
SAO2 % BLD: 99.5 % (ref 92–97)
SERVICE CMNT-IMP: ABNORMAL
SERVICE CMNT-IMP: ABNORMAL
SODIUM SERPL-SCNC: 136 MMOL/L (ref 136–145)
SODIUM SERPL-SCNC: 137 MMOL/L (ref 136–145)
SP GR UR REFRACTOMETRY: 1.01 (ref 1–1.03)
SPECIMEN TYPE: ABNORMAL
SPECIMEN TYPE: ABNORMAL
TROPONIN-HIGH SENSITIVITY: 267 NG/L (ref 0–54)
TROPONIN-HIGH SENSITIVITY: 4414 NG/L (ref 0–54)
UROBILINOGEN UR QL STRIP.AUTO: 0.2 EU/DL (ref 0.2–1)
VENTILATION MODE VENT: ABNORMAL
VENTILATION MODE VENT: ABNORMAL
VENTRICULAR RATE, ECG03: 116 BPM
VT SETTING VENT: 400 ML
VT SETTING VENT: 400 ML
WBC # BLD AUTO: 7.1 K/UL (ref 4.6–13.2)
WBC URNS QL MICRO: NEGATIVE /HPF (ref 0–5)

## 2023-01-29 PROCEDURE — 77010033678 HC OXYGEN DAILY

## 2023-01-29 PROCEDURE — 82962 GLUCOSE BLOOD TEST: CPT

## 2023-01-29 PROCEDURE — 85730 THROMBOPLASTIN TIME PARTIAL: CPT

## 2023-01-29 PROCEDURE — 36415 COLL VENOUS BLD VENIPUNCTURE: CPT

## 2023-01-29 PROCEDURE — 73701 CT LOWER EXTREMITY W/DYE: CPT

## 2023-01-29 PROCEDURE — 85610 PROTHROMBIN TIME: CPT

## 2023-01-29 PROCEDURE — 36430 TRANSFUSION BLD/BLD COMPNT: CPT

## 2023-01-29 PROCEDURE — 81001 URINALYSIS AUTO W/SCOPE: CPT

## 2023-01-29 PROCEDURE — P9016 RBC LEUKOCYTES REDUCED: HCPCS

## 2023-01-29 PROCEDURE — 87205 SMEAR GRAM STAIN: CPT

## 2023-01-29 PROCEDURE — 65610000006 HC RM INTENSIVE CARE

## 2023-01-29 PROCEDURE — A4722 DIALYS SOL FLD VOL > 1999CC: HCPCS | Performed by: INTERNAL MEDICINE

## 2023-01-29 PROCEDURE — 36600 WITHDRAWAL OF ARTERIAL BLOOD: CPT

## 2023-01-29 PROCEDURE — 74011250636 HC RX REV CODE- 250/636: Performed by: INTERNAL MEDICINE

## 2023-01-29 PROCEDURE — 74011000636 HC RX REV CODE- 636: Performed by: FAMILY MEDICINE

## 2023-01-29 PROCEDURE — 74011250636 HC RX REV CODE- 250/636

## 2023-01-29 PROCEDURE — 74011000258 HC RX REV CODE- 258: Performed by: FAMILY MEDICINE

## 2023-01-29 PROCEDURE — 82803 BLOOD GASES ANY COMBINATION: CPT

## 2023-01-29 PROCEDURE — 74011636637 HC RX REV CODE- 636/637: Performed by: FAMILY MEDICINE

## 2023-01-29 PROCEDURE — 74018 RADEX ABDOMEN 1 VIEW: CPT

## 2023-01-29 PROCEDURE — 74011000250 HC RX REV CODE- 250: Performed by: FAMILY MEDICINE

## 2023-01-29 PROCEDURE — 94003 VENT MGMT INPAT SUBQ DAY: CPT

## 2023-01-29 PROCEDURE — 74177 CT ABD & PELVIS W/CONTRAST: CPT

## 2023-01-29 PROCEDURE — 80307 DRUG TEST PRSMV CHEM ANLYZR: CPT

## 2023-01-29 PROCEDURE — 74011250636 HC RX REV CODE- 250/636: Performed by: FAMILY MEDICINE

## 2023-01-29 PROCEDURE — 86923 COMPATIBILITY TEST ELECTRIC: CPT

## 2023-01-29 PROCEDURE — 83605 ASSAY OF LACTIC ACID: CPT

## 2023-01-29 PROCEDURE — 71045 X-RAY EXAM CHEST 1 VIEW: CPT

## 2023-01-29 PROCEDURE — 80053 COMPREHEN METABOLIC PANEL: CPT

## 2023-01-29 PROCEDURE — 74011000258 HC RX REV CODE- 258: Performed by: INTERNAL MEDICINE

## 2023-01-29 PROCEDURE — 85018 HEMOGLOBIN: CPT

## 2023-01-29 PROCEDURE — 74011000250 HC RX REV CODE- 250: Performed by: INTERNAL MEDICINE

## 2023-01-29 PROCEDURE — 3E1M39Z IRRIGATION OF PERITONEAL CAVITY USING DIALYSATE, PERCUTANEOUS APPROACH: ICD-10-PCS | Performed by: INTERNAL MEDICINE

## 2023-01-29 PROCEDURE — 71275 CT ANGIOGRAPHY CHEST: CPT

## 2023-01-29 PROCEDURE — 85027 COMPLETE CBC AUTOMATED: CPT

## 2023-01-29 PROCEDURE — 86900 BLOOD TYPING SEROLOGIC ABO: CPT

## 2023-01-29 RX ORDER — INSULIN LISPRO 100 [IU]/ML
INJECTION, SOLUTION INTRAVENOUS; SUBCUTANEOUS EVERY 6 HOURS
Status: DISCONTINUED | OUTPATIENT
Start: 2023-01-29 | End: 2023-02-01

## 2023-01-29 RX ORDER — SODIUM CHLORIDE 0.9 % (FLUSH) 0.9 %
5-40 SYRINGE (ML) INJECTION EVERY 8 HOURS
Status: DISCONTINUED | OUTPATIENT
Start: 2023-01-29 | End: 2023-02-03 | Stop reason: HOSPADM

## 2023-01-29 RX ORDER — SODIUM CHLORIDE 0.9 % (FLUSH) 0.9 %
5-40 SYRINGE (ML) INJECTION AS NEEDED
Status: DISCONTINUED | OUTPATIENT
Start: 2023-01-29 | End: 2023-02-03 | Stop reason: HOSPADM

## 2023-01-29 RX ORDER — PROPOFOL 10 MG/ML
INJECTION, EMULSION INTRAVENOUS
Status: COMPLETED
Start: 2023-01-29 | End: 2023-01-29

## 2023-01-29 RX ORDER — PROPOFOL 10 MG/ML
0-50 VIAL (ML) INTRAVENOUS
Status: DISCONTINUED | OUTPATIENT
Start: 2023-01-29 | End: 2023-01-30

## 2023-01-29 RX ORDER — ACETAMINOPHEN 325 MG/1
650 TABLET ORAL
Status: DISCONTINUED | OUTPATIENT
Start: 2023-01-29 | End: 2023-02-03 | Stop reason: HOSPADM

## 2023-01-29 RX ORDER — ONDANSETRON 2 MG/ML
4 INJECTION INTRAMUSCULAR; INTRAVENOUS
Status: DISCONTINUED | OUTPATIENT
Start: 2023-01-29 | End: 2023-02-03 | Stop reason: HOSPADM

## 2023-01-29 RX ORDER — ACETAMINOPHEN 650 MG/1
650 SUPPOSITORY RECTAL
Status: DISCONTINUED | OUTPATIENT
Start: 2023-01-29 | End: 2023-02-03 | Stop reason: HOSPADM

## 2023-01-29 RX ORDER — SODIUM CHLORIDE 9 MG/ML
250 INJECTION, SOLUTION INTRAVENOUS AS NEEDED
Status: DISCONTINUED | OUTPATIENT
Start: 2023-01-29 | End: 2023-02-03 | Stop reason: HOSPADM

## 2023-01-29 RX ORDER — HEPARIN SODIUM 5000 [USP'U]/ML
5000 INJECTION, SOLUTION INTRAVENOUS; SUBCUTANEOUS EVERY 8 HOURS
Status: DISCONTINUED | OUTPATIENT
Start: 2023-01-29 | End: 2023-02-03 | Stop reason: HOSPADM

## 2023-01-29 RX ORDER — ONDANSETRON 4 MG/1
4 TABLET, ORALLY DISINTEGRATING ORAL
Status: DISCONTINUED | OUTPATIENT
Start: 2023-01-29 | End: 2023-02-03 | Stop reason: HOSPADM

## 2023-01-29 RX ORDER — PROPOFOL 10 MG/ML
0-50 VIAL (ML) INTRAVENOUS
Status: DISCONTINUED | OUTPATIENT
Start: 2023-01-29 | End: 2023-01-29 | Stop reason: SDUPTHER

## 2023-01-29 RX ORDER — CHLORHEXIDINE GLUCONATE 1.2 MG/ML
10 RINSE ORAL EVERY 12 HOURS
Status: DISCONTINUED | OUTPATIENT
Start: 2023-01-29 | End: 2023-01-31

## 2023-01-29 RX ORDER — HYDRALAZINE HYDROCHLORIDE 20 MG/ML
10 INJECTION INTRAMUSCULAR; INTRAVENOUS
Status: DISCONTINUED | OUTPATIENT
Start: 2023-01-29 | End: 2023-02-03 | Stop reason: HOSPADM

## 2023-01-29 RX ORDER — IBUPROFEN 200 MG
16 TABLET ORAL AS NEEDED
Status: DISCONTINUED | OUTPATIENT
Start: 2023-01-29 | End: 2023-02-03 | Stop reason: HOSPADM

## 2023-01-29 RX ORDER — MORPHINE SULFATE 10 MG/ML
6 INJECTION, SOLUTION INTRAMUSCULAR; INTRAVENOUS
Status: ACTIVE | OUTPATIENT
Start: 2023-01-29 | End: 2023-01-29

## 2023-01-29 RX ORDER — VANCOMYCIN HYDROCHLORIDE
1250 ONCE
Status: COMPLETED | OUTPATIENT
Start: 2023-01-29 | End: 2023-01-29

## 2023-01-29 RX ORDER — POLYETHYLENE GLYCOL 3350 17 G/17G
17 POWDER, FOR SOLUTION ORAL DAILY PRN
Status: DISCONTINUED | OUTPATIENT
Start: 2023-01-29 | End: 2023-02-03 | Stop reason: HOSPADM

## 2023-01-29 RX ORDER — DEXTROSE MONOHYDRATE 100 MG/ML
0-250 INJECTION, SOLUTION INTRAVENOUS AS NEEDED
Status: DISCONTINUED | OUTPATIENT
Start: 2023-01-29 | End: 2023-02-03 | Stop reason: HOSPADM

## 2023-01-29 RX ADMIN — PIPERACILLIN AND TAZOBACTAM 3.38 G: 3; .375 INJECTION, POWDER, LYOPHILIZED, FOR SOLUTION INTRAVENOUS at 21:15

## 2023-01-29 RX ADMIN — SODIUM CHLORIDE, SODIUM LACTATE, CALCIUM CHLORIDE, MAGNESIUM CHLORIDE AND DEXTROSE 2000 ML: 4.25; 538; 448; 18.3; 5.08 INJECTION, SOLUTION INTRAPERITONEAL at 21:16

## 2023-01-29 RX ADMIN — PROPOFOL 40 MCG/KG/MIN: 10 INJECTION, EMULSION INTRAVENOUS at 21:42

## 2023-01-29 RX ADMIN — Medication 2 UNITS: at 06:00

## 2023-01-29 RX ADMIN — FAMOTIDINE 20 MG: 10 INJECTION, SOLUTION INTRAVENOUS at 21:15

## 2023-01-29 RX ADMIN — SODIUM CHLORIDE, PRESERVATIVE FREE 10 ML: 5 INJECTION INTRAVENOUS at 03:30

## 2023-01-29 RX ADMIN — HEPARIN SODIUM 5000 UNITS: 5000 INJECTION INTRAVENOUS; SUBCUTANEOUS at 10:32

## 2023-01-29 RX ADMIN — HEPARIN SODIUM 5000 UNITS: 5000 INJECTION INTRAVENOUS; SUBCUTANEOUS at 18:11

## 2023-01-29 RX ADMIN — PROPOFOL 50 MCG/KG/MIN: 10 INJECTION, EMULSION INTRAVENOUS at 03:49

## 2023-01-29 RX ADMIN — 0.12% CHLORHEXIDINE GLUCONATE 10 ML: 1.2 RINSE ORAL at 09:17

## 2023-01-29 RX ADMIN — IOPAMIDOL 100 ML: 755 INJECTION, SOLUTION INTRAVENOUS at 00:52

## 2023-01-29 RX ADMIN — FENTANYL CITRATE 25 MCG/HR: 50 INJECTION INTRAVENOUS at 09:00

## 2023-01-29 RX ADMIN — HEPARIN SODIUM 5000 UNITS: 5000 INJECTION INTRAVENOUS; SUBCUTANEOUS at 03:09

## 2023-01-29 RX ADMIN — SODIUM CHLORIDE, PRESERVATIVE FREE 10 ML: 5 INJECTION INTRAVENOUS at 21:16

## 2023-01-29 RX ADMIN — SODIUM CHLORIDE, SODIUM LACTATE, CALCIUM CHLORIDE, MAGNESIUM CHLORIDE AND DEXTROSE 2000 ML: 4.25; 538; 448; 18.3; 5.08 INJECTION, SOLUTION INTRAPERITONEAL at 09:17

## 2023-01-29 RX ADMIN — PROPOFOL 45 MCG/KG/MIN: 10 INJECTION, EMULSION INTRAVENOUS at 18:08

## 2023-01-29 RX ADMIN — SODIUM CHLORIDE, PRESERVATIVE FREE 10 ML: 5 INJECTION INTRAVENOUS at 15:19

## 2023-01-29 RX ADMIN — PROPOFOL 45 MCG/KG/MIN: 10 INJECTION, EMULSION INTRAVENOUS at 10:35

## 2023-01-29 RX ADMIN — PROPOFOL 45 MCG/KG/MIN: 10 INJECTION, EMULSION INTRAVENOUS at 14:11

## 2023-01-29 RX ADMIN — SODIUM CHLORIDE, SODIUM LACTATE, CALCIUM CHLORIDE, MAGNESIUM CHLORIDE AND DEXTROSE 2000 ML: 4.25; 538; 448; 18.3; 5.08 INJECTION, SOLUTION INTRAPERITONEAL at 03:04

## 2023-01-29 RX ADMIN — VANCOMYCIN HYDROCHLORIDE 1250 MG: 10 INJECTION, POWDER, LYOPHILIZED, FOR SOLUTION INTRAVENOUS at 03:39

## 2023-01-29 RX ADMIN — SODIUM CHLORIDE, SODIUM LACTATE, CALCIUM CHLORIDE, MAGNESIUM CHLORIDE AND DEXTROSE 2000 ML: 4.25; 538; 448; 18.3; 5.08 INJECTION, SOLUTION INTRAPERITONEAL at 15:18

## 2023-01-29 RX ADMIN — PROPOFOL 45 MCG/KG/MIN: 10 INJECTION, EMULSION INTRAVENOUS at 07:15

## 2023-01-29 RX ADMIN — FAMOTIDINE 20 MG: 10 INJECTION, SOLUTION INTRAVENOUS at 09:17

## 2023-01-29 RX ADMIN — 0.12% CHLORHEXIDINE GLUCONATE 10 ML: 1.2 RINSE ORAL at 21:15

## 2023-01-29 RX ADMIN — SODIUM CHLORIDE, PRESERVATIVE FREE 10 ML: 5 INJECTION INTRAVENOUS at 07:15

## 2023-01-29 RX ADMIN — PIPERACILLIN AND TAZOBACTAM 3.38 G: 3; .375 INJECTION, POWDER, LYOPHILIZED, FOR SOLUTION INTRAVENOUS at 10:33

## 2023-01-29 NOTE — ROUTINE PROCESS
TRANSFER - IN REPORT:    Verbal report received from Ken Arreaga RN (name) on Frutoso Gowers  being received from ED(unit) for routine progression of care      Report consisted of patients Situation, Background, Assessment and   Recommendations(SBAR). Information from the following report(s) SBAR, Kardex, ED Summary, Procedure Summary, Intake/Output, MAR, and Recent Results was reviewed with the receiving nurse. Opportunity for questions and clarification was provided. Assessment completed upon patients arrival to unit and care assumed.

## 2023-01-29 NOTE — ED TRIAGE NOTES
Pt presents to ED with cc of SOB, productive cough and nausea X a few hours. Pt had PD earlier today.

## 2023-01-29 NOTE — CONSULTS
[facility] Consult Note      Patient: Ashly Shepard MRN: [id number]  SSN: [id number]    YOB: 1980  Age: 43 y.o. Sex: female    Date of Consultation: 01/29/2023  Referring Physician: Shade Alicia MD  Reason for Consultation: CAD    Chief complain: Shortness of breath    HPI: 60-year-old female came to emergency with complaining of shortness of breath 3 hours prior to ER arrival.  She did not tolerate BiPAP in the emergency room and she was intubated. History obtained from patient's chart. As per patient chart she was started on gabapentin and was having vomiting, lightheadedness, dyspnea. Patient is intubated and being managed for hypoxic respiratory failure. Cardiology consult called for history of CAD and CHF. Patient is on peritoneal dialysis    Past Medical History:   Diagnosis Date    BMI 34.0-34.9,adult 1/29/2023    Cardiomyopathy ([facility]     Chronic kidney disease     Coronary arteriosclerosis     DM (diabetes mellitus) ([facility]     Hypertension     Tachycardia      History reviewed. No pertinent surgical history.   Current Facility-Administered Medications   Medication Dose Route Frequency    peritoneal dialysis DEXTROSE 4.25% (2.5 mEq/L low calcium) solution 2,000 mL  2,000 mL IntraPERitoneal QID    sodium chloride (NS) flush 5-40 mL  5-40 mL IntraVENous Q8H    sodium chloride (NS) flush 5-40 mL  5-40 mL IntraVENous PRN    acetaminophen (TYLENOL) tablet 650 mg  650 mg Oral Q6H PRN    Or    acetaminophen (TYLENOL) suppository 650 mg  650 mg Rectal Q6H PRN    polyethylene glycol (MIRALAX) packet 17 g  17 g Oral DAILY PRN    ondansetron (ZOFRAN ODT) tablet 4 mg  4 mg Oral Q8H PRN    Or    ondansetron (ZOFRAN) injection 4 mg  4 mg IntraVENous Q6H PRN    heparin (porcine) injection 5,000 Units  5,000 Units SubCUTAneous Q8H    famotidine (PF) (PEPCID) 20 mg in 0.9% sodium chloride 10 mL injection  20 mg IntraVENous BID    insulin lispro (HUMALOG) injection   SubCUTAneous Q6H    glucose chewable tablet 16 g  16 g Oral PRN    glucagon (GLUCAGEN) injection 1 mg  1 mg IntraMUSCular PRN    dextrose 10% infusion 0-250 mL  0-250 mL IntraVENous PRN    Vancomycin-Pharmacy to Dose  1 Each Other Rx Dosing/Monitoring    fentaNYL (PF) 10 mcg/mL infusion  0-200 mcg/hr IntraVENous TITRATE    propofol (DIPRIVAN) 10 mg/mL infusion  0-50 mcg/kg/min IntraVENous TITRATE    0.9% sodium chloride infusion 250 mL  250 mL IntraVENous PRN    chlorhexidine (PERIDEX) 0.12 % mouthwash 10 mL  10 mL Oral Q12H    hydrALAZINE (APRESOLINE) 20 mg/mL injection 10 mg  10 mg IntraVENous Q4H PRN    piperacillin-tazobactam (ZOSYN) 3.375 g in 0.9% sodium chloride (MBP/ADV) 100 mL MBP  3.375 g IntraVENous Q12H    sodium chloride (NS) flush 5-10 mL  5-10 mL IntraVENous PRN       Allergies and Intolerances: Allergies   Allergen Reactions    Coreg [Carvedilol] Nausea and Vomiting    Lortab [Hydrocodone-Acetaminophen] Nausea and Vomiting       Family History:   Family History   Problem Relation Age of Onset    Diabetes Mother     Heart Disease Mother     Heart Attack Mother     Heart Attack Father     Heart Disease Father     Diabetes Father        Social History:   She  reports that she has never smoked. She has never used smokeless tobacco.  She  reports current alcohol use.       Review of Systems:      Cannot obtain due to patient's clinical condition      Physical:   Patient Vitals for the past 6 hrs:   Temp Pulse Resp BP SpO2   01/29/23 1800 -- 64 20 (!) 165/94 100 %   01/29/23 1700 -- 62 13 (!) 147/86 100 %   01/29/23 1600 97.9 °F (36.6 °C) 62 17 (!) 150/89 100 %   01/29/23 1523 97.8 °F (36.6 °C) 61 11 (!) 148/90 99 %   01/29/23 1518 -- 61 11 -- 98 %   01/29/23 1515 -- 62 12 (!) 148/90 98 %   01/29/23 1500 -- 62 11 (!) 146/91 98 %   01/29/23 1445 -- 62 12 (!) 147/92 98 %   01/29/23 1430 -- 63 11 (!) 146/92 98 %   01/29/23 1415 -- 63 12 (!) 145/89 98 %   01/29/23 1400 -- 63 11 (!) 141/90 98 %   01/29/23 1345 -- 63 11 (!) 141/89 97 %   01/29/23 1330 -- 64 11 (!) 140/90 97 %   01/29/23 1323 97.5 °F (36.4 °C) 64 11 (!) 141/89 97 %   01/29/23 1315 97.5 °F (36.4 °C) 65 12 (!) 141/89 97 %   01/29/23 1309 97.5 °F (36.4 °C) 65 11 (!) 140/88 98 %   01/29/23 1304 -- 65 12 -- 99 %   01/29/23 1300 -- 65 12 (!) 140/88 100 %         Exam:   General Appearance:  orally intubated on mechanical ventilator,not using accessory muscles of respiration. HEENT: SARMAD. HEAD: Atraumatic  NECK: No JVD, no thyroidomeglay. CAROTIDS: no bruit  LUNGS:  bilateral conducted sounds   HEART: S1+S2 audible, no murmur, no pericardial rub. ABD: Non-tender, BS Audible    EXT:  bilateral lower extremity 1+ edema, and no cyanosis. VASCULAR EXAM: Pulses are intact. MUSCULOSKELETAL: Grossly no joint deformity.   NEUROLOGICAL: Sedated     Review of Data:   LABS:   Lab Results   Component Value Date/Time    WBC 7.1 01/29/2023 06:47 AM    HGB 6.3 (L) 01/29/2023 06:47 AM    HCT 20.7 (L) 01/29/2023 06:47 AM    PLATELET 383 73/65/8519 06:47 AM     Lab Results   Component Value Date/Time    Sodium 136 01/29/2023 06:47 AM    Potassium 5.7 (H) 01/29/2023 06:47 AM    Chloride 97 (L) 01/29/2023 06:47 AM    CO2 25 01/29/2023 06:47 AM    Glucose 160 (H) 01/29/2023 06:47 AM    BUN 73 (H) 01/29/2023 06:47 AM    Creatinine 14.20 (H) 01/29/2023 06:47 AM     No results found for: CHOL, CHOLX, CHLST, CHOLV, HDL, HDLP, LDL, LDLC, DLDLP, TGLX, TRIGL, TRIGP  No components found for: GPT  Lab Results   Component Value Date/Time    Hemoglobin A1c 5.0 11/15/2022 01:45 AM         Cardiology Procedures:   Results for orders placed or performed during the hospital encounter of 01/28/23   EKG, 12 LEAD, INITIAL   Result Value Ref Range    Ventricular Rate 116 BPM    Atrial Rate 116 BPM    P-R Interval 154 ms    QRS Duration 96 ms    Q-T Interval 330 ms    QTC Calculation (Bezet) 458 ms    Calculated P Axis 63 degrees    Calculated R Axis -40 degrees    Calculated T Axis 94 degrees    Diagnosis       Poor data quality, interpretation may be adversely affected  Sinus tachycardia  Left atrial enlargement  Left axis deviation  Left ventricular hypertrophy ( Sulaiman product )  Nonspecific ST and T wave abnormality  Abnormal ECG  Confirmed by Aubrie Anderson MD, E[facility] Brothers ([id number]) on 1/29/2023 1:54:46 PM             Impression / Plan:    Patient Active Problem List   Diagnosis Code    Acute pulmonary edema ([facility]) J81.0    Chest pain R07.9    ESRD (end stage renal disease) on dialysis ([facility] [address] [facility] N18.6, Z99.2    Pulmonary edema J81.1    Cardiomyopathy ([facility]) I42.9    HTN (hypertension) I10    Type 2 diabetes mellitus ([facility]) E11.9    PAD (peripheral artery disease) ([facility]) I73.9    CAD (coronary artery disease) I25.10    Acute respiratory failure with hypoxia and hypercapnia ([facility]) J96.01, J96.02    Hypertensive emergency I16.1    Acute exacerbation of CHF (congestive heart failure) ([facility]) I50.9    BMI 34.0-34.9,adult Z68.34    Peritoneal dialysis status ([facility] Z99.2    Diabetic ulcer of left foot associated with type 2 diabetes mellitus, with fat layer exposed ([facility] E11.621, L97.522    Sepsis ([facility]) A41.9    Cellulitis of fourth toe, left L03.032    Chronic anemia D64.9     Echocardiogram was done in 11/2022 reported      Left Ventricle: Moderately reduced left ventricular systolic function with a visually estimated EF of 35 - 40%. Left ventricle size is normal. Moderate septal thickening. Severe posterior thickening. Moderate global hypokinesis present. Grade II diastolic dysfunction with increased LAP. Right Ventricle: Right ventricle size is normal. Mildly increased wall thickness. Aortic Valve: Severely thickened cusp. Moderately calcified right and noncoronary cusps. Minimal to moderate stenosis of the aortic valve. Mitral Valve: Mildly thickened leaflet, at the posterior leaflet. Severe annular calcification at the posterior leaflet of the mitral valve. Moderate regurgitation.  Mild to moderate stenosis noted, measurement is compromised with severe calcification. Planimetry was not performed. Left Atrium: Left atrium is severely dilated. Right Atrium: Right atrium is moderately dilated. PA systolic pressure is 40 mmHg. Cardiac catheterization was done in November 2022 reported    Hemodynamics: Ao: 158/81 mmHg. Mean 112 mmHg. LV: 162/12 mmHg. LVEDP: 25   mmHg. No aortic valve gradient was noted on the pullback of the   pigtail catheter. Coronary Arteries:        Left Main Artery: Widely patent     Left Anterior Descending Artery: Proximal segments patent. Mid vessel contains prior stents which are patent. Distal segments patent. Moderate sized first diagonal branch which is patent. Moderate-sized second diagonal branch which contains prior stents is 100% occluded. Left Circumflex Artery: Moderate size AV vessel. Proximal and mid are patent. Distal segment contains a 20% stenosis just after the origin of an obtuse marginal branch. Large branching   obtuse marginal branch from the mid AV groove vessel. Proximal segment contains a concentric discrete 70% stenosis. Distal vessel contains luminal irregularities. Right Coronary Artery: Dominant, moderate size vessel. Proximal and mid segments patent. Distal vessel bifurcates into 2 branches at the acute margin. The ongoing right branch is diffusely diseased with a long 80% stenosis, followed by a   discrete 90% stenosis. The acute marginal branch contains   proximal and mid 99% stenoses. 1.  Progressive coronary disease since prior catheterization   7/14/2021. Second diagonal branch which was previously stented is now 100% occluded. First obtuse marginal branch contains a ostial/proximal 70% stenosis. Distal right coronary artery demonstrates 2 branches with the ongoing vessel diffusely diseased with discrete distal 90% stenosis. The acute marginal branch contains proximal and mid 99% stenoses. 2.  Ischemic cardiomyopathy ejection fraction 40%   3.   No aortic stenosis   4. Mild mitral regurgitation   5. Successful administration of moderate sedation without   complications     Plan: Routine post cardiac cath orders. Patient's disease is   mostly branch vessel and distal vessel disease. The diagonal is 100% chronic total occlusion, the distal right coronary artery   disease is very diffuse. Thus, this anatomy is unfavorable for   PCI and CABG. The obtuse marginal branch potentially could be considered for PCI although would leave behind significant   residual coronary disease. Patient needs aggressive guideline   directed risk factor modification. In addition with her LV   systolic dysfunction she will also need guideline directed   medical therapy for LV dysfunction and chronic systolic heart   failure. Plan:     Continue ventilator support    Echocardiogram   Continue dialysis as per nephrologist   Resume aspirin if no evidence of any bleeding   Will follow-up    36 minutes of critical care time spent in the direct evaluation and treatment of this high risk patient. The reason for providing this level of medical care for this critically ill patient was due a critical illness that impaired one or more vital organ systems such that there was a high probability of imminent or life threatening deterioration in the patient's condition. This care involved high complexity decision making to assess, manipulate, and support vital system functions, to treat this degree vital organ system failure and to prevent further life threatening deterioration of the patient's condition.     Signed By: Katelyn Jaramillo MD     January 29, 2023

## 2023-01-29 NOTE — PROGRESS NOTES
Hospitalist Progress Note    Patient: Michele Hernández MRN: [id number]  CSN: [id number]    YOB: 1980  Age: 43 y.o. Sex: female    DOA: 1/28/2023 LOS:  LOS: 0 days          Chief Complaint:      Resp failure    Assessment/Plan     44 y/o female with multiple medical problems to include progressive CAD not amenable to stenting followed by [facility], CHF with EF of 40%, ESRD on peritoneal dialysis, HTN, type 2 diabetes mellitus, and obesity is admitted with acute hypoxemic and hypercapneic respiratory failure due to acute CHF exacerbation and sepsis. She also has a diabetic foot ulcer with cellulitis of the left fourth toe. She was recently started on gabapentin prior to symptom onset      Neuro-Propofol and fentanyl for sedation. Pulmonary-acute hypoxemic and hypercapneic respiratory failure due to volume overload and CHF exacerbation. CTA chest neg for PE    CV-CHF exacerbation with underlying cardiomyopathy and CAD. echocardiogram and trend troponins. consult cardiology. GI-no issues    Renal-ESRD on peritoneal dialysis. She is compliant at home but in acute volume overload. Nephrology was consulted in the ED to facilitate immediate dialysis for volume removal.hyperK associated    Heme-chronic anemia due to ESRD. Transfuse blood for Hgb 6    ID-sepsis with markedly elevated lactic acid. Sepsis bundle initiated. Follow blood, sputum, and peritoneal dialysis cultures. Empiric antibiotic treatment with zosyn and vancomycin. CT foot shows cellulitis, CT abd is neg    Endo-type 2 diabetes mellitus. Sliding scale insulin ordered.     F/E/N-no IV fluids due to volume overload/replete prn     Full code  Palliative care consult placed to discuss goals of care/identify legal next of kin given critical illness       Estimated length of stay : 5 nights    Prognosis guarded    Disposition :  Patient Active Problem List   Diagnosis Code    Acute pulmonary edema ([facility] [address] J81.0    Chest pain R07.9 ESRD (end stage renal disease) on dialysis ([facility] N18.6, Z99.2    Pulmonary edema J81.1    Cardiomyopathy ([facility]) I42.9    HTN (hypertension) I10    Type 2 diabetes mellitus ([facility]) E11.9    PAD (peripheral artery disease) ([facility]) I73.9    CAD (coronary artery disease) I25.10    Acute respiratory failure with hypoxia and hypercapnia ([facility]) J96.01, J96.02    Hypertensive emergency I16.1    Acute exacerbation of CHF (congestive heart failure) ([facility]) I50.9    BMI 34.0-34.9,adult Z68.34    Peritoneal dialysis status ([facility] Z99.2    Diabetic ulcer of left foot associated with type 2 diabetes mellitus, with fat layer exposed ([facility] E11.621, L97.522    Sepsis ([facility]) A41.9    Cellulitis of fourth toe, left L03.032    Chronic anemia D64.9       Subjective:  She is sedate on vent this am  No issues reported to me      Review of systems:  As above      Vital signs/Intake and Output:  Visit Vitals  BP (!) 151/96   Pulse 73   Temp 97.5 °F (36.4 °C)   Resp 18   Ht 5' 7\" (1.702 m)   Wt 89.7 kg (197 lb 12 oz)   SpO2 96%   BMI 30.97 kg/m²     Current Shift:  No intake/output data recorded.   Last three shifts:  01/27 1901 - 01/29 0700  In: 100 [I.V.:100]  Out: 40 [Urine:40]    Exam:    General: sedate vented AAF NAD  CVS:Regular rate and rhythm, no M/R/G, S1/S2 heard, no thrill  Lungs:Clear to auscultation bilaterally, no wheezes, rhonchi, or rales  Abdomen: Soft, Nontender, No distention  Extremities: left lateral foot with mild edema, lesion  Neuro:grossly normal                 Labs: Results:       Chemistry Recent Labs     01/29/23  0647 01/28/23  2320   * 162*    137   K 5.7* 5.0   CL 97* 96*   CO2 25 23   BUN 73* 71*   CREA 14.20* 14.20*   CA 9.6 10.1   AGAP 14 18   BUCR 5* 5*    127*   TP 5.5* 6.7   ALB 2.1* 2.6*   GLOB 3.4 4.1*   AGRAT 0.6* 0.6*      CBC w/Diff Recent Labs     01/29/23  0647 01/28/23  2320   WBC 7.1 12.5   RBC 2.25* 2.70*   HGB 6.3* 7.7*   HCT 20.7* 26.2*    390   GRANS  --  75*   LYMPH  -- 9*   EOS  --  14*      Cardiac Enzymes No results for input(s): CPK, CKND1, NAVIN in the last 72 hours. No lab exists for component: CKRMB, TROIP   Coagulation No results for input(s): PTP, INR, APTT, INREXT in the last 72 hours. Lipid Panel No results found for: CHOL, CHOLPOCT, CHOLX, CHLST, CHOLV, 571587, HDL, HDLP, LDL, LDLC, DLDLP, 879575, VLDLC, VLDL, TGLX, TRIGL, TRIGP, TGLPOCT, CHHD, CHHDX   BNP No results for input(s): BNPP in the last 72 hours.    Liver Enzymes Recent Labs     01/29/23  0647   TP 5.5*   ALB 2.1*         Thyroid Studies No results found for: T4, T3U, TSH, TSHEXT     Procedures/imaging: see electronic medical records for all procedures/Xrays and details which were not copied into this note but were reviewed prior to creation of Mirta Galdamez MD

## 2023-01-29 NOTE — PROGRESS NOTES
Reason for Admission:   Chart reviewed; per H&P, patient is a \"37 y.o. female with multiple medical problems to include progressive CAD not amenable to stenting followed by [facility], CHF with EF of 40%, ESRD on peritoneal dialysis, HTN, type 2 diabetes mellitus, and obesity who presents to the ED with acute onset of shortness of breath 3 hours prior to presentation. She did not tolerate BiPAP in the ED and was subsequently intubated. \"                   RUR Score:     moderate; 19%             PCP: First and Last name:   Brandt Montero MD     Name of Practice:    Are you a current patient: Yes/No:    Approximate date of last visit:    Can you participate in a virtual visit if needed:     Do you (patient/family) have any concerns for transition/discharge? Will reassess when appropriate              Plan for utilizing home health:   will reassess when appropriate    Current Advanced Directive/Advance Care Plan:  Full Code      Healthcare Decision Maker:   Click here to complete [address] including selection of the [address] Relationship (ie \"Primary\")              Transition of Care Plan:      4014: Patient is intubated and on diprivan and fentanyl drips; per primary RN, challenge of legal NOK for consents - patient has boyfriend, Douglas Daily, patient's mother is in a rehab facility for an amputation (facility is in [facility]), father is in a nursing home not in the area; brother's name is Luis Fernando Calderon and sister's name is [place]illion. Care management will follow patient progress and assist when appropriate in discharge planning.     Care Management Interventions  Transition of Care Consult (CM Consult): Discharge Planning  Support Systems: Other Family Member(s), Spouse/Significant Other  The Plan for Transition of Care is Related to the Following Treatment Goals : acute respiratory failure with hypoxia  Discharge Location  Patient Expects to be Discharged to[de-identified]  (TBD)

## 2023-01-29 NOTE — CONSULTS
Nephrology Consult    Patient: Frutoso Gowers  Requesting physician: Dr. Leonie Gurroal  Reason for consult: ESRD management    CC: SOB    History of Present Illness:  Frutoso Gowers is a 43 y.o. female with a past medical history significant for CAD/MI, CM 40% EF, DM type 2, obesity, ESRD on PD at [facility], who presented with worsening SOB/ resp distress. Pt could not tolerate bipap and eventually required intubation, admitted to ICU. Noticed to have elevated BP as well. No f/c. Imaging c/w volume overload and neg for PE. Nephrology was consulted for further evaluation and management of her ESRD.     Past Medical History:  Patient Active Problem List   Diagnosis Code    Acute pulmonary edema ([facility]) J81.0    Chest pain R07.9    ESRD (end stage renal disease) on dialysis ([facility] [address] N18.6, Z99.2    Pulmonary edema J81.1    Cardiomyopathy ([facility] [address] I42.9    HTN (hypertension) I10    Type 2 diabetes mellitus ([facility]) E11.9    PAD (peripheral artery disease) ([facility]) I73.9    CAD (coronary artery disease) I25.10    Acute respiratory failure with hypoxia and hypercapnia ([facility]) J96.01, J96.02    Hypertensive emergency I16.1    Acute exacerbation of CHF (congestive heart failure) ([facility]) I50.9    BMI 34.0-34.9,adult Z68.34    Peritoneal dialysis status ([facility]) Z99.2    Diabetic ulcer of left foot associated with type 2 diabetes mellitus, with fat layer exposed ([facility] [address] E11.621, L97.522    Sepsis ([facility]) A41.9    Cellulitis of fourth toe, left L03.032    Chronic anemia D64.9       Social History:  Social History     Socioeconomic History    Marital status:    Tobacco Use    Smoking status: Never    Smokeless tobacco: Never   Substance and Sexual Activity    Alcohol use: Yes     Comment: rarely       Family History:  Family History   Problem Relation Age of Onset    Diabetes Mother     Heart Disease Mother     Heart Attack Mother     Heart Attack Father     Heart Disease Father     Diabetes Father        Allergy:  Allergies   Allergen Reactions    Coreg [Carvedilol] Nausea and Vomiting    Lortab [Hydrocodone-Acetaminophen] Nausea and Vomiting        Medication:  Home meds: reviewed    Inpatient meds:  Current Facility-Administered Medications   Medication Dose Route Frequency    peritoneal dialysis DEXTROSE 4.25% (2.5 mEq/L low calcium) solution 2,000 mL  2,000 mL IntraPERitoneal QID    sodium chloride (NS) flush 5-40 mL  5-40 mL IntraVENous Q8H    sodium chloride (NS) flush 5-40 mL  5-40 mL IntraVENous PRN    acetaminophen (TYLENOL) tablet 650 mg  650 mg Oral Q6H PRN    Or    acetaminophen (TYLENOL) suppository 650 mg  650 mg Rectal Q6H PRN    polyethylene glycol (MIRALAX) packet 17 g  17 g Oral DAILY PRN    ondansetron (ZOFRAN ODT) tablet 4 mg  4 mg Oral Q8H PRN    Or    ondansetron (ZOFRAN) injection 4 mg  4 mg IntraVENous Q6H PRN    heparin (porcine) injection 5,000 Units  5,000 Units SubCUTAneous Q8H    famotidine (PF) (PEPCID) 20 mg in 0.9% sodium chloride 10 mL injection  20 mg IntraVENous BID    insulin lispro (HUMALOG) injection   SubCUTAneous Q6H    glucose chewable tablet 16 g  16 g Oral PRN    glucagon (GLUCAGEN) injection 1 mg  1 mg IntraMUSCular PRN    dextrose 10% infusion 0-250 mL  0-250 mL IntraVENous PRN    Vancomycin-Pharmacy to Dose  1 Each Other Rx Dosing/Monitoring    morphine 10 mg/mL injection 6 mg  6 mg IntraVENous NOW    fentaNYL (PF) 10 mcg/mL infusion  0-200 mcg/hr IntraVENous TITRATE    propofol (DIPRIVAN) 10 mg/mL infusion  0-50 mcg/kg/min IntraVENous TITRATE    sodium chloride (NS) flush 5-10 mL  5-10 mL IntraVENous PRN                        Review of Systems:  Unable to obtain    Vital signs:   Visit Vitals  BP (!) 151/101   Pulse 67   Temp 97.5 °F (36.4 °C)   Resp 18   Ht 5' 7\" (1.702 m)   Wt 89.7 kg (197 lb 12 oz)   SpO2 100%   BMI 30.97 kg/m²       Intake/Output Summary (Last 24 hours) at 1/29/2023 0759  Last data filed at 1/29/2023 0600  Gross per 24 hour   Intake 100 ml   Output 40 ml   Net 60 ml Physical Exam:    General: Intubated   HENT: Atraumatic and normocephalic   Eyes: Normal conjunctiva, EOMI   Neck: Supple with no mass   Cardiovascular: Normal S1 & S2, no m/r/g   Pulmonary/Chest Wall: Clear to auscultation bilaterally, no w/c   Abdominal: Soft and non-tender, normal active bowel sound   Musculoskeletal: +edema in LEs, left more than right   Skin: No rash   Neurological: sedated       Data Review:  CMP:   Lab Results   Component Value Date/Time     01/29/2023 06:47 AM    K 5.7 (H) 01/29/2023 06:47 AM    CL 97 (L) 01/29/2023 06:47 AM    CO2 25 01/29/2023 06:47 AM    AGAP 14 01/29/2023 06:47 AM     (H) 01/29/2023 06:47 AM    BUN 73 (H) 01/29/2023 06:47 AM    CREA 14.20 (H) 01/29/2023 06:47 AM    CA 9.6 01/29/2023 06:47 AM    ALB 2.1 (L) 01/29/2023 06:47 AM    TP 5.5 (L) 01/29/2023 06:47 AM    GLOB 3.4 01/29/2023 06:47 AM    AGRAT 0.6 (L) 01/29/2023 06:47 AM    ALT 15 01/29/2023 06:47 AM     CBC:   Lab Results   Component Value Date/Time    WBC 7.1 01/29/2023 06:47 AM    HGB 6.3 (L) 01/29/2023 06:47 AM    HCT 20.7 (L) 01/29/2023 06:47 AM     01/29/2023 06:47 AM     All Cardiac Markers in the last 24 hours: No results found for: CPK, CK, CKMMB, CKMB, RCK3, CKMBT, CKNDX, CKND1, NAVIN, TROPT, TROIQ, JUAN CARLOS, TROPT, TNIPOC, BNP, BNPP  Recent Glucose Results:   Lab Results   Component Value Date/Time     (H) 01/29/2023 06:47 AM     (H) 01/28/2023 11:20 PM     ABG:   Lab Results   Component Value Date/Time    PHI 7.45 01/29/2023 09:42 AM    PCO2I 37.6 01/29/2023 09:42 AM    PO2I 145 (H) 01/29/2023 09:42 AM    HCO3I 26.2 (H) 01/29/2023 09:42 AM    FIO2I 50 01/29/2023 09:42 AM     Liver Panel:   Lab Results   Component Value Date/Time    ALB 2.1 (L) 01/29/2023 06:47 AM    TP 5.5 (L) 01/29/2023 06:47 AM    GLOB 3.4 01/29/2023 06:47 AM    AGRAT 0.6 (L) 01/29/2023 06:47 AM    ALT 15 01/29/2023 06:47 AM     01/29/2023 06:47 AM     Pancreatic Markers: No results found for: AMYLPOCT, AML, LIPPOCT, LPSE      CT:      Significant bilateral lower lobe consolidation and bilateral  pulmonary infiltrates. Small right pleural effusion. No evidence of pulmonary embolism. Peritoneal dialysis catheter is coiled within the left lower quadrant. No acute abnormality is identified. Impression:     ESRD on home PD  Respiratory failure required intubation  Hyperkalemia, mild  Anemia in CKD  DM type 2  Hypoalbuminemia  Lactic acidosis  H.o. CAD/ MI    Plan: Will start manual PD with 4.25% dextrose, for volume control  Will run 4 times/day today  Recheck chemistry  Following h/h and transfuse as needed  DM management  Vent management per [facility]  Cardiology following    Will change back to 2.5% dextrose in AM  AM renal panel, PTH    D/w ICU team    Thanks for inviting us to participate in this patient's medical care.   We'll follow the patient closely with the medical team.    Jennifer Nickerson MD  [facility]  [phone]

## 2023-01-29 NOTE — PROGRESS NOTES
0130 : Admission assessment completed, see flowsheets. Patient intubated and sedated, and synchronous with ventilator settings. Responsive to pain with movement to extremities. Bilateral soft wrist restraints maintained to protect integrity of LDAs. Receiving orders from Hospitalist, nursing care in progress. 0219 : Lost one IV access. Attempted IV stick x 2, unsuccessful. ED called for assistance and will send staff over once available. IV abx on hold while Propofol infusing due to incompatibility. Hospitalist updated. 0300 : PD in progress. Patient tolerating without complication. 0350 : Patient agitated post IV insertion. Propofol titrated per order. 0720 : Bedside and Verbal shift change report given to Maryjane Hernandez RN (oncoming nurse) by Toni Moreno RN (offgoing nurse). Report included the following information SBAR, Kardex, Intake/Output, MAR, and Recent Results.

## 2023-01-29 NOTE — PROGRESS NOTES
[facility] Pharmacokinetic Monitoring Service - Vancomycin    Wilfred East is a 43 y.o. female starting on vancomycin therapy for SEPSIS. Pharmacy consulted by Dr. Tawnya August for monitoring and adjustment. Target Concentration: Pre-Dialysis Concentration 15-20 mg/L  Additional Antimicrobials: zosyn    Pertinent Laboratory Values: Wt Readings from Last 1 Encounters:   01/28/23 100 kg (220 lb 6.4 oz)     Temp Readings from Last 1 Encounters:   01/29/23 97.8 °F (36.6 °C)     No components found for: PROCAL  Recent Labs     01/28/23  2320   WBC 12.5     Plan:  Concentration-guided dosing due to renal impairment and intermittent hemodialysis   Start vancomycin 1250 mg IV once on 1/29/2023 @0200. Pharmacy will continue to monitor patient and adjust therapy as indicated    Thank you for the consult,  Michaela Negro PharmD.   [phone]  1/29/2023 2:31 AM

## 2023-01-29 NOTE — ED PROVIDER NOTES
THE [facility], [address]       Pt Name: Singh Perez  MRN: [id number]  [place] 1980  Date of evaluation: 1/28/2023  Provider: Laury Sandoval MD   PCP: Fransisco Olmstead MD  Note Started: 11:17 PM 1/28/23     CHIEF COMPLAINT       Chief Complaint   Patient presents with    Respiratory Distress    Cough        HISTORY OF PRESENT ILLNESS: 1 or more elements      History From: Patient and EMS  HPI Limitations : Patient Celia Bird is a 43 y.o. female who the emergency department by EMS because of respiratory difficulty. She reports that she has had a cough today, and it has worsened progressively through the day. Had no fevers or chills. She does peritoneal dialysis, and she reports that she has not changed her usual routine. She has had no more increase in her oral intake of water or fluids. She denies any chest pain or abdominal pain. She has had no ill contacts. In route, EMS put her on 100% nonrebreather, which she was somewhat able to cooperate with, after her oxygen saturation was in the mid 80s on room air. Nursing Notes were all reviewed and agreed with or any disagreements were addressed in the HPI. REVIEW OF SYSTEMS      Review of Systems     Positives and Pertinent negatives as per HPI. PAST HISTORY     Past Medical History:  Past Medical History:   Diagnosis Date    BMI 34.0-34.9,adult 1/29/2023    Cardiomyopathy ([facility] [address]     Chronic kidney disease     Coronary arteriosclerosis     DM (diabetes mellitus) ([facility] [address]     Hypertension     Tachycardia        Past Surgical History:  History reviewed. No pertinent surgical history.     Family History:  Family History   Problem Relation Age of Onset    Diabetes Mother     Heart Disease Mother     Heart Attack Mother     Heart Attack Father     Heart Disease Father     Diabetes Father        Social History:  Social History     Tobacco Use    Smoking status: Never    Smokeless tobacco: Never Substance Use Topics    Alcohol use: Yes     Comment: rarely       Allergies: Allergies   Allergen Reactions    Coreg [Carvedilol] Nausea and Vomiting    Lortab [Hydrocodone-Acetaminophen] Nausea and Vomiting       CURRENT MEDICATIONS      Current Discharge Medication List        CONTINUE these medications which have NOT CHANGED    Details   bisacodyL 5 mg tab Take 5 mg by mouth two (2) times a day. metoclopramide HCl (REGLAN) 5 mg tablet Take 5 mg by mouth as needed for Nausea or Vomiting.      sodium bicarbonate 650 mg tablet Take 650 mg by mouth two (2) times a day. albuterol (Ventolin HFA) 90 mcg/actuation inhaler Take 2 Puffs by inhalation every six (6) hours as needed for Wheezing. atorvastatin (LIPITOR) 80 mg tablet Take 80 mg by mouth daily. clopidogreL (Plavix) 75 mg tab Take 75 mg by mouth daily. cloNIDine HCL (CATAPRES) 0.2 mg tablet Take 0.2 mg by mouth two (2) times a day. hydrALAZINE (APRESOLINE) 100 mg tablet Take 100 mg by mouth three (3) times daily. mupirocin (BACTROBAN) 2 % ointment Apply  to affected area three (3) times daily. candesartan (ATACAND) 32 mg tablet Take 32 mg by mouth daily. Indications: high blood pressure      isosorbide mononitrate ER (IMDUR) 120 mg CR tablet Take 120 mg by mouth every morning. metoprolol succinate (TOPROL-XL) 100 mg tablet Take 100 mg by mouth two (2) times a day. insulin aspart U-100 (NOVOLOG) 100 unit/mL injection 15 Units by SubCUTAneous route Before breakfast, lunch, and dinner. fluticasone propionate (FLOVENT HFA) 44 mcg/actuation inhaler Take 1 Puff by inhalation as needed. calcium acetate,phosphat bind, (PHOSLO) 667 mg cap Take 2 Capsules by mouth three (3) times daily (with meals). calcitRIOL (ROCALTROL) 0.25 mcg capsule Take 0.25 mcg by mouth daily. NIFEdipine ER (PROCARDIA XL) 90 mg ER tablet Take 90 mg by mouth daily.       levothyroxine (SYNTHROID) 200 mcg tablet Take 200 mcg by mouth Daily (before breakfast). cinacalcet (SENSIPAR) 30 mg tablet Take 60 mg by mouth daily. bumetanide (BUMEX) 2 mg tablet Take 2 mg by mouth two (2) times a day. aspirin 81 mg chewable tablet Take 81 mg by mouth daily. SCREENINGS               No data recorded         PHYSICAL EXAM      ED Triage Vitals   ED Encounter Vitals Group      BP 01/28/23 2235 (!) 181/121      Pulse (Heart Rate) 01/28/23 2225 (!) 118      Resp Rate 01/28/23 2225 22      Temp 01/28/23 2225 98.1 °F (36.7 °C)      Temp src --       O2 Sat (%) 01/28/23 2225 90 %      Weight 01/28/23 2257 220 lb 6.4 oz      Height --         Physical Exam  Constitutional:       General: She is in acute distress. Appearance: She is well-developed. She is ill-appearing. Comments: Middle-aged -American woman with significant agitation and respiratory effort, coughing up purulent sputum. HENT:      Head: Normocephalic. Mouth/Throat:      Mouth: Mucous membranes are moist.   Eyes:      Pupils: Pupils are equal, round, and reactive to light. Cardiovascular:      Rate and Rhythm: Regular rhythm. Tachycardia present. Pulmonary:      Effort: Accessory muscle usage and respiratory distress present. Breath sounds: Examination of the right-upper field reveals rhonchi and rales. Examination of the left-upper field reveals rhonchi and rales. Examination of the right-middle field reveals rhonchi and rales. Examination of the left-middle field reveals rhonchi and rales. Examination of the right-lower field reveals rhonchi and rales. Examination of the left-lower field reveals rhonchi and rales. Rhonchi and rales present. No decreased breath sounds or wheezing. Chest:      Chest wall: No deformity or tenderness. Musculoskeletal:         General: Normal range of motion. Cervical back: Normal range of motion. Right lower leg: Edema present. Left lower leg: Edema present.    Skin:     General: Skin is warm and dry. Neurological:      Mental Status: She is alert and oriented to person, place, and time. Psychiatric:         Behavior: Behavior is agitated. DIAGNOSTIC RESULTS   LABS:       Recent Results (from the past 12 hour(s))   EKG, 12 LEAD, INITIAL    Collection Time: 01/28/23 10:23 PM   Result Value Ref Range    Ventricular Rate 116 BPM    Atrial Rate 116 BPM    P-R Interval 154 ms    QRS Duration 96 ms    Q-T Interval 330 ms    QTC Calculation (Bezet) 458 ms    Calculated P Axis 63 degrees    Calculated R Axis -40 degrees    Calculated T Axis 94 degrees    Diagnosis       Sinus tachycardia  Left atrial enlargement  Left axis deviation  Left ventricular hypertrophy ( [place] product )  Nonspecific ST and T wave abnormality  Abnormal ECG  When compared with ECG of 16-NOV-2022 01:59,  T wave inversion less evident in Lateral leads     LACTIC ACID    Collection Time: 01/28/23 11:20 PM   Result Value Ref Range    Lactic acid 6.6 (HH) 0.4 - 2.0 MMOL/L   METABOLIC PANEL, COMPREHENSIVE    Collection Time: 01/28/23 11:20 PM   Result Value Ref Range    Sodium 137 136 - 145 mmol/L    Potassium 5.0 3.5 - 5.5 mmol/L    Chloride 96 (L) 100 - 111 mmol/L    CO2 23 21 - 32 mmol/L    Anion gap 18 3.0 - 18 mmol/L    Glucose 162 (H) 74 - 99 mg/dL    BUN 71 (H) 7.0 - 18 MG/DL    Creatinine 14.20 (H) 0.6 - 1.3 MG/DL    BUN/Creatinine ratio 5 (L) 12 - 20      eGFR 3 (L) >60 ml/min/1.73m2    Calcium 10.1 8.5 - 10.1 MG/DL    Bilirubin, total 0.4 0.2 - 1.0 MG/DL    ALT (SGPT) 17 13 - 56 U/L    AST (SGOT) 20 10 - 38 U/L    Alk.  phosphatase 127 (H) 45 - 117 U/L    Protein, total 6.7 6.4 - 8.2 g/dL    Albumin 2.6 (L) 3.4 - 5.0 g/dL    Globulin 4.1 (H) 2.0 - 4.0 g/dL    A-G Ratio 0.6 (L) 0.8 - 1.7     CBC WITH AUTOMATED DIFF    Collection Time: 01/28/23 11:20 PM   Result Value Ref Range    WBC 12.5 4.6 - 13.2 K/uL    RBC 2.70 (L) 4.20 - 5.30 M/uL    HGB 7.7 (L) 12.0 - 16.0 g/dL    HCT 26.2 (L) 35.0 - 45.0 %    MCV 97.0 78.0 - 100.0 FL    MCH 28.5 24.0 - 34.0 PG    MCHC 29.4 (L) 31.0 - 37.0 g/dL    RDW 14.7 (H) 11.6 - 14.5 %    PLATELET 867 928 - 539 K/uL    MPV 10.2 9.2 - 11.8 FL    NRBC 0.2 (H) 0  WBC    ABSOLUTE NRBC 0.02 (H) 0.00 - 0.01 K/uL    NEUTROPHILS 75 (H) 40 - 73 %    LYMPHOCYTES 9 (L) 21 - 52 %    MONOCYTES 1 (L) 3 - 10 %    EOSINOPHILS 14 (H) 0 - 5 %    BASOPHILS 1 0 - 2 %    IMMATURE GRANULOCYTES 1 (H) 0.0 - 0.5 %    ABS. NEUTROPHILS 9.3 (H) 1.8 - 8.0 K/UL    ABS. LYMPHOCYTES 1.1 0.9 - 3.6 K/UL    ABS. MONOCYTES 0.1 0.05 - 1.2 K/UL    ABS. EOSINOPHILS 1.7 (H) 0.0 - 0.4 K/UL    ABS. BASOPHILS 0.1 0.0 - 0.1 K/UL    ABS. IMM. GRANS. 0.1 (H) 0.00 - 0.04 K/UL    DF AUTOMATED     COVID-19 WITH INFLUENZA A/B    Collection Time: 01/28/23 11:20 PM   Result Value Ref Range    SARS-CoV-2 by PCR Not detected NOTD      Influenza A by PCR Not detected NOTD      Influenza B by PCR Not detected NOTD     NT-PRO BNP    Collection Time: 01/28/23 11:20 PM   Result Value Ref Range    NT pro-BNP 83,617 (H) 0 - 450 PG/ML   TROPONIN-HIGH SENSITIVITY    Collection Time: 01/28/23 11:20 PM   Result Value Ref Range    Troponin-High Sensitivity 267 (HH) 0 - 54 ng/L   BLOOD GAS, ARTERIAL POC    Collection Time: 01/28/23 11:38 PM   Result Value Ref Range    Device: ADULT VENT      FIO2 (POC) 100 %    pH (POC) 7.29 (L) 7.35 - 7.45      pCO2 (POC) 51.1 (H) 35.0 - 45.0 MMHG    pO2 (POC) 103 (H) 80 - 100 MMHG    HCO3 (POC) 24.4 22 - 26 MMOL/L    sO2 (POC) 97.1 (H) 92 - 97 %    Base deficit (POC) 2.4 mmol/L    Mode ASSIST CONTROL      Tidal volume 400 ml    Set Rate 15 bpm    PEEP/CPAP (POC) 5 cmH2O    Allens test (POC) NOT APPLICABLE      Site RIGHT RADIAL      Patient temp.  98      Specimen type (POC) ARTERIAL      Performed by Taya COLLADO/ JOHN MICROSCOPIC    Collection Time: 01/29/23 12:38 AM   Result Value Ref Range    Color YELLOW      Appearance CLEAR      Specific gravity 1.011 1.005 - 1.030      pH (UA) 8.0 5.0 - 8.0 Protein 300 (A) NEG mg/dL    Glucose 100 (A) NEG mg/dL    Ketone Negative NEG mg/dL    Bilirubin Negative NEG      Blood SMALL (A) NEG      Urobilinogen 0.2 0.2 - 1.0 EU/dL    Nitrites Negative NEG      Leukocyte Esterase Negative NEG     URINE MICROSCOPIC ONLY    Collection Time: 01/29/23 12:38 AM   Result Value Ref Range    WBC Negative 0 - 5 /hpf    RBC 0 to 3 0 - 5 /hpf    Epithelial cells 2+ 0 - 5 /lpf    Bacteria FEW (A) NEG /hpf   DRUG SCREEN, URINE    Collection Time: 01/29/23 12:38 AM   Result Value Ref Range    BENZODIAZEPINES Negative NEG      BARBITURATES Negative NEG      THC (TH-CANNABINOL) Negative NEG      OPIATES Negative NEG      PCP(PHENCYCLIDINE) Negative NEG      COCAINE Negative NEG      AMPHETAMINES Negative NEG      METHADONE Negative NEG      HDSCOM (NOTE)    GLUCOSE, POC    Collection Time: 01/29/23  1:25 AM   Result Value Ref Range    Glucose (POC) 130 (H) 70 - 110 mg/dL        EKG: sinus tachycardia, , LVH, LAE, Nonspecific ST changes. Interpreted by ED provider Dr Claudis Simmonds, MD     RADIOLOGY:  Non-plain film images such as CT, Ultrasound and MRI are read by the radiologist. Ulus Feather radiographic images are visualized and preliminarily interpreted by the ED Provider with the below findings:     Significant pulmonary edema vs ARDS      Interpretation per the Radiologist below, if available at the time of this note:     XR ABD (KUB)    Result Date: 1/29/2023  CLINICAL HISTORY: NG tube placement Single KUB demonstrates a normal bowel gas pattern. NG tube has been placed with the tip projecting over the fundus. Peritoneal dialysis catheter projects over the left lower quadrant. No acute process. NG tube satisfactory position. CTA CHEST W OR W WO CONT    Result Date: 1/29/2023  EXAM:  CTA CHEST W OR W WO CONT, CT ABD PELV W CONT INDICATION:   respiratory failure, end-stage renal disease COMPARISON: 11/15/2022.  CHEST CTA: TECHNIQUE: Precontrast  images were obtained to localize the volume for acquisition. Multislice helical CT arteriography was performed from the diaphragm to the thoracic inlet during uneventful rapid bolus of 100 cc Isovue-370. Lung and soft tissue windows were generated. Coronal and sagittal images were generated and 3D post processing consisting of coronal maximum intensity images was performed. CT dose reduction was achieved through use of a standardized protocol tailored for this examination and automatic exposure control for dose modulation. FINDINGS: CHEST: THYROID: No nodule. MEDIASTINUM: No mass or lymphadenopathy. TABATHA: No mass or lymphadenopathy. THORACIC AORTA: No dissection or aneurysm. MAIN PULMONARY ARTERY: There is no evidence of pulmonary embolism. TRACHEA/BRONCHI: ET tube satisfactory position. ESOPHAGUS: No wall thickening or dilatation. HEART: Dilatation of the left atrium and left ventricle. Coronary artery calcification: Present. PLEURA: Small right pleural effusion. LUNGS: Significant bilateral lower lobe consolidation and substantial diffuse bilateral pulmonary infiltrates. BONES: No destructive bone lesion. Significant bilateral lower lobe consolidation and bilateral pulmonary infiltrates. Small right pleural effusion. No evidence of pulmonary embolism. ABDOMEN/PELVIC CT: TECHNIQUE: Following the uneventful intravenous administration of 100 cc Isovue-370, thin axial images were obtained through the abdomen and pelvis. Coronal and sagittal reconstructions were generated. Oral contrast was not administered. CT dose reduction was achieved through use of a standardized protocol tailored for this examination and automatic exposure control for dose modulation. FINDINGS: LIVER: No mass or biliary dilatation. GALLBLADDER: Unremarkable. SPLEEN: No mass. PANCREAS: No mass or ductal dilatation. ADRENALS: Stable small left adrenal adenoma. No follow-up required. KIDNEYS: No mass, calculus, or hydronephrosis. STOMACH: Unremarkable.  NG tube tip terminates within the fundus. SMALL BOWEL: No dilatation or wall thickening. COLON: No dilatation or wall thickening. APPENDIX: Within normal limits. PERITONEUM: No ascites or pneumoperitoneum. Peritoneal dialysis catheter is coiled within the left lower quadrant. RETROPERITONEUM: No lymphadenopathy or aortic aneurysm. REPRODUCTIVE ORGANS: Unremarkable. URINARY BLADDER: Decompressed by Torres catheter. BONES: Degenerative changes are seen in the lumbar spine. ADDITIONAL COMMENTS: N/A IMPRESSION: Peritoneal dialysis catheter is coiled within the left lower quadrant. No acute abnormality is identified. CT ABD PELV W CONT    Result Date: 1/29/2023  EXAM:  CTA CHEST W OR W WO CONT, CT ABD PELV W CONT INDICATION:   respiratory failure, end-stage renal disease COMPARISON: 11/15/2022. CHEST CTA: TECHNIQUE: Precontrast  images were obtained to localize the volume for acquisition. Multislice helical CT arteriography was performed from the diaphragm to the thoracic inlet during uneventful rapid bolus of 100 cc Isovue-370. Lung and soft tissue windows were generated. Coronal and sagittal images were generated and 3D post processing consisting of coronal maximum intensity images was performed. CT dose reduction was achieved through use of a standardized protocol tailored for this examination and automatic exposure control for dose modulation. FINDINGS: CHEST: THYROID: No nodule. MEDIASTINUM: No mass or lymphadenopathy. TABATHA: No mass or lymphadenopathy. THORACIC AORTA: No dissection or aneurysm. MAIN PULMONARY ARTERY: There is no evidence of pulmonary embolism. TRACHEA/BRONCHI: ET tube satisfactory position. ESOPHAGUS: No wall thickening or dilatation. HEART: Dilatation of the left atrium and left ventricle. Coronary artery calcification: Present. PLEURA: Small right pleural effusion. LUNGS: Significant bilateral lower lobe consolidation and substantial diffuse bilateral pulmonary infiltrates.  BONES: No destructive bone lesion. Significant bilateral lower lobe consolidation and bilateral pulmonary infiltrates. Small right pleural effusion. No evidence of pulmonary embolism. ABDOMEN/PELVIC CT: TECHNIQUE: Following the uneventful intravenous administration of 100 cc Isovue-370, thin axial images were obtained through the abdomen and pelvis. Coronal and sagittal reconstructions were generated. Oral contrast was not administered. CT dose reduction was achieved through use of a standardized protocol tailored for this examination and automatic exposure control for dose modulation. FINDINGS: LIVER: No mass or biliary dilatation. GALLBLADDER: Unremarkable. SPLEEN: No mass. PANCREAS: No mass or ductal dilatation. ADRENALS: Stable small left adrenal adenoma. No follow-up required. KIDNEYS: No mass, calculus, or hydronephrosis. STOMACH: Unremarkable. NG tube tip terminates within the fundus. SMALL BOWEL: No dilatation or wall thickening. COLON: No dilatation or wall thickening. APPENDIX: Within normal limits. PERITONEUM: No ascites or pneumoperitoneum. Peritoneal dialysis catheter is coiled within the left lower quadrant. RETROPERITONEUM: No lymphadenopathy or aortic aneurysm. REPRODUCTIVE ORGANS: Unremarkable. URINARY BLADDER: Decompressed by Torres catheter. BONES: Degenerative changes are seen in the lumbar spine. ADDITIONAL COMMENTS: N/A IMPRESSION: Peritoneal dialysis catheter is coiled within the left lower quadrant. No acute abnormality is identified. XR CHEST PORT    Result Date: 1/29/2023  Indication: ET tube placement Comparison to 1/28/2023. Portable exam obtained at 1245 demonstrates interval placement of an ET tube, projecting at the thoracic inlet. NG tube has been placed but the tip is not visualized. Bilateral pulmonary infiltrates have increased compared to prior exam.     ET tube tip projects at the thoracic inlet. Increased bilateral pulmonary infiltrates.      XR CHEST PORT    Result Date: 1/28/2023  Indication: Shortness of breath Comparison: 11/15/2022 Portable exam of the chest obtained at 2249 demonstrates mild cardiomegaly. Moderate bilateral pulmonary infiltrates are noted. The osseous structures are unremarkable. Moderate bilateral pulmonary infiltrates most likely related to pulmonary edema. PROCEDURES   Unless otherwise noted below, none  Intubation    Date/Time: 1/28/2023 11:30 PM  Performed by: Janina Duong MD  Authorized by: Janina Duong MD     Consent:     Consent obtained:  Verbal and emergent situation    Consent given by:  Patient    Risks discussed:  Aspiration, brain injury, pneumothorax and hypoxia  Universal protocol:     Patient identity confirmed:  Verbally with patient and hospital-assigned identification number  Pre-procedure details:     Indication: failure to oxygenate      Patient status:  Altered mental status    Mouth opening - incisor distance:  2 finger widths    Hyoid-mental distance: 2 finger widths      Mallampati score:  IV    Obstruction: none      Neck mobility: normal      Pharmacologic strategy: RSI      Induction agents:  Propofol    Paralytic: Vecuronium. Procedure details:     Preoxygenation:  None (Unable to achieve any preoxygenation; SpO2 persistently dropping.)    Intubation technique: video assisted      Laryngoscope blade:   Mac 4    Bougie used: no      Grade view: II      Tube size (mm):  7.0    Tube type:  Cuffed    Number of attempts:  2    Tube visualized through cords: yes    Placement assessment:     ETT at teeth/gumline (cm):  23    Breath sounds:  Equal    Placement verification: chest rise, colorimetric ETCO2, CXR verification and endoscopic      CXR findings:  Appropriate position  Post-procedure details:     Procedure completion:  Tolerated     CRITICAL CARE TIME   CRITICAL CARE NOTE :    IMPENDING DETERIORATION -Airway, Respiratory, Cardiovascular, and CNS  ASSOCIATED RISK FACTORS - Hypoxia and Metabolic changes  MANAGEMENT- Bedside Assessment and Supervision of Care  INTERPRETATION -  Xrays, CT Scan, Blood Gases, ECG, and Blood Pressure  INTERVENTIONS - hemodynamic mngmt, vent mngmt, and gastric tube  CASE REVIEW - Hospitalist/Intensivist  TREATMENT RESPONSE -Stable  PERFORMED BY - Self    NOTES   :  I have spent 60 minutes of critical care time involved in lab review, consultations with specialist, family decision- making, bedside attention and documentation; time exculsive of EKG review/interpretation. During this entire length of time I was immediately available to the patient. This critical care is independent of procedures, which are billed separately.         EMERGENCY DEPARTMENT COURSE and DIFFERENTIAL DIAGNOSIS/MDM   Vitals:    Vitals:    01/29/23 0043 01/29/23 0046 01/29/23 0131 01/29/23 0137   BP:  130/86     Pulse: 88 88 77    Resp: 23 23 25    Temp:    97.8 °F (36.6 °C)   SpO2:   100%    Weight:            Patient was given the following medications:  Medications   sodium chloride (NS) flush 5-10 mL (has no administration in time range)   peritoneal dialysis DEXTROSE 4.25% (2.5 mEq/L low calcium) solution 2,000 mL (has no administration in time range)   propofol (DIPRIVAN) 10 mg/mL infusion (35 mcg/kg/min × 100 kg IntraVENous Rate Change 1/29/23 0137)   sodium chloride (NS) flush 5-40 mL (has no administration in time range)   sodium chloride (NS) flush 5-40 mL (has no administration in time range)   acetaminophen (TYLENOL) tablet 650 mg (has no administration in time range)     Or   acetaminophen (TYLENOL) suppository 650 mg (has no administration in time range)   polyethylene glycol (MIRALAX) packet 17 g (has no administration in time range)   ondansetron (ZOFRAN ODT) tablet 4 mg (has no administration in time range)     Or   ondansetron (ZOFRAN) injection 4 mg (has no administration in time range)   heparin (porcine) injection 5,000 Units (has no administration in time range)   famotidine (PF) (PEPCID) 20 mg in 0.9% sodium chloride 10 mL injection (has no administration in time range)   insulin lispro (HUMALOG) injection (has no administration in time range)   glucose chewable tablet 16 g (has no administration in time range)   glucagon (GLUCAGEN) injection 1 mg (has no administration in time range)   dextrose 10% infusion 0-250 mL (has no administration in time range)   vancomycin (VANCOCIN) 1250 mg in  ml infusion (has no administration in time range)   Vancomycin-Pharmacy to Dose (has no administration in time range)   morphine 10 mg/mL injection 6 mg (has no administration in time range)   fentaNYL (PF) 900 mcg/30 ml infusion soln (has no administration in time range)   LORazepam (ATIVAN) injection 2 mg (2 mg IntraVENous Given 1/28/23 2234)   furosemide (LASIX) injection 80 mg (80 mg IntraVENous Given 1/28/23 2235)   vecuronium (NORCURON) injection (10 mg IntraVENous Given 1/28/23 2256)   propofoL (DIPRIVAN) 10 mg/mL injection (100 mg IntraVENous Given 1/28/23 2256)   piperacillin-tazobactam (ZOSYN) 3.375 g in 0.9% sodium chloride (MBP/ADV) 100 mL MBP (0 g IntraVENous IV Completed 1/29/23 0013)   iopamidoL (ISOVUE-370) 370 mg iodine /mL (76 %) injection 100 mL (100 mL IntraVENous Given 1/29/23 0052)       CONSULTS: (Who and What was discussed)  IP CONSULT TO HOSPITALIST  IP CONSULT TO NEPHROLOGY  IP CONSULT TO INTENSIVIST  IP CONSULT TO PALLIATIVE CARE - PROVIDER  IP CONSULT TO CARDIOLOGY    Chronic Conditions: ESRD    Social Determinants affecting Dx or Tx: None    Records Reviewed (source and summary of external notes): Prior medical records, Previous Radiology studies, Previous Laboratory studies, Previous EKGs, and EMS run sheet    MEDICAL DECISION MAKING:     CC/HPI Summary, DDx, ED Course, and Reassessment: Patient with significant fluid overload, although there is a large amount of purulent sputum that she seems to be producing.   She reports that she does her own peritoneal dialysis, but this is second time in three months that she has come to the hospital in fulminant pulmonary edema. Have consulted hospitalist, intensivist, and nephrologist. CTA at the behest of the hospitalist, although I doubt that there is a PE. Will be difficult to discern whether this is all pulmonary edema or if there are also infiltrates. Disposition Considerations (Tests not done, Shared Decision Making, Pt Expectation of Test or Tx.): none     FINAL IMPRESSION   Pulmonary edema     DISPOSITION/PLAN   Admitted    Admit Note: Pt is being admitted by Dr. Tawnya August. The results of their tests and reason(s) for their admission have been discussed with pt and/or available family. They convey agreement and understanding for the need to be admitted and for the admission diagnosis. PATIENT REFERRED TO:  Follow-up Information    None           DISCHARGE MEDICATIONS:  Current Discharge Medication List            DISCONTINUED MEDICATIONS:  Current Discharge Medication List          I am the Primary Clinician of Record. Eldon Kearney MD (electronically signed)    (Please note that parts of this dictation were completed with voice recognition software. Quite often unanticipated grammatical, syntax, homophones, and other interpretive errors are inadvertently transcribed by the computer software. Please disregards these errors.  Please excuse any errors that have escaped final proofreading.)

## 2023-01-29 NOTE — PROGRESS NOTES
RESPIRATORY NOTE:       Ventilator changes made per Dr. Francois Ovalle, ACVC+ 14/ 400/ PEEP 8/ FIO2 titrated 50 50% at this time, will continue to monitor.

## 2023-01-29 NOTE — CONSULTS
Pulmonary Specialists  Pulmonary, Critical Care, and Sleep Medicine    Name: Singh Perez MRN: [id number]   : 1980 Hospital: [facility] [place]    Date: 2023  Room: [address] [place] [address] Note                                              Consult requesting physician: Dr. Loni Patterson  Reason for Consult: Acute respiratory failure on ventilator support      IMPRESSION:   Acute respiratory failure with hypoxemia and hypercapnia  Acute exacerbation of CHF  Bilateral pulmonary infiltrates  End-stage renal disease on peritoneal dialysis  Hypertensive emergency  Anemia of chronic disease  Left foot ulcer/cellulitis  Peripheral arterial disease  Hypertension  Type 2 diabetes mellitus  Coronary artery disease      Patient Active Problem List   Diagnosis Code    Acute pulmonary edema (HCC) J81.0    Chest pain R07.9    ESRD (end stage renal disease) on dialysis ([facility] [facility] N18.6, Z99.2    Pulmonary edema J81.1    Cardiomyopathy ([facility] [facility] I42.9    HTN (hypertension) I10    Type 2 diabetes mellitus (HCC) E11.9    PAD (peripheral artery disease) (HCC) I73.9    CAD (coronary artery disease) I25.10    Acute respiratory failure with hypoxia and hypercapnia (HCC) J96.01, J96.02    Hypertensive emergency I16.1    Acute exacerbation of CHF (congestive heart failure) ([facility] [address] [facility] I50.9    BMI 34.0-34.9,adult Z68.34    Peritoneal dialysis status ([facility] [facility] Z99.2    Diabetic ulcer of left foot associated with type 2 diabetes mellitus, with fat layer exposed ([facility] [address] E11.621, L97.522    Sepsis (HCC) A41.9    Cellulitis of fourth toe, left L03.032    Chronic anemia D64.9         Code status: Full code      RECOMMENDATIONS:   Respiratory: Patient present with acute respiratory distress, with volume overload, bilateral pulmonary infiltrates on chest CT, and intubated in the ER. VCV mode of ventilation; ABG improved, but shows respiratory alkalosis; ventilator settings adjusted-14/400/50 percent/PEEP 8.   Chest x-ray and chest CT reviewed-good ET tube position; good OG tube position; diffuse bilateral infiltrates and consolidations suggestive of volume overload; small right basal effusion; no significant medius adenopathy. Ventilator and sedation bundles ordered. Sedation-propofol drip; add fentanyl drip if needed. Keep SPO2 >=92%. HOB 30 degree elevation all the time. Aggressive pulmonary toileting. Aspiration precautions. Incentive spirometry after extubation. CVS: Known patient with coronary artery disease and cardiomyopathy/low EF; EKG on admission-nil acute. Manage hypertension with sedation drips; prn IV hydralazine added; if needed, clonidine patch can be added to keep systolic blood pressure less than 160 mmHg. Prior EF of 35-40%; mild pulmonary hypertension. Monitor troponin; cardiology Dr. Gaston Quevedo consulted. ID: Treat as sepsis; lactic acidosis has resolved. Broad-spectrum antibiotic-Zosyn and IV vancomycin-renal dosing. Monitor blood cultures; send peritoneal fluid culture. Follow-up on chest x-ray tomorrow. Deescalate antibiotic when appropriate. Hematology/Oncology: Anemia of chronic disease; hemoglobin 6.3 this morning; transfuse 1 unit PRBC due to history of coronary artery disease. Platelets normal; check coags. No active bleeding issues. Renal: Nephrology has been consulted for peritoneal dialysis. Monitor renal function and potassium levels. GI: CT abdomen-nil acute; history of peritoneal dialysis catheter. Endocrine: Monitor BS. SSI. Neurology: Intubated and currently sedated. Toxicology: UDS-negative. Skin/Wound: Wound care consult for left foot ulcer. Vascular: Check bilateral lower extremity arterial ultrasound. Nutrition:   Prophylaxis: DVT Prophylaxis: SCD. GI Prophylaxis: Protonix. Restraints: Prn wrist soft restraints for patient interfering with medical therapy/management and patient safety. Lines/Tubes: PIVs; RT arm midline  ETT: 1/29. OGT/NGT: 1/29.    Torres: 1/29 (Medically necessary for strict input/output monitoring in critically ill patient, will remove it when not needed. Torres bundle followed). Other drains: Chronic PD catheter. Advance Directive/Palliative Care: consulted: Prognosis guarded overall. Quality Care: PPI, DVT prophylaxis, HOB elevated, Infection control all reviewed and addressed. Care of plan d/w RN, RT. Update family when available. Next of kin-boyfriend. Name Relation Home Work [address] Boyfriend [phone]     High complexity decision making was performed during the evaluation of this patient at high risk for decompensation with multiple organ involvement. Total critical care time spent rendering care exclusive of procedures/family discussion/coordination of care: 46 minutes. Addendum  RN was able to communicate with patient's siblings over the phone; subsequently, I talked to them and discussed patient care and management. Brother-Lenny, sister-Grecia. Discussed about patient presentation with respiratory distress and fluid overload, and intubated emergently in the ER; patient was very hypertensive as well; patient is currently on ventilator support, with sedation; peritoneal dialysis being done by nephrology; awaiting cardiac evaluation since patient has history of decreased heart function, and coronary circulation problems; anemia needing blood transfusion; patient is critically ill; prognosis is guarded. Subjective/History of Present Illness:     Patient is a 43 y.o. female with PMHx significant for multiple medical problems. Patient last admission was in November 2022 with NSTEMI and fluid overload, treated with peritoneal dialysis at that time. The patient has history of coronary artery disease, not amenable for coronary stenting-[facility] [place]; cardiomyopathy with EF 40%, end-stage renal disease on peritoneal dialysis, hypertension, type 2 diabetes mellitus, obesity.   The patient came to the ER last night with worsening shortness of breath symptoms. The patient was in respiratory distress, and could not tolerate BiPAP therapy. She was hypertensive with blood pressure 229/186 mmHg max. Patient subsequently needed to be intubated. Patient underwent CT studies-reviewed below. Patient has left foot ulcer. Patient was subsequently transferred to the ICU. Patient is intubated and sedated. No vomiting or diarrhea. No hematemesis or rectal bleeding. No bleeding from ET tube. Telemetry-sinus rhythm; blood pressure-elevated. Drips-propofol. Next of kin-boyfriend    Review of Systems:  ROS not obtained due to patient factor. Allergies   Allergen Reactions    Coreg [Carvedilol] Nausea and Vomiting    Lortab [Hydrocodone-Acetaminophen] Nausea and Vomiting      Past Medical History:   Diagnosis Date    BMI 34.0-34.9,adult 1/29/2023    Cardiomyopathy ([facility] [address]     Chronic kidney disease     Coronary arteriosclerosis     DM (diabetes mellitus) ([facility] [address]     Hypertension     Tachycardia       History reviewed. No pertinent surgical history. Social History     Tobacco Use    Smoking status: Never    Smokeless tobacco: Never   Substance Use Topics    Alcohol use: Yes     Comment: rarely      Family History   Problem Relation Age of Onset    Diabetes Mother     Heart Disease Mother     Heart Attack Mother     Heart Attack Father     Heart Disease Father     Diabetes Father       Prior to Admission medications    Medication Sig Start Date End Date Taking? Authorizing Provider   bisacodyL 5 mg tab Take 5 mg by mouth two (2) times a day. Provider, Historical   metoclopramide HCl (REGLAN) 5 mg tablet Take 5 mg by mouth as needed for Nausea or Vomiting. Provider, Historical   sodium bicarbonate 650 mg tablet Take 650 mg by mouth two (2) times a day. Provider, Historical   albuterol (Ventolin HFA) 90 mcg/actuation inhaler Take 2 Puffs by inhalation every six (6) hours as needed for Wheezing.     Provider, Historical   atorvastatin (LIPITOR) 80 mg tablet Take 80 mg by mouth daily. Provider, Historical   clopidogreL (Plavix) 75 mg tab Take 75 mg by mouth daily. Provider, Historical   cloNIDine HCL (CATAPRES) 0.2 mg tablet Take 0.2 mg by mouth two (2) times a day. Provider, Historical   hydrALAZINE (APRESOLINE) 100 mg tablet Take 100 mg by mouth three (3) times daily. Provider, Historical   mupirocin (BACTROBAN) 2 % ointment Apply  to affected area three (3) times daily. Provider, Historical   candesartan (ATACAND) 32 mg tablet Take 32 mg by mouth daily. Indications: high blood pressure    Provider, Historical   isosorbide mononitrate ER (IMDUR) 120 mg CR tablet Take 120 mg by mouth every morning. Provider, Historical   metoprolol succinate (TOPROL-XL) 100 mg tablet Take 100 mg by mouth two (2) times a day. Provider, Historical   insulin aspart U-100 (NOVOLOG) 100 unit/mL injection 15 Units by SubCUTAneous route Before breakfast, lunch, and dinner. Provider, Historical   fluticasone propionate (FLOVENT HFA) 44 mcg/actuation inhaler Take 1 Puff by inhalation as needed. Provider, Historical   calcium acetate,phosphat bind, (PHOSLO) 667 mg cap Take 2 Capsules by mouth three (3) times daily (with meals). Provider, Historical   calcitRIOL (ROCALTROL) 0.25 mcg capsule Take 0.25 mcg by mouth daily. Provider, Historical   NIFEdipine ER (PROCARDIA XL) 90 mg ER tablet Take 90 mg by mouth daily. Provider, Historical   levothyroxine (SYNTHROID) 200 mcg tablet Take 200 mcg by mouth Daily (before breakfast). Provider, Historical   cinacalcet (SENSIPAR) 30 mg tablet Take 60 mg by mouth daily. Provider, Historical   bumetanide (BUMEX) 2 mg tablet Take 2 mg by mouth two (2) times a day. Provider, Historical   aspirin 81 mg chewable tablet Take 81 mg by mouth daily.     Provider, Historical     Current Facility-Administered Medications   Medication Dose Route Frequency    peritoneal dialysis DEXTROSE 4.25% (2.5 mEq/L low calcium) solution 2,000 mL  2,000 mL IntraPERitoneal QID    sodium chloride (NS) flush 5-40 mL  5-40 mL IntraVENous Q8H    heparin (porcine) injection 5,000 Units  5,000 Units SubCUTAneous Q8H    famotidine (PF) (PEPCID) 20 mg in 0.9% sodium chloride 10 mL injection  20 mg IntraVENous BID    insulin lispro (HUMALOG) injection   SubCUTAneous Q6H    Vancomycin-Pharmacy to Dose  1 Each Other Rx Dosing/Monitoring    morphine 10 mg/mL injection 6 mg  6 mg IntraVENous NOW    fentaNYL (PF) 10 mcg/mL infusion  0-200 mcg/hr IntraVENous TITRATE    propofol (DIPRIVAN) 10 mg/mL infusion  0-50 mcg/kg/min IntraVENous TITRATE         Objective:   Vital Signs:    Visit Vitals  BP (!) 151/96   Pulse 73   Temp 97.5 °F (36.4 °C)   Resp 18   Ht 5' 7\" (1.702 m)   Wt 89.7 kg (197 lb 12 oz)   SpO2 96%   BMI 30.97 kg/m²       O2 Device: Non-rebreather mask       Temp (24hrs), Av.8 °F (36.6 °C), Min:97.5 °F (36.4 °C), Max:98.1 °F (36.7 °C)       Intake/Output:   Last shift:      No intake/output data recorded.     Last 3 shifts: 1901 -  0700  In: 100 [I.V.:100]  Out: 40 [Urine:40]      Intake/Output Summary (Last 24 hours) at 2023 0842  Last data filed at 2023 0600  Gross per 24 hour   Intake 100 ml   Output 40 ml   Net 60 ml       Last 3 Recorded Weights in this Encounter    23 2257 23 0400   Weight: 100 kg (220 lb 6.4 oz) 89.7 kg (197 lb 12 oz)         Ventilator Settings:  Mode Rate Tidal Volume Pressure FiO2 PEEP   Assist control, VC+   400 ml    60 % 5 cm H20     Peak airway pressure: 33 cm H2O    Plateau pressure:     Tidal volume:    Minute ventilation: 7.2 l/min     Recent Labs     23  0458 23  2338   PHI 7.52* 7.29*   PCO2I 30.2* 51.1*   PO2I 149* 103*   HCO3I 24.5 24.4   FIO2I 100 100       Physical Exam:   Intubated and sedated; acyanotic  HEENT: pupils not dilated, reactive, no scleral jaundice, moist oral mucosa, no nasal drainage  Neck: No adenopathy or thyroid swelling  Orotracheal and orogastric tubes-no bleeding or secretions  CVS: S1S2 no murmurs; JVD not elevated; telemetry-sinus rhythm  RS: Mod air entry bilaterally, decreased BS at bases, no wheezes, bilateral crackles, more prominent in the lower chest; not tachypneic or in distress  Abd: soft, non tender, no hepatosplenomegaly, no guarding or rigidity, bowel sounds heard  PD catheter in place; mild abdominal distention from PD fluid  Neuro: Intubated and sedated; limited exam   Extrm: Mild bilateral chronic/indurated leg edema   Skin: no rash; left foot ulcer at the junction of fourth and fifth toe  Lymphatic: no cervical or supraclavicular adenopathy  Vasc: Both feet cool; DPs not palpable         Data:       Recent Results (from the past 24 hour(s))   EKG, 12 LEAD, INITIAL    Collection Time: 01/28/23 10:23 PM   Result Value Ref Range    Ventricular Rate 116 BPM    Atrial Rate 116 BPM    P-R Interval 154 ms    QRS Duration 96 ms    Q-T Interval 330 ms    QTC Calculation (Bezet) 458 ms    Calculated P Axis 63 degrees    Calculated R Axis -40 degrees    Calculated T Axis 94 degrees    Diagnosis       Sinus tachycardia  Left atrial enlargement  Left axis deviation  Left ventricular hypertrophy ( Sulaiman product )  Nonspecific ST and T wave abnormality  Abnormal ECG  When compared with ECG of 16-NOV-2022 01:59,  T wave inversion less evident in Lateral leads     CULTURE, BLOOD    Collection Time: 01/28/23 11:20 PM    Specimen: Blood   Result Value Ref Range    Special Requests: NO SPECIAL REQUESTS      Culture result: NO GROWTH AFTER 7 HOURS     LACTIC ACID    Collection Time: 01/28/23 11:20 PM   Result Value Ref Range    Lactic acid 6.6 (HH) 0.4 - 2.0 MMOL/L   CULTURE, BLOOD    Collection Time: 01/28/23 11:20 PM    Specimen: Blood   Result Value Ref Range    Special Requests: NO SPECIAL REQUESTS      Culture result: NO GROWTH AFTER 7 HOURS     METABOLIC PANEL, COMPREHENSIVE    Collection Time: 01/28/23 11:20 PM   Result Value Ref Range    Sodium 137 136 - 145 mmol/L    Potassium 5.0 3.5 - 5.5 mmol/L    Chloride 96 (L) 100 - 111 mmol/L    CO2 23 21 - 32 mmol/L    Anion gap 18 3.0 - 18 mmol/L    Glucose 162 (H) 74 - 99 mg/dL    BUN 71 (H) 7.0 - 18 MG/DL    Creatinine 14.20 (H) 0.6 - 1.3 MG/DL    BUN/Creatinine ratio 5 (L) 12 - 20      eGFR 3 (L) >60 ml/min/1.73m2    Calcium 10.1 8.5 - 10.1 MG/DL    Bilirubin, total 0.4 0.2 - 1.0 MG/DL    ALT (SGPT) 17 13 - 56 U/L    AST (SGOT) 20 10 - 38 U/L    Alk. phosphatase 127 (H) 45 - 117 U/L    Protein, total 6.7 6.4 - 8.2 g/dL    Albumin 2.6 (L) 3.4 - 5.0 g/dL    Globulin 4.1 (H) 2.0 - 4.0 g/dL    A-G Ratio 0.6 (L) 0.8 - 1.7     CBC WITH AUTOMATED DIFF    Collection Time: 01/28/23 11:20 PM   Result Value Ref Range    WBC 12.5 4.6 - 13.2 K/uL    RBC 2.70 (L) 4.20 - 5.30 M/uL    HGB 7.7 (L) 12.0 - 16.0 g/dL    HCT 26.2 (L) 35.0 - 45.0 %    MCV 97.0 78.0 - 100.0 FL    MCH 28.5 24.0 - 34.0 PG    MCHC 29.4 (L) 31.0 - 37.0 g/dL    RDW 14.7 (H) 11.6 - 14.5 %    PLATELET 760 733 - 713 K/uL    MPV 10.2 9.2 - 11.8 FL    NRBC 0.2 (H) 0  WBC    ABSOLUTE NRBC 0.02 (H) 0.00 - 0.01 K/uL    NEUTROPHILS 75 (H) 40 - 73 %    LYMPHOCYTES 9 (L) 21 - 52 %    MONOCYTES 1 (L) 3 - 10 %    EOSINOPHILS 14 (H) 0 - 5 %    BASOPHILS 1 0 - 2 %    IMMATURE GRANULOCYTES 1 (H) 0.0 - 0.5 %    ABS. NEUTROPHILS 9.3 (H) 1.8 - 8.0 K/UL    ABS. LYMPHOCYTES 1.1 0.9 - 3.6 K/UL    ABS. MONOCYTES 0.1 0.05 - 1.2 K/UL    ABS. EOSINOPHILS 1.7 (H) 0.0 - 0.4 K/UL    ABS. BASOPHILS 0.1 0.0 - 0.1 K/UL    ABS. IMM.  GRANS. 0.1 (H) 0.00 - 0.04 K/UL    DF AUTOMATED     COVID-19 WITH INFLUENZA A/B    Collection Time: 01/28/23 11:20 PM   Result Value Ref Range    SARS-CoV-2 by PCR Not detected NOTD      Influenza A by PCR Not detected NOTD      Influenza B by PCR Not detected NOTD     NT-PRO BNP    Collection Time: 01/28/23 11:20 PM   Result Value Ref Range    NT pro-BNP 83,617 (H) 0 - 450 PG/ML   TROPONIN-HIGH SENSITIVITY Collection Time: 01/28/23 11:20 PM   Result Value Ref Range    Troponin-High Sensitivity 267 (HH) 0 - 54 ng/L   BLOOD GAS, ARTERIAL POC    Collection Time: 01/28/23 11:38 PM   Result Value Ref Range    Device: ADULT VENT      FIO2 (POC) 100 %    pH (POC) 7.29 (L) 7.35 - 7.45      pCO2 (POC) 51.1 (H) 35.0 - 45.0 MMHG    pO2 (POC) 103 (H) 80 - 100 MMHG    HCO3 (POC) 24.4 22 - 26 MMOL/L    sO2 (POC) 97.1 (H) 92 - 97 %    Base deficit (POC) 2.4 mmol/L    Mode ASSIST CONTROL      Tidal volume 400 ml    Set Rate 15 bpm    PEEP/CPAP (POC) 5 cmH2O    Allens test (POC) NOT APPLICABLE      Site RIGHT RADIAL      Patient temp.  98      Specimen type (POC) ARTERIAL      Performed by Eyvonne Gaucher W/ BARTX MICROSCOPIC    Collection Time: 01/29/23 12:38 AM   Result Value Ref Range    Color YELLOW      Appearance CLEAR      Specific gravity 1.011 1.005 - 1.030      pH (UA) 8.0 5.0 - 8.0      Protein 300 (A) NEG mg/dL    Glucose 100 (A) NEG mg/dL    Ketone Negative NEG mg/dL    Bilirubin Negative NEG      Blood SMALL (A) NEG      Urobilinogen 0.2 0.2 - 1.0 EU/dL    Nitrites Negative NEG      Leukocyte Esterase Negative NEG     URINE MICROSCOPIC ONLY    Collection Time: 01/29/23 12:38 AM   Result Value Ref Range    WBC Negative 0 - 5 /hpf    RBC 0 to 3 0 - 5 /hpf    Epithelial cells 2+ 0 - 5 /lpf    Bacteria FEW (A) NEG /hpf   DRUG SCREEN, URINE    Collection Time: 01/29/23 12:38 AM   Result Value Ref Range    BENZODIAZEPINES Negative NEG      BARBITURATES Negative NEG      THC (TH-CANNABINOL) Negative NEG      OPIATES Negative NEG      PCP(PHENCYCLIDINE) Negative NEG      COCAINE Negative NEG      AMPHETAMINES Negative NEG      METHADONE Negative NEG      HDSCOM (NOTE)    GLUCOSE, POC    Collection Time: 01/29/23  1:25 AM   Result Value Ref Range    Glucose (POC) 130 (H) 70 - 110 mg/dL   BLOOD GAS, ARTERIAL POC    Collection Time: 01/29/23  4:58 AM   Result Value Ref Range    Device: ADULT VENT      FIO2 (POC) 100 % pH (POC) 7.52 (H) 7.35 - 7.45      pCO2 (POC) 30.2 (L) 35.0 - 45.0 MMHG    pO2 (POC) 149 (H) 80 - 100 MMHG    HCO3 (POC) 24.5 22 - 26 MMOL/L    sO2 (POC) 99.5 (H) 92 - 97 %    Base excess (POC) 2.6 mmol/L    Mode ASSIST CONTROL      Tidal volume 400 ml    Set Rate 25 bpm    PEEP/CPAP (POC) 12 cmH2O    Allens test (POC) NOT APPLICABLE      Site RIGHT RADIAL      Patient temp. 98      Specimen type (POC) ARTERIAL      Performed by Rory Cordova    GLUCOSE, POC    Collection Time: 01/29/23  5:10 AM   Result Value Ref Range    Glucose (POC) 158 (H) 70 - 110 mg/dL   LACTIC ACID    Collection Time: 01/29/23  6:47 AM   Result Value Ref Range    Lactic acid 1.8 0.4 - 2.0 MMOL/L   CBC W/O DIFF    Collection Time: 01/29/23  6:47 AM   Result Value Ref Range    WBC 7.1 4.6 - 13.2 K/uL    RBC 2.25 (L) 4.20 - 5.30 M/uL    HGB 6.3 (L) 12.0 - 16.0 g/dL    HCT 20.7 (L) 35.0 - 45.0 %    MCV 92.0 78.0 - 100.0 FL    MCH 28.0 24.0 - 34.0 PG    MCHC 30.4 (L) 31.0 - 37.0 g/dL    RDW 14.5 11.6 - 14.5 %    PLATELET 773 310 - 415 K/uL    MPV 9.7 9.2 - 11.8 FL    NRBC 0.0 0  WBC    ABSOLUTE NRBC 0.00 0.00 - 6.74 K/uL   METABOLIC PANEL, COMPREHENSIVE    Collection Time: 01/29/23  6:47 AM   Result Value Ref Range    Sodium 136 136 - 145 mmol/L    Potassium 5.7 (H) 3.5 - 5.5 mmol/L    Chloride 97 (L) 100 - 111 mmol/L    CO2 25 21 - 32 mmol/L    Anion gap 14 3.0 - 18 mmol/L    Glucose 160 (H) 74 - 99 mg/dL    BUN 73 (H) 7.0 - 18 MG/DL    Creatinine 14.20 (H) 0.6 - 1.3 MG/DL    BUN/Creatinine ratio 5 (L) 12 - 20      eGFR 3 (L) >60 ml/min/1.73m2    Calcium 9.6 8.5 - 10.1 MG/DL    Bilirubin, total 0.4 0.2 - 1.0 MG/DL    ALT (SGPT) 15 13 - 56 U/L    AST (SGOT) 35 10 - 38 U/L    Alk.  phosphatase 103 45 - 117 U/L    Protein, total 5.5 (L) 6.4 - 8.2 g/dL    Albumin 2.1 (L) 3.4 - 5.0 g/dL    Globulin 3.4 2.0 - 4.0 g/dL    A-G Ratio 0.6 (L) 0.8 - 1.7           Chemistry Recent Labs     01/29/23  0647 01/28/23  2320   * 162*    137 K 5.7* 5.0   CL 97* 96*   CO2 25 23   BUN 73* 71*   CREA 14.20* 14.20*   CA 9.6 10.1   AGAP 14 18   BUCR 5* 5*    127*   TP 5.5* 6.7   ALB 2.1* 2.6*   GLOB 3.4 4.1*   AGRAT 0.6* 0.6*        Lactic Acid Lactic acid   Date Value Ref Range Status   01/29/2023 1.8 0.4 - 2.0 MMOL/L Final     Recent Labs     01/29/23  0647 01/28/23  2320   LAC 1.8 6.6*        Liver Enzymes Protein, total   Date Value Ref Range Status   01/29/2023 5.5 (L) 6.4 - 8.2 g/dL Final     Albumin   Date Value Ref Range Status   01/29/2023 2.1 (L) 3.4 - 5.0 g/dL Final     Globulin   Date Value Ref Range Status   01/29/2023 3.4 2.0 - 4.0 g/dL Final     A-G Ratio   Date Value Ref Range Status   01/29/2023 0.6 (L) 0.8 - 1.7   Final     Alk. phosphatase   Date Value Ref Range Status   01/29/2023 103 45 - 117 U/L Final     Recent Labs     01/29/23  0647 01/28/23  2320   TP 5.5* 6.7   ALB 2.1* 2.6*   GLOB 3.4 4.1*   AGRAT 0.6* 0.6*    127*        CBC w/Diff Recent Labs     01/29/23  0647 01/28/23  2320   WBC 7.1 12.5   RBC 2.25* 2.70*   HGB 6.3* 7.7*   HCT 20.7* 26.2*    390   GRANS  --  75*   LYMPH  --  9*   EOS  --  14*        Cardiac Enzymes No results found for: CPK, CK, CKMMB, CKMB, RCK3, CKMBT, CKNDX, CKND1, NAVIN, TROPT, TROIQ, JUAN CARLOS, TROPT, TNIPOC, BNP, BNPP     BNP No results found for: BNP, BNPP, XBNPT     Coagulation No results for input(s): PTP, INR, APTT, INREXT in the last 72 hours.       Thyroid  No results found for: T4, T3U, TSH, TSHEXT    No results found for: T4     Urinalysis Lab Results   Component Value Date/Time    Color YELLOW 01/29/2023 12:38 AM    Appearance CLEAR 01/29/2023 12:38 AM    Specific gravity 1.011 01/29/2023 12:38 AM    pH (UA) 8.0 01/29/2023 12:38 AM    Protein 300 (A) 01/29/2023 12:38 AM    Glucose 100 (A) 01/29/2023 12:38 AM    Ketone Negative 01/29/2023 12:38 AM    Bilirubin Negative 01/29/2023 12:38 AM    Urobilinogen 0.2 01/29/2023 12:38 AM    Nitrites Negative 01/29/2023 12:38 AM    Leukocyte Esterase Negative 01/29/2023 12:38 AM    Epithelial cells 2+ 01/29/2023 12:38 AM    Bacteria FEW (A) 01/29/2023 12:38 AM    WBC Negative 01/29/2023 12:38 AM    RBC 0 to 3 01/29/2023 12:38 AM          Culture data during this hospitalization. All Micro Results       Procedure Component Value Units Date/Time    CULTURE, BLOOD [[id number]] Collected: 01/28/23 2320    Order Status: Completed Specimen: Blood Updated: 01/29/23 0715     Special Requests: NO SPECIAL REQUESTS        Culture result: NO GROWTH AFTER 7 HOURS       CULTURE, BLOOD [[id number]] Collected: 01/28/23 2320    Order Status: Completed Specimen: Blood Updated: 01/29/23 0715     Special Requests: NO SPECIAL REQUESTS        Culture result: NO GROWTH AFTER 7 HOURS       CULTURE, BODY FLUID [place] STAIN [[id number]]     Order Status: Sent Specimen: Body Fluid from Peritoneal [place]     CULTURE, Alayna Abdirashid STAIN [[id number]] Collected: 01/29/23 0038    Order Status: Sent Specimen: Wound from Foot Updated: 01/29/23 0042    COVID-19 WITH INFLUENZA A/B [[id number]] Collected: 01/28/23 2320    Order Status: Completed Specimen: Nasopharyngeal Updated: 01/28/23 2356     SARS-CoV-2 by PCR Not detected        Comment: Not Detected results do not preclude SARS-CoV-2 infection and should not be used as the sole basis for patient management decisions. Results must be combined with clinical observations, patient history, and epidemiological information. Influenza A by PCR Not detected        Influenza B by PCR Not detected        Comment: Testing was performed using yunior Josie SARS-CoV-2 and Influenza A/B nucleic acid assay. This test is a multiplex Real-Time Reverse Transcriptase Polymerase Chain Reaction (RT-PCR) based in vitro diagnostic test intended for the qualitative detection of nucleic acids from SARS-CoV-2, Influenza A, and Influenza B in nasopharyngeal for use under the FDA's Emergency Use Authorization(EAU) only.        Fact sheet for Patients: FindDrives.pl  Fact sheet for Healthcare Providers: FindDrives.pl         CULTURE, RESPIRATORY/SPUTUM/BRONCH Marcell Swain [[id number]] Collected: 01/28/23 2320    Order Status: Sent Specimen: Sputum Updated: 01/28/23 2328             LE Doppler       ECHO Nov 2022 Interpretation Summary  Result status: Final result     Left Ventricle: Moderately reduced left ventricular systolic function with a visually estimated EF of 35 - 40%. Left ventricle size is normal. Moderate septal thickening. Severe posterior thickening. Moderate global hypokinesis present. Grade II diastolic dysfunction with increased LAP. Right Ventricle: Right ventricle size is normal. Mildly increased wall thickness. Aortic Valve: Severely thickened cusp. Moderately calcified right and noncoronary cusps. Minimal to moderate stenosis of the aortic valve. Mitral Valve: Mildly thickened leaflet, at the posterior leaflet. Severe annular calcification at the posterior leaflet of the mitral valve. Moderate regurgitation. Mild to moderate stenosis noted, measurement is compromised with severe calcification. Planimetry was not performed. Left Atrium: Left atrium is severely dilated. Right Atrium: Right atrium is moderately dilated. PA systolic pressure is 40 mmHg. Images report reviewed by me:  CT LT Foot 1/29/2023   (Most Recent) Results from [facility] [place] Encounter encounter on 01/28/23    CT FOOT LT W CONT    Narrative  INDICATION:  cellulitis/wound of left 4th toe    EXAM: CT left foot. No comparisons. Thin section axial images were obtained after the administration of intravenous  contrast. From these sagittal and coronal reformats were performed. CT dose  reduction was achieved through use of a standardized protocol tailored for this  examination and automatic exposure control for dose modulation. FINDINGS: There is diffuse edema in the dorsal soft tissues.  A drainable fluid  collection is not identified. There are extensive vascular calcifications. No  fluid tracking proximally within the tendon sheaths. Scattered degenerative  changes of the midfoot osteochondral defect medial talar dome is chronic. Degenerative changes ankle joint. Cortical destruction is not demonstrated an  acute fracture is not demonstrated either. There is plantar and retrocalcaneal  spurring    Impression  1. Diffuse subcutaneous edema in the foot compatible with the clinical diagnosis  of cellulitis. No drainable abscess or evidence of osteomyelitis         CTA chest, Abd, Pelvis 1/29/2023  EXAM:  CTA CHEST W OR W WO CONT, CT ABD PELV W CONT   INDICATION:   respiratory failure, end-stage renal disease   COMPARISON: 11/15/2022. CHEST CTA:  FINDINGS:  CHEST:  THYROID: No nodule. MEDIASTINUM: No mass or lymphadenopathy. TABATHA: No mass or lymphadenopathy. THORACIC AORTA: No dissection or aneurysm. MAIN PULMONARY ARTERY: There is no evidence of pulmonary embolism. TRACHEA/BRONCHI: ET tube satisfactory position. ESOPHAGUS: No wall thickening or dilatation. HEART: Dilatation of the left atrium and left ventricle. Coronary artery  calcification: Present. PLEURA: Small right pleural effusion. LUNGS: Significant bilateral lower lobe consolidation and substantial diffuse  bilateral pulmonary infiltrates. BONES: No destructive bone lesion. IMPRESSION  Significant bilateral lower lobe consolidation and bilateral  pulmonary infiltrates. Small right pleural effusion. No evidence of pulmonary  embolism. ABDOMEN/PELVIC CT:  FINDINGS:   LIVER: No mass or biliary dilatation. GALLBLADDER: Unremarkable. SPLEEN: No mass. PANCREAS: No mass or ductal dilatation. ADRENALS: Stable small left adrenal adenoma. No follow-up required. KIDNEYS: No mass, calculus, or hydronephrosis. STOMACH: Unremarkable. NG tube tip terminates within the fundus. SMALL BOWEL: No dilatation or wall thickening.   COLON: No dilatation or wall thickening. APPENDIX: Within normal limits. PERITONEUM: No ascites or pneumoperitoneum. Peritoneal dialysis catheter is  coiled within the left lower quadrant. RETROPERITONEUM: No lymphadenopathy or aortic aneurysm. REPRODUCTIVE ORGANS: Unremarkable. URINARY BLADDER: Decompressed by Torres catheter. BONES: Degenerative changes are seen in the lumbar spine. ADDITIONAL COMMENTS: N/A  IMPRESSION:  Peritoneal dialysis catheter is coiled within the left lower quadrant. No acute  abnormality is identified. CXR reviewed by me:  XR (Most Recent). CXR   Results from [place] encounter on 01/28/23    XR CHEST PORT    Narrative  Indication: ET tube placement    Comparison to 1/28/2023. Portable exam obtained at 1245 demonstrates interval  placement of an ET tube, projecting at the thoracic inlet. NG tube has been  placed but the tip is not visualized. Bilateral pulmonary infiltrates have  increased compared to prior exam.    Impression  ET tube tip projects at the thoracic inlet. Increased bilateral  pulmonary infiltrates. Please note: Voice-recognition software may have been used to generate this report, which may have resulted in some phonetic-based errors in grammar and contents. Even though attempts were made to correct all the mistakes, some may have been missed, and remained in the body of the document. Dragon software not working well since recent [employer], and care taken as much as possible to correct mistakes.       Graciela Lee MD  1/29/2023

## 2023-01-29 NOTE — PROGRESS NOTES
Zosyn (Piperacillin/Tazobactam) Extended Infusion    Ashly Shepard, a 43 y.o. yo female, has been converted to an extended infusion of Zosyn while in the intensive care unit. A loading dose of 3.375 was given over 30 minutes on 1/28 at 23:35  Extended infusions will begin 4 hours after the initial dose if CrCl  >/= 20 ml/min or 8 hours after the initial dose if CrCl < 20 ml/min. Extended infusions will run over 4 hours (240 minutes). Dose adjusted to: Zosyn 3.375 grams IV q12h  x 7 days  Indication HAP      (Prior order: Zosyn 2.25 grams IV q8h)      Recent Labs     01/29/23  0647 01/28/23  2320   CREA 14.20* 14.20*     Ht Readings from Last 1 Encounters:   01/29/23 170.2 cm (67\")     Wt Readings from Last 1 Encounters:   01/29/23 89.7 kg (197 lb 12 oz)       CrCl : Serum creatinine: 14.2 mg/dL (H) 01/29/23 0647  Estimated creatinine clearance: 5.9 mL/min (A)    Renal adjustment of extended infusion of Zosyn  3.375 or 4.5 gm every 8 hours for CrCl >/= 20 ml/min  3.375 or 4.5 gm every 12 hours for CrCl < 20 ml/min, intermittent HD or PD    Pharmacy to continue to monitor daily.    Promise Lowry, [facility] D      [phone]

## 2023-01-29 NOTE — H&P
History & Physical    Patient: Dai Nowak MRN: [id number]  [facility]: [id number]    YOB: 1980  Age: 43 y.o. Sex: female      DOA: 1/28/2023  Primary Care Provider:  Mia Mueller MD      Assessment/Plan     Patient Active Problem List   Diagnosis Code    Acute pulmonary edema ([facility]) J81.0    Chest pain R07.9    ESRD (end stage renal disease) on dialysis ([facility] N18.6, Z99.2    Pulmonary edema J81.1    Cardiomyopathy ([facility]) I42.9    HTN (hypertension) I10    Type 2 diabetes mellitus ([facility]) E11.9    PAD (peripheral artery disease) ([facility]) I73.9    CAD (coronary artery disease) I25.10    Acute respiratory failure with hypoxia and hypercapnia ([facility]) J96.01, J96.02    Hypertensive emergency I16.1    Acute exacerbation of CHF (congestive heart failure) ([facility] I50.9    BMI 34.0-34.9,adult Z68.34    Peritoneal dialysis status ([facility]) Z99.2    Diabetic ulcer of left foot associated with type 2 diabetes mellitus, with fat layer exposed ([facility] [address] E11.621, L97.522    Sepsis ([facility]) A41.9    Cellulitis of fourth toe, left L03.032    Chronic anemia D64.9     44 y/o female with multiple medical problems to include progressive CAD not amenable to stenting followed by [address] [place] [address], CHF with EF of 40%, ESRD on peritoneal dialysis, HTN, type 2 diabetes mellitus, and obesity is admitted with acute hypoxemic and hypercapneic respiratory failure due to acute CHF exacerbation and sepsis. She also has a diabetic foot ulcer with cellulitis of the left fourth toe. She was recently started on gabapentin prior to symptom onset which may have worsened her heart failure. Neuro-no issues, was mentating normally prior to intubation per ED report. Propofol and fentanyl for sedation. Pulmonary-acute hypoxemic and hypercapneic respiratory failure due to volume overload and CHF exacerbation. I have ordered a CTA to evaluate for pulmonary embolism. CV-CHF exacerbation with underlying cardiomyopathy and CAD.  I will order an echocardiogram and trend troponins. I will also consult cardiology. GI-no issues  Renal-ESRD on peritoneal dialysis. She is compliant at home but in acute volume overload. Nephrology was consulted in the ED to facilitate immediate dialysis for volume removal.  Heme-chronic anemia due to ESRD. Transfuse for hemoglobin <7. ID-sepsis with markedly elevated lactic acid. Sepsis bundle initiated. Follow blood, sputum, and peritoneal dialysis cultures. Empiric antibiotic treatment with zosyn and vancomycin. I have ordered a CT of the abdomen/pelvis as well as of the left foot to evaluate for deep space infections. Wound care consult for diabetic foot ulcer. Endo-type 2 diabetes mellitus. Sliding scale insulin ordered. F/E/N-no IV fluids due to volume overload/replete prn    She will need to stop gabapentin permanently. She has a poor overall long term prognosis. Family discussion: The only emergency contact available is her boyfriend. He lives with the patient and her 5year old son. Her mother is recovering from a leg amputation and her father is in a nursing home. I have updated him on the patient's condition and answered all questions. Full code  -Palliative care consult placed to discuss goals of care/identify legal next of kin given critical illness    Prophylaxis: pepcid IV, heparin SQ    Estimated length of stay : 5 nights    Vijaya Barahona MD  Nocturnist    Critical Care Time [phone]  60 minutes of critical care time spent in the direct evaluation and treatment of this high risk patient. The reason for providing this level of medical care for this critically ill patient was due a critical illness that impaired one or more vital organ systems such that there was a high probability of imminent or life threatening deterioration in the patients condition.  This care involved high complexity decision making to assess, manipulate, and support vital system functions, to treat this degreee vital organ system failure and to prevent further life threatening deterioration of the patients condition.  -----------------------------------------------------------------------------------------------------------------------------------------------------------------------------------  -----------------------------------------------------------------------------------------------------------------------------------------------------------------------------------  CC: shortness of breath       HPI:     Danuta Garcia is a 43 y.o. female with multiple medical problems to include progressive CAD not amenable to stenting followed by [facility], CHF with EF of 40%, ESRD on peritoneal dialysis, HTN, type 2 diabetes mellitus, and obesity who presents to the ED with acute onset of shortness of breath 3 hours prior to presentation. She did not tolerate BiPAP in the ED and was subsequently intubated. History is unobtainable from the patient. I contacted her emergency contact (boyfriend) who stated that she just started a new medication the day prior which made her have vomiting, lightheadedness, dyspnea, and dizziness. Pharmacy confirmed this medication to be gabapentin. She has been seeing wound care for her diabetic foot ulcer. She has been compliant with her peritoneal dialysis. Past Medical History:   Diagnosis Date    BMI 34.0-34.9,adult 1/29/2023    Cardiomyopathy ([facility]     Chronic kidney disease     Coronary arteriosclerosis     [facility]     Hypertension     Tachycardia        History reviewed. No pertinent surgical history.     Family History   Problem Relation Age of Onset    Diabetes Mother     Heart Disease Mother     Heart Attack Mother     Heart Attack Father     Heart Disease Father     Diabetes Father        Social History     Socioeconomic History    Marital status:    Tobacco Use    Smoking status: Never    Smokeless tobacco: Never   Substance and Sexual Activity    Alcohol use: Yes     Comment: rarely       Prior to Admission medications    Medication Sig Start Date End Date Taking? Authorizing Provider   bisacodyL 5 mg tab Take 5 mg by mouth two (2) times a day. Provider, Historical   metoclopramide HCl (REGLAN) 5 mg tablet Take 5 mg by mouth as needed for Nausea or Vomiting. Provider, Historical   sodium bicarbonate 650 mg tablet Take 650 mg by mouth two (2) times a day. Provider, Historical   albuterol (Ventolin HFA) 90 mcg/actuation inhaler Take 2 Puffs by inhalation every six (6) hours as needed for Wheezing. Provider, Historical   atorvastatin (LIPITOR) 80 mg tablet Take 80 mg by mouth daily. Provider, Historical   clopidogreL (Plavix) 75 mg tab Take 75 mg by mouth daily. Provider, Historical   cloNIDine HCL (CATAPRES) 0.2 mg tablet Take 0.2 mg by mouth two (2) times a day. Provider, Historical   hydrALAZINE (APRESOLINE) 100 mg tablet Take 100 mg by mouth three (3) times daily. Provider, Historical   mupirocin (BACTROBAN) 2 % ointment Apply  to affected area three (3) times daily. Provider, Historical   candesartan (ATACAND) 32 mg tablet Take 32 mg by mouth daily. Indications: high blood pressure    Provider, Historical   isosorbide mononitrate ER (IMDUR) 120 mg CR tablet Take 120 mg by mouth every morning. Provider, Historical   metoprolol succinate (TOPROL-XL) 100 mg tablet Take 100 mg by mouth two (2) times a day. Provider, Historical   insulin aspart U-100 (NOVOLOG) 100 unit/mL injection 15 Units by SubCUTAneous route Before breakfast, lunch, and dinner. Provider, Historical   fluticasone propionate (FLOVENT HFA) 44 mcg/actuation inhaler Take 1 Puff by inhalation as needed. Provider, Historical   calcium acetate,phosphat bind, (PHOSLO) 667 mg cap Take 2 Capsules by mouth three (3) times daily (with meals). Provider, Historical   calcitRIOL (ROCALTROL) 0.25 mcg capsule Take 0.25 mcg by mouth daily.     Provider, Historical   NIFEdipine ER (PROCARDIA XL) 90 mg ER tablet Take 90 mg by mouth daily. Provider, Historical   levothyroxine (SYNTHROID) 200 mcg tablet Take 200 mcg by mouth Daily (before breakfast). Provider, Historical   cinacalcet (SENSIPAR) 30 mg tablet Take 60 mg by mouth daily. Provider, Historical   bumetanide (BUMEX) 2 mg tablet Take 2 mg by mouth two (2) times a day. Provider, Historical   aspirin 81 mg chewable tablet Take 81 mg by mouth daily. Provider, Historical       Allergies   Allergen Reactions    Coreg [Carvedilol] Nausea and Vomiting    Lortab [Hydrocodone-Acetaminophen] Nausea and Vomiting       Review of Systems  Unable to obtain due to intubation/sedation        Physical Exam:     Physical Exam:  Visit Vitals  /86   Pulse 77   Temp 97.8 °F (36.6 °C)   Resp 25   Wt 100 kg (220 lb 6.4 oz)   SpO2 100%   BMI 34.52 kg/m²      O2 Device: Non-rebreather mask    Temp (24hrs), Av °F (36.7 °C), Min:97.8 °F (36.6 °C), Max:98.1 °F (36.7 °C)    1901 -  0700  In: 100 [I.V.:100]  Out: -    No intake/output data recorded. General:  Intubated, sedated. Head:  Normocephalic, without obvious abnormality, atraumatic. Eyes:  Conjunctivae/corneas clear, sclera anicteric. Nose: Nares normal. No drainage or sinus tenderness. Throat: Lips, mucosa, and tongue normal.    Neck: Supple, symmetrical, trachea midline. Lungs:   Bibasilar rales in the lower 1/3 of lung bases. Heart:  Regular rate and rhythm, S1, S2 normal, no murmur, click, rub or gallop. Abdomen: Soft, obese, non-tender. Bowel sounds normal. No masses,  No organomegaly. Extremities: Extremities normal, atraumatic, no cyanosis. 2+ edema present with venous stasis changes. Left foot with open wound between the 4/5 toes with exposed fat layer and 4th toe cellulitis. Capillary refill normal.   Pulses: 2+ and symmetric all extremities.    Skin: Skin color pink, turgor normal. No rashes or lesions   Neurologic: Intubated and sedated Labs Reviewed: All lab results for the last 24 hours reviewed. Recent Results (from the past 24 hour(s))   EKG, 12 LEAD, INITIAL    Collection Time: 01/28/23 10:23 PM   Result Value Ref Range    Ventricular Rate 116 BPM    Atrial Rate 116 BPM    P-R Interval 154 ms    QRS Duration 96 ms    Q-T Interval 330 ms    QTC Calculation (Bezet) 458 ms    Calculated P Axis 63 degrees    Calculated R Axis -40 degrees    Calculated T Axis 94 degrees    Diagnosis       Sinus tachycardia  Left atrial enlargement  Left axis deviation  Left ventricular hypertrophy ( [place] product )  Nonspecific ST and T wave abnormality  Abnormal ECG  When compared with ECG of 16-NOV-2022 01:59,  T wave inversion less evident in Lateral leads     LACTIC ACID    Collection Time: 01/28/23 11:20 PM   Result Value Ref Range    Lactic acid 6.6 (HH) 0.4 - 2.0 MMOL/L   METABOLIC PANEL, COMPREHENSIVE    Collection Time: 01/28/23 11:20 PM   Result Value Ref Range    Sodium 137 136 - 145 mmol/L    Potassium 5.0 3.5 - 5.5 mmol/L    Chloride 96 (L) 100 - 111 mmol/L    CO2 23 21 - 32 mmol/L    Anion gap 18 3.0 - 18 mmol/L    Glucose 162 (H) 74 - 99 mg/dL    BUN 71 (H) 7.0 - 18 MG/DL    Creatinine 14.20 (H) 0.6 - 1.3 MG/DL    BUN/Creatinine ratio 5 (L) 12 - 20      eGFR 3 (L) >60 ml/min/1.73m2    Calcium 10.1 8.5 - 10.1 MG/DL    Bilirubin, total 0.4 0.2 - 1.0 MG/DL    ALT (SGPT) 17 13 - 56 U/L    AST (SGOT) 20 10 - 38 U/L    Alk.  phosphatase 127 (H) 45 - 117 U/L    Protein, total 6.7 6.4 - 8.2 g/dL    Albumin 2.6 (L) 3.4 - 5.0 g/dL    Globulin 4.1 (H) 2.0 - 4.0 g/dL    A-G Ratio 0.6 (L) 0.8 - 1.7     CBC WITH AUTOMATED DIFF    Collection Time: 01/28/23 11:20 PM   Result Value Ref Range    WBC 12.5 4.6 - 13.2 K/uL    RBC 2.70 (L) 4.20 - 5.30 M/uL    HGB 7.7 (L) 12.0 - 16.0 g/dL    HCT 26.2 (L) 35.0 - 45.0 %    MCV 97.0 78.0 - 100.0 FL    MCH 28.5 24.0 - 34.0 PG    MCHC 29.4 (L) 31.0 - 37.0 g/dL    RDW 14.7 (H) 11.6 - 14.5 %    PLATELET 598 619 - 820 K/uL    MPV 10.2 9.2 - 11.8 FL    NRBC 0.2 (H) 0  WBC    ABSOLUTE NRBC 0.02 (H) 0.00 - 0.01 K/uL    NEUTROPHILS 75 (H) 40 - 73 %    LYMPHOCYTES 9 (L) 21 - 52 %    MONOCYTES 1 (L) 3 - 10 %    EOSINOPHILS 14 (H) 0 - 5 %    BASOPHILS 1 0 - 2 %    IMMATURE GRANULOCYTES 1 (H) 0.0 - 0.5 %    ABS. NEUTROPHILS 9.3 (H) 1.8 - 8.0 K/UL    ABS. LYMPHOCYTES 1.1 0.9 - 3.6 K/UL    ABS. MONOCYTES 0.1 0.05 - 1.2 K/UL    ABS. EOSINOPHILS 1.7 (H) 0.0 - 0.4 K/UL    ABS. BASOPHILS 0.1 0.0 - 0.1 K/UL    ABS. IMM. GRANS. 0.1 (H) 0.00 - 0.04 K/UL    DF AUTOMATED     COVID-19 WITH INFLUENZA A/B    Collection Time: 01/28/23 11:20 PM   Result Value Ref Range    SARS-CoV-2 by PCR Not detected NOTD      Influenza A by PCR Not detected NOTD      Influenza B by PCR Not detected NOTD     NT-PRO BNP    Collection Time: 01/28/23 11:20 PM   Result Value Ref Range    NT pro-BNP 83,617 (H) 0 - 450 PG/ML   TROPONIN-HIGH SENSITIVITY    Collection Time: 01/28/23 11:20 PM   Result Value Ref Range    Troponin-High Sensitivity 267 (HH) 0 - 54 ng/L   BLOOD GAS, ARTERIAL POC    Collection Time: 01/28/23 11:38 PM   Result Value Ref Range    Device: ADULT VENT      FIO2 (POC) 100 %    pH (POC) 7.29 (L) 7.35 - 7.45      pCO2 (POC) 51.1 (H) 35.0 - 45.0 MMHG    pO2 (POC) 103 (H) 80 - 100 MMHG    HCO3 (POC) 24.4 22 - 26 MMOL/L    sO2 (POC) 97.1 (H) 92 - 97 %    Base deficit (POC) 2.4 mmol/L    Mode ASSIST CONTROL      Tidal volume 400 ml    Set Rate 15 bpm    PEEP/CPAP (POC) 5 cmH2O    Allens test (POC) NOT APPLICABLE      Site RIGHT RADIAL      Patient temp.  98      Specimen type (POC) ARTERIAL      Performed by [facility] W/ RFLX MICROSCOPIC    Collection Time: 01/29/23 12:38 AM   Result Value Ref Range    Color YELLOW      Appearance CLEAR      Specific gravity 1.011 1.005 - 1.030      pH (UA) 8.0 5.0 - 8.0      Protein 300 (A) NEG mg/dL    Glucose 100 (A) NEG mg/dL    Ketone Negative NEG mg/dL    Bilirubin Negative NEG      Blood SMALL (A) NEG      Urobilinogen 0.2 0.2 - 1.0 EU/dL    Nitrites Negative NEG      Leukocyte Esterase Negative NEG     URINE MICROSCOPIC ONLY    Collection Time: 01/29/23 12:38 AM   Result Value Ref Range    WBC Negative 0 - 5 /hpf    RBC 0 to 3 0 - 5 /hpf    Epithelial cells 2+ 0 - 5 /lpf    Bacteria FEW (A) NEG /hpf   DRUG SCREEN, URINE    Collection Time: 01/29/23 12:38 AM   Result Value Ref Range    BENZODIAZEPINES Negative NEG      BARBITURATES Negative NEG      THC (TH-CANNABINOL) Negative NEG      OPIATES Negative NEG      PCP(PHENCYCLIDINE) Negative NEG      COCAINE Negative NEG      AMPHETAMINES Negative NEG      METHADONE Negative NEG      HDSCOM (NOTE)    GLUCOSE, POC    Collection Time: 01/29/23  1:25 AM   Result Value Ref Range    Glucose (POC) 130 (H) 70 - 110 mg/dL     Results  XR CHEST PORT (Accession [id number]) (Order [id number])  Allergies       Not Specified: Coreg [Carvedilol]; Lortab [Hydrocodone-acetaminophen]     Exam Information    Status Exam Begun  Exam Ended    Final [99] 1/28/2023 22:39 1/28/2023 10:49 PM [id number] 10:49 PM     Result Information    Status: Final result (Exam End: 1/28/2023 22:49) Provider Status: Open       XR CHEST PORT: Patient Communication     Released  Not seen     Study Result    Narrative & Impression   Indication: Shortness of breath     Comparison: 11/15/2022     Portable exam of the chest obtained at 2249 demonstrates mild cardiomegaly. Moderate bilateral pulmonary infiltrates are noted. The osseous structures are  unremarkable. IMPRESSION  Moderate bilateral pulmonary infiltrates most likely related to  pulmonary edema.

## 2023-01-30 ENCOUNTER — APPOINTMENT (OUTPATIENT)
Dept: VASCULAR SURGERY | Age: 43
End: 2023-01-30
Attending: INTERNAL MEDICINE
Payer: MEDICAID

## 2023-01-30 ENCOUNTER — APPOINTMENT (OUTPATIENT)
Dept: NON INVASIVE DIAGNOSTICS | Age: 43
End: 2023-01-30
Attending: FAMILY MEDICINE
Payer: MEDICAID

## 2023-01-30 ENCOUNTER — APPOINTMENT (OUTPATIENT)
Dept: GENERAL RADIOLOGY | Age: 43
End: 2023-01-30
Attending: INTERNAL MEDICINE
Payer: MEDICAID

## 2023-01-30 LAB
ABO + RH BLD: NORMAL
ALBUMIN SERPL-MCNC: 2.3 G/DL (ref 3.4–5)
ALBUMIN/GLOB SERPL: 0.6 (ref 0.8–1.7)
ALP SERPL-CCNC: 92 U/L (ref 45–117)
ALT SERPL-CCNC: 14 U/L (ref 13–56)
ANION GAP SERPL CALC-SCNC: 11 MMOL/L (ref 3–18)
ARTERIAL PATENCY WRIST A: POSITIVE
AST SERPL-CCNC: 29 U/L (ref 10–38)
BASE EXCESS BLD CALC-SCNC: 3.4 MMOL/L
BDY SITE: ABNORMAL
BILIRUB SERPL-MCNC: 0.4 MG/DL (ref 0.2–1)
BLD PROD TYP BPU: NORMAL
BLOOD GROUP ANTIBODIES SERPL: NORMAL
BODY TEMPERATURE: 97.5
BPU ID: NORMAL
BUN SERPL-MCNC: 69 MG/DL (ref 7–18)
BUN/CREAT SERPL: 5 (ref 12–20)
CALCIUM SERPL-MCNC: 10.4 MG/DL (ref 8.5–10.1)
CALCIUM SERPL-MCNC: 10.7 MG/DL (ref 8.5–10.1)
CALLED TO:,BCALL1: NORMAL
CHLORIDE SERPL-SCNC: 98 MMOL/L (ref 100–111)
CO2 SERPL-SCNC: 30 MMOL/L (ref 21–32)
CREAT SERPL-MCNC: 13.6 MG/DL (ref 0.6–1.3)
CROSSMATCH RESULT,%XM: NORMAL
ECHO AV AREA PEAK VELOCITY: 1.3 CM2
ECHO AV AREA VTI: 1.4 CM2
ECHO AV AREA/BSA PEAK VELOCITY: 0.6 CM2/M2
ECHO AV AREA/BSA VTI: 0.7 CM2/M2
ECHO AV MEAN GRADIENT: 5 MMHG
ECHO AV MEAN VELOCITY: 1 M/S
ECHO AV PEAK GRADIENT: 10 MMHG
ECHO AV PEAK VELOCITY: 1.6 M/S
ECHO AV VELOCITY RATIO: 0.44
ECHO AV VTI: 32.6 CM
ECHO LV E' LATERAL VELOCITY: 4 CM/S
ECHO LV E' SEPTAL VELOCITY: 3 CM/S
ECHO LV EDV A2C: 152 ML
ECHO LV EDV A4C: 154 ML
ECHO LV EDV BP: 153 ML (ref 56–104)
ECHO LV EDV INDEX A4C: 73 ML/M2
ECHO LV EDV INDEX BP: 73 ML/M2
ECHO LV EDV NDEX A2C: 72 ML/M2
ECHO LV EJECTION FRACTION A2C: 36 %
ECHO LV EJECTION FRACTION A4C: 39 %
ECHO LV EJECTION FRACTION BIPLANE: 39 % (ref 55–100)
ECHO LV ESV A2C: 98 ML
ECHO LV ESV A4C: 94 ML
ECHO LV ESV BP: 94 ML (ref 19–49)
ECHO LV ESV INDEX A2C: 46 ML/M2
ECHO LV ESV INDEX A4C: 45 ML/M2
ECHO LV ESV INDEX BP: 45 ML/M2
ECHO LV FRACTIONAL SHORTENING: 21 % (ref 28–44)
ECHO LV INTERNAL DIMENSION DIASTOLE INDEX: 2.27 CM/M2
ECHO LV INTERNAL DIMENSION DIASTOLIC: 4.8 CM (ref 3.9–5.3)
ECHO LV INTERNAL DIMENSION SYSTOLIC INDEX: 1.8 CM/M2
ECHO LV INTERNAL DIMENSION SYSTOLIC: 3.8 CM
ECHO LV IVSD: 1.9 CM (ref 0.6–0.9)
ECHO LV MASS 2D: 437.8 G (ref 67–162)
ECHO LV MASS INDEX 2D: 207.5 G/M2 (ref 43–95)
ECHO LV POSTERIOR WALL DIASTOLIC: 1.9 CM (ref 0.6–0.9)
ECHO LV RELATIVE WALL THICKNESS RATIO: 0.79
ECHO LVOT AREA: 3.1 CM2
ECHO LVOT AV VTI INDEX: 0.46
ECHO LVOT DIAM: 2 CM
ECHO LVOT MEAN GRADIENT: 1 MMHG
ECHO LVOT PEAK GRADIENT: 2 MMHG
ECHO LVOT PEAK VELOCITY: 0.7 M/S
ECHO LVOT STROKE VOLUME INDEX: 22.3 ML/M2
ECHO LVOT SV: 47.1 ML
ECHO LVOT VTI: 15 CM
ECHO MV A VELOCITY: 1.26 M/S
ECHO MV AREA VTI: 1.2 CM2
ECHO MV E DECELERATION TIME (DT): 163.2 MS
ECHO MV E VELOCITY: 1.36 M/S
ECHO MV E/A RATIO: 1.08
ECHO MV E/E' LATERAL: 34
ECHO MV E/E' RATIO (AVERAGED): 39.67
ECHO MV E/E' SEPTAL: 45.33
ECHO MV LVOT VTI INDEX: 2.57
ECHO MV MAX VELOCITY: 1.5 M/S
ECHO MV MEAN GRADIENT: 5 MMHG
ECHO MV MEAN VELOCITY: 1 M/S
ECHO MV PEAK GRADIENT: 9 MMHG
ECHO MV REGURGITANT ALIASING (NYQUIST) VELOCITY: 36 CM/S
ECHO MV REGURGITANT PEAK GRADIENT: 81 MMHG
ECHO MV REGURGITANT PEAK VELOCITY: 4.5 M/S
ECHO MV REGURGITANT VTIA: 208.3 CM
ECHO MV VTI: 38.6 CM
ECHO TV REGURGITANT MAX VELOCITY: 2.59 M/S
ECHO TV REGURGITANT PEAK GRADIENT: 27 MMHG
ERYTHROCYTE [DISTWIDTH] IN BLOOD BY AUTOMATED COUNT: 15.6 % (ref 11.6–14.5)
GAS FLOW.O2 O2 DELIVERY SYS: ABNORMAL
GAS FLOW.O2 SETTING OXYMISER: 10 BPM
GLOBULIN SER CALC-MCNC: 3.6 G/DL (ref 2–4)
GLUCOSE BLD STRIP.AUTO-MCNC: 114 MG/DL (ref 70–110)
GLUCOSE BLD STRIP.AUTO-MCNC: 69 MG/DL (ref 70–110)
GLUCOSE BLD STRIP.AUTO-MCNC: 81 MG/DL (ref 70–110)
GLUCOSE BLD STRIP.AUTO-MCNC: 83 MG/DL (ref 70–110)
GLUCOSE SERPL-MCNC: 75 MG/DL (ref 74–99)
HCO3 BLD-SCNC: 28.6 MMOL/L (ref 22–26)
HCT VFR BLD AUTO: 28.1 % (ref 35–45)
HGB BLD-MCNC: 8.5 G/DL (ref 12–16)
LACTATE SERPL-SCNC: 1.5 MMOL/L (ref 0.4–2)
LACTATE SERPL-SCNC: 1.9 MMOL/L (ref 0.4–2)
LEFT CFA DIST SYS PSV: 74.6 CM/S
LEFT DIST ATA VELOCITY: 44.7 CM/S
LEFT DIST PTA PSV: 11.6 CM/S
LEFT POP A PROX VEL RATIO: 1.05
LEFT POPLITEAL DIST SYS PSV: 69.5 CM/S
LEFT POPLITEAL PROX SYS PSV: 47.1 CM/S
LEFT PTA MID SYS PSV: 29.9 CM/S
LEFT SFA DIST VEL RATIO: 1.02
LEFT SFA MID VEL RATIO: 0.8
LEFT SFA PROX VEL RATIO: 0.73
LEFT SUPER FEMORAL DIST SYS PSV: 44.7 CM/S
LEFT SUPER FEMORAL MID SYS PSV: 43.9 CM/S
LEFT SUPER FEMORAL PROX SYS PSV: 54.8 CM/S
MCH RBC QN AUTO: 28 PG (ref 24–34)
MCHC RBC AUTO-ENTMCNC: 30.2 G/DL (ref 31–37)
MCV RBC AUTO: 92.4 FL (ref 78–100)
NRBC # BLD: 0 K/UL (ref 0–0.01)
NRBC BLD-RTO: 0 PER 100 WBC
O2/TOTAL GAS SETTING VFR VENT: 40 %
PCO2 BLD: 44.9 MMHG (ref 35–45)
PEEP RESPIRATORY: 8 CMH2O
PH BLD: 7.41 (ref 7.35–7.45)
PLATELET # BLD AUTO: 284 K/UL (ref 135–420)
PMV BLD AUTO: 10.5 FL (ref 9.2–11.8)
PO2 BLD: 118 MMHG (ref 80–100)
POTASSIUM SERPL-SCNC: 4.9 MMOL/L (ref 3.5–5.5)
PROT SERPL-MCNC: 5.9 G/DL (ref 6.4–8.2)
PTH-INTACT SERPL-MCNC: 649.1 PG/ML (ref 18.4–88)
RBC # BLD AUTO: 3.04 M/UL (ref 4.2–5.3)
RIGHT CFA DIST SYS PSV: 81.6 CM/S
RIGHT DIST ATA VELOCITY: 54 CM/S
RIGHT DIST PTA PSV: 24.8 CM/S
RIGHT POP A PROX VEL RATIO: 1.32
RIGHT POPLITEAL DIST SYS PSV: 68.7 CM/S
RIGHT POPLITEAL PROX SYS PSV: 112 CM/S
RIGHT PTA MID SYS PSV: 24.8 CM/S
RIGHT SFA DIST VEL RATIO: 0.9
RIGHT SFA MID VEL RATIO: 1.1
RIGHT SFA PROX VEL RATIO: 1
RIGHT SUPER FEMORAL DIST SYS PSV: 84.9 CM/S
RIGHT SUPER FEMORAL MID SYS PSV: 94.8 CM/S
RIGHT SUPER FEMORAL PROX SYS PSV: 83.8 CM/S
SAO2 % BLD: 98.7 % (ref 92–97)
SERVICE CMNT-IMP: ABNORMAL
SODIUM SERPL-SCNC: 139 MMOL/L (ref 136–145)
SPECIMEN EXP DATE BLD: NORMAL
SPECIMEN TYPE: ABNORMAL
STATUS OF UNIT,%ST: NORMAL
TROPONIN-HIGH SENSITIVITY: 8757 NG/L (ref 0–54)
UNIT DIVISION, %UDIV: 0
VANCOMYCIN SERPL-MCNC: 15.4 UG/ML (ref 5–40)
VENTILATION MODE VENT: ABNORMAL
VT SETTING VENT: 400 ML
WBC # BLD AUTO: 8.9 K/UL (ref 4.6–13.2)

## 2023-01-30 PROCEDURE — 99221 1ST HOSP IP/OBS SF/LOW 40: CPT | Performed by: NURSE PRACTITIONER

## 2023-01-30 PROCEDURE — 65610000006 HC RM INTENSIVE CARE

## 2023-01-30 PROCEDURE — 84484 ASSAY OF TROPONIN QUANT: CPT

## 2023-01-30 PROCEDURE — 93925 LOWER EXTREMITY STUDY: CPT

## 2023-01-30 PROCEDURE — 77010033678 HC OXYGEN DAILY

## 2023-01-30 PROCEDURE — 82962 GLUCOSE BLOOD TEST: CPT

## 2023-01-30 PROCEDURE — 74011250636 HC RX REV CODE- 250/636: Performed by: FAMILY MEDICINE

## 2023-01-30 PROCEDURE — 94003 VENT MGMT INPAT SUBQ DAY: CPT

## 2023-01-30 PROCEDURE — 87205 SMEAR GRAM STAIN: CPT

## 2023-01-30 PROCEDURE — 74011250637 HC RX REV CODE- 250/637: Performed by: HOSPITALIST

## 2023-01-30 PROCEDURE — 87015 SPECIMEN INFECT AGNT CONCNTJ: CPT

## 2023-01-30 PROCEDURE — 83605 ASSAY OF LACTIC ACID: CPT

## 2023-01-30 PROCEDURE — 82803 BLOOD GASES ANY COMBINATION: CPT

## 2023-01-30 PROCEDURE — 2709999900 HC NON-CHARGEABLE SUPPLY

## 2023-01-30 PROCEDURE — 80202 ASSAY OF VANCOMYCIN: CPT

## 2023-01-30 PROCEDURE — 74011000258 HC RX REV CODE- 258: Performed by: INTERNAL MEDICINE

## 2023-01-30 PROCEDURE — 36415 COLL VENOUS BLD VENIPUNCTURE: CPT

## 2023-01-30 PROCEDURE — 74011000250 HC RX REV CODE- 250: Performed by: INTERNAL MEDICINE

## 2023-01-30 PROCEDURE — 85027 COMPLETE CBC AUTOMATED: CPT

## 2023-01-30 PROCEDURE — 74011250636 HC RX REV CODE- 250/636: Performed by: INTERNAL MEDICINE

## 2023-01-30 PROCEDURE — 74011000250 HC RX REV CODE- 250: Performed by: FAMILY MEDICINE

## 2023-01-30 PROCEDURE — 36600 WITHDRAWAL OF ARTERIAL BLOOD: CPT

## 2023-01-30 PROCEDURE — 74011250637 HC RX REV CODE- 250/637: Performed by: FAMILY MEDICINE

## 2023-01-30 PROCEDURE — 83970 ASSAY OF PARATHORMONE: CPT

## 2023-01-30 PROCEDURE — 71045 X-RAY EXAM CHEST 1 VIEW: CPT

## 2023-01-30 PROCEDURE — A4722 DIALYS SOL FLD VOL > 1999CC: HCPCS | Performed by: INTERNAL MEDICINE

## 2023-01-30 PROCEDURE — 92610 EVALUATE SWALLOWING FUNCTION: CPT

## 2023-01-30 PROCEDURE — 80053 COMPREHEN METABOLIC PANEL: CPT

## 2023-01-30 PROCEDURE — 93308 TTE F-UP OR LMTD: CPT

## 2023-01-30 RX ORDER — METOPROLOL SUCCINATE 50 MG/1
50 TABLET, EXTENDED RELEASE ORAL 2 TIMES DAILY
Status: DISCONTINUED | OUTPATIENT
Start: 2023-01-30 | End: 2023-02-03 | Stop reason: HOSPADM

## 2023-01-30 RX ORDER — CLONIDINE HYDROCHLORIDE 0.1 MG/1
0.2 TABLET ORAL 2 TIMES DAILY
Status: DISCONTINUED | OUTPATIENT
Start: 2023-01-30 | End: 2023-02-03 | Stop reason: HOSPADM

## 2023-01-30 RX ORDER — HYDRALAZINE HYDROCHLORIDE 50 MG/1
100 TABLET, FILM COATED ORAL 3 TIMES DAILY
Status: DISCONTINUED | OUTPATIENT
Start: 2023-01-30 | End: 2023-01-30

## 2023-01-30 RX ORDER — CALCITRIOL 0.25 UG/1
0.25 CAPSULE ORAL DAILY
Status: DISCONTINUED | OUTPATIENT
Start: 2023-01-30 | End: 2023-02-03 | Stop reason: HOSPADM

## 2023-01-30 RX ORDER — CALCIUM ACETATE 667 MG/1
2 CAPSULE ORAL
Status: DISCONTINUED | OUTPATIENT
Start: 2023-01-30 | End: 2023-02-03 | Stop reason: HOSPADM

## 2023-01-30 RX ORDER — ATORVASTATIN CALCIUM 20 MG/1
80 TABLET, FILM COATED ORAL DAILY
Status: DISCONTINUED | OUTPATIENT
Start: 2023-01-30 | End: 2023-02-03 | Stop reason: HOSPADM

## 2023-01-30 RX ORDER — MORPHINE SULFATE 4 MG/ML
4 INJECTION INTRAVENOUS
Status: DISCONTINUED | OUTPATIENT
Start: 2023-01-30 | End: 2023-02-03 | Stop reason: HOSPADM

## 2023-01-30 RX ORDER — ISOSORBIDE MONONITRATE 60 MG/1
60 TABLET, EXTENDED RELEASE ORAL
Status: DISCONTINUED | OUTPATIENT
Start: 2023-01-30 | End: 2023-02-03 | Stop reason: HOSPADM

## 2023-01-30 RX ORDER — METOPROLOL SUCCINATE 100 MG/1
100 TABLET, EXTENDED RELEASE ORAL 2 TIMES DAILY
Status: DISCONTINUED | OUTPATIENT
Start: 2023-01-30 | End: 2023-01-30

## 2023-01-30 RX ORDER — NIFEDIPINE 30 MG/1
90 TABLET, EXTENDED RELEASE ORAL DAILY
Status: DISCONTINUED | OUTPATIENT
Start: 2023-01-30 | End: 2023-02-03 | Stop reason: HOSPADM

## 2023-01-30 RX ORDER — CINACALCET 30 MG/1
60 TABLET, FILM COATED ORAL DAILY
Status: DISCONTINUED | OUTPATIENT
Start: 2023-01-30 | End: 2023-02-03 | Stop reason: HOSPADM

## 2023-01-30 RX ORDER — LEVOTHYROXINE SODIUM 100 UG/1
200 TABLET ORAL
Status: DISCONTINUED | OUTPATIENT
Start: 2023-01-30 | End: 2023-02-03 | Stop reason: HOSPADM

## 2023-01-30 RX ORDER — ISOSORBIDE MONONITRATE 60 MG/1
120 TABLET, EXTENDED RELEASE ORAL
Status: DISCONTINUED | OUTPATIENT
Start: 2023-01-30 | End: 2023-01-30

## 2023-01-30 RX ORDER — HYDRALAZINE HYDROCHLORIDE 50 MG/1
50 TABLET, FILM COATED ORAL 3 TIMES DAILY
Status: DISCONTINUED | OUTPATIENT
Start: 2023-01-30 | End: 2023-02-03 | Stop reason: HOSPADM

## 2023-01-30 RX ADMIN — 0.12% CHLORHEXIDINE GLUCONATE 10 ML: 1.2 RINSE ORAL at 09:19

## 2023-01-30 RX ADMIN — ONDANSETRON 4 MG: 2 INJECTION INTRAMUSCULAR; INTRAVENOUS at 22:59

## 2023-01-30 RX ADMIN — HYDRALAZINE HYDROCHLORIDE 10 MG: 20 INJECTION INTRAMUSCULAR; INTRAVENOUS at 11:03

## 2023-01-30 RX ADMIN — HEPARIN SODIUM 5000 UNITS: 5000 INJECTION INTRAVENOUS; SUBCUTANEOUS at 03:36

## 2023-01-30 RX ADMIN — METOPROLOL SUCCINATE 50 MG: 50 TABLET, EXTENDED RELEASE ORAL at 20:29

## 2023-01-30 RX ADMIN — SODIUM CHLORIDE, SODIUM LACTATE, CALCIUM CHLORIDE, MAGNESIUM CHLORIDE AND DEXTROSE 2000 ML: 4.25; 538; 448; 18.3; 5.08 INJECTION, SOLUTION INTRAPERITONEAL at 14:59

## 2023-01-30 RX ADMIN — SODIUM CHLORIDE, PRESERVATIVE FREE 10 ML: 5 INJECTION INTRAVENOUS at 13:36

## 2023-01-30 RX ADMIN — HEPARIN SODIUM 5000 UNITS: 5000 INJECTION INTRAVENOUS; SUBCUTANEOUS at 18:12

## 2023-01-30 RX ADMIN — ISOSORBIDE MONONITRATE 60 MG: 60 TABLET, EXTENDED RELEASE ORAL at 17:59

## 2023-01-30 RX ADMIN — CLONIDINE HYDROCHLORIDE 0.2 MG: 0.1 TABLET ORAL at 22:54

## 2023-01-30 RX ADMIN — FAMOTIDINE 20 MG: 10 INJECTION, SOLUTION INTRAVENOUS at 09:20

## 2023-01-30 RX ADMIN — HYDRALAZINE HYDROCHLORIDE 50 MG: 50 TABLET, FILM COATED ORAL at 16:04

## 2023-01-30 RX ADMIN — SODIUM CHLORIDE, SODIUM LACTATE, CALCIUM CHLORIDE, MAGNESIUM CHLORIDE AND DEXTROSE 2000 ML: 4.25; 538; 448; 18.3; 5.08 INJECTION, SOLUTION INTRAPERITONEAL at 09:03

## 2023-01-30 RX ADMIN — SODIUM CHLORIDE, SODIUM LACTATE, CALCIUM CHLORIDE, MAGNESIUM CHLORIDE AND DEXTROSE 2000 ML: 4.25; 538; 448; 18.3; 5.08 INJECTION, SOLUTION INTRAPERITONEAL at 22:18

## 2023-01-30 RX ADMIN — PROPOFOL 40 MCG/KG/MIN: 10 INJECTION, EMULSION INTRAVENOUS at 06:05

## 2023-01-30 RX ADMIN — HYDRALAZINE HYDROCHLORIDE 10 MG: 20 INJECTION INTRAMUSCULAR; INTRAVENOUS at 14:44

## 2023-01-30 RX ADMIN — HEPARIN SODIUM 5000 UNITS: 5000 INJECTION INTRAVENOUS; SUBCUTANEOUS at 11:25

## 2023-01-30 RX ADMIN — MORPHINE SULFATE 4 MG: 4 INJECTION INTRAVENOUS at 22:54

## 2023-01-30 RX ADMIN — ATORVASTATIN CALCIUM 80 MG: 20 TABLET, FILM COATED ORAL at 09:19

## 2023-01-30 RX ADMIN — METOPROLOL SUCCINATE 50 MG: 50 TABLET, EXTENDED RELEASE ORAL at 09:19

## 2023-01-30 RX ADMIN — PROPOFOL 40 MCG/KG/MIN: 10 INJECTION, EMULSION INTRAVENOUS at 02:03

## 2023-01-30 RX ADMIN — PIPERACILLIN AND TAZOBACTAM 3.38 G: 3; .375 INJECTION, POWDER, LYOPHILIZED, FOR SOLUTION INTRAVENOUS at 09:20

## 2023-01-30 RX ADMIN — SODIUM CHLORIDE, SODIUM LACTATE, CALCIUM CHLORIDE, MAGNESIUM CHLORIDE AND DEXTROSE 2000 ML: 4.25; 538; 448; 18.3; 5.08 INJECTION, SOLUTION INTRAPERITONEAL at 03:35

## 2023-01-30 RX ADMIN — CALCITRIOL 0.25 MCG: 0.25 CAPSULE ORAL at 09:19

## 2023-01-30 RX ADMIN — ACETAMINOPHEN 650 MG: 325 TABLET ORAL at 20:29

## 2023-01-30 RX ADMIN — HYDRALAZINE HYDROCHLORIDE 50 MG: 50 TABLET, FILM COATED ORAL at 21:00

## 2023-01-30 RX ADMIN — SODIUM CHLORIDE, PRESERVATIVE FREE 10 ML: 5 INJECTION INTRAVENOUS at 06:08

## 2023-01-30 RX ADMIN — HYDRALAZINE HYDROCHLORIDE 50 MG: 50 TABLET, FILM COATED ORAL at 09:20

## 2023-01-30 RX ADMIN — PIPERACILLIN AND TAZOBACTAM 3.38 G: 3; .375 INJECTION, POWDER, LYOPHILIZED, FOR SOLUTION INTRAVENOUS at 22:20

## 2023-01-30 RX ADMIN — SODIUM CHLORIDE, PRESERVATIVE FREE 10 ML: 5 INJECTION INTRAVENOUS at 22:19

## 2023-01-30 NOTE — PROGRESS NOTES
Hospitalist Progress Note    Patient: Branodn Albert MRN: [id number]  CSN: [id number]    YOB: 1980  Age: 43 y.o. Sex: female    DOA: 1/28/2023 LOS:  LOS: 1 day          Chief Complaint:    Resp failure      Assessment/Plan        42 y/o female with multiple medical problems to include progressive CAD not amenable to stenting followed by [facility], CHF with EF of 40%, ESRD on peritoneal dialysis, HTN, type 2 diabetes mellitus, and obesity is admitted with acute hypoxemic and hypercapneic respiratory failure due to acute CHF exacerbation and sepsis. She also has a diabetic foot ulcer with cellulitis of the left fourth toe. She was recently started on gabapentin prior to symptom onset         Pulmonary-acute hypoxemic and hypercapneic respiratory failure due to volume overload and CHF exacerbation. CTA chest neg for PE  Vent mgmt and Sedation as per intensivist     CV-CHF exacerbation with underlying cardiomyopathy and CAD. echocardiogram pending and trend troponins. Cardiology on case  Resume her beta blocker, imdur, hydralazine for uncontrolled HTN     GI-no issues, continue SUP     Renal-ESRD on peritoneal dialysis. She is compliant at home but in acute volume overload. Nephrology was consulted in the ED to facilitate immediate dialysis for volume removal.hyperK associated  4 times daily PD as per nephrology  Sensipar and phoslo when safely able to resume  rocaltrol     Heme-chronic anemia due to ESRD. Received blood transfusion 1/29, Hgb improved     ID-sepsis with markedly elevated lactic acid. Follow blood, sputum, and peritoneal dialysis cultures. Empiric antibiotic treatment with zosyn and vancomycin. CT foot shows cellulitis, CT abd is neg     Endo-type 2 diabetes mellitus. Sliding scale insulin ordered.      F/E/N-no IV fluids due to volume overload/replete prn     Full code  Palliative care consult placed to discuss goals of care/identify legal next of kin given critical illness  NOK are parents    D/w nurse this am     Prognosis guarded  Disposition :  Patient Active Problem List   Diagnosis Code    Acute pulmonary edema ([facility]) J81.0    Chest pain R07.9    ESRD (end stage renal disease) on dialysis ([facility] N18.6, Z99.2    Pulmonary edema J81.1    Cardiomyopathy ([facility] I42.9    HTN (hypertension) I10    Type 2 diabetes mellitus ([facility]) E11.9    PAD (peripheral artery disease) ([facility]) I73.9    CAD (coronary artery disease) I25.10    Acute respiratory failure with hypoxia and hypercapnia ([facility]) J96.01, J96.02    Hypertensive emergency I16.1    Acute exacerbation of CHF (congestive heart failure) ([facility] I50.9    BMI 34.0-34.9,adult Z68.34    Peritoneal dialysis status ([facility] Z99.2    Diabetic ulcer of left foot associated with type 2 diabetes mellitus, with fat layer exposed ([facility] E11.621, L97.522    Sepsis ([facility]) A41.9    Cellulitis of fourth toe, left L03.032    Chronic anemia D64.9       Subjective:    She has elevated BP's  Sedation being turned down  has not woken up yet  No new events reported    Review of systems:    UTO due to illness      Vital signs/Intake and Output:  Visit Vitals  BP (!) 147/90   Pulse 69   Temp 97.5 °F (36.4 °C)   Resp 11   Ht 5' 7\" (1.702 m)   Wt 91 kg (200 lb 9.9 oz)   SpO2 100%   BMI 31.42 kg/m²     Current Shift:  No intake/output data recorded.   Last three shifts:  01/28 1901 - 01/30 0700  In: 9651.6 [I.V.:1334.1]  Out: 03426 [Urine:492]    Exam:    General: sedate well developed AAF on vent, NAD  CVS:Regular rate and rhythm, no M/R/G, S1/S2 heard, no thrill  Lungs:Clear to auscultation bilaterally, no wheezes, rhonchi, or rales  Abdomen: Soft, Nontender, No distention, PD catheter  Extremities: No C/C/E, pulses palpable 2+  Neuro:sedated                Labs: Results:       Chemistry Recent Labs     01/29/23  0647 01/28/23  0290   * 162*    137   K 5.7* 5.0   CL 97* 96*   CO2 25 23   BUN 73* 71*   CREA 14.20* 14.20*   CA 9.6 10.1   AGAP 14 18   BUCR 5* 5*    127*   TP 5.5* 6.7   ALB 2.1* 2.6*   GLOB 3.4 4.1*   AGRAT 0.6* 0.6*      CBC w/Diff Recent Labs     01/29/23  1830 01/29/23  0647 01/28/23  2320   WBC  --  7.1 12.5   RBC  --  2.25* 2.70*   HGB 8.2* 6.3* 7.7*   HCT 26.6* 20.7* 26.2*   PLT  --  251 390   GRANS  --   --  75*   LYMPH  --   --  9*   EOS  --   --  14*      Cardiac Enzymes No results for input(s): CPK, CKND1, NAVIN in the last 72 hours. No lab exists for component: CKRMB, TROIP   Coagulation Recent Labs     01/29/23  1045   PTP 14.6   INR 1.1   APTT <20.0*       Lipid Panel No results found for: CHOL, CHOLPOCT, CHOLX, CHLST, CHOLV, 064632, HDL, HDLP, LDL, LDLC, DLDLP, 784329, VLDLC, VLDL, TGLX, TRIGL, TRIGP, TGLPOCT, CHHD, CHHDX   BNP No results for input(s): BNPP in the last 72 hours.    Liver Enzymes Recent Labs     01/29/23  0647   TP 5.5*   ALB 2.1*         Thyroid Studies No results found for: T4, T3U, TSH, TSHEXT     Procedures/imaging: see electronic medical records for all procedures/Xrays and details which were not copied into this note but were reviewed prior to creation of Donnell uRff MD

## 2023-01-30 NOTE — ACP (ADVANCE CARE PLANNING)
Advance Care Planning     General Advance Care Planning (ACP) Conversation    Date of Conversation: 1/30/2023  No conversations held today. Patient was extubated after we saw her. Will attempt to complete AMD tomorrow to clarify decision makers. Healthcare Decision Maker:     Primary Decision Maker: Lenny Alexander - Brother - [phone]    Primary Decision Maker: Rachelle Skelton - Sister - [phone]  Mother and father are living. Need to clarify health status  Today we documented Decision Maker(s) consistent with Legal Next of Kin hierarchy. Content/Action Overview:   Has NO ACP documents/care preferences - will work to complete with patient  Reviewed DNR/DNI and patient is Full Code (Attempt Resuscitation)by default    0840 Seen today in room ICU 2 along with Jose Paz NP. Lying supine. Did not attempt to stimulate Intubated/ventilated/sedated. FiO2 35 PEEP 8. Diprivan and fentanyl gtts infusing    Came to the ED 1/28/2023 from home per EMS for shortness of breath and nausea that began the same day. Her respiratory status required BiPAP which she could not tolerate. She was intubated in the ED. PMH significant for ESRD on home PD; cardiomyopathy (last EF 40%), T2DM, CAD (not amenable to further stenting)    Admitted to ICU for pulmonary edema (ventilator management per intensivist, PD for volume removal), CHR (cardiology consultation, ECHO), ESRD (nephrology consultation with PD as ordered), sepsis (sepsis bundle, IV antibiotics, would care for diabetic foot ulcer), T2DM (sliding scale insulin). Palliative Medicine consultation placed by Dr Marina Washington for goals of care discussion    There is no AMD on file. She has a brother, Tylor Messina, and sister, Joshua German. Her children are < 18. Her parents are ill (father in SNF, mom recuperating from leg amputation). Since the patient was extubated this afternoon, we will attempt to clarify surrogate decision makers tomorrow.      Disposition plan: to be determined based on response to treatment and patient/family decisions    Palliative care will continue to follow Abhay Miranda  and her family during her hospitalization and support them as they make healthcare decisions and define goals of care.       Alma Cruz RN, MSN  Palliative Medicine  P: [phone]

## 2023-01-30 NOTE — CONSULTS
Palliative Medicine Consult    Patient Name: Becki Villareal  YOB: 1980    Date of Initial Consult: 1/30/2023  Reason for Consult: Goals of care discussions  Requesting Provider: Dr. Sarahy Santiago  Primary Care Physician: Eileen Serrano MD      SUMMARY:   Becki Villareal is a 43 y.o. with a past history of progressive CAD not amenable to stenting followed by [facility], CHF with EF of 40%, ESRD on peritoneal dialysis, HTN, type 2 diabetes mellitus, PAD, and obesity, who was admitted on 1/28/2023 from home with a diagnosis of acute respiratory failure with hypoxemia and hypercapnia, acute exacerbation of CHF, hypertensive emergency, anemia of chronic disease, ESRD on peritoneal dialysis, and left foot ulcer with cellulitis. Current medical issues leading to Palliative Medicine involvement include: goals of care discussions in the setting of chronic comorbidities and acute critical illness. PALLIATIVE DIAGNOSES:   Goals of care discussions/advance care planning  Acute respiratory failure with hypoxemia and hypercapnia  CHF exacerbation  ESRD on peritoneal dialysis  Left foot ulcer/cellulitis        PLAN:   Goals of care discussions/Advance care planning: Palliative medicine team including Byron Bojorquez RN and I met with patient at patient's bedside. Patient is orally intubated and sedated, withdrawals to noxious stimuli. No family at bedside. Per chart review, patient has no AMD, was seen by our  Adriane Phan 11/15/2022 and was offered the opportunity to complete one but declined. Patient is not , has a 5year old son, mother Jenelle Chilel who is reportedly in a rehab facility in [address] [place] [address] recovering from an amputation surgery, and a father who reportedly is ill in a nursing facility. Patient has two siblings Pura Garcia and Miya Carter who have made contact with the ICU for medical updates. In the absence of an AMD, patient's mother and father are legal NOK. Noted patient has been medically extubated and currently on 2 L NC. Our team will follow up with patient tomorrow to see if she is able and willing to complete an AMD and discuss goals of care. At this time per chart review, patient is a full code with full interventions. Acute respiratory failure with hypoxemia and hypercapnia: Patient was intubated on 1/28/2023 for impending respiratory failure. Patient remains intubated and sedated. CHF exacerbation: presented with fluid overload, including bilateral pulmonary infiltrates. History of cardiomyopathy and EF of 35-40%, with mild pulmonary hypertension. Cardiology following. ESRD on peritoneal dialysis: nephrology following. Receiving peritoneal dialysis while inpatient. Left foot ulcer/cellulitis: Patient is being treated for sepsis.  She is on broad spectrum antibiotics per primary team.   Initial consult note routed to primary continuity provider  Communicated plan of care with: Palliative IDT       GOALS OF CARE / TREATMENT PREFERENCES:   [====Goals of Care====]  GOALS OF CARE: Full code with full interventions  Patient/Health Care Proxy Stated Goals: Prolong life      TREATMENT PREFERENCES:   Code Status: Full Code    Advance Care Planning:  Advance Care Planning 1/30/2023   Patient's Healthcare Decision Maker is: Legal Next of Kin   Confirm Advance Directive None   Patient Would Like to Complete Advance Directive Unable       Medical Interventions: Full interventions            The palliative care team has discussed with patient / health care proxy about goals of care / treatment preferences for patient.  [====Goals of Care====]         HISTORY:     History obtained from: patient's chart, family    CHIEF COMPLAINT: respiratory failure    HPI/SUBJECTIVE:    The patient is:   [] Verbal and participatory  [x] Non-participatory due to:   Orally intubated and sedated     Clinical Pain Assessment (nonverbal scale for severity on nonverbal patients):   Clinical Pain Assessment  Severity: 0    Adult Nonverbal Pain Scale  Face: No particular expression or smile  Activity (Movement): Lying quietly, normal position  Guarding: Lying quietly, no positioning of hands over areas of body  Physiology (Vital Signs): Stable vital signs  Respiratory: Baseline RR/SpO2 compliant with ventilator  Total Score: 0       FUNCTIONAL ASSESSMENT:     Palliative Performance Scale (PPS):  PPS: 30       PSYCHOSOCIAL/SPIRITUAL SCREENING:     Advance Care Planning:  Advance Care Planning 1/30/2023   Patient's Healthcare Decision Maker is: Legal Next of Kin   Confirm Advance Directive None   Patient Would Like to Complete Advance Directive Unable        Any spiritual / Episcopalian concerns: unable to assess  [] Yes /  [] No    Caregiver Burnout:  [] Yes /  [] No /  [x] No Caregiver Present      Anticipatory grief assessment: unable to assess  [] Normal  / [] Maladaptive             REVIEW OF SYSTEMS:     Positive and pertinent negative findings in ROS are noted above in HPI. The following systems were [] reviewed / [x] unable to be reviewed as noted in HPI  Other findings are noted below. Systems: constitutional, ears/nose/mouth/throat, respiratory, gastrointestinal, genitourinary, musculoskeletal, integumentary, neurologic, psychiatric, endocrine. Positive findings noted below. Modified ESAS Completed by: provider           Pain: 0           Dyspnea: 0                    PHYSICAL EXAM:     From RN flowsheet:  Wt Readings from Last 3 Encounters:   01/30/23 91 kg (200 lb 9.9 oz)   11/18/22 92.8 kg (204 lb 9.4 oz)   09/11/18 96.6 kg (213 lb)     Blood pressure (!) 147/90, pulse 69, temperature 97.5 °F (36.4 °C), resp. rate 11, height 5' 7\" (1.702 m), weight 91 kg (200 lb 9.9 oz), SpO2 100 %.     Pain Scale 1: Adult Nonverbal Pain Scale  Pain Intensity 1: 0                   Constitutional: 43year old female, obese, lying in bed, orally intubated and sedated, critically ill  Eyes: closed  ENMT: orally intubated  Cardiovascular: regular rhythm, distal pulses intact  Respiratory: breathing not labored, symmetric, orally intubated on mechanical ventilator   Gastrointestinal: soft non-tender   Musculoskeletal: no deformity, no tenderness to palpation  Skin: warm, dry  Neurologic: sedated       HISTORY:     Principal Problem:    Acute respiratory failure with hypoxia and hypercapnia (HCC) (1/29/2023)    Active Problems:    ESRD (end stage renal disease) on dialysis ([facility] (11/15/2022)      HTN (hypertension) (11/15/2022)      Type 2 diabetes mellitus ([address] (11/15/2022)      PAD (peripheral artery disease) ([address] (11/15/2022)      CAD (coronary artery disease) (11/15/2022)      Hypertensive emergency (1/29/2023)      Acute exacerbation of CHF (congestive heart failure) ([address] (1/29/2023)      BMI 34.0-34.9,adult (1/29/2023)      Peritoneal dialysis status ([address] (1/29/2023)      Diabetic ulcer of left foot associated with type 2 diabetes mellitus, with fat layer exposed ([address] (1/29/2023)      Sepsis ([address] (1/29/2023)      Cellulitis of fourth toe, left (1/29/2023)      Chronic anemia (1/29/2023)    Past Medical History:   Diagnosis Date    BMI 34.0-34.9,adult 1/29/2023    Cardiomyopathy ([address]     Chronic kidney disease     Coronary arteriosclerosis     DM (diabetes mellitus) ([address]     Hypertension     Tachycardia       History reviewed. No pertinent surgical history. Family History   Problem Relation Age of Onset    Diabetes Mother     Heart Disease Mother     Heart Attack Mother     Heart Attack Father     Heart Disease Father     Diabetes Father       History reviewed, no pertinent family history.   Social History     Tobacco Use    Smoking status: Never    Smokeless tobacco: Never   Substance Use Topics    Alcohol use: Yes     Comment: rarely     Allergies   Allergen Reactions    Coreg [Carvedilol] Nausea and Vomiting    Lortab [Hydrocodone-Acetaminophen] Nausea and Vomiting      Current Facility-Administered Medications   Medication Dose Route Frequency    peritoneal dialysis DEXTROSE 4.25% (2.5 mEq/L low calcium) solution 2,000 mL  2,000 mL IntraPERitoneal QID    sodium chloride (NS) flush 5-40 mL  5-40 mL IntraVENous Q8H    sodium chloride (NS) flush 5-40 mL  5-40 mL IntraVENous PRN    acetaminophen (TYLENOL) tablet 650 mg  650 mg Oral Q6H PRN    Or    acetaminophen (TYLENOL) suppository 650 mg  650 mg Rectal Q6H PRN    polyethylene glycol (MIRALAX) packet 17 g  17 g Oral DAILY PRN    ondansetron (ZOFRAN ODT) tablet 4 mg  4 mg Oral Q8H PRN    Or    ondansetron (ZOFRAN) injection 4 mg  4 mg IntraVENous Q6H PRN    heparin (porcine) injection 5,000 Units  5,000 Units SubCUTAneous Q8H    famotidine (PF) (PEPCID) 20 mg in 0.9% sodium chloride 10 mL injection  20 mg IntraVENous BID    insulin lispro (HUMALOG) injection   SubCUTAneous Q6H    glucose chewable tablet 16 g  16 g Oral PRN    glucagon (GLUCAGEN) injection 1 mg  1 mg IntraMUSCular PRN    dextrose 10% infusion 0-250 mL  0-250 mL IntraVENous PRN    Vancomycin-Pharmacy to Dose  1 Each Other Rx Dosing/Monitoring    fentaNYL (PF) 10 mcg/mL infusion  0-200 mcg/hr IntraVENous TITRATE    propofol (DIPRIVAN) 10 mg/mL infusion  0-50 mcg/kg/min IntraVENous TITRATE    0.9% sodium chloride infusion 250 mL  250 mL IntraVENous PRN    chlorhexidine (PERIDEX) 0.12 % mouthwash 10 mL  10 mL Oral Q12H    hydrALAZINE (APRESOLINE) 20 mg/mL injection 10 mg  10 mg IntraVENous Q4H PRN    piperacillin-tazobactam (ZOSYN) 3.375 g in 0.9% sodium chloride (MBP/ADV) 100 mL MBP  3.375 g IntraVENous Q12H    sodium chloride (NS) flush 5-10 mL  5-10 mL IntraVENous PRN          LAB AND IMAGING FINDINGS:     Lab Results   Component Value Date/Time    WBC 7.1 01/29/2023 06:47 AM    HGB 8.2 (L) 01/29/2023 06:30 PM    PLATELET 070 50/39/3820 06:47 AM     Lab Results   Component Value Date/Time    Sodium 136 01/29/2023 06:47 AM    Potassium 5.7 (H) 01/29/2023 06:47 AM    Chloride 97 (L) 01/29/2023 06:47 AM    CO2 25 01/29/2023 06:47 AM    BUN 73 (H) 01/29/2023 06:47 AM    Creatinine 14.20 (H) 01/29/2023 06:47 AM    Calcium 9.6 01/29/2023 06:47 AM    Magnesium 2.6 11/18/2022 02:40 AM    Phosphorus 7.1 (H) 11/18/2022 02:40 AM      Lab Results   Component Value Date/Time    Alk. phosphatase 103 01/29/2023 06:47 AM    Protein, total 5.5 (L) 01/29/2023 06:47 AM    Albumin 2.1 (L) 01/29/2023 06:47 AM    Globulin 3.4 01/29/2023 06:47 AM     Lab Results   Component Value Date/Time    INR 1.1 01/29/2023 10:45 AM    Prothrombin time 14.6 01/29/2023 10:45 AM    aPTT <20.0 (L) 01/29/2023 10:45 AM      Lab Results   Component Value Date/Time    Iron 50 11/18/2022 02:39 AM    TIBC 220 (L) 11/18/2022 02:39 AM    Iron % saturation 23 11/18/2022 02:39 AM    Ferritin 779 (H) 11/18/2022 02:39 AM      No results found for: PH, PCO2, PO2  No components found for: [place]   Lab Results   Component Value Date/Time     (H) 09/11/2018 02:34 AM    CK - MB 10.6 (H) 09/11/2018 02:34 AM                Total time: 40 minutes  Counseling / coordination time, spent as noted above:   > 50% counseling / coordination?: yes, patient, family, and medical team     Prolonged service was provided for  []30 min   []75 min in face to face time in the presence of the patient, spent as noted above.   Time Start:   Time End:

## 2023-01-30 NOTE — PROGRESS NOTES
conducted an initial consultation and spiritual assessment for Filomena Squires, who is a 43 y.o.,female. Patient is known to me from a previous visit. She had just been extubated. Her eyes remained closed but she squeezed my hand after my prayer. Initiated a relationship of care and support. Offered prayer and assurance of continued prayers on patients behalf. Chart reviewed. Chaplains will continue to follow and will provide pastoral care as needed or requested.  recommends bedside caregivers page  on duty if patient shows signs of acute spiritual or emotional distress. [address], M.Div.   Board Certified   [phone] - Office

## 2023-01-30 NOTE — PROGRESS NOTES
Nephrology Progress Note    Patient: Michele Hernández  Requesting physician: Dr. Rajesh Penaloza  Reason for consult: ESRD management    CC: SOB    History of Present Illness:  Michele Hernández is a 43 y.o. female with a past medical history significant for CAD/MI, CM 40% EF, DM type 2, obesity, ESRD on PD at [facility], who presented with worsening SOB/ resp distress. Pt could not tolerate bipap and eventually required intubation, admitted to ICU. Noticed to have elevated BP as well. No f/c. Imaging c/w volume overload and neg for PE. Nephrology was consulted for further evaluation and management of her ESRD. -Pt extubated. Doing well. No SOB. PD w/ 4.25% working well.       Inpatient meds:  Current Facility-Administered Medications   Medication Dose Route Frequency    atorvastatin (LIPITOR) tablet 80 mg  80 mg Oral DAILY    calcitRIOL (ROCALTROL) capsule 0.25 mcg  0.25 mcg Oral DAILY    [Held by provider] cinacalcet (SENSIPAR) tablet 60 mg  60 mg Oral DAILY    [Held by provider] levothyroxine (SYNTHROID) tablet 200 mcg  200 mcg Oral ACB    [Held by provider] NIFEdipine ER (PROCARDIA XL) tablet 90 mg  90 mg Oral DAILY    [Held by provider] calcium acetate(phosphat bind) (PHOSLO) capsule 1,334 mg  2 Capsule Oral TID WITH MEALS    isosorbide mononitrate ER (IMDUR) tablet 60 mg  60 mg Oral 7am    metoprolol succinate (TOPROL-XL) XL tablet 50 mg  50 mg Oral BID    hydrALAZINE (APRESOLINE) tablet 50 mg  50 mg Oral TID    [START ON 1/31/2023] Vanocomycin level due with AM labs 1/31/23  1 Each Other ONCE    peritoneal dialysis DEXTROSE 4.25% (2.5 mEq/L low calcium) solution 2,000 mL  2,000 mL IntraPERitoneal QID    sodium chloride (NS) flush 5-40 mL  5-40 mL IntraVENous Q8H    sodium chloride (NS) flush 5-40 mL  5-40 mL IntraVENous PRN    acetaminophen (TYLENOL) tablet 650 mg  650 mg Oral Q6H PRN    Or    acetaminophen (TYLENOL) suppository 650 mg  650 mg Rectal Q6H PRN    polyethylene glycol (MIRALAX) packet 17 g  17 g Oral DAILY PRN    ondansetron (ZOFRAN ODT) tablet 4 mg  4 mg Oral Q8H PRN    Or    ondansetron (ZOFRAN) injection 4 mg  4 mg IntraVENous Q6H PRN    heparin (porcine) injection 5,000 Units  5,000 Units SubCUTAneous Q8H    famotidine (PF) (PEPCID) 20 mg in 0.9% sodium chloride 10 mL injection  20 mg IntraVENous BID    insulin lispro (HUMALOG) injection   SubCUTAneous Q6H    glucose chewable tablet 16 g  16 g Oral PRN    glucagon (GLUCAGEN) injection 1 mg  1 mg IntraMUSCular PRN    dextrose 10% infusion 0-250 mL  0-250 mL IntraVENous PRN    Vancomycin-Pharmacy to Dose  1 Each Other Rx Dosing/Monitoring    0.9% sodium chloride infusion 250 mL  250 mL IntraVENous PRN    chlorhexidine (PERIDEX) 0.12 % mouthwash 10 mL  10 mL Oral Q12H    hydrALAZINE (APRESOLINE) 20 mg/mL injection 10 mg  10 mg IntraVENous Q4H PRN    piperacillin-tazobactam (ZOSYN) 3.375 g in 0.9% sodium chloride (MBP/ADV) 100 mL MBP  3.375 g IntraVENous Q12H    sodium chloride (NS) flush 5-10 mL  5-10 mL IntraVENous PRN             Vital signs:   Visit Vitals  BP (!) 190/100   Pulse 100   Temp 98.1 °F (36.7 °C)   Resp 18   Ht 5' 7\" (1.702 m)   Wt 99.8 kg (220 lb)   SpO2 98%   BMI 34.46 kg/m²       Intake/Output Summary (Last 24 hours) at 1/30/2023 1201  Last data filed at 1/30/2023 1124  Gross per 24 hour   Intake 8975.3 ml   Output 19235 ml   Net -3726.7 ml         Physical Exam:    General: sleepy   HENT: Atraumatic and normocephalic   Eyes: Normal conjunctiva, EOMI   Neck: Supple with no mass   Cardiovascular: Normal S1 & S2, no m/r/g   Pulmonary/Chest Wall: Clear to auscultation bilaterally, no w/c   Abdominal: Soft and non-tender, normal active bowel sound, +PD cath    Musculoskeletal: +edema in LEs, left more than right   Skin: No rash   Neurological: Sleepy but oriented        Data Review:  CMP:   Lab Results   Component Value Date/Time     01/30/2023 08:12 AM    K 4.9 01/30/2023 08:12 AM    CL 98 (L) 01/30/2023 08:12 AM    CO2 30 01/30/2023 08:12 AM    AGAP 11 01/30/2023 08:12 AM    GLU 75 01/30/2023 08:12 AM    BUN 69 (H) 01/30/2023 08:12 AM    CREA 13.60 (H) 01/30/2023 08:12 AM    CA 10.4 (H) 01/30/2023 08:12 AM    ALB 2.3 (L) 01/30/2023 08:12 AM    TP 5.9 (L) 01/30/2023 08:12 AM    GLOB 3.6 01/30/2023 08:12 AM    AGRAT 0.6 (L) 01/30/2023 08:12 AM    ALT 14 01/30/2023 08:12 AM     CBC:   Lab Results   Component Value Date/Time    WBC 8.9 01/30/2023 08:12 AM    HGB 8.5 (L) 01/30/2023 08:12 AM    HCT 28.1 (L) 01/30/2023 08:12 AM     01/30/2023 08:12 AM     All Cardiac Markers in the last 24 hours: No results found for: CPK, CK, CKMMB, CKMB, RCK3, CKMBT, CKNDX, CKND1, NAVIN, TROPT, TROIQ, JUAN CARLOS, TROPT, TNIPOC, BNP, BNPP  Recent Glucose Results:   Lab Results   Component Value Date/Time    GLU 75 01/30/2023 08:12 AM     ABG:   Lab Results   Component Value Date/Time    PHI 7.41 01/30/2023 04:15 AM    PCO2I 44.9 01/30/2023 04:15 AM    PO2I 118 (H) 01/30/2023 04:15 AM    HCO3I 28.6 (H) 01/30/2023 04:15 AM    FIO2I 40 01/30/2023 04:15 AM     Liver Panel:   Lab Results   Component Value Date/Time    ALB 2.3 (L) 01/30/2023 08:12 AM    TP 5.9 (L) 01/30/2023 08:12 AM    GLOB 3.6 01/30/2023 08:12 AM    AGRAT 0.6 (L) 01/30/2023 08:12 AM    ALT 14 01/30/2023 08:12 AM    AP 92 01/30/2023 08:12 AM     Pancreatic Markers: No results found for: AMYLPOCT, AML, LIPPOCT, LPSE      CT:      Significant bilateral lower lobe consolidation and bilateral  pulmonary infiltrates. Small right pleural effusion. No evidence of pulmonary embolism. Peritoneal dialysis catheter is coiled within the left lower quadrant. No acute abnormality is identified. Impression:     ESRD on home PD  Respiratory failure required intubation: now extubated  Hyperkalemia, mild: resolved  Anemia in CKD  HTN: BP not well controlled   DM type 2  Hypoalbuminemia  Lactic acidosis  H.o.  CAD/ MI    Plan:     Cont manual PD with 4.25% dextrose, for volume control, 4 times/day today  Will consider changing back to 2.5% dextrose in AM  Following h/h and transfuse as needed  SCOTT when BP is better controlled (SBP <160)  PTH pending        MD Thomas Carlin  [phone]

## 2023-01-30 NOTE — WOUND CARE
IP WOUND CONSULT    Soledad Mcneil  MEDICAL RECORD NUMBER:  [id number]  AGE: 43 y.o. GENDER: female  : 1980  TODAY'S DATE:  2023    GENERAL     [] Follow-up   [x] New Consult    [x] Present on Admission  [] Marian Alexander is a 43 y.o. female referred by:   [] Physician  [x] Nursing  [] Other:         PAST MEDICAL HISTORY    Past Medical History:   Diagnosis Date    BMI 34.0-34.9,adult 2023    Cardiomyopathy ([facility] [address]     Chronic kidney disease     Coronary arteriosclerosis     DM (diabetes mellitus) ([facility] [address]     Hypertension     Tachycardia         PAST SURGICAL HISTORY    History reviewed. No pertinent surgical history. FAMILY HISTORY    Family History   Problem Relation Age of Onset    Diabetes Mother     Heart Disease Mother     Heart Attack Mother     Heart Attack Father     Heart Disease Father     Diabetes Father        SOCIAL HISTORY    Social History     Tobacco Use    Smoking status: Never    Smokeless tobacco: Never   Substance Use Topics    Alcohol use: Yes     Comment: rarely       ALLERGIES    Allergies   Allergen Reactions    Coreg [Carvedilol] Nausea and Vomiting    Lortab [Hydrocodone-Acetaminophen] Nausea and Vomiting       MEDICATIONS    No current facility-administered medications on file prior to encounter. Current Outpatient Medications on File Prior to Encounter   Medication Sig Dispense Refill    bisacodyL 5 mg tab Take 5 mg by mouth two (2) times a day. metoclopramide HCl (REGLAN) 5 mg tablet Take 5 mg by mouth as needed for Nausea or Vomiting.      sodium bicarbonate 650 mg tablet Take 650 mg by mouth two (2) times a day. albuterol (Ventolin HFA) 90 mcg/actuation inhaler Take 2 Puffs by inhalation every six (6) hours as needed for Wheezing. atorvastatin (LIPITOR) 80 mg tablet Take 80 mg by mouth daily. clopidogreL (Plavix) 75 mg tab Take 75 mg by mouth daily.       cloNIDine HCL (CATAPRES) 0.2 mg tablet Take 0.2 mg by mouth two (2) times a day. hydrALAZINE (APRESOLINE) 100 mg tablet Take 100 mg by mouth three (3) times daily. mupirocin (BACTROBAN) 2 % ointment Apply  to affected area three (3) times daily. candesartan (ATACAND) 32 mg tablet Take 32 mg by mouth daily. Indications: high blood pressure      isosorbide mononitrate ER (IMDUR) 120 mg CR tablet Take 120 mg by mouth every morning. metoprolol succinate (TOPROL-XL) 100 mg tablet Take 100 mg by mouth two (2) times a day. insulin aspart U-100 (NOVOLOG) 100 unit/mL injection 15 Units by SubCUTAneous route Before breakfast, lunch, and dinner. fluticasone propionate (FLOVENT HFA) 44 mcg/actuation inhaler Take 1 Puff by inhalation as needed. calcium acetate,phosphat bind, (PHOSLO) 667 mg cap Take 2 Capsules by mouth three (3) times daily (with meals). calcitRIOL (ROCALTROL) 0.25 mcg capsule Take 0.25 mcg by mouth daily. NIFEdipine ER (PROCARDIA XL) 90 mg ER tablet Take 90 mg by mouth daily. levothyroxine (SYNTHROID) 200 mcg tablet Take 200 mcg by mouth Daily (before breakfast). cinacalcet (SENSIPAR) 30 mg tablet Take 60 mg by mouth daily. bumetanide (BUMEX) 2 mg tablet Take 2 mg by mouth two (2) times a day. aspirin 81 mg chewable tablet Take 81 mg by mouth daily. Wt Readings from Last 3 Encounters:   01/30/23 99.8 kg (220 lb)   11/18/22 92.8 kg (204 lb 9.4 oz)   09/11/18 96.6 kg (213 lb)       [email or web address] Vitals  BP (!) 190/100   Pulse 100   Temp 97.5 °F (36.4 °C)   Resp 18   Ht 5' 7\" (1.702 m)   Wt 99.8 kg (220 lb)   SpO2 98%   BMI 34.46 kg/m²       ASSESSMENT     Skin impairment Identification:  Type: diabetic    Contributing Factors: diabetes    Wound Foot Left; Anterior 01/30/23 (Active)   Wound Image   01/30/23 1122   Wound Etiology Diabetic 01/30/23 1122   Dressing Status Other open to air 01/30/23 1122   Cleansed Cleansed with saline 01/30/23 1122   Dressing/Treatment Collagen with Ag;Moisten with saline;Alginate with Ag;Silicone border 88/51/60 1122   Wound Length (cm) 1.5 cm 01/30/23 1122   Wound Width (cm) 0.7 cm 01/30/23 1122   Wound Depth (cm) 0.5 cm 01/30/23 1122   Wound Surface Area (cm^2) 1.05 cm^2 01/30/23 1122   Wound Volume (cm^3) 0.525 cm^3 01/30/23 1122   Wound Assessment Pale granulation tissue 01/30/23 1122   Drainage Amount None 01/30/23 1122   Wound Odor None 01/30/23 1122   Shanda-Wound/Incision Assessment Dry/flaky 01/30/23 1122   Edges Defined edges 01/30/23 1122   Wound Thickness Description Full thickness 01/30/23 1122   Number of days: 0          PLAN     Skin Care & Pressure Relief Recommendations  Minimize layers of linen  Pads under patient to optimize support surface  Turn/reposition approximately every 2 hours  Pillow wedges  Please use in bed position system    Recommendations: leave dressing in place if dressing comes off or is removed please apply new border    Teaching completed with:   [x] Patient           [] Family member       [] Caregiver          [] Nursing  [] Other    Patient/Caregiver Teaching:  Level of patient/caregiver understanding able to:   [x] Indicates understanding       [] Needs reinforcement  [] Unsuccessful      [] Verbal Understanding  [] Demonstrated understanding       [] No evidence of learning  [] Refused teaching         [] N/A       Electronically signed by Hayley Chow RN on 1/30/2023 at 11:24 AM

## 2023-01-30 NOTE — PROGRESS NOTES
Problem: Dysphagia (Adult)  Description: Patient will:  1. Tolerate PO trials with 0 s/s overt distress in 4/5 trials. 2. Utilize compensatory swallow strategies/maneuvers (decrease bite/sip, size/rate, alt. liq/sol) with min cues in 4/5 trials. 3. Perform oral-motor/laryngeal exercises to increase oropharyngeal swallow function with min cues. 4. Complete an objective swallow study (i.e., MBSS) to assess swallow integrity, r/o aspiration, and determine of safest LRD, min A.    Rec:     NPO except spoon sips of water for comfort/ with meds - only if alert  Aspiration precautions  HOB >45 during po intake, remain >30 for 30-45 minutes after po   Oral care TID  Outcome: Progressing Towards Goal      SPEECH LANGUAGE PATHOLOGY BEDSIDE SWALLOW   EVALUATION     Patient: Thai Manriquez (43 y.o. female)  Date: 1/30/2023  Primary Diagnosis: Acute hypoxemic respiratory failure (HCC) [J96.01]       Precautions: Aspiration     PLOF: as per H&P    ASSESSMENT :  Based on the objective data described below, the patient presents with suspected pharyngeal phase dysphagia s/p extubation early this a.m. Patient drowsy upon arrival. Opened eyes to voice but did not keep eyes open during eval. Patient primarily spoke in a whisper. Patient able to produce voice (low volume) for very brief period of time. Patient is oriented to person and situation. Limited trials due to patient's drowsy state. Oral mechanism exam complete. Patient presenting with decreased labial seal and decreased lingual strength/ rate, likely 2/ overall fatigue. Limited trials of ice chips and spoon sips of water. Bolus control/manipulation appears WFL. Suspect delayed swallow initiation and decreased laryngeal elevation. Patient demo delayed cough x1 following spoon sip of water. No other overt s/sx of aspiration or distress appreciated. Recommend patient remain NPO with spoon sips of water/ice chips (only when alert) for comfort. Aspiration precautions.  ST will reassess in the morning when patient more alert. D/w with SUAD Beyer. Patient will benefit from skilled intervention to address the above impairments. Patient's rehabilitation potential is considered to be Good  Factors which may influence rehabilitation potential include:   []            None noted  []            Mental ability/status  [x]            Medical condition  []            Home/family situation and support systems  []            Safety awareness  []            Pain tolerance/management  []            Other:      PLAN :  Recommendations and Planned Interventions:  See above  Frequency/Duration: Patient will be followed by speech-language pathology 3 times a week to address goals. Discharge Recommendations: To Be Determined     SUBJECTIVE:   Patient stated I'm hungry. OBJECTIVE:     Past Medical History:   Diagnosis Date    BMI 34.0-34.9,adult 1/29/2023    Cardiomyopathy ([facility] [address]     Chronic kidney disease     Coronary arteriosclerosis     DM (diabetes mellitus) ([facility] [address]     Hypertension     Tachycardia    History reviewed. No pertinent surgical history. Home Situation:   Home Situation  Support Systems: Other Family Member(s), Spouse/Significant Other  Patient Expects to be Discharged to[de-identified]  (TBD)    Diet prior to admission: Regular, thin  Current Diet:  NPO     Cognitive and Communication Status:  Neurologic State: Eyes open to voice, Drowsy  Orientation Level: Oriented to person, Oriented to situation  Cognition: Follows commands  Perception: Appears intact  Perseveration: No perseveration noted     Oral Assessment:  Oral Assessment  Labial: Decreased seal  Dentition: Natural;Intact  Oral Hygiene:  (fair)  Lingual: Decreased strength;Decreased rate  Velum: No impairment  Mandible: No impairment  Gag Reflex:  (did not test)  P.O. Trials:  Patient Position:  (HOB>45)  Vocal quality prior to P.O.: Hoarse;Aphonic  Consistency Presented: Thin liquid; Ice chips  How Presented: SLP-fed/presented;Spoon     Bolus Acceptance: No impairment  Bolus Formation/Control: No impairment     Propulsion: No impairment  Oral Residue: None  Initiation of Swallow: Other (comment) (suspect delay)  Laryngeal Elevation: Decreased  Aspiration Signs/Symptoms: Delayed cough/throat clear  Pharyngeal Phase Characteristics: Easily fatigued ; Feeling of discomfort;Poor endurance  Effective Modifications: Spoon;Double swallow  Cues for Modifications: Minimal-moderate       Oral Phase Severity: No impairment  Pharyngeal Phase Severity :  (suspect)    PAIN:  Pain level pre-treatment: none reported  Pain level post-treatment: none reported    After treatment:   []            Patient left in no apparent distress sitting up in chair  [x]            Patient left in no apparent distress in bed  [x]            Call bell left within reach  [x]            Nursing notified  []            Family present  []            Caregiver present  []            Bed alarm activated    COMMUNICATION/EDUCATION:   [x]            Aspiration precautions; swallow safety; compensatory techniques. []            Patient/family have participated as able in goal setting and plan of care. []            Patient/family agree to work toward stated goals and plan of care. []            Patient understands intent and goals of therapy; neutral about participation. []            Patient unable to participate in goal setting/plan of care; educ ongoing with interdisciplinary staff  []         Posted safety precautions in patient's room.     Thank you for this referral.  Lorenzo Murphy, SLP  Time Calculation: 20 mins

## 2023-01-30 NOTE — PROGRESS NOTES
[facility]  Pulmonary, Critical Care, and Sleep Medicine    Name: Dionne Phan MRN: [id number]   : 1980 Hospital: [facility] [place]    Date: 2023  Room: [address] [place] [address] Note                                              Consult requesting physician: Dr. Brandy Queen  Reason for Consult: Acute respiratory failure on ventilator support      IMPRESSION:   Acute respiratory failure with hypoxemia and hypercapnia  Acute exacerbation of CHF  Bilateral pulmonary infiltrates  End-stage renal disease on peritoneal dialysis  Hypertensive emergency  Anemia of chronic disease  Left foot ulcer/cellulitis  Peripheral arterial disease  Hypertension  Type 2 diabetes mellitus  Coronary artery disease      Patient Active Problem List   Diagnosis Code    Acute pulmonary edema (HCC) J81.0    Chest pain R07.9    ESRD (end stage renal disease) on dialysis ([facility] [address] N18.6, Z99.2    Pulmonary edema J81.1    Cardiomyopathy ([facility] [address] I42.9    HTN (hypertension) I10    Type 2 diabetes mellitus (HCC) E11.9    PAD (peripheral artery disease) (HCC) I73.9    CAD (coronary artery disease) I25.10    Acute respiratory failure with hypoxia and hypercapnia (HCC) J96.01, J96.02    Hypertensive emergency I16.1    Acute exacerbation of CHF (congestive heart failure) ([facility] [address] I50.9    BMI 34.0-34.9,adult Z68.34    Peritoneal dialysis status ([facility] [address] Z99.2    Diabetic ulcer of left foot associated with type 2 diabetes mellitus, with fat layer exposed ([facility] [address] E11.621, L97.522    Sepsis (HCC) A41.9    Cellulitis of fourth toe, left L03.032    Chronic anemia D64.9         Code status: Full code      RECOMMENDATIONS:   Respiratory: Patient present with acute respiratory distress, with volume overload, bilateral pulmonary infiltrates on chest CT, and intubated in the ER. Chest x-ray this morning-stable ET tube and OG tube position; improved pulmonary edema; no worsening pleural effusions.   VCV mode of ventilation; FiO2 40%; PEEP decreased to 5. ABG reviewed-stable ventilation and oxygenation. Patient appears to be clinically stable for weaning trials and trial of extubation; stopped sedation and fentanyl drip; patient started waking up, and started having coughing and gagging continuously; SBT-patient good pulling in good volumes without being tachypneic; cuff leak test present done by RT on VCV board; hence planned for quick extubation; patient extubated with help of RT and RN; placed on nasal cannula oxygen-stable respirations and oxygenation; no stridor. Keep SPO2 >=92%. HOB 30 degree elevation all the time. Aggressive pulmonary toileting. Aspiration precautions. Incentive spirometry after extubation. CVS: Known patient with coronary artery disease and cardiomyopathy/low EF; EKG on admission-nil acute. Patient got antihypertensive through OG tube this morning. Prn IV hydralazine added; keep systolic blood pressure less than 160 mmHg. Prior EF of 35-40%; mild pulmonary hypertension. Monitor troponin; cardiology Dr. Madeleine Mcclure on case. Follow-up echo-pending. ID: Treat as sepsis; lactic acidosis has resolved. Broad-spectrum antibiotic-Zosyn and IV vancomycin-renal dosing. Monitor blood cultures; send peritoneal fluid culture. Follow-up on chest x-ray tomorrow. Deescalate antibiotic when appropriate. Hematology/Oncology: Anemia of chronic disease; hemoglobin 8.5 this morning-s/p 1 unit PRBC yesterday. Platelets normal; coags-INR normal, PTT low. No active bleeding issues. Renal: Nephrology on case; patient on peritoneal dialysis. Patient renal function appears uremic, with stable potassium and bicarb. GI: CT abdomen-nil acute; history of peritoneal dialysis catheter. Endocrine: Monitor BS. SSI. Neurology: Intubated and currently sedated. Toxicology: UDS-negative. Skin/Wound: Wound care consult for left foot ulcer. Vascular: Check bilateral lower extremity arterial ultrasound-pending.   Nutrition: S/p extubation; SLP quin consulted. Prophylaxis: DVT Prophylaxis: SCD. GI Prophylaxis: Protonix. Restraints: Prn wrist soft restraints for patient interfering with medical therapy/management and patient safety. Discontinue postextubation. PT OT consulted. Lines/Tubes: PIVs; RT arm midline  ETT: 1/29. OGT/NGT: 1/29. Torres: 1/29 (Medically necessary for strict input/output monitoring in critically ill patient, will remove it when not needed. Torres bundle followed). Other drains: Chronic PD catheter. Advance Directive/Palliative Care: consulted: Prognosis guarded overall. Quality Care: PPI, DVT prophylaxis, HOB elevated, Infection control all reviewed and addressed. Care of plan d/w RN, RT; appreciate assistance to help with extubation. High complexity decision making was performed during the evaluation of this patient at high risk for decompensation with multiple organ involvement. Total critical care time spent rendering care exclusive of procedures/family discussion/coordination of care: 40 minutes. Name Relation Home Work Lenny Haile Brother [phone]     German Calhoun Sister [phone]       I called and discussed with patient's sister Nelia Dorantes; updated management plans from ICU perspective; discussed about patient stable respiratory status and improved chest x-ray findings; patient has been extubated and doing well so far; blood pressure needs to be monitored; continuing peritoneal dialysis as per nephrology; cardiology consulted-echo results pending. Subjective/History of Present Illness:     Patient is a 43 y.o. female with PMHx significant for multiple medical problems. Patient last admission was in November 2022 with NSTEMI and fluid overload, treated with peritoneal dialysis at that time.   The patient has history of coronary artery disease, not amenable for coronary stenting-[facility]; cardiomyopathy with EF 40%, end-stage renal disease on peritoneal dialysis, hypertension, type 2 diabetes mellitus, obesity. The patient came to the ER last night with worsening shortness of breath symptoms. The patient was in respiratory distress, and could not tolerate BiPAP therapy. She was hypertensive with blood pressure 229/186 mmHg max. Patient subsequently needed to be intubated. Patient underwent CT studies-reviewed below. Patient has left foot ulcer. 1/30/2023  Patient seen in the ICU. Patient is intubated and sedated. Patient remains on ventilator support, and sedated. No bloody secretions reported from ET tube. No bleeding issues. Stable hemodynamics; telemetry-sinus rhythm; blood pressure-elevated. Drips-propofol, and fentanyl. Review of Systems:  ROS not obtained due to patient factor. Allergies   Allergen Reactions    Coreg [Carvedilol] Nausea and Vomiting    Lortab [Hydrocodone-Acetaminophen] Nausea and Vomiting      Past Medical History:   Diagnosis Date    BMI 34.0-34.9,adult 1/29/2023    Cardiomyopathy ([facility] [address]     Chronic kidney disease     Coronary arteriosclerosis     DM (diabetes mellitus) ([facility] [address]     Hypertension     Tachycardia       History reviewed. No pertinent surgical history. Social History     Tobacco Use    Smoking status: Never    Smokeless tobacco: Never   Substance Use Topics    Alcohol use: Yes     Comment: rarely      Family History   Problem Relation Age of Onset    Diabetes Mother     Heart Disease Mother     Heart Attack Mother     Heart Attack Father     Heart Disease Father     Diabetes Father       Prior to Admission medications    Medication Sig Start Date End Date Taking? Authorizing Provider   bisacodyL 5 mg tab Take 5 mg by mouth two (2) times a day. Provider, Historical   metoclopramide HCl (REGLAN) 5 mg tablet Take 5 mg by mouth as needed for Nausea or Vomiting. Provider, Historical   sodium bicarbonate 650 mg tablet Take 650 mg by mouth two (2) times a day.     Provider, Historical   albuterol (Ventolin HFA) 90 mcg/actuation inhaler Take 2 Puffs by inhalation every six (6) hours as needed for Wheezing. Provider, Historical   atorvastatin (LIPITOR) 80 mg tablet Take 80 mg by mouth daily. Provider, Historical   clopidogreL (Plavix) 75 mg tab Take 75 mg by mouth daily. Provider, Historical   cloNIDine HCL (CATAPRES) 0.2 mg tablet Take 0.2 mg by mouth two (2) times a day. Provider, Historical   hydrALAZINE (APRESOLINE) 100 mg tablet Take 100 mg by mouth three (3) times daily. Provider, Historical   mupirocin (BACTROBAN) 2 % ointment Apply  to affected area three (3) times daily. Provider, Historical   candesartan (ATACAND) 32 mg tablet Take 32 mg by mouth daily. Indications: high blood pressure    Provider, Historical   isosorbide mononitrate ER (IMDUR) 120 mg CR tablet Take 120 mg by mouth every morning. Provider, Historical   metoprolol succinate (TOPROL-XL) 100 mg tablet Take 100 mg by mouth two (2) times a day. Provider, Historical   insulin aspart U-100 (NOVOLOG) 100 unit/mL injection 15 Units by SubCUTAneous route Before breakfast, lunch, and dinner. Provider, Historical   fluticasone propionate (FLOVENT HFA) 44 mcg/actuation inhaler Take 1 Puff by inhalation as needed. Provider, Historical   calcium acetate,phosphat bind, (PHOSLO) 667 mg cap Take 2 Capsules by mouth three (3) times daily (with meals). Provider, Historical   calcitRIOL (ROCALTROL) 0.25 mcg capsule Take 0.25 mcg by mouth daily. Provider, Historical   NIFEdipine ER (PROCARDIA XL) 90 mg ER tablet Take 90 mg by mouth daily. Provider, Historical   levothyroxine (SYNTHROID) 200 mcg tablet Take 200 mcg by mouth Daily (before breakfast). Provider, Historical   cinacalcet (SENSIPAR) 30 mg tablet Take 60 mg by mouth daily. Provider, Historical   bumetanide (BUMEX) 2 mg tablet Take 2 mg by mouth two (2) times a day. Provider, Historical   aspirin 81 mg chewable tablet Take 81 mg by mouth daily.     Provider, Historical Current Facility-Administered Medications   Medication Dose Route Frequency    atorvastatin (LIPITOR) tablet 80 mg  80 mg Oral DAILY    calcitRIOL (ROCALTROL) capsule 0.25 mcg  0.25 mcg Oral DAILY    [Held by provider] cinacalcet (SENSIPAR) tablet 60 mg  60 mg Oral DAILY    [Held by provider] levothyroxine (SYNTHROID) tablet 200 mcg  200 mcg Oral ACB    [Held by provider] NIFEdipine ER (PROCARDIA XL) tablet 90 mg  90 mg Oral DAILY    [Held by provider] calcium acetate(phosphat bind) (PHOSLO) capsule 1,334 mg  2 Capsule Oral TID WITH MEALS    isosorbide mononitrate ER (IMDUR) tablet 60 mg  60 mg Oral 7am    metoprolol succinate (TOPROL-XL) XL tablet 50 mg  50 mg Oral BID    hydrALAZINE (APRESOLINE) tablet 50 mg  50 mg Oral TID    peritoneal dialysis DEXTROSE 4.25% (2.5 mEq/L low calcium) solution 2,000 mL  2,000 mL IntraPERitoneal QID    sodium chloride (NS) flush 5-40 mL  5-40 mL IntraVENous Q8H    heparin (porcine) injection 5,000 Units  5,000 Units SubCUTAneous Q8H    famotidine (PF) (PEPCID) 20 mg in 0.9% sodium chloride 10 mL injection  20 mg IntraVENous BID    insulin lispro (HUMALOG) injection   SubCUTAneous Q6H    Vancomycin-Pharmacy to Dose  1 Each Other Rx Dosing/Monitoring    fentaNYL (PF) 10 mcg/mL infusion  0-200 mcg/hr IntraVENous TITRATE    propofol (DIPRIVAN) 10 mg/mL infusion  0-50 mcg/kg/min IntraVENous TITRATE    chlorhexidine (PERIDEX) 0.12 % mouthwash 10 mL  10 mL Oral Q12H    piperacillin-tazobactam (ZOSYN) 3.375 g in 0.9% sodium chloride (MBP/ADV) 100 mL MBP  3.375 g IntraVENous Q12H         Objective:   Vital Signs:    Visit Vitals  BP (!) 147/97   Pulse 70   Temp 97.5 °F (36.4 °C)   Resp 10   Ht 5' 7\" (1.702 m)   Wt 99.8 kg (220 lb)   SpO2 100%   BMI 34.46 kg/m²       O2 Device: Endotracheal tube, Ventilator       Temp (24hrs), Av.6 °F (36.4 °C), Min:97.5 °F (36.4 °C), Max:97.9 °F (36.6 °C)       Intake/Output:   Last shift:      701 - 1900  In:    Out:      Last 3 shifts: 01/28 1901 - 01/30 0700  In: 9651.6 [I.V.:1334.1]  Out: 53282 [Urine:492]      Intake/Output Summary (Last 24 hours) at 1/30/2023 1001  Last data filed at 1/30/2023 0931  Gross per 24 hour   Intake 9084.8 ml   Output 39357 ml   Net -3517.2 ml         Last 3 Recorded Weights in this Encounter    01/29/23 0400 01/30/23 0400 01/30/23 0946   Weight: 89.7 kg (197 lb 12 oz) 91 kg (200 lb 9.9 oz) 99.8 kg (220 lb)         Ventilator Settings:  Mode Rate Tidal Volume Pressure FiO2 PEEP   Assist control, VC+   400 ml    35 % 8 cm H20     Peak airway pressure: 20 cm H2O    Plateau pressure:     Tidal volume:    Minute ventilation: 4.48 l/min     Recent Labs     01/30/23  0415 01/29/23  0942 01/29/23  0458   PHI 7.41 7.45 7.52*   PCO2I 44.9 37.6 30.2*   PO2I 118* 145* 149*   HCO3I 28.6* 26.2* 24.5   FIO2I 40 50 100         Physical Exam:   Intubated and sedated; acyanotic  HEENT: pupils not dilated, reactive, no scleral jaundice, moist oral mucosa, no nasal drainage  Neck: No adenopathy or thyroid swelling  Orotracheal and orogastric tubes-no bleeding or secretions  CVS: Normal heart sounds; S1-S2 with no murmur; telemetry-sinus rhythm; JVD not seen   RS: Good air entry bilateral; no wheezes; few bibasal crackles; not tachypneic while on vent support  Abd: Soft, no tenderness, no distention, no guarding or rigidity, bowel sounds present, no hepatosplenomegaly   PD catheter in place  Neuro: Intubated and sedated; limited exam   Extrm: Mild bilateral chronic/indurated leg edema   Skin: no rash; left foot ulcer at the junction of fourth and fifth toe  Lymphatic: no cervical or supraclavicular adenopathy  Vasc: Both feet cool; DPs not palpable         Data:       Recent Results (from the past 24 hour(s))   PROTHROMBIN TIME + INR    Collection Time: 01/29/23 10:45 AM   Result Value Ref Range    Prothrombin time 14.6 11.5 - 15.2 sec    INR 1.1 0.8 - 1.2     PTT    Collection Time: 01/29/23 10:45 AM   Result Value Ref Range aPTT <20.0 (L) 23.0 - 36.4 SEC   GLUCOSE, POC    Collection Time: 01/29/23 11:31 AM   Result Value Ref Range    Glucose (POC) 150 (H) 70 - 110 mg/dL   GLUCOSE, POC    Collection Time: 01/29/23  6:08 PM   Result Value Ref Range    Glucose (POC) 138 (H) 70 - 110 mg/dL   HGB & HCT    Collection Time: 01/29/23  6:30 PM   Result Value Ref Range    HGB 8.2 (L) 12.0 - 16.0 g/dL    HCT 26.6 (L) 35.0 - 45.0 %   GLUCOSE, POC    Collection Time: 01/29/23 11:17 PM   Result Value Ref Range    Glucose (POC) 120 (H) 70 - 110 mg/dL   BLOOD GAS, ARTERIAL POC    Collection Time: 01/30/23  4:15 AM   Result Value Ref Range    Device: ADULT VENT      FIO2 (POC) 40 %    pH (POC) 7.41 7.35 - 7.45      pCO2 (POC) 44.9 35.0 - 45.0 MMHG    pO2 (POC) 118 (H) 80 - 100 MMHG    HCO3 (POC) 28.6 (H) 22 - 26 MMOL/L    sO2 (POC) 98.7 (H) 92 - 97 %    Base excess (POC) 3.4 mmol/L    Mode ASSIST CONTROL      Tidal volume 400 ml    Set Rate 10 bpm    PEEP/CPAP (POC) 8 cmH2O    Allens test (POC) Positive      Site RIGHT RADIAL      Patient temp.  97.5      Specimen type (POC) ARTERIAL      Performed by Judy Perez    GLUCOSE, POC    Collection Time: 01/30/23  5:29 AM   Result Value Ref Range    Glucose (POC) 81 70 - 110 mg/dL   LACTIC ACID    Collection Time: 01/30/23  8:12 AM   Result Value Ref Range    Lactic acid 1.5 0.4 - 2.0 MMOL/L   CBC W/O DIFF    Collection Time: 01/30/23  8:12 AM   Result Value Ref Range    WBC 8.9 4.6 - 13.2 K/uL    RBC 3.04 (L) 4.20 - 5.30 M/uL    HGB 8.5 (L) 12.0 - 16.0 g/dL    HCT 28.1 (L) 35.0 - 45.0 %    MCV 92.4 78.0 - 100.0 FL    MCH 28.0 24.0 - 34.0 PG    MCHC 30.2 (L) 31.0 - 37.0 g/dL    RDW 15.6 (H) 11.6 - 14.5 %    PLATELET 395 311 - 373 K/uL    MPV 10.5 9.2 - 11.8 FL    NRBC 0.0 0  WBC    ABSOLUTE NRBC 0.00 0.00 - 6.63 K/uL   METABOLIC PANEL, COMPREHENSIVE    Collection Time: 01/30/23  8:12 AM   Result Value Ref Range    Sodium 139 136 - 145 mmol/L    Potassium 4.9 3.5 - 5.5 mmol/L    Chloride 98 (L) 100 - 111 mmol/L    CO2 30 21 - 32 mmol/L    Anion gap 11 3.0 - 18 mmol/L    Glucose 75 74 - 99 mg/dL    BUN 69 (H) 7.0 - 18 MG/DL    Creatinine 13.60 (H) 0.6 - 1.3 MG/DL    BUN/Creatinine ratio 5 (L) 12 - 20      eGFR 3 (L) >60 ml/min/1.73m2    Calcium 10.4 (H) 8.5 - 10.1 MG/DL    Bilirubin, total 0.4 0.2 - 1.0 MG/DL    ALT (SGPT) 14 13 - 56 U/L    AST (SGOT) 29 10 - 38 U/L    Alk.  phosphatase 92 45 - 117 U/L    Protein, total 5.9 (L) 6.4 - 8.2 g/dL    Albumin 2.3 (L) 3.4 - 5.0 g/dL    Globulin 3.6 2.0 - 4.0 g/dL    A-G Ratio 0.6 (L) 0.8 - 1.7     VANCOMYCIN, RANDOM    Collection Time: 01/30/23  8:12 AM   Result Value Ref Range    Vancomycin, random 15.4 5.0 - 40.0 UG/ML   ECHO ADULT FOLLOW-UP OR LIMITED    Collection Time: 01/30/23  9:54 AM   Result Value Ref Range    IVSd 1.9 (A) 0.6 - 0.9 cm    LVIDd 4.8 3.9 - 5.3 cm    LVIDs 3.8 cm    LVOT Diameter 2.0 cm    LVPWd 1.9 (A) 0.6 - 0.9 cm    EF BP 39 (A) 55 - 100 %    LV Ejection Fraction A2C 36 %    LV Ejection Fraction A4C 39 %    LV EDV A2C 152 mL    LV EDV A4C 154 mL    LV EDV  (A) 56 - 104 mL    LV ESV A2C 98 mL    LV ESV A4C 94 mL    LV ESV BP 94 (A) 19 - 49 mL    LVOT Peak Gradient 2 mmHg    LVOT Mean Gradient 1 mmHg    LVOT SV 47.1 ml    LVOT Peak Velocity 0.7 m/s    LVOT VTI 15.0 cm    AV Area by Peak Velocity 1.3 cm2    AV Area by VTI 1.4 cm2    AV Peak Gradient 10 mmHg    AV Mean Gradient 5 mmHg    AV Peak Velocity 1.6 m/s    AV Mean Velocity 1.0 m/s    AV VTI 32.6 cm    MV Nyquist Velocity 36 cm/s    MV A Velocity 1.26 m/s    MV E Wave Deceleration Time 163.2 ms    MV E Velocity 1.36 m/s    LV E' Lateral Velocity 4 cm/s    LV E' Septal Velocity 3 cm/s    MV Area by VTI 1.2 cm2    MR Peak Gradient 81 mmHg    MR Peak Velocity 4.5 m/s    MR .3 cm    MV Peak Gradient 9 mmHg    MV Mean Gradient 5 mmHg    MV Max Velocity 1.5 m/s    MV Mean Velocity 1.0 m/s    MV VTI 38.6 cm    TR Peak Gradient 27 mmHg    TR Max Velocity 2.59 m/s    Fractional Shortening 2D 21 28 - 44 %    LV ESV Index BP 45 mL/m2    LV EDV Index BP 73 mL/m2    LV ESV Index A4C 45 mL/m2    LV EDV Index A4C 73 mL/m2    LV ESV Index A2C 46 mL/m2    LV EDV Index A2C 72 mL/m2    LVIDd Index 2.27 cm/m2    LVIDs Index 1.80 cm/m2    LV RWT Ratio 0.79     LV Mass 2D 437.8 (A) 67 - 162 g    LV Mass 2D Index 207.5 (A) 43 - 95 g/m2    MV E/A 1.08     E/E' Ratio (Averaged) 39.67     E/E' Lateral 34.00     E/E' Septal 45.33     LVOT Stroke Volume Index 22.3 mL/m2    LVOT Area 3.1 cm2    AV Velocity Ratio 0.44     LVOT:AV VTI Index 0.46     DANIEL/BSA VTI 0.7 cm2/m2    DANIEL/BSA Peak Velocity 0.6 cm2/m2    MV:LVOT VTI Index 2.57          Chemistry Recent Labs     01/30/23  0812 01/29/23  0647 01/28/23  2320   GLU 75 160* 162*    136 137   K 4.9 5.7* 5.0   CL 98* 97* 96*   CO2 30 25 23   BUN 69* 73* 71*   CREA 13.60* 14.20* 14.20*   CA 10.4* 9.6 10.1   AGAP 11 14 18   BUCR 5* 5* 5*   AP 92 103 127*   TP 5.9* 5.5* 6.7   ALB 2.3* 2.1* 2.6*   GLOB 3.6 3.4 4.1*   AGRAT 0.6* 0.6* 0.6*          Lactic Acid Lactic acid   Date Value Ref Range Status   01/30/2023 1.5 0.4 - 2.0 MMOL/L Final     Recent Labs     01/30/23  0812 01/29/23  0647 01/28/23  2320   LAC 1.5 1.8 6.6*          Liver Enzymes Protein, total   Date Value Ref Range Status   01/30/2023 5.9 (L) 6.4 - 8.2 g/dL Final     Albumin   Date Value Ref Range Status   01/30/2023 2.3 (L) 3.4 - 5.0 g/dL Final     Globulin   Date Value Ref Range Status   01/30/2023 3.6 2.0 - 4.0 g/dL Final     A-G Ratio   Date Value Ref Range Status   01/30/2023 0.6 (L) 0.8 - 1.7   Final     Alk.  phosphatase   Date Value Ref Range Status   01/30/2023 92 45 - 117 U/L Final     Recent Labs     01/30/23  0812 01/29/23  0647 01/28/23  2320   TP 5.9* 5.5* 6.7   ALB 2.3* 2.1* 2.6*   GLOB 3.6 3.4 4.1*   AGRAT 0.6* 0.6* 0.6*   AP 92 103 127*          CBC w/Diff Recent Labs     01/30/23  0812 01/29/23  1830 01/29/23  0647 01/28/23  2320   WBC 8.9  --  7.1 12.5   RBC 3.04*  --  2.25* 2.70*   HGB 8.5* 8.2* 6.3* 7.7*   HCT 28.1* 26.6* 20.7* 26.2*     --  251 390   GRANS  --   --   --  75*   LYMPH  --   --   --  9*   EOS  --   --   --  14*          Cardiac Enzymes No results found for: CPK, CK, CKMMB, CKMB, RCK3, CKMBT, CKNDX, CKND1, NAVIN, TROPT, TROIQ, JUA NCARLOS, TROPT, TNIPOC, BNP, BNPP     BNP No results found for: BNP, BNPP, XBNPT     Coagulation Recent Labs     01/29/23  1045   PTP 14.6   INR 1.1   APTT <20.0*           Thyroid  No results found for: T4, T3U, TSH, TSHEXT, TSHEXT    No results found for: T4     Urinalysis Lab Results   Component Value Date/Time    Color YELLOW 01/29/2023 12:38 AM    Appearance CLEAR 01/29/2023 12:38 AM    Specific gravity 1.011 01/29/2023 12:38 AM    pH (UA) 8.0 01/29/2023 12:38 AM    Protein 300 (A) 01/29/2023 12:38 AM    Glucose 100 (A) 01/29/2023 12:38 AM    Ketone Negative 01/29/2023 12:38 AM    Bilirubin Negative 01/29/2023 12:38 AM    Urobilinogen 0.2 01/29/2023 12:38 AM    Nitrites Negative 01/29/2023 12:38 AM    Leukocyte Esterase Negative 01/29/2023 12:38 AM    Epithelial cells 2+ 01/29/2023 12:38 AM    Bacteria FEW (A) 01/29/2023 12:38 AM    WBC Negative 01/29/2023 12:38 AM    RBC 0 to 3 01/29/2023 12:38 AM          Culture data during this hospitalization.    All Micro Results       Procedure Component Value Units Date/Time    CULTURE, BLOOD [[id number]] Collected: 01/28/23 2320    Order Status: Completed Specimen: Blood Updated: 01/30/23 0712     Special Requests: NO SPECIAL REQUESTS        Culture result: NO GROWTH 2 DAYS       CULTURE, BLOOD [[id number]] Collected: 01/28/23 2320    Order Status: Completed Specimen: Blood Updated: 01/30/23 0712     Special Requests: NO SPECIAL REQUESTS        Culture result: NO GROWTH 2 DAYS       CULTURE, Roc Cristobal STAIN [[id number]] Collected: 01/29/23 0038    Order Status: Completed Specimen: Wound from Foot Updated: 01/30/23 0638     Special Requests: NO SPECIAL REQUESTS        GRAM STAIN NO WBC'S SEEN NO ORGANISMS SEEN        Culture result: PENDING    CULTURE, BODY FLUID Chrystal Jacome STAIN [[id number]] Collected: 01/30/23 0526    Order Status: Sent Specimen: Body Fluid from Peritoneal [place] Updated: 01/30/23 0536    CULTURE, RESPIRATORY/SPUTUM/BRONCH Marcell Swain [[id number]] Collected: 01/28/23 2320    Order Status: Sent Specimen: Sputum Updated: 01/29/23 2121    COVID-19 WITH INFLUENZA A/B [[id number]] Collected: 01/28/23 2320    Order Status: Completed Specimen: Nasopharyngeal Updated: 01/28/23 2356     SARS-CoV-2 by PCR Not detected        Comment: Not Detected results do not preclude SARS-CoV-2 infection and should not be used as the sole basis for patient management decisions. Results must be combined with clinical observations, patient history, and epidemiological information. Influenza A by PCR Not detected        Influenza B by PCR Not detected        Comment: Testing was performed using yunior Josie SARS-CoV-2 and Influenza A/B nucleic acid assay. This test is a multiplex Real-Time Reverse Transcriptase Polymerase Chain Reaction (RT-PCR) based in vitro diagnostic test intended for the qualitative detection of nucleic acids from SARS-CoV-2, Influenza A, and Influenza B in nasopharyngeal for use under the FDA's Emergency Use Authorization(EAU) only. Fact sheet for Patients: FindDrives.pl  Fact sheet for Healthcare Providers: FindDrives.pl                  LE Doppler       ECHO Nov 2022 Interpretation Summary  Result status: Final result     Left Ventricle: Moderately reduced left ventricular systolic function with a visually estimated EF of 35 - 40%. Left ventricle size is normal. Moderate septal thickening. Severe posterior thickening. Moderate global hypokinesis present. Grade II diastolic dysfunction with increased LAP. Right Ventricle: Right ventricle size is normal. Mildly increased wall thickness.     Aortic Valve: Severely thickened cusp. Moderately calcified right and noncoronary cusps. Minimal to moderate stenosis of the aortic valve. Mitral Valve: Mildly thickened leaflet, at the posterior leaflet. Severe annular calcification at the posterior leaflet of the mitral valve. Moderate regurgitation. Mild to moderate stenosis noted, measurement is compromised with severe calcification. Planimetry was not performed. Left Atrium: Left atrium is severely dilated. Right Atrium: Right atrium is moderately dilated. PA systolic pressure is 40 mmHg. Images report reviewed by me:  CT LT Foot 1/29/2023   (Most Recent) [place] [facility] [place]    CT FOOT LT W CONT    Narrative  INDICATION:  cellulitis/wound of left 4th toe    EXAM: CT left foot. No comparisons. Thin section axial images were obtained after the administration of intravenous  contrast. From these sagittal and coronal reformats were performed. CT dose  reduction was achieved through use of a standardized protocol tailored for this  examination and automatic exposure control for dose modulation. FINDINGS: There is diffuse edema in the dorsal soft tissues. A drainable fluid  collection is not identified. There are extensive vascular calcifications. No  fluid tracking proximally within the tendon sheaths. Scattered degenerative  changes of the midfoot osteochondral defect medial talar dome is chronic. Degenerative changes ankle joint. Cortical destruction is not demonstrated an  acute fracture is not demonstrated either. There is plantar and retrocalcaneal  spurring    Impression  1. Diffuse subcutaneous edema in the foot compatible with the clinical diagnosis  of cellulitis. No drainable abscess or evidence of osteomyelitis         CTA chest, Abd, Pelvis 1/29/2023  EXAM:  CTA CHEST W OR W WO CONT, CT ABD PELV W CONT   INDICATION:   respiratory failure, end-stage renal disease   COMPARISON: 11/15/2022.   CHEST CTA: FINDINGS:  CHEST:  THYROID: No nodule. MEDIASTINUM: No mass or lymphadenopathy. TABATHA: No mass or lymphadenopathy. THORACIC AORTA: No dissection or aneurysm. MAIN PULMONARY ARTERY: There is no evidence of pulmonary embolism. TRACHEA/BRONCHI: ET tube satisfactory position. ESOPHAGUS: No wall thickening or dilatation. HEART: Dilatation of the left atrium and left ventricle. Coronary artery  calcification: Present. PLEURA: Small right pleural effusion. LUNGS: Significant bilateral lower lobe consolidation and substantial diffuse  bilateral pulmonary infiltrates. BONES: No destructive bone lesion. IMPRESSION  Significant bilateral lower lobe consolidation and bilateral  pulmonary infiltrates. Small right pleural effusion. No evidence of pulmonary  embolism. ABDOMEN/PELVIC CT:  FINDINGS:   LIVER: No mass or biliary dilatation. GALLBLADDER: Unremarkable. SPLEEN: No mass. PANCREAS: No mass or ductal dilatation. ADRENALS: Stable small left adrenal adenoma. No follow-up required. KIDNEYS: No mass, calculus, or hydronephrosis. STOMACH: Unremarkable. NG tube tip terminates within the fundus. SMALL BOWEL: No dilatation or wall thickening. COLON: No dilatation or wall thickening. APPENDIX: Within normal limits. PERITONEUM: No ascites or pneumoperitoneum. Peritoneal dialysis catheter is  coiled within the left lower quadrant. RETROPERITONEUM: No lymphadenopathy or aortic aneurysm. REPRODUCTIVE ORGANS: Unremarkable. URINARY BLADDER: Decompressed by Torres catheter. BONES: Degenerative changes are seen in the lumbar spine. ADDITIONAL COMMENTS: N/A  IMPRESSION:  Peritoneal dialysis catheter is coiled within the left lower quadrant. No acute  abnormality is identified. CXR reviewed by me:  XR 1/30 (Most Recent). CXR   Results from [place] encounter on 01/28/23    XR CHEST PORT    Narrative  INDICATION: Intubation.     Portable AP view of the chest.    Direct comparison made to prior chest x-ray dated January 29, 2023. Cardiomediastinal silhouette is stable. Tubes and lines are in appropriate  position. There has been significant interval improved aeration with residual  bibasilar airspace disease and small bilateral pleural effusions. There is no  pneumothorax. Impression  Improved bilateral aeration. Please note: Voice-recognition software may have been used to generate this report, which may have resulted in some phonetic-based errors in grammar and contents. Even though attempts were made to correct all the mistakes, some may have been missed, and remained in the body of the document. Dragon software not working well since recent [employer], and care taken as much as possible to correct mistakes.       Tarik Polk MD  1/30/2023

## 2023-01-30 NOTE — PROGRESS NOTES
1915 : Bedside and Verbal shift change report given to Ronald Ibarra RN (oncoming nurse) by Eileen Mack RN (offgoing nurse). Report included the following information SBAR, Kardex, Intake/Output, MAR, and Recent Results. 2000 : Assessment completed, see flowsheets. Patient intubated, sedated, and synchronous with ventilator settings. Responds to noxious stimuli. OGT to suction. Torres catheter draining clear, yellow urine. Patient to have two rounds of PD overnight. Bilateral soft wrist restraints to protect integrity of LDAs. 0000 : Reassessment. No changes at this time. 0400 : Reassessment. No changes at this time. 5300 : Phlebotomy attempted lab draw but unsuccessful. Oncoming phlebotomist to be made aware of lab orders. 0530 : RN attempted blood draw x2 but unsuccessful. 0715 : Bedside and Verbal shift change report given to UGO Humphries (oncoming nurse) by Soren Alves RN (offgoing nurse). Report included the following information SBAR, Kardex, Intake/Output, MAR, and Recent Results.

## 2023-01-30 NOTE — PROGRESS NOTES
1020 patient sedation turned off per provider, Dr. Francisco Carbajal and SBT started. 1027 patient successfully extubated with RT, RN, and provider at bedside. Patient placed on 2 liters NC. Patient able to follow commands.

## 2023-01-30 NOTE — PROGRESS NOTES
0700 bedside shift report received from Tatum Dias, [address] [place] [address].    2959 patient extubated without complications. Able to follow commands, oriented to name and . 1200 reassessment with update changes. 1600 reassessment with updated changes. 1900 bedside shift report given to night shift RN.

## 2023-01-30 NOTE — PROGRESS NOTES
Comprehensive Nutrition Assessment    Type and Reason for Visit: Initial, NPO/clear liquid    Nutrition Recommendations/Plan:   Will order trickle feeding. Initiate when clinically feasible; monitor for tolerance. 01/30/23 1018   Enteral Nutrition   Feeding Route Orogastric   EN Formula Renal   Schedule Continuous   Feeding Regimen Goal: Nepro @ 30ml + 3 prosource (w/o propofol). Begin trickle feeding at 10ml/hr. Additives/Modulars Protein  (3 prosource (180kcal, 45g PRO))   Goal EN & Flush Order Provides Trickle feeding at 10ml/hr provides: 396kcal, 18g PRO, 37g CHO, 160ml free water     + 30ml Q4 flushes (provides 180ml) -- 340ml free water total    + Propofol @ 18ml provides 475kcal (871kcal)        Malnutrition Assessment:  Malnutrition Status: At risk for malnutrition (specify) (01/30/23 1018)      Nutrition Assessment:    pt with multiple medical problems (progressive CAD , CHF, ESRD- HD, HTN, T2DM, obesity). Presented with SOB. Was on BiPAp in ED subsequently intubated. Admitted with acute hypoxemic and hypercapneic respiratory failure d/t acute CHF exacerbation and sepsis, diabeteic foot ulcer. Recently started gabapentin. Remains on vent support in ICU. No wt lost noted: 197lb (1/5/23), 200lb (12/16/22), 198lb (10/6/22). Per MD- keyshawn to start trickle feeding. Per RN- og tube was on suction but now clamped. Made aware of trickle feeds. Nutrition Related Findings:    GFR 3, BUN 69, Cr13.60. Lipitor, calcitriol, calcium acetate, pepcid, humalog, synthroid, toprol xl. Noted propofol @ 18ml (475kcal)- currently held for SBT. Current Nutrition Intake & Therapies:  Average Meal Intake: NPO     DIET NPO    Anthropometric Measures:  Height: 5' 7\" (170.2 cm)  Ideal Body Weight (IBW): 135 lbs (61 kg)     Current Body Wt:  99.8 kg (220 lb) (1/30/23), 163 % IBW.  Bed scale  Current BMI (kg/m2): 34.4  Usual Body Weight: 90.7 kg (200 lb) (12/2022)  % Weight Change (Calculated): 10  Weight Adjustment: No adjustment                 BMI Category: Obese class 1 (BMI 30.0-34. 9)    Estimated Daily Nutrient Needs:  Energy Requirements Based On: Kcal/kg  Weight Used for Energy Requirements: Current (11-14kcals)  Energy (kcal/day): 6356-1893  Weight Used for Protein Requirements: Current (1.5-2g)  Protein (g/day):   Method Used for Fluid Requirements: 1 ml/kcal  Fluid (ml/day): 8607-2139    Nutrition Diagnosis:   Inadequate oral intake related to acute injury/trauma as evidenced by NPO or clear liquid status due to medical condition, intubation    Nutrition Interventions:   Food and/or Nutrient Delivery: Start tube feeding, Continue NPO  Nutrition Education/Counseling: No recommendations at this time  Coordination of Nutrition Care: Continue to monitor while inpatient, Interdisciplinary rounds       Goals:     Goals: Initiate nutrition support, by next RD assessment       Nutrition Monitoring and Evaluation:   Behavioral-Environmental Outcomes: None identified  Food/Nutrient Intake Outcomes: Diet advancement/tolerance, Enteral nutrition intake/tolerance  Physical Signs/Symptoms Outcomes: Biochemical data, Meal time behavior, Nutrition focused physical findings, Skin, Weight    Discharge Planning:     Too soon to determine    Cresencio Cortez RD

## 2023-01-30 NOTE — PROGRESS NOTES
Pharmacy Dosing Services: Renal Dosing    The following medication: Pepcid was automatically dose-adjusted per [facility]  P&T Committee Protocol, with respect to renal function. Consult provided for this   43 y.o. , female , for the indication of SUP. Pt Weight:   Wt Readings from Last 1 Encounters:   01/30/23 99.8 kg (220 lb)         Previous Regimen Pepcid 20 mg IV q12h   Serum Creatinine Lab Results   Component Value Date/Time    Creatinine 13.60 (H) 01/30/2023 08:12 AM       Creatinine Clearance Estimated Creatinine Clearance: 6.5 mL/min (A) (based on SCr of 13.6 mg/dL (H)). BUN Lab Results   Component Value Date/Time    BUN 69 (H) 01/30/2023 08:12 AM       Dosage changed to:  Pepcid 20 mg IV daily    Additional notes: PD patient      Pharmacy to continue to monitor patient daily. Will make dosage adjustments based upon changing renal function.   Signed Geovanny Barry, [address]

## 2023-01-30 NOTE — PROGRESS NOTES
[facility] Pharmacokinetic Monitoring Service - Vancomycin    Consulting Provider: Melanie Beaver   Indication: Sepsis  Target Concentration: Pre-Dialysis Concentration 15-20 mg/L  Day of Therapy: 2  Additional Antimicrobials: Zosyn    Pertinent Laboratory Values:    Wt Readings from Last 1 Encounters:   01/30/23 99.8 kg (220 lb)     Temp Readings from Last 1 Encounters:   01/30/23 97.5 °F (36.4 °C)     No components found for: PROCAL  Recent Labs     01/30/23  0812 01/29/23  0647   WBC 8.9 7.1       Pertinent Cultures:  Culture Date Source Results   1/28/23 Blood NGTD     Assessment:  Date/Time Current Dose Concentration Dialysis Session   1/30/23 Vanc 1250 mg IV x once 15.4 mcg/ml PD continuous     Plan:  Concentration-guided dosing due to renal impairment and intermittent hemodialysis   Current dosing regimen of vancomycin 1250 mg x once is therapeutic   No dose needed today  Vancomycin concentration ordered for 1/24/23 with AM labs  Pharmacy will continue to monitor patient and adjust therapy as indicated    Thank you for the consult,  MADELIN Crook  1/30/2023 11:20 AM

## 2023-01-30 NOTE — PROGRESS NOTES
CM notes patient was extubated yesterday and is now on 2L NC. CM notes that therapy is ordered and palliative care in on board. CM to watch for therapy notes to assist in identifying any discharge planning needs. CM notes patient is on PD 4xs per day.

## 2023-01-31 ENCOUNTER — APPOINTMENT (OUTPATIENT)
Dept: GENERAL RADIOLOGY | Age: 43
End: 2023-01-31
Attending: INTERNAL MEDICINE
Payer: MEDICAID

## 2023-01-31 LAB
ALBUMIN SERPL-MCNC: 2.3 G/DL (ref 3.4–5)
ALBUMIN/GLOB SERPL: 0.6 (ref 0.8–1.7)
ALP SERPL-CCNC: 94 U/L (ref 45–117)
ALT SERPL-CCNC: 15 U/L (ref 13–56)
ANION GAP SERPL CALC-SCNC: 14 MMOL/L (ref 3–18)
AST SERPL-CCNC: 37 U/L (ref 10–38)
BACTERIA SPEC CULT: NORMAL
BACTERIA SPEC CULT: NORMAL
BILIRUB SERPL-MCNC: 0.4 MG/DL (ref 0.2–1)
BUN SERPL-MCNC: 63 MG/DL (ref 7–18)
BUN/CREAT SERPL: 5 (ref 12–20)
CALCIUM SERPL-MCNC: 10 MG/DL (ref 8.5–10.1)
CHLORIDE SERPL-SCNC: 101 MMOL/L (ref 100–111)
CO2 SERPL-SCNC: 26 MMOL/L (ref 21–32)
CREAT SERPL-MCNC: 12.9 MG/DL (ref 0.6–1.3)
ERYTHROCYTE [DISTWIDTH] IN BLOOD BY AUTOMATED COUNT: 15.7 % (ref 11.6–14.5)
GLOBULIN SER CALC-MCNC: 3.8 G/DL (ref 2–4)
GLUCOSE BLD STRIP.AUTO-MCNC: 119 MG/DL (ref 70–110)
GLUCOSE BLD STRIP.AUTO-MCNC: 122 MG/DL (ref 70–110)
GLUCOSE BLD STRIP.AUTO-MCNC: 124 MG/DL (ref 70–110)
GLUCOSE BLD STRIP.AUTO-MCNC: 149 MG/DL (ref 70–110)
GLUCOSE BLD STRIP.AUTO-MCNC: 98 MG/DL (ref 70–110)
GLUCOSE SERPL-MCNC: 109 MG/DL (ref 74–99)
GRAM STN SPEC: NORMAL
HCT VFR BLD AUTO: 30.9 % (ref 35–45)
HGB BLD-MCNC: 9.3 G/DL (ref 12–16)
LACTATE SERPL-SCNC: 1.4 MMOL/L (ref 0.4–2)
LACTATE SERPL-SCNC: 1.8 MMOL/L (ref 0.4–2)
MAGNESIUM SERPL-MCNC: 2.4 MG/DL (ref 1.6–2.6)
MCH RBC QN AUTO: 28.1 PG (ref 24–34)
MCHC RBC AUTO-ENTMCNC: 30.1 G/DL (ref 31–37)
MCV RBC AUTO: 93.4 FL (ref 78–100)
NRBC # BLD: 0 K/UL (ref 0–0.01)
NRBC BLD-RTO: 0 PER 100 WBC
PHOSPHATE SERPL-MCNC: 8.9 MG/DL (ref 2.5–4.9)
PLATELET # BLD AUTO: 316 K/UL (ref 135–420)
PMV BLD AUTO: 10.5 FL (ref 9.2–11.8)
POTASSIUM SERPL-SCNC: 4.3 MMOL/L (ref 3.5–5.5)
PROT SERPL-MCNC: 6.1 G/DL (ref 6.4–8.2)
RBC # BLD AUTO: 3.31 M/UL (ref 4.2–5.3)
SERVICE CMNT-IMP: NORMAL
SERVICE CMNT-IMP: NORMAL
SODIUM SERPL-SCNC: 141 MMOL/L (ref 136–145)
TROPONIN-HIGH SENSITIVITY: ABNORMAL NG/L (ref 0–54)
VANCOMYCIN SERPL-MCNC: 14.1 UG/ML (ref 5–40)
WBC # BLD AUTO: 9.3 K/UL (ref 4.6–13.2)

## 2023-01-31 PROCEDURE — 74011250637 HC RX REV CODE- 250/637: Performed by: FAMILY MEDICINE

## 2023-01-31 PROCEDURE — 74011250637 HC RX REV CODE- 250/637: Performed by: HOSPITALIST

## 2023-01-31 PROCEDURE — 74011250636 HC RX REV CODE- 250/636: Performed by: INTERNAL MEDICINE

## 2023-01-31 PROCEDURE — 80053 COMPREHEN METABOLIC PANEL: CPT

## 2023-01-31 PROCEDURE — 77010033678 HC OXYGEN DAILY

## 2023-01-31 PROCEDURE — A4722 DIALYS SOL FLD VOL > 1999CC: HCPCS | Performed by: INTERNAL MEDICINE

## 2023-01-31 PROCEDURE — 99233 SBSQ HOSP IP/OBS HIGH 50: CPT | Performed by: NURSE PRACTITIONER

## 2023-01-31 PROCEDURE — 92526 ORAL FUNCTION THERAPY: CPT

## 2023-01-31 PROCEDURE — 83735 ASSAY OF MAGNESIUM: CPT

## 2023-01-31 PROCEDURE — 82962 GLUCOSE BLOOD TEST: CPT

## 2023-01-31 PROCEDURE — 84100 ASSAY OF PHOSPHORUS: CPT

## 2023-01-31 PROCEDURE — 84484 ASSAY OF TROPONIN QUANT: CPT

## 2023-01-31 PROCEDURE — 80202 ASSAY OF VANCOMYCIN: CPT

## 2023-01-31 PROCEDURE — 74011250636 HC RX REV CODE- 250/636: Performed by: FAMILY MEDICINE

## 2023-01-31 PROCEDURE — 65610000006 HC RM INTENSIVE CARE

## 2023-01-31 PROCEDURE — 74011000258 HC RX REV CODE- 258: Performed by: INTERNAL MEDICINE

## 2023-01-31 PROCEDURE — 74011000250 HC RX REV CODE- 250: Performed by: FAMILY MEDICINE

## 2023-01-31 PROCEDURE — 74011000250 HC RX REV CODE- 250: Performed by: INTERNAL MEDICINE

## 2023-01-31 PROCEDURE — 85027 COMPLETE CBC AUTOMATED: CPT

## 2023-01-31 PROCEDURE — 36415 COLL VENOUS BLD VENIPUNCTURE: CPT

## 2023-01-31 PROCEDURE — 83605 ASSAY OF LACTIC ACID: CPT

## 2023-01-31 PROCEDURE — 74011250637 HC RX REV CODE- 250/637: Performed by: INTERNAL MEDICINE

## 2023-01-31 PROCEDURE — 71045 X-RAY EXAM CHEST 1 VIEW: CPT

## 2023-01-31 RX ORDER — BUMETANIDE 1 MG/1
2 TABLET ORAL 2 TIMES DAILY
Status: DISCONTINUED | OUTPATIENT
Start: 2023-01-31 | End: 2023-02-03 | Stop reason: HOSPADM

## 2023-01-31 RX ADMIN — BUMETANIDE 2 MG: 1 TABLET ORAL at 21:51

## 2023-01-31 RX ADMIN — NIFEDIPINE 90 MG: 30 TABLET, FILM COATED, EXTENDED RELEASE ORAL at 13:01

## 2023-01-31 RX ADMIN — 0.12% CHLORHEXIDINE GLUCONATE 10 ML: 1.2 RINSE ORAL at 08:57

## 2023-01-31 RX ADMIN — HEPARIN SODIUM 5000 UNITS: 5000 INJECTION INTRAVENOUS; SUBCUTANEOUS at 13:01

## 2023-01-31 RX ADMIN — METOPROLOL SUCCINATE 50 MG: 50 TABLET, EXTENDED RELEASE ORAL at 08:56

## 2023-01-31 RX ADMIN — CALCITRIOL 0.25 MCG: 0.25 CAPSULE ORAL at 08:56

## 2023-01-31 RX ADMIN — SODIUM CHLORIDE, PRESERVATIVE FREE 10 ML: 5 INJECTION INTRAVENOUS at 21:55

## 2023-01-31 RX ADMIN — FAMOTIDINE 20 MG: 10 INJECTION, SOLUTION INTRAVENOUS at 08:57

## 2023-01-31 RX ADMIN — PIPERACILLIN AND TAZOBACTAM 3.38 G: 3; .375 INJECTION, POWDER, LYOPHILIZED, FOR SOLUTION INTRAVENOUS at 21:49

## 2023-01-31 RX ADMIN — SODIUM CHLORIDE, PRESERVATIVE FREE 10 ML: 5 INJECTION INTRAVENOUS at 05:12

## 2023-01-31 RX ADMIN — BUMETANIDE 2 MG: 1 TABLET ORAL at 13:01

## 2023-01-31 RX ADMIN — HYDRALAZINE HYDROCHLORIDE 50 MG: 50 TABLET, FILM COATED ORAL at 18:30

## 2023-01-31 RX ADMIN — HEPARIN SODIUM 5000 UNITS: 5000 INJECTION INTRAVENOUS; SUBCUTANEOUS at 04:50

## 2023-01-31 RX ADMIN — SODIUM CHLORIDE, SODIUM LACTATE, CALCIUM CHLORIDE, MAGNESIUM CHLORIDE AND DEXTROSE 2000 ML: 2.5; 538; 448; 18.3; 5.08 INJECTION, SOLUTION INTRAPERITONEAL at 18:31

## 2023-01-31 RX ADMIN — PIPERACILLIN AND TAZOBACTAM 3.38 G: 3; .375 INJECTION, POWDER, LYOPHILIZED, FOR SOLUTION INTRAVENOUS at 09:07

## 2023-01-31 RX ADMIN — HYDRALAZINE HYDROCHLORIDE 10 MG: 20 INJECTION INTRAMUSCULAR; INTRAVENOUS at 05:09

## 2023-01-31 RX ADMIN — SODIUM CHLORIDE, SODIUM LACTATE, CALCIUM CHLORIDE, MAGNESIUM CHLORIDE AND DEXTROSE 2000 ML: 4.25; 538; 448; 18.3; 5.08 INJECTION, SOLUTION INTRAPERITONEAL at 04:50

## 2023-01-31 RX ADMIN — HYDRALAZINE HYDROCHLORIDE 10 MG: 20 INJECTION INTRAMUSCULAR; INTRAVENOUS at 00:31

## 2023-01-31 RX ADMIN — SODIUM CHLORIDE, SODIUM LACTATE, CALCIUM CHLORIDE, MAGNESIUM CHLORIDE AND DEXTROSE 2000 ML: 2.5; 538; 448; 18.3; 5.08 INJECTION, SOLUTION INTRAPERITONEAL at 14:24

## 2023-01-31 RX ADMIN — HYDRALAZINE HYDROCHLORIDE 10 MG: 20 INJECTION INTRAMUSCULAR; INTRAVENOUS at 10:59

## 2023-01-31 RX ADMIN — CLONIDINE HYDROCHLORIDE 0.2 MG: 0.1 TABLET ORAL at 21:51

## 2023-01-31 RX ADMIN — SODIUM CHLORIDE, SODIUM LACTATE, CALCIUM CHLORIDE, MAGNESIUM CHLORIDE AND DEXTROSE 2000 ML: 2.5; 538; 448; 18.3; 5.08 INJECTION, SOLUTION INTRAPERITONEAL at 21:49

## 2023-01-31 RX ADMIN — CLONIDINE HYDROCHLORIDE 0.2 MG: 0.1 TABLET ORAL at 08:57

## 2023-01-31 RX ADMIN — HEPARIN SODIUM 5000 UNITS: 5000 INJECTION INTRAVENOUS; SUBCUTANEOUS at 18:30

## 2023-01-31 RX ADMIN — ISOSORBIDE MONONITRATE 60 MG: 60 TABLET, EXTENDED RELEASE ORAL at 08:56

## 2023-01-31 RX ADMIN — ATORVASTATIN CALCIUM 80 MG: 20 TABLET, FILM COATED ORAL at 08:57

## 2023-01-31 RX ADMIN — METOPROLOL SUCCINATE 50 MG: 50 TABLET, EXTENDED RELEASE ORAL at 21:51

## 2023-01-31 RX ADMIN — HYDRALAZINE HYDROCHLORIDE 50 MG: 50 TABLET, FILM COATED ORAL at 21:51

## 2023-01-31 RX ADMIN — SODIUM CHLORIDE, SODIUM LACTATE, CALCIUM CHLORIDE, MAGNESIUM CHLORIDE AND DEXTROSE 2000 ML: 4.25; 538; 448; 18.3; 5.08 INJECTION, SOLUTION INTRAPERITONEAL at 08:58

## 2023-01-31 RX ADMIN — HYDRALAZINE HYDROCHLORIDE 50 MG: 50 TABLET, FILM COATED ORAL at 08:57

## 2023-01-31 RX ADMIN — SODIUM CHLORIDE, PRESERVATIVE FREE 10 ML: 5 INJECTION INTRAVENOUS at 13:02

## 2023-01-31 RX ADMIN — CINACALCET HYDROCHLORIDE 60 MG: 30 TABLET, FILM COATED ORAL at 13:01

## 2023-01-31 NOTE — PROGRESS NOTES
1915 : Bedside and Verbal shift change report given to WilliamMarcelobrigette Murray, RN (oncoming nurse) by Ana Greenberg RN (offgoing nurse). Report included the following information SBAR, Kardex, Intake/Output, MAR, and Recent Results. 2000 : Assessment completed, see flow sheets. Patient drowsy but arouses to voice. AAOx2, oxygen saturations <95% on 2L NC, BP elevated. Pain assessed on appropriate scale and PRN medication given (see MAR). 2133 : Patient's mom called but without pass code. No contact information for mom listed in chart. Patient orientation not intact at this time to verify identity or to give RN permission fto provide updates. 2140 : Spoke with Theone Neither, stepbrother, to verify patient's mom's contact information - Patria Estrada (cell) [phone]    2150 : Updates provided to patient's mother. All questions answered to her satisfaction. Pass code provided and contact information added to the chart. 2248 : /104, . Patient restless and kicking legs repeatedly. Asked if legs are painful and patient stated \"my left foot hurt. \" Hospitalist made aware and orders received. 0000 : Reassessment. Patient resting comfortably after pain medicine. /102. Nursing care continues. 0400 : Reassessment. HD in progress. Tolerating without complications. 0730 : Bedside and Verbal shift change report given to John Carnes RN (oncoming nurse) by Hamilton Hayward RN (offgoing nurse). Report included the following information SBAR, Kardex, Intake/Output, MAR, and Recent Results.

## 2023-01-31 NOTE — PROGRESS NOTES
conducted a follow up visit with Finesse Huntley, who is a 43 y.o.,female. Patient was feeling much better today off the [facility]. \"  Patient has a good support team and Palliative Care has helped her complete an Advance Medical Directive. Continued the relationship of care and support. Listened empathically. Offered prayer and assurance of continued prayer on patients behalf. Chart reviewed. Patient expressed gratitude for Spiritual Care visit. Chaplains will continue to follow and will provide pastoral care as needed or requested.  recommends bedside caregivers page the  on duty if patient shows signs of acute spiritual or emotional distress. [address], [place].    Board Certified   [phone] - Office

## 2023-01-31 NOTE — ACP (ADVANCE CARE PLANNING)
Palliative Medicine    CODE STATUS: FULL CODE    AMD Status: completed today naming her sister, Reyes Vázquez, and significant other, Sheila Haro, as MPOAs     9995 Seen today in room ICU 2 along with Griselda Sandhu NP. Lying in bed with head of bed elevated Tired but engaged in conversation. Voice weak. Oriented   x 4. Respirations unlabored. Oxygen on at 2 lpm per NC. Able to speak in short sentences. Pain -- denies    States feeling much better and glad to be off of the ventilator. Discussed if she had an AMD and the [address] of decision making. She does not have an AMD and was open to one. She was clear that she chose her sister, Michelle Lindsay, and significant other, Lilian Owens, as her MPOAs. Acknowledged that her parents had health issues and she did not want to burden them with the responsibility. AMD completed (see below)    Discussed code and intubation options in depth including benefits and burdens. She was clear that she was open to all modalities of resuscitation including CPR, shocks, ACLS medications, and intubation. Disposition plan: to be determined    Patient completed an advance medical directive today. Copy placed on chart for scanning into EMR and copy given to patient for personal records. Primary Decision Maker (Health Care Agent): Reyes Vázquez  Relationship to patient: sister  Phone number: [phone]  [x] Named in a [address] scanned document   [] Legal Next of Kin  [] Guardian    Secondary Decision Maker ([address]): Sheila Haro  Relationship to patient: friend  Phone number:  [phone]  [x] Named in a NEWLY COMPLETED scanned document   [] Legal Next of Kin  [] Guardian    ACP documents you currently have include:  [x] Advance Directive or Living Will  [] Durable Do Not Resuscitate  [] Physician Orders for Scope of Treatment (POST)  [] Medical Power of   [] Other    After meeting with patient, the goals of care have been defined. Code status remains: FULL CODE   AMD status: completed today naming her sister and friend as MPOAs Will sign off but remain available for reconsult as needed/if appropriate.      Thank you for the Palliative Medicine consult and allowing us to participate in the care of [address] RN, MSN  Palliative Medicine   [phone]

## 2023-01-31 NOTE — PROGRESS NOTES
Cardiology Progress Note        Patient: Nina Farley        Sex: female          DOA: 1/28/2023  YOB: 1980      Age:  43 y.o.        LOS:  LOS: 1 day       Patient seen and examined, chart reviewed    Assessment/Plan     Patient Active Problem List   Diagnosis Code    Acute pulmonary edema ([facility] J81.0    Chest pain R07.9    ESRD (end stage renal disease) on dialysis ([facility] N18.6, Z99.2    Pulmonary edema J81.1    Cardiomyopathy ([facility] I42.9    HTN (hypertension) I10    Type 2 diabetes mellitus ([facility]) E11.9    PAD (peripheral artery disease) ([facility]) I73.9    CAD (coronary artery disease) I25.10    Acute respiratory failure with hypoxia and hypercapnia ([facility]) J96.01, J96.02    Hypertensive emergency I16.1    Acute exacerbation of CHF (congestive heart failure) ([facility] I50.9    BMI 34.0-34.9,adult Z68.34    Peritoneal dialysis status ([facility] Z99.2    Diabetic ulcer of left foot associated with type 2 diabetes mellitus, with fat layer exposed ([facility] E11.621, L97.522    Sepsis ([facility]) A41.9    Cellulitis of fourth toe, left L03.032    Chronic anemia D64.9       Abnormal troponin most likely non-STEMI type II  Echocardiogram was done in 11/2022 reported       Left Ventricle: Moderately reduced left ventricular systolic function with a visually estimated EF of 35 - 40%. Left ventricle size is normal. Moderate septal thickening. Severe posterior thickening. Moderate global hypokinesis present. Grade II diastolic dysfunction with increased LAP. Right Ventricle: Right ventricle size is normal. Mildly increased wall thickness. Aortic Valve: Severely thickened cusp. Moderately calcified right and noncoronary cusps. Minimal to moderate stenosis of the aortic valve. Mitral Valve: Mildly thickened leaflet, at the posterior leaflet. Severe annular calcification at the posterior leaflet of the mitral valve. Moderate regurgitation.  Mild to moderate stenosis noted, measurement is compromised with severe calcification. Planimetry was not performed. Left Atrium: Left atrium is severely dilated. Right Atrium: Right atrium is moderately dilated. PA systolic pressure is 40 mmHg. Cardiac catheterization was done in November 2022 reported     Hemodynamics: Ao: 158/81 mmHg. Mean 112 mmHg. LV: 162/12 mmHg. LVEDP: 25   mmHg. No aortic valve gradient was noted on the pullback of the   pigtail catheter. Coronary Arteries:        Left Main Artery: Widely patent     Left Anterior Descending Artery: Proximal segments patent. Mid vessel contains prior stents which are patent. Distal segments patent. Moderate sized first diagonal branch which is patent. Moderate-sized second diagonal branch which contains prior stents is 100% occluded. Left Circumflex Artery: Moderate size AV vessel. Proximal and mid are patent. Distal segment contains a 20% stenosis just after the origin of an obtuse marginal branch. Large branching   obtuse marginal branch from the mid AV groove vessel. Proximal segment contains a concentric discrete 70% stenosis. Distal vessel contains luminal irregularities. Right Coronary Artery: Dominant, moderate size vessel. Proximal and mid segments patent. Distal vessel bifurcates into 2 branches at the acute margin. The ongoing right branch is diffusely diseased with a long 80% stenosis, followed by a   discrete 90% stenosis. The acute marginal branch contains   proximal and mid 99% stenoses. 1.  Progressive coronary disease since prior catheterization   7/14/2021. Second diagonal branch which was previously stented is now 100% occluded. First obtuse marginal branch contains a ostial/proximal 70% stenosis. Distal right coronary artery demonstrates 2 branches with the ongoing vessel diffusely diseased with discrete distal 90% stenosis. The acute marginal branch contains proximal and mid 99% stenoses.    2.  Ischemic cardiomyopathy ejection fraction 40% 3.  No aortic stenosis   4. Mild mitral regurgitation   5. Successful administration of moderate sedation without   complications     Plan: Routine post cardiac cath orders. Patient's disease is   mostly branch vessel and distal vessel disease. The diagonal is 100% chronic total occlusion, the distal right coronary artery   disease is very diffuse. Thus, this anatomy is unfavorable for   PCI and CABG. The obtuse marginal branch potentially could be considered for PCI although would leave behind significant   residual coronary disease. Patient needs aggressive guideline   directed risk factor modification. In addition with her LV   systolic dysfunction she will also need guideline directed   medical therapy for LV dysfunction and chronic systolic heart   failure. Status post extubated     Plan:      Continue ventilator support    Continue dialysis as per nephrologist   Resume aspirin if no evidence of any bleeding   Will follow-up              Subjective:    cc:   Not in any distress      REVIEW OF SYSTEMS:     General: No fevers or chills. Cardiovascular: No chest pain,No palpitations, No orthopnea, No PND, positive leg swelling, No claudication  Pulmonary: No  dyspnea. Gastrointestinal: No nausea, vomiting, bleeding  Neurology: No Dizziness    Objective:      Visit Vitals  BP (!) 198/108   Pulse (!) 109   Temp 98.3 °F (36.8 °C)   Resp 23   Ht 5' 7\" (1.702 m)   Wt 99.8 kg (220 lb)   SpO2 100%   BMI 34.46 kg/m²     Body mass index is 34.46 kg/m². Physical Exam:  General Appearance: Comfortable, not using accessory muscles of respiration. HEENT: SARMAD. HEAD: Atraumatic  NECK: No JVD, no thyroidomeglay. CAROTIDS: no bruit  LUNGS:  bilateral conducted sounds   HEART: S1+S2 audible, no murmur, no pericardial rub. ABD: Non-tender, BS Audible    EXT:  bilateral lower extremity 1+ edema, and no cyanosis.   NEUROLOGICAL: lethargic  Medication:  Current Facility-Administered Medications   Medication Dose Route Frequency    atorvastatin (LIPITOR) tablet 80 mg  80 mg Oral DAILY    calcitRIOL (ROCALTROL) capsule 0.25 mcg  0.25 mcg Oral DAILY    [Held by provider] cinacalcet (SENSIPAR) tablet 60 mg  60 mg Oral DAILY    [Held by provider] levothyroxine (SYNTHROID) tablet 200 mcg  200 mcg Oral ACB    [Held by provider] NIFEdipine ER (PROCARDIA XL) tablet 90 mg  90 mg Oral DAILY    [Held by provider] calcium acetate(phosphat bind) (PHOSLO) capsule 1,334 mg  2 Capsule Oral TID WITH MEALS    isosorbide mononitrate ER (IMDUR) tablet 60 mg  60 mg Oral 7am    metoprolol succinate (TOPROL-XL) XL tablet 50 mg  50 mg Oral BID    hydrALAZINE (APRESOLINE) tablet 50 mg  50 mg Oral TID    [START ON 1/31/2023] Vanocomycin level due with AM labs 1/31/23  1 Each Other ONCE    [START ON 1/31/2023] famotidine (PF) (PEPCID) 20 mg in 0.9% sodium chloride 10 mL injection  20 mg IntraVENous DAILY    peritoneal dialysis DEXTROSE 4.25% (2.5 mEq/L low calcium) solution 2,000 mL  2,000 mL IntraPERitoneal QID    sodium chloride (NS) flush 5-40 mL  5-40 mL IntraVENous Q8H    sodium chloride (NS) flush 5-40 mL  5-40 mL IntraVENous PRN    acetaminophen (TYLENOL) tablet 650 mg  650 mg Oral Q6H PRN    Or    acetaminophen (TYLENOL) suppository 650 mg  650 mg Rectal Q6H PRN    polyethylene glycol (MIRALAX) packet 17 g  17 g Oral DAILY PRN    ondansetron (ZOFRAN ODT) tablet 4 mg  4 mg Oral Q8H PRN    Or    ondansetron (ZOFRAN) injection 4 mg  4 mg IntraVENous Q6H PRN    heparin (porcine) injection 5,000 Units  5,000 Units SubCUTAneous Q8H    insulin lispro (HUMALOG) injection   SubCUTAneous Q6H    glucose chewable tablet 16 g  16 g Oral PRN    glucagon (GLUCAGEN) injection 1 mg  1 mg IntraMUSCular PRN    dextrose 10% infusion 0-250 mL  0-250 mL IntraVENous PRN    Vancomycin-Pharmacy to Dose  1 Each Other Rx Dosing/Monitoring    0.9% sodium chloride infusion 250 mL  250 mL IntraVENous PRN    chlorhexidine (PERIDEX) 0.12 % mouthwash 10 mL  10 mL Oral Q12H    hydrALAZINE (APRESOLINE) 20 mg/mL injection 10 mg  10 mg IntraVENous Q4H PRN    piperacillin-tazobactam (ZOSYN) 3.375 g in 0.9% sodium chloride (MBP/ADV) 100 mL MBP  3.375 g IntraVENous Q12H    sodium chloride (NS) flush 5-10 mL  5-10 mL IntraVENous PRN               Lab/Data Reviewed:       Recent Labs     01/30/23  0812 01/29/23  1830 01/29/23  0647 01/28/23  2320   WBC 8.9  --  7.1 12.5   HGB 8.5* 8.2* 6.3* 7.7*   HCT 28.1* 26.6* 20.7* 26.2*     --  251 390     Recent Labs     01/30/23  0812 01/29/23  0647 01/28/23  2320    136 137   K 4.9 5.7* 5.0   CL 98* 97* 96*   CO2 30 25 23   GLU 75 160* 162*   BUN 69* 73* 71*   CREA 13.60* 14.20* 14.20*   CA 10.7*  10.4* 9.6 10.1     37 minutes of critical care time spent in the direct evaluation and treatment of this high risk patient. The reason for providing this level of medical care for this critically ill patient was due a critical illness that impaired one or more vital organ systems such that there was a high probability of imminent or life threatening deterioration in the patient's condition. This care involved high complexity decision making to assess, manipulate, and support vital system functions, to treat this degree vital organ system failure and to prevent further life threatening deterioration of the patient's condition.     Signed By: Neyda Melendrez MD     January 30, 2023

## 2023-01-31 NOTE — PROGRESS NOTES
1915 : Bedside and Verbal shift change report given to Ronald Vanegas, RN (oncoming nurse) by Oh Winchester RN (offgoing nurse). Report included the following information SBAR, Kardex, Intake/Output, MAR, and Recent Results. 2000 : Assessment completed, see flow sheets. 2133 : Patient's mom called but without pass code. No contact information for mom listed in chart. Patient orientation not intact at this time to verify identity or give permission for updates. 2140 : Spoke with kelly Catherine, to verify patient's mom's contact information - [facility] (cell) [phone]    2150 : Updates provided to patient's mother. All questions answered to her satisfaction. Pass code provided and contact information added to the chart.

## 2023-01-31 NOTE — PROGRESS NOTES
Nephrology Progress Note    Patient: Klever Beyer      History of Present Illness:  Klever Beyer is a 43 y.o. female with a past medical history significant for CAD/MI, CM 40% EF, DM type 2, obesity, ESRD on PD at [facility], who presented with worsening SOB/ resp distress. Pt could not tolerate bipap and eventually required intubation, admitted to ICU. Noticed to have elevated BP as well. No f/c. Imaging c/w volume overload and neg for PE. Nephrology was consulted for further evaluation and management of her ESRD. -Pt extubated. NAD Denies SOB. No technical problem with exchanges. Patient says she had poor UF/drainage prior to admission. Attributes this to constipation, now improved. Denies non-compliance with PD Prescription.       Inpatient meds:  Current Facility-Administered Medications   Medication Dose Route Frequency    atorvastatin (LIPITOR) tablet 80 mg  80 mg Oral DAILY    calcitRIOL (ROCALTROL) capsule 0.25 mcg  0.25 mcg Oral DAILY    [Held by provider] cinacalcet (SENSIPAR) tablet 60 mg  60 mg Oral DAILY    [Held by provider] levothyroxine (SYNTHROID) tablet 200 mcg  200 mcg Oral ACB    [Held by provider] NIFEdipine ER (PROCARDIA XL) tablet 90 mg  90 mg Oral DAILY    [Held by provider] calcium acetate(phosphat bind) (PHOSLO) capsule 1,334 mg  2 Capsule Oral TID WITH MEALS    isosorbide mononitrate ER (IMDUR) tablet 60 mg  60 mg Oral 7am    metoprolol succinate (TOPROL-XL) XL tablet 50 mg  50 mg Oral BID    hydrALAZINE (APRESOLINE) tablet 50 mg  50 mg Oral TID    famotidine (PF) (PEPCID) 20 mg in 0.9% sodium chloride 10 mL injection  20 mg IntraVENous DAILY    cloNIDine HCL (CATAPRES) tablet 0.2 mg  0.2 mg Oral BID    morphine injection 4 mg  4 mg IntraVENous Q4H PRN    peritoneal dialysis DEXTROSE 4.25% (2.5 mEq/L low calcium) solution 2,000 mL  2,000 mL IntraPERitoneal QID    sodium chloride (NS) flush 5-40 mL  5-40 mL IntraVENous Q8H    sodium chloride (NS) flush 5-40 mL  5-40 mL IntraVENous PRN    acetaminophen (TYLENOL) tablet 650 mg  650 mg Oral Q6H PRN    Or    acetaminophen (TYLENOL) suppository 650 mg  650 mg Rectal Q6H PRN    polyethylene glycol (MIRALAX) packet 17 g  17 g Oral DAILY PRN    ondansetron (ZOFRAN ODT) tablet 4 mg  4 mg Oral Q8H PRN    Or    ondansetron (ZOFRAN) injection 4 mg  4 mg IntraVENous Q6H PRN    heparin (porcine) injection 5,000 Units  5,000 Units SubCUTAneous Q8H    insulin lispro (HUMALOG) injection   SubCUTAneous Q6H    glucose chewable tablet 16 g  16 g Oral PRN    glucagon (GLUCAGEN) injection 1 mg  1 mg IntraMUSCular PRN    dextrose 10% infusion 0-250 mL  0-250 mL IntraVENous PRN    0.9% sodium chloride infusion 250 mL  250 mL IntraVENous PRN    chlorhexidine (PERIDEX) 0.12 % mouthwash 10 mL  10 mL Oral Q12H    hydrALAZINE (APRESOLINE) 20 mg/mL injection 10 mg  10 mg IntraVENous Q4H PRN    piperacillin-tazobactam (ZOSYN) 3.375 g in 0.9% sodium chloride (MBP/ADV) 100 mL MBP  3.375 g IntraVENous Q12H    sodium chloride (NS) flush 5-10 mL  5-10 mL IntraVENous PRN             Vital signs:   Visit Vitals  BP (!) 165/104   Pulse 99   Temp 97.6 °F (36.4 °C)   Resp 15   Ht 5' 7\" (1.702 m)   Wt 99.8 kg (220 lb)   SpO2 100%   BMI 34.46 kg/m²       Intake/Output Summary (Last 24 hours) at 1/31/2023 1147  Last data filed at 1/31/2023 0856  Gross per 24 hour   Intake 8242.95 ml   Output 9450 ml   Net -1207.05 ml         Physical Exam:    General: sleepy   HENT: Atraumatic and normocephalic   Eyes: Normal conjunctiva, EOMI   Neck: Supple with no JVD   Cardiovascular: Normal S1 & S2, no m/r/g   Pulmonary/Chest Wall: Clear to auscultation bilaterally   Abdominal: Soft and non-tender, normal active bowel sound, +PD cath    Musculoskeletal: No pedal edema   Skin: No rash   Neurological: Sleepy but oriented        Data Review:  CMP:   Lab Results   Component Value Date/Time     01/31/2023 05:04 AM    K 4.3 01/31/2023 05:04 AM     01/31/2023 05:04 AM    CO2 26 01/31/2023 05:04 AM    AGAP 14 01/31/2023 05:04 AM     (H) 01/31/2023 05:04 AM    BUN 63 (H) 01/31/2023 05:04 AM    CREA 12.90 (H) 01/31/2023 05:04 AM    CA 10.0 01/31/2023 05:04 AM    MG 2.4 01/31/2023 08:36 AM    PHOS 8.9 (H) 01/31/2023 08:36 AM    ALB 2.3 (L) 01/31/2023 05:04 AM    TP 6.1 (L) 01/31/2023 05:04 AM    GLOB 3.8 01/31/2023 05:04 AM    AGRAT 0.6 (L) 01/31/2023 05:04 AM    ALT 15 01/31/2023 05:04 AM     CBC:   Lab Results   Component Value Date/Time    WBC 9.3 01/31/2023 05:04 AM    HGB 9.3 (L) 01/31/2023 05:04 AM    HCT 30.9 (L) 01/31/2023 05:04 AM     01/31/2023 05:04 AM     All Cardiac Markers in the last 24 hours: No results found for: CPK, CK, CKMMB, CKMB, RCK3, CKMBT, CKNDX, CKND1, NAVIN, TROPT, TROIQ, UJAN CARLOS, TROPT, TNIPOC, BNP, BNPP  Recent Glucose Results:   Lab Results   Component Value Date/Time     (H) 01/31/2023 05:04 AM     ABG:   No results found for: PH, PHI, PCO2, PCO2I, PO2, PO2I, HCO3, HCO3I, FIO2, FIO2I    Liver Panel:   Lab Results   Component Value Date/Time    ALB 2.3 (L) 01/31/2023 05:04 AM    TP 6.1 (L) 01/31/2023 05:04 AM    GLOB 3.8 01/31/2023 05:04 AM    AGRAT 0.6 (L) 01/31/2023 05:04 AM    ALT 15 01/31/2023 05:04 AM    AP 94 01/31/2023 05:04 AM     Pancreatic Markers: No results found for: AMYLPOCT, AML, LIPPOCT, LPSE      CT:      Significant bilateral lower lobe consolidation and bilateral  pulmonary infiltrates. Small right pleural effusion. No evidence of pulmonary embolism. Peritoneal dialysis catheter is coiled within the left lower quadrant. No acute abnormality is identified. Impression:     ESRD on home PD  Respiratory failure required intubation: now extubated  Pulm Edema, improved. Net UF 4.8L so far since admission. CXR improved. Hyperkalemia, mild: resolved  Anemia in CKD  HTN: BP not well controlled   DM type 2  Hypoalbuminemia  Lactic acidosis, resolved  H.o.  CAD/ MI  SHPT:      Plan:     Switch manual PD to 2.5L of 2.5 % dextrose 4 times/day today  Resume Bumex 2mg po bid  Restart Nifedipine 90mg daily .  Continue Hydralazine, clonidine and Metoprolol  SCOTT when BP is better controlled (SBP <160)  Continue Calcitriol po, resume Sensipar 60mg daily  D/c Torres Catheter        MD Thomas Farias  [phone]

## 2023-01-31 NOTE — PROGRESS NOTES
Problem: Dysphagia (Adult)  Description: Problem: Dysphagia (Adult)  Description: Patient will:  1. Tolerate PO trials with 0 s/s overt distress in 4/5 trials. 2. Utilize compensatory swallow strategies/maneuvers (decrease bite/sip, size/rate, alt. liq/sol) with min cues in 4/5 trials. 3. Perform oral-motor/laryngeal exercises to increase oropharyngeal swallow function with min cues. 4. Complete an objective swallow study (i.e., MBSS) to assess swallow integrity, r/o aspiration, and determine of safest LRD, min A.    Rec:     Soft and bite-size, thin liquid (No straws), advance as tolerated  Patient may need assistance with meal setup  Aspiration precautions  HOB >45 during po intake, remain >30 for 30-45 minutes after po   Oral care TID  Meds float in puree  Outcome: Progressing Towards Goal  1/31/2023 0927 by Conrado Cortez SLP  Outcome: Progressing Towards Goal    [id number] [address] TREATMENT    Patient: Sarai Washington (43 y.o. female)  Date: 1/31/2023  Diagnosis: Acute hypoxemic respiratory failure (HCC) [J96.01] Acute respiratory failure with hypoxia and hypercapnia (HCC)      Precautions: Aspiration    PLOF:as per H&P     ASSESSMENT:  Patient seen by ST for dysphagia management. RN in room. Patient more alert today; oriented x 3. Patient's voice has come back. Slow rate of speech but likely 2/ overall fatigue. Trials of thin liquid (+/- straw), puree, mech soft, and solid textures given. Patient instructed to hold bolus for 3-seconds in mouth before initiating swallow. Patient followed directions in 100% of opportunities. With mech soft and solid textures, patient demo slow/prolonged mastication, likely 2/weakness, fatigue. Swallow initiation appears timely today with no overt s/sx of aspiration appreciated. Rec soft and bite-size, thin liquid diet (no straws); general aspiration precautions; meds crushed/float in puree. RN to advance diet as tolerated.  D/w SUAD Brumfield and MD. Pete Farrell following. Progression toward goals:  [x]         Improving appropriately and progressing toward goals  []         Improving slowly and progressing toward goals  []         Not making progress toward goals and plan of care will be adjusted     PLAN:  Recommendations and Planned Interventions:  See above  Patient continues to benefit from skilled intervention to address the above impairments. Continue treatment per established plan of care. Discharge Recommendations: To Be Determined     SUBJECTIVE:   Patient stated [employer] feels so good. OBJECTIVE:   Cognitive and Communication Status:  Neurologic State: Alert  Orientation Level: Oriented to person, Oriented to place, Oriented to situation  Cognition: Decreased attention/concentration, Follows commands  Perception: Appears intact  Perseveration: No perseveration noted  Safety/Judgement: Awareness of environment  Dysphagia Treatment:  Oral Assessment:  Oral Assessment  Labial: Decreased seal  Dentition: Natural, Intact  Oral Hygiene:  (fair)  Lingual: Decreased strength, Decreased rate  Velum: No impairment  Mandible: No impairment  Gag Reflex:  (did not test)  P.O. Trials:   Patient Position:  (HOB>45)   Vocal quality prior to P.O.: Low volume   Consistency Presented: Thin liquid, Solid, Puree, Mechanical soft   How Presented: SLP-fed/presented, Cup/sip, Spoon, Straw       Bolus Acceptance: No impairment   Bolus Formation/Control: Impaired   Type of Impairment: Mastication (prolonged mastication likely 2/ fatigue, weakness)   Propulsion: Delayed (# of seconds)   Oral Residue: None   Initiation of Swallow: No impairment   Laryngeal Elevation: Functional   Aspiration Signs/Symptoms: None   Pharyngeal Phase Characteristics: Easily fatigued , Poor endurance   Effective Modifications:  Alternate liquids/solids, Small sips and bites, Other (comment) (3 second prep)   Cues for Modifications: Minimal-moderate         Oral Phase Severity: No impairment   Pharyngeal Phase Severity :  (suspect)    PAIN:  Pain level pre-treatment: none reported  Pain level post-treatment: none reported    After treatment:   []              Patient left in no apparent distress sitting up in chair  [x]              Patient left in no apparent distress in bed  [x]              Call bell left within reach  [x]              Nursing notified  []              Family present  []              Caregiver present  []              Bed alarm activated      COMMUNICATION/EDUCATION:   [x] Aspiration precautions; swallow safety; compensatory techniques  []        Patient unable to participate in education; education ongoing with staff  []  Posted safety precautions in patient's room.   [] Oral-motor/laryngeal strengthening exercises      DILSHAD Macias  Time Calculation: 17 mins

## 2023-01-31 NOTE — PROGRESS NOTES
Palliative Medicine Consult    Patient Name: Paul Stone  YOB: 1980    Date of Initial Consult: 1/30/2023  Date of follow up: 1/31/2023  Reason for Consult: Goals of care discussions  Requesting Provider: Dr. Ina Gonzalez  Primary Care Physician: Jocelynn Cervantes MD      SUMMARY:   Paul Stone is a 43 y.o. with a past history of progressive CAD not amenable to stenting followed by [facility], CHF with EF of 40%, ESRD on peritoneal dialysis, HTN, type 2 diabetes mellitus, PAD, and obesity, who was admitted on 1/28/2023 from home with a diagnosis of acute respiratory failure with hypoxemia and hypercapnia, acute exacerbation of CHF, hypertensive emergency, anemia of chronic disease, ESRD on peritoneal dialysis, and left foot ulcer with cellulitis. Current medical issues leading to Palliative Medicine involvement include: goals of care discussions in the setting of chronic comorbidities and acute critical illness. 1/31/2023: Patient is awake, engaged in goals of care discussions. Voice is weak, but understandable. Oxygen on at 2 lpm per NC. PALLIATIVE DIAGNOSES:   Goals of care discussions/advance care planning  Acute respiratory failure with hypoxemia and hypercapnia  CHF exacerbation  ESRD on peritoneal dialysis  Left foot ulcer/cellulitis        PLAN:   1/31/2023: Palliative medicine team including Louis Rodriguez RN and I met with patient at patient's bedside. Patient is awake, alert, in NAD, appears fatigued but oriented x 4 and engages in goals of care discussions. Patient states she is feeling better, able to reposition herself in bed. Offered patient the opportunity to complete an AMD.  Patient completed an AMD today naming her Sister Nancy Browne as primary medical power of  and significant other Bianka Cash as secondary medical power of .  Discussed the benefits and burdens of CPR in the event of cardiopulmonary arrest.  Discussed the benefits and burdens of reintubation in the event of respiratory decline pre-arrest.  Patient was clear that she is accepting of all resuscitation efforts. Patient remains a full code with full interventions. Will sign off as goals of care have been established. Please re-consult should it be warranted. Thank you for the opportunity to provide care to this patient. See previous discussions below:    1/30/2023: Goals of care discussions/Advance care planning: Palliative medicine team including Louis Rodriguez, SUAD and I met with patient at patient's bedside. Patient is orally intubated and sedated, withdrawals to noxious stimuli. No family at bedside. Per chart review, patient has no AMD, was seen by our  Melissa Crews 11/15/2022 and was offered the opportunity to complete one but declined. Patient is not , has a 5year old son, mother Yun Bryant who is reportedly in a rehab facility in [place] recovering from an amputation surgery, and a father who reportedly is ill in a nursing facility. Patient has two siblings Domenica Sanchez and Nancy Browne who have made contact with the ICU for medical updates. In the absence of an AMD, patient's mother and father are legal NOK. Noted patient has been medically extubated and currently on 2 L NC. Our team will follow up with patient tomorrow to see if she is able and willing to complete an AMD and discuss goals of care. At this time per chart review, patient is a full code with full interventions. Acute respiratory failure with hypoxemia and hypercapnia: Patient was intubated on 1/28/2023 for impending respiratory failure. Patient remains intubated and sedated. CHF exacerbation: presented with fluid overload, including bilateral pulmonary infiltrates. History of cardiomyopathy and EF of 35-40%, with mild pulmonary hypertension. Cardiology following. ESRD on peritoneal dialysis: nephrology following. Receiving peritoneal dialysis while inpatient.    Left foot ulcer/cellulitis: Patient is being treated for sepsis.  She is on broad spectrum antibiotics per primary team.   Initial consult note routed to primary continuity provider  Communicated plan of care with: Palliative IDT       GOALS OF CARE / TREATMENT PREFERENCES:   [====Goals of Care====]  GOALS OF CARE: Full code with full interventions  Patient/Health Care Proxy Stated Goals: Prolong life      TREATMENT PREFERENCES:   Code Status: Full Code    Advance Care Planning:  Advance Care Planning 1/31/2023   Patient's Healthcare Decision Maker is: Named in scanned ACP document   Confirm Advance Directive Yes, on file   Patient Would Like to Complete Advance Directive -       Medical Interventions: Full interventions            The palliative care team has discussed with patient / health care proxy about goals of care / treatment preferences for patient.  [====Goals of Care====]         HISTORY:     History obtained from: patient's chart, family    CHIEF COMPLAINT: respiratory failure    HPI/SUBJECTIVE:    The patient is:   [] Verbal and participatory  [x] Non-participatory due to:   Orally intubated and sedated     Clinical Pain Assessment (nonverbal scale for severity on nonverbal patients):   Clinical Pain Assessment  Severity: 0    Adult Nonverbal Pain Scale  Face: Frequent grimace, tearing, frowning, wrinkled forehead  Activity (Movement): Restless, excessive activity and/or withdrawal reflexes  Guarding: Splinting areas of the body, tense  Physiology (Vital Signs): Change in any of the following: SBP > 20 mm HG or HR > 20/minute  Respiratory: RR > 10 above baseline or 5% decrease SpO2 mild asynchrony with ventilator  Total Score: 7       FUNCTIONAL ASSESSMENT:     Palliative Performance Scale (PPS):  PPS: 50       PSYCHOSOCIAL/SPIRITUAL SCREENING:     Advance Care Planning:  Advance Care Planning 1/31/2023   Patient's Healthcare Decision Maker is: Named in scanned ACP document   Confirm Advance Directive Yes, on file   Patient Would Like to Complete Advance Directive -        Any spiritual / Jewish concerns: unable to assess  [] Yes /  [] No    Caregiver Burnout:  [] Yes /  [] No /  [x] No Caregiver Present      Anticipatory grief assessment: unable to assess  [] Normal  / [] Maladaptive             REVIEW OF SYSTEMS:     Positive and pertinent negative findings in ROS are noted above in HPI. The following systems were [] reviewed / [x] unable to be reviewed as noted in HPI  Other findings are noted below. Systems: constitutional, ears/nose/mouth/throat, respiratory, gastrointestinal, genitourinary, musculoskeletal, integumentary, neurologic, psychiatric, endocrine. Positive findings noted below. Modified ESAS Completed by: provider   Fatigue: 5       Pain: 0   Anxiety: 0 Nausea: 0   Anorexia: 0 Dyspnea: 0     Constipation: No              PHYSICAL EXAM:     From RN flowsheet:  Wt Readings from Last 3 Encounters:   01/30/23 99.8 kg (220 lb)   11/18/22 92.8 kg (204 lb 9.4 oz)   09/11/18 96.6 kg (213 lb)     Blood pressure (!) 143/98, pulse 100, temperature 97.6 °F (36.4 °C), resp. rate 15, height 5' 7\" (1.702 m), weight 99.8 kg (220 lb), SpO2 100 %.     Pain Scale 1: Numeric (0 - 10)  Pain Intensity 1: 0              Pain Intervention(s) 1: Medication (see MAR)    Constitutional: 43year old female, awake, alert, fatigued appearing, NAD  Eyes: PERRL  ENMT: moist mucous membranes  Cardiovascular: regular rhythm, distal pulses intact  Respiratory: breathing not labored, symmetric, on 2 L NC  Gastrointestinal: soft non-tender   Musculoskeletal: no deformity, no tenderness to palpation  Skin: warm, dry  Neurologic: awake, alert, oriented to person, place, year, and situation        HISTORY:     Principal Problem:    Acute respiratory failure with hypoxia and hypercapnia ([facility] (1/29/2023)    Active Problems:    ESRD (end stage renal disease) on dialysis ([facility] (11/15/2022)      HTN (hypertension) (11/15/2022)      Type 2 diabetes mellitus ([facility] (11/15/2022)      PAD (peripheral artery disease) ([facility] (11/15/2022)      CAD (coronary artery disease) (11/15/2022)      Hypertensive emergency (1/29/2023)      Acute exacerbation of CHF (congestive heart failure) ([facility] (1/29/2023)      BMI 34.0-34.9,adult (1/29/2023)      Peritoneal dialysis status ([facility] (1/29/2023)      Diabetic ulcer of left foot associated with type 2 diabetes mellitus, with fat layer exposed ([facility] (1/29/2023)      Sepsis ([facility] (1/29/2023)      Cellulitis of fourth toe, left (1/29/2023)      Chronic anemia (1/29/2023)    Past Medical History:   Diagnosis Date    BMI 34.0-34.9,adult 1/29/2023    Cardiomyopathy ([facility]     Chronic kidney disease     Coronary arteriosclerosis     DM (diabetes mellitus) ([facility]     Hypertension     Tachycardia       History reviewed. No pertinent surgical history. Family History   Problem Relation Age of Onset    Diabetes Mother     Heart Disease Mother     Heart Attack Mother     Heart Attack Father     Heart Disease Father     Diabetes Father       History reviewed, no pertinent family history.   Social History     Tobacco Use    Smoking status: Never    Smokeless tobacco: Never   Substance Use Topics    Alcohol use: Yes     Comment: rarely     Allergies   Allergen Reactions    Coreg [Carvedilol] Nausea and Vomiting    Lortab [Hydrocodone-Acetaminophen] Nausea and Vomiting      Current Facility-Administered Medications   Medication Dose Route Frequency    bumetanide (BUMEX) tablet 2 mg  2 mg Oral BID    peritoneal dialysis DEXTROSE 2.5% (2.5 mEq/L low calcium) solution 2,500 mL  2,500 mL IntraPERitoneal QID    atorvastatin (LIPITOR) tablet 80 mg  80 mg Oral DAILY    calcitRIOL (ROCALTROL) capsule 0.25 mcg  0.25 mcg Oral DAILY    cinacalcet (SENSIPAR) tablet 60 mg  60 mg Oral DAILY    [Held by provider] levothyroxine (SYNTHROID) tablet 200 mcg  200 mcg Oral ACB    NIFEdipine ER (PROCARDIA XL) tablet 90 mg  90 mg Oral DAILY    [Held by provider] calcium acetate(phosphat bind) (PHOSLO) capsule 1,334 mg  2 Capsule Oral TID WITH MEALS    isosorbide mononitrate ER (IMDUR) tablet 60 mg  60 mg Oral 7am    metoprolol succinate (TOPROL-XL) XL tablet 50 mg  50 mg Oral BID    hydrALAZINE (APRESOLINE) tablet 50 mg  50 mg Oral TID    famotidine (PF) (PEPCID) 20 mg in 0.9% sodium chloride 10 mL injection  20 mg IntraVENous DAILY    cloNIDine HCL (CATAPRES) tablet 0.2 mg  0.2 mg Oral BID    morphine injection 4 mg  4 mg IntraVENous Q4H PRN    sodium chloride (NS) flush 5-40 mL  5-40 mL IntraVENous Q8H    sodium chloride (NS) flush 5-40 mL  5-40 mL IntraVENous PRN    acetaminophen (TYLENOL) tablet 650 mg  650 mg Oral Q6H PRN    Or    acetaminophen (TYLENOL) suppository 650 mg  650 mg Rectal Q6H PRN    polyethylene glycol (MIRALAX) packet 17 g  17 g Oral DAILY PRN    ondansetron (ZOFRAN ODT) tablet 4 mg  4 mg Oral Q8H PRN    Or    ondansetron (ZOFRAN) injection 4 mg  4 mg IntraVENous Q6H PRN    heparin (porcine) injection 5,000 Units  5,000 Units SubCUTAneous Q8H    insulin lispro (HUMALOG) injection   SubCUTAneous Q6H    glucose chewable tablet 16 g  16 g Oral PRN    glucagon (GLUCAGEN) injection 1 mg  1 mg IntraMUSCular PRN    dextrose 10% infusion 0-250 mL  0-250 mL IntraVENous PRN    0.9% sodium chloride infusion 250 mL  250 mL IntraVENous PRN    chlorhexidine (PERIDEX) 0.12 % mouthwash 10 mL  10 mL Oral Q12H    hydrALAZINE (APRESOLINE) 20 mg/mL injection 10 mg  10 mg IntraVENous Q4H PRN    piperacillin-tazobactam (ZOSYN) 3.375 g in 0.9% sodium chloride (MBP/ADV) 100 mL MBP  3.375 g IntraVENous Q12H    sodium chloride (NS) flush 5-10 mL  5-10 mL IntraVENous PRN          LAB AND IMAGING FINDINGS:     Lab Results   Component Value Date/Time    WBC 9.3 01/31/2023 05:04 AM    HGB 9.3 (L) 01/31/2023 05:04 AM    PLATELET 664 08/08/2373 05:04 AM     Lab Results   Component Value Date/Time    Sodium 141 01/31/2023 05:04 AM    Potassium 4.3 01/31/2023 05:04 AM Chloride 101 01/31/2023 05:04 AM    CO2 26 01/31/2023 05:04 AM    BUN 63 (H) 01/31/2023 05:04 AM    Creatinine 12.90 (H) 01/31/2023 05:04 AM    Calcium 10.0 01/31/2023 05:04 AM    Magnesium 2.4 01/31/2023 08:36 AM    Phosphorus 8.9 (H) 01/31/2023 08:36 AM      Lab Results   Component Value Date/Time    Alk. phosphatase 94 01/31/2023 05:04 AM    Protein, total 6.1 (L) 01/31/2023 05:04 AM    Albumin 2.3 (L) 01/31/2023 05:04 AM    Globulin 3.8 01/31/2023 05:04 AM     Lab Results   Component Value Date/Time    INR 1.1 01/29/2023 10:45 AM    Prothrombin time 14.6 01/29/2023 10:45 AM    aPTT <20.0 (L) 01/29/2023 10:45 AM      Lab Results   Component Value Date/Time    Iron 50 11/18/2022 02:39 AM    TIBC 220 (L) 11/18/2022 02:39 AM    Iron % saturation 23 11/18/2022 02:39 AM    Ferritin 779 (H) 11/18/2022 02:39 AM      No results found for: PH, PCO2, PO2  No components found for: [place]   Lab Results   Component Value Date/Time     (H) 09/11/2018 02:34 AM    CK - MB 10.6 (H) 09/11/2018 02:34 AM                Total time: 50 minutes  Counseling / coordination time, spent as noted above:   > 50% counseling / coordination?: yes, patient and medical team     Prolonged service was provided for  []30 min   []75 min in face to face time in the presence of the patient, spent as noted above.   Time Start:   Time End:

## 2023-01-31 NOTE — PROGRESS NOTES
Bedside and Verbal shift change report given to Baron Zavala RN (oncoming nurse) by T. Alver Lesch, RN (offgoing nurse). Report included the following information SBAR, Kardex, Intake/Output, MAR, Recent Results, and Cardiac Rhythm SR-ST .      Shift assessment completed and documented per relevant flow sheet, BP remains elevated, scheduled antihypertensives given see MAR, pt is alert and oriented to self place and situation, disoriented to time, pt follows commands and denies pain, Oxygenating >95% on 2LNC, speech at the bedside to assess pt swallowing, pt able to take whole meds in apple sauce, pt tolerated PD draining and instilling, palliative care at bedside to discuss care decisions    Pt sBP remains between 160-170s, PRN IV hydralazine given, will reassess intervention    Spoke with Abelardo Albright (sister of pt) and provided her with the pass code, as pt has named sister primary decision maker, sister updated on this information as well, all questions answered to her liking, no additional concerns    1200 Reassessment completed no change, VSS after BP intervention    1215 Spoke with nephrologist, new orders received for additional antihypertensive medications as well as change in dialysate     1230 RN reached out to NRS Supervisor to bring new bags of dialysate, pending retrieval of dialysate at this time    Spoke with Nephrologist regarding the modification of dialysate order, new orders received and carried out see [facility]

## 2023-01-31 NOTE — PROGRESS NOTES
[address] [facility] Pharmacokinetic Monitoring Service - Vancomycin    Consulting Provider: Mauricio Valerio   Indication: Sepsis  Target Concentration: Pre-Dialysis Concentration 15-20 mg/L  Day of Therapy: 3  Additional Antimicrobials: Zosyn    Pertinent Laboratory Values:    Wt Readings from Last 1 Encounters:   01/30/23 99.8 kg (220 lb)     Temp Readings from Last 1 Encounters:   01/31/23 97.6 °F (36.4 °C)     No components found for: PROCAL  Recent Labs     01/31/23  0504 01/30/23  0812   WBC 9.3 8.9       Pertinent Cultures:  1/28/23 Blood culture: NGTD  1/29/23 Wound culture: Heavy enterococcus spp    Assessment:  Date/Time Current Dose Concentration Dialysis Session   1/31/23 Vancomycin 1250 mg x once 1/29/23 14.1 mcg/ml PD q6h     Plan:  Concentration-guided dosing due to renal impairment and peritoneal dialysis  Administer vancomycin 500 mg IV x once 2/1/23 @ 0300  Vancomycin concentration ordered for 2/3/23 with AM labs  Pharmacy will continue to monitor patient and adjust therapy as indicated    Thank you for the consult,  MADELIN Matias  1/31/2023 10:00 AM

## 2023-01-31 NOTE — PROGRESS NOTES
1/31/2023 PT note: consult received and chart reviewed. Spoke with nurse Giuliano Lynn and advised to return tomorrow for evaluation; pt has PD scheduled at [address] [place] [address], !p and 5p. Will f/u accordingly as nurse recommends.  Thank you for this referral.   Jonathan Hernández, PT

## 2023-01-31 NOTE — PROGRESS NOTES
Occupational Therapy Evaluation Attempt     OT eval orders received. Chart reviewed. Attempted Occupational Therapy Evaluation, however, patient unable to be seen due to:  []  Nausea/vomiting  []  Eating  []  Pain  []  Patient too lethargic  []  Off Unit for testing/procedure  []  Dialysis treatment in progress  []  Telemetry Results  [x]  Other: SUAD Solis reports pt fatigued from PD and wants to let pt rest for the evening. Pt's PD schedules at 0900, 1300 and 1800. To be followed tomorrow between PD schedules as deemed fit.       Elva De La Paz, OTR/L

## 2023-01-31 NOTE — PROGRESS NOTES
[facility]  Pulmonary, Critical Care, and Sleep Medicine    Name: Frutoso Gowers MRN: [id number]   : 1980 Hospital: [facility] [place]    Date: 2023  Room: [address] [place] [address] Note                                              Consult requesting physician: Dr. Leonie Gurrola  Reason for Consult: Acute respiratory failure on ventilator support      IMPRESSION:   Acute respiratory failure with hypoxemia and hypercapnia  Acute exacerbation of CHF  Bilateral pulmonary infiltrates  End-stage renal disease on peritoneal dialysis  Hypertensive emergency  Anemia of chronic disease  Left foot ulcer/cellulitis  Peripheral arterial disease  Hypertension  Type 2 diabetes mellitus  Coronary artery disease      Patient Active Problem List   Diagnosis Code    Acute pulmonary edema (HCC) J81.0    Chest pain R07.9    ESRD (end stage renal disease) on dialysis ([facility] [address] N18.6, Z99.2    Pulmonary edema J81.1    Cardiomyopathy ([facility] [address] I42.9    HTN (hypertension) I10    Type 2 diabetes mellitus (HCC) E11.9    PAD (peripheral artery disease) (HCC) I73.9    CAD (coronary artery disease) I25.10    Acute respiratory failure with hypoxia and hypercapnia (HCC) J96.01, J96.02    Hypertensive emergency I16.1    Acute exacerbation of CHF (congestive heart failure) ([facility] [address] [facility] I50.9    BMI 34.0-34.9,adult Z68.34    Peritoneal dialysis status ([facility] [address] Z99.2    Diabetic ulcer of left foot associated with type 2 diabetes mellitus, with fat layer exposed ([facility] [address] E11.621, L97.522    Sepsis (HCC) A41.9    Cellulitis of fourth toe, left L03.032    Chronic anemia D64.9         Code status: Full code      RECOMMENDATIONS:   Respiratory: Patient present with acute respiratory distress, with volume overload, bilateral pulmonary infiltrates on chest CT, and intubated in the ER. Patient extubated yesterday  Stable respirations; on nasal cannula oxygen-2 L; encourage incentive spirometry.   Chest x-ray today-cardiomegaly; significant improvement in pulmonary edema; no pleural effusions. Keep SPO2 >=92%. HOB 30 degree elevation all the time. Aspiration precautions. CVS: Known patient with coronary artery disease and cardiomyopathy/low EF; EKG on admission-nil acute. Antihypertensives-clonidine 0.2 mg, hydralazine 50 mg, metoprolol XL 50 mg orally. Imdur daily. Prior EF of 35-40%; mild pulmonary hypertension. Repeat echo yesterday shows severely decreased LV function-EF 25-30%; moderate global hypokinesis; no pulmonary hypertension reported. Cardiology-Dr. Danielle Singleton on case. ID: Treat as sepsis; lactic acidosis has resolved. Blood cultures and respiratory cultures negative; continue IV Zosyn-complete 5 days; stopped IV vancomycin. Peritoneal fluid culture-no organisms reported; follow culture results. Chest x-ray shows significant improvement in pulmonary edema. Hematology/Oncology: Anemia of chronic disease. Hemoglobin 9.3; platelets normal.  S/p 1 unit PRBC this admission. Coags-INR normal, PTT low. No active bleeding issues. Renal: Nephrology on case; patient on peritoneal dialysis. GI: CT abdomen-nil acute. Endocrine: Monitor BS. SSI. Neurology: Normal mentation. Toxicology: UDS-negative. Skin/Wound: Wound care consult for left foot ulcer. Vascular: Bilateral lower extremity arterial ultrasound-no evidence of stenosis. Nutrition: SLP cleared for oral diet. Prophylaxis: DVT Prophylaxis: Sc Heparin. GI Prophylaxis: Protonix. PT OT consulted. Lines/Tubes: PIVs; RT arm midline  ETT: 1/29-1/30. Torres: 1/29- DC Torres catheter-ordered. Other drains: Chronic PD catheter. Advance Directive/Palliative Care: consulted: Prognosis guarded overall. Quality Care: PPI, DVT prophylaxis, HOB elevated, Infection control all reviewed and addressed. Care of plan d/w RN. Discussed with patient and updated management plans. Await cardiology and nephrology review, and see if stable for transfer to telemetry.     High complexity decision making was performed during the evaluation of this patient at high risk for decompensation with multiple organ involvement. Total critical care time spent rendering care exclusive of procedures/family discussion/coordination of care: 38 minutes. Name Relation Home Work Lenny Haile Brother [phone]     Toni Cummings Sister [phone]                Subjective/History of Present Illness:     Patient is a 43 y.o. female with PMHx significant for multiple medical problems. Patient last admission was in November 2022 with NSTEMI and fluid overload, treated with peritoneal dialysis at that time. The patient has history of coronary artery disease, not amenable for coronary stenting-[facility]; cardiomyopathy with EF 40%, end-stage renal disease on peritoneal dialysis, hypertension, type 2 diabetes mellitus, obesity. The patient came to the ER last night with worsening shortness of breath symptoms. The patient was in respiratory distress, and could not tolerate BiPAP therapy. She was hypertensive with blood pressure 229/186 mmHg max. Patient subsequently needed to be intubated. Patient underwent CT studies-reviewed below. Patient has left foot ulcer. 1/31/2023  Patient seen in the ICU. Patient extubated yesterday. Stable respirations overnight. Mild cough. No chest pain or palpitations  Telemetry-sinus rhythm; blood pressure-elevated. Review of Systems:  -No fever or chills  - No chest pain or palpitation  - No vomiting or diarrhea  - No abdominal pains  - No hemoptysis       Allergies   Allergen Reactions    Coreg [Carvedilol] Nausea and Vomiting    Lortab [Hydrocodone-Acetaminophen] Nausea and Vomiting      Past Medical History:   Diagnosis Date    BMI 34.0-34.9,adult 1/29/2023    Cardiomyopathy ([facility] [address]     Chronic kidney disease     Coronary arteriosclerosis     DM (diabetes mellitus) ([facility] [address]     Hypertension     Tachycardia       History reviewed.  No pertinent surgical history. Social History     Tobacco Use    Smoking status: Never    Smokeless tobacco: Never   Substance Use Topics    Alcohol use: Yes     Comment: rarely      Family History   Problem Relation Age of Onset    Diabetes Mother     Heart Disease Mother     Heart Attack Mother     Heart Attack Father     Heart Disease Father     Diabetes Father       Prior to Admission medications    Medication Sig Start Date End Date Taking? Authorizing Provider   bisacodyL 5 mg tab Take 5 mg by mouth two (2) times a day. Provider, Historical   metoclopramide HCl (REGLAN) 5 mg tablet Take 5 mg by mouth as needed for Nausea or Vomiting. Provider, Historical   sodium bicarbonate 650 mg tablet Take 650 mg by mouth two (2) times a day. Provider, Historical   albuterol (Ventolin HFA) 90 mcg/actuation inhaler Take 2 Puffs by inhalation every six (6) hours as needed for Wheezing. Provider, Historical   atorvastatin (LIPITOR) 80 mg tablet Take 80 mg by mouth daily. Provider, Historical   clopidogreL (Plavix) 75 mg tab Take 75 mg by mouth daily. Provider, Historical   cloNIDine HCL (CATAPRES) 0.2 mg tablet Take 0.2 mg by mouth two (2) times a day. Provider, Historical   hydrALAZINE (APRESOLINE) 100 mg tablet Take 100 mg by mouth three (3) times daily. Provider, Historical   mupirocin (BACTROBAN) 2 % ointment Apply  to affected area three (3) times daily. Provider, Historical   candesartan (ATACAND) 32 mg tablet Take 32 mg by mouth daily. Indications: high blood pressure    Provider, Historical   isosorbide mononitrate ER (IMDUR) 120 mg CR tablet Take 120 mg by mouth every morning. Provider, Historical   metoprolol succinate (TOPROL-XL) 100 mg tablet Take 100 mg by mouth two (2) times a day. Provider, Historical   insulin aspart U-100 (NOVOLOG) 100 unit/mL injection 15 Units by SubCUTAneous route Before breakfast, lunch, and dinner.     Provider, Historical   fluticasone propionate (FLOVENT HFA) 44 mcg/actuation inhaler Take 1 Puff by inhalation as needed. Provider, Historical   calcium acetate,phosphat bind, (PHOSLO) 667 mg cap Take 2 Capsules by mouth three (3) times daily (with meals). Provider, Historical   calcitRIOL (ROCALTROL) 0.25 mcg capsule Take 0.25 mcg by mouth daily. Provider, Historical   NIFEdipine ER (PROCARDIA XL) 90 mg ER tablet Take 90 mg by mouth daily. Provider, Historical   levothyroxine (SYNTHROID) 200 mcg tablet Take 200 mcg by mouth Daily (before breakfast). Provider, Historical   cinacalcet (SENSIPAR) 30 mg tablet Take 60 mg by mouth daily. Provider, Historical   bumetanide (BUMEX) 2 mg tablet Take 2 mg by mouth two (2) times a day. Provider, Historical   aspirin 81 mg chewable tablet Take 81 mg by mouth daily.     Provider, Historical     Current Facility-Administered Medications   Medication Dose Route Frequency    [START ON 2/1/2023] vancomycin (VANCOCIN) 500 mg in 0.9% sodium chloride (MBP/ADV) 100 mL MBP  500 mg IntraVENous ONCE    [START ON 2/3/2023] Vancomcyin level due with AM labs 2/3/23 0400  1 Each Other ONCE    atorvastatin (LIPITOR) tablet 80 mg  80 mg Oral DAILY    calcitRIOL (ROCALTROL) capsule 0.25 mcg  0.25 mcg Oral DAILY    [Held by provider] cinacalcet (SENSIPAR) tablet 60 mg  60 mg Oral DAILY    [Held by provider] levothyroxine (SYNTHROID) tablet 200 mcg  200 mcg Oral ACB    [Held by provider] NIFEdipine ER (PROCARDIA XL) tablet 90 mg  90 mg Oral DAILY    [Held by provider] calcium acetate(phosphat bind) (PHOSLO) capsule 1,334 mg  2 Capsule Oral TID WITH MEALS    isosorbide mononitrate ER (IMDUR) tablet 60 mg  60 mg Oral 7am    metoprolol succinate (TOPROL-XL) XL tablet 50 mg  50 mg Oral BID    hydrALAZINE (APRESOLINE) tablet 50 mg  50 mg Oral TID    famotidine (PF) (PEPCID) 20 mg in 0.9% sodium chloride 10 mL injection  20 mg IntraVENous DAILY    cloNIDine HCL (CATAPRES) tablet 0.2 mg  0.2 mg Oral BID    peritoneal dialysis DEXTROSE 4.25% (2.5 mEq/L low calcium) solution 2,000 mL  2,000 mL IntraPERitoneal QID    sodium chloride (NS) flush 5-40 mL  5-40 mL IntraVENous Q8H    heparin (porcine) injection 5,000 Units  5,000 Units SubCUTAneous Q8H    insulin lispro (HUMALOG) injection   SubCUTAneous Q6H    Vancomycin-Pharmacy to Dose  1 Each Other Rx Dosing/Monitoring    chlorhexidine (PERIDEX) 0.12 % mouthwash 10 mL  10 mL Oral Q12H    piperacillin-tazobactam (ZOSYN) 3.375 g in 0.9% sodium chloride (MBP/ADV) 100 mL MBP  3.375 g IntraVENous Q12H         Objective:   Vital Signs:    Visit Vitals  BP (!) 165/104   Pulse 99   Temp 97.6 °F (36.4 °C)   Resp 15   Ht 5' 7\" (1.702 m)   Wt 99.8 kg (220 lb)   SpO2 100%   BMI 34.46 kg/m²       O2 Device: Nasal cannula   O2 Flow Rate (L/min): 2 l/min   Temp (24hrs), Av.1 °F (36.7 °C), Min:97.6 °F (36.4 °C), Max:98.8 °F (37.1 °C)       Intake/Output:   Last shift:       07 - 1900  In:    Out: 3050 [Urine:50]    Last 3 shifts: 1901 -  07  In: 37278.8 [I.V.:782.8]  Out: 01470 [Urine:882]      Intake/Output Summary (Last 24 hours) at 2023 1117  Last data filed at 2023 0856  Gross per 24 hour   Intake 8242.95 ml   Output 9550 ml   Net -1307.05 ml         Last 3 Recorded Weights in this Encounter    23 0400 23 0400 23 0946   Weight: 89.7 kg (197 lb 12 oz) 91 kg (200 lb 9.9 oz) 99.8 kg (220 lb)          Physical Exam:   Comfortable; on 2 lits nc; acyanotic  HEENT: pupils not dilated, reactive, no scleral jaundice  Neck: No adenopathy or thyroid swelling  CVS: S1S2 no murmurs; JVD not elevated  RS: Mod air entry bilaterally, decreased BS at bases, no wheezes, mild bibasal crackles, not tachypneic or in distress  Abd: soft, non tender, no hepatosplenomegaly, no abd distension, no guarding or rigidity, bowel sounds heard  Neuro: non focal, awake, alert  Extrm: Mild bilateral chronic/indurated leg edema   Skin: no rash; left foot ulcer at the junction of fourth and fifth toe  Lymphatic: no cervical or supraclavicular adenopathy  Vasc: Both feet cool; DPs not palpable         Data:       Recent Results (from the past 24 hour(s))   GLUCOSE, POC    Collection Time: 01/30/23 11:19 AM   Result Value Ref Range    Glucose (POC) 69 (L) 70 - 110 mg/dL   GLUCOSE, POC    Collection Time: 01/30/23 11:21 AM   Result Value Ref Range    Glucose (POC) 83 70 - 110 mg/dL   LACTIC ACID    Collection Time: 01/30/23  1:48 PM   Result Value Ref Range    Lactic acid 1.9 0.4 - 2.0 MMOL/L   DUPLEX LOWER EXT ARTERY BILAT    Collection Time: 01/30/23  3:58 PM   Result Value Ref Range    Right CFA dist sys PSV 81.6 cm/s    Right super femoral prox sys PSV 83.8 cm/s    Right super femoral mid sys PSV 94.8 cm/s    Right super femoral dist sys PSV 84.9 cm/s    Right popliteal prox sys .0 cm/s    Right popliteal dist sys PSV 68.7 cm/s    Right mid PTA sys PSV 24.8 cm/s    Right Dist PTA PSV 24.8 cm/s    Right Dist AJAY Velocity 54.0 cm/s    Left CFA dist sys PSV 74.6 cm/s    Left super femoral prox sys PSV 54.8 cm/s    Left super femoral mid sys PSV 43.9 cm/s    Left super femoral dist sys PSV 44.7 cm/s    Left popliteal prox sys PSV 47.1 cm/s    Left popliteal dist sys PSV 69.5 cm/s    Left mid PTA sys PSV 29.9 cm/s    Left Dist PTA PSV 11.6 cm/s    Left Dist AJAY Velocity 44.7 cm/s    Right SFA Prox Yannick Ratio 1.0     Right SFA Mid Yannick Ratio 1.1     Right SFA Dist Yannick Ratio 0.90     Right Pop A Prox Yannick Ratio 1.32     Left SFA Prox Yannick Ratio 0.73     Left SFA Mid Yannick Ratio 0.80     Left SFA Dist Yannick Ratio 1.02     Left Pop A Prox Yannick Ratio 1.05    GLUCOSE, POC    Collection Time: 01/30/23  5:49 PM   Result Value Ref Range    Glucose (POC) 114 (H) 70 - 110 mg/dL   GLUCOSE, POC    Collection Time: 01/31/23 12:27 AM   Result Value Ref Range    Glucose (POC) 124 (H) 70 - 110 mg/dL   LACTIC ACID    Collection Time: 01/31/23  5:04 AM   Result Value Ref Range    Lactic acid 1.8 0.4 - 2.0 MMOL/L   CBC W/O DIFF Collection Time: 01/31/23  5:04 AM   Result Value Ref Range    WBC 9.3 4.6 - 13.2 K/uL    RBC 3.31 (L) 4.20 - 5.30 M/uL    HGB 9.3 (L) 12.0 - 16.0 g/dL    HCT 30.9 (L) 35.0 - 45.0 %    MCV 93.4 78.0 - 100.0 FL    MCH 28.1 24.0 - 34.0 PG    MCHC 30.1 (L) 31.0 - 37.0 g/dL    RDW 15.7 (H) 11.6 - 14.5 %    PLATELET 361 658 - 727 K/uL    MPV 10.5 9.2 - 11.8 FL    NRBC 0.0 0  WBC    ABSOLUTE NRBC 0.00 0.00 - 7.76 K/uL   METABOLIC PANEL, COMPREHENSIVE    Collection Time: 01/31/23  5:04 AM   Result Value Ref Range    Sodium 141 136 - 145 mmol/L    Potassium 4.3 3.5 - 5.5 mmol/L    Chloride 101 100 - 111 mmol/L    CO2 26 21 - 32 mmol/L    Anion gap 14 3.0 - 18 mmol/L    Glucose 109 (H) 74 - 99 mg/dL    BUN 63 (H) 7.0 - 18 MG/DL    Creatinine 12.90 (H) 0.6 - 1.3 MG/DL    BUN/Creatinine ratio 5 (L) 12 - 20      eGFR 3 (L) >60 ml/min/1.73m2    Calcium 10.0 8.5 - 10.1 MG/DL    Bilirubin, total 0.4 0.2 - 1.0 MG/DL    ALT (SGPT) 15 13 - 56 U/L    AST (SGOT) 37 10 - 38 U/L    Alk.  phosphatase 94 45 - 117 U/L    Protein, total 6.1 (L) 6.4 - 8.2 g/dL    Albumin 2.3 (L) 3.4 - 5.0 g/dL    Globulin 3.8 2.0 - 4.0 g/dL    A-G Ratio 0.6 (L) 0.8 - 1.7     VANCOMYCIN, RANDOM    Collection Time: 01/31/23  5:04 AM   Result Value Ref Range    Vancomycin, random 14.1 5.0 - 40.0 UG/ML   TROPONIN-HIGH SENSITIVITY    Collection Time: 01/31/23  5:04 AM   Result Value Ref Range    Troponin-High Sensitivity 12,124 (HH) 0 - 54 ng/L   GLUCOSE, POC    Collection Time: 01/31/23  5:04 AM   Result Value Ref Range    Glucose (POC) 122 (H) 70 - 110 mg/dL   LACTIC ACID    Collection Time: 01/31/23  8:36 AM   Result Value Ref Range    Lactic acid 1.4 0.4 - 2.0 MMOL/L   MAGNESIUM    Collection Time: 01/31/23  8:36 AM   Result Value Ref Range    Magnesium 2.4 1.6 - 2.6 mg/dL   PHOSPHORUS    Collection Time: 01/31/23  8:36 AM   Result Value Ref Range    Phosphorus 8.9 (H) 2.5 - 4.9 MG/DL         Chemistry Recent Labs     01/31/23  0836 01/31/23  0509 01/30/23  0812 01/29/23  0647   GLU  --  109* 75 160*   NA  --  141 139 136   K  --  4.3 4.9 5.7*   CL  --  101 98* 97*   CO2  --  26 30 25   BUN  --  63* 69* 73*   CREA  --  12.90* 13.60* 14.20*   CA  --  10.0 10.7*  10.4* 9.6   MG 2.4  --   --   --    PHOS 8.9*  --   --   --    AGAP  --  14 11 14   BUCR  --  5* 5* 5*   AP  --  94 92 103   TP  --  6.1* 5.9* 5.5*   ALB  --  2.3* 2.3* 2.1*   GLOB  --  3.8 3.6 3.4   AGRAT  --  0.6* 0.6* 0.6*          Lactic Acid Lactic acid   Date Value Ref Range Status   01/31/2023 1.4 0.4 - 2.0 MMOL/L Final     Recent Labs     01/31/23  0836 01/31/23  0504 01/30/23  1348   LAC 1.4 1.8 1.9          Liver Enzymes Protein, total   Date Value Ref Range Status   01/31/2023 6.1 (L) 6.4 - 8.2 g/dL Final     Albumin   Date Value Ref Range Status   01/31/2023 2.3 (L) 3.4 - 5.0 g/dL Final     Globulin   Date Value Ref Range Status   01/31/2023 3.8 2.0 - 4.0 g/dL Final     A-G Ratio   Date Value Ref Range Status   01/31/2023 0.6 (L) 0.8 - 1.7   Final     Alk.  phosphatase   Date Value Ref Range Status   01/31/2023 94 45 - 117 U/L Final     Recent Labs     01/31/23  0504 01/30/23  0812 01/29/23  0647   TP 6.1* 5.9* 5.5*   ALB 2.3* 2.3* 2.1*   GLOB 3.8 3.6 3.4   AGRAT 0.6* 0.6* 0.6*   AP 94 92 103          CBC w/Diff Recent Labs     01/31/23  0504 01/30/23  0812 01/29/23  1830 01/29/23  0647 01/28/23  2320   WBC 9.3 8.9  --  7.1 12.5   RBC 3.31* 3.04*  --  2.25* 2.70*   HGB 9.3* 8.5* 8.2* 6.3* 7.7*   HCT 30.9* 28.1* 26.6* 20.7* 26.2*    284  --  251 390   GRANS  --   --   --   --  75*   LYMPH  --   --   --   --  9*   EOS  --   --   --   --  14*          Cardiac Enzymes No results found for: CPK, CK, CKMMB, CKMB, RCK3, CKMBT, CKNDX, CKND1, NAVIN, TROPT, TROIQ, JUAN CARLOS, TROPT, TNIPOC, BNP, BNPP     BNP No results found for: BNP, BNPP, XBNPT     Coagulation Recent Labs     01/29/23  1045   PTP 14.6   INR 1.1   APTT <20.0*           Thyroid  No results found for: T4, T3U, TSH, TSHEXT, TSHEXT    No results found for: T4     Urinalysis Lab Results   Component Value Date/Time    Color YELLOW 01/29/2023 12:38 AM    Appearance CLEAR 01/29/2023 12:38 AM    Specific gravity 1.011 01/29/2023 12:38 AM    pH (UA) 8.0 01/29/2023 12:38 AM    Protein 300 (A) 01/29/2023 12:38 AM    Glucose 100 (A) 01/29/2023 12:38 AM    Ketone Negative 01/29/2023 12:38 AM    Bilirubin Negative 01/29/2023 12:38 AM    Urobilinogen 0.2 01/29/2023 12:38 AM    Nitrites Negative 01/29/2023 12:38 AM    Leukocyte Esterase Negative 01/29/2023 12:38 AM    Epithelial cells 2+ 01/29/2023 12:38 AM    Bacteria FEW (A) 01/29/2023 12:38 AM    WBC Negative 01/29/2023 12:38 AM    RBC 0 to 3 01/29/2023 12:38 AM          Culture data during this hospitalization.    All Micro Results       Procedure Component Value Units Date/Time    CULTURE, Ofilia Challenger STAIN [[id number]] Collected: 01/29/23 0038    Order Status: Completed Specimen: Wound from Foot Updated: 01/31/23 1033     Special Requests: NO SPECIAL REQUESTS        GRAM STAIN NO WBC'S SEEN         NO ORGANISMS SEEN        Culture result:       HEAVY MIXED SKIN MICHAEL ISOLATED          CULTURE, RESPIRATORY/SPUTUM/BRONCH Ellwood Peels STAIN [[id number]] Collected: 01/28/23 2320    Order Status: Completed Specimen: Sputum Updated: 01/31/23 0816     Special Requests: NO SPECIAL REQUESTS        GRAM STAIN NO WBC'S SEEN         NO DEFINITE ORGANISM SEEN               RARE EPITHELIAL CELLS SEEN           Culture result:       MODERATE NORMAL RESPIRATORY MCIHAEL          CULTURE, BLOOD [[id number]] Collected: 01/28/23 2320    Order Status: Completed Specimen: Blood Updated: 01/31/23 0648     Special Requests: NO SPECIAL REQUESTS        Culture result: NO GROWTH 3 DAYS       CULTURE, BLOOD [[id number]] Collected: 01/28/23 2320    Order Status: Completed Specimen: Blood Updated: 01/31/23 0648     Special Requests: NO SPECIAL REQUESTS        Culture result: NO GROWTH 3 DAYS       CULTURE, BODY FLUID Ellwood Peels STAIN [[id number]] Collected: 01/30/23 0526    Order Status: Completed Specimen: Body Fluid from Peritoneal [place] Updated: 01/30/23 2008     Special Requests: NO SPECIAL REQUESTS        GRAM STAIN NO WBC'S SEEN         NO ORGANISMS SEEN        Culture result: PENDING    COVID-19 WITH INFLUENZA A/B [[id number]] Collected: 01/28/23 2320    Order Status: Completed Specimen: Nasopharyngeal Updated: 01/28/23 5636     SARS-CoV-2 by PCR Not detected        Comment: Not Detected results do not preclude SARS-CoV-2 infection and should not be used as the sole basis for patient management decisions. Results must be combined with clinical observations, patient history, and epidemiological information. Influenza A by PCR Not detected        Influenza B by PCR Not detected        Comment: Testing was performed using yunior Josie SARS-CoV-2 and Influenza A/B nucleic acid assay. This test is a multiplex Real-Time Reverse Transcriptase Polymerase Chain Reaction (RT-PCR) based in vitro diagnostic test intended for the qualitative detection of nucleic acids from SARS-CoV-2, Influenza A, and Influenza B in nasopharyngeal for use under the FDA's Emergency Use Authorization(EAU) only. Fact sheet for Patients: FindDrives.pl  Fact sheet for Healthcare Providers: FindDrives.pl                  LE Doppler 1/30 Interpretation Summary  No evidence of significant stenosis noted in the bilateral lower extremity arteries assessed,  Color flow filling defect secondary to the calcified vessels bilaterally. ECHO 1/30 Interpretation Summary  Result status: Final result     Left Ventricle: Severely reduced left ventricular systolic function with a visually estimated EF of 25 - 30%. Left ventricle size is normal. Severely increased wall thickness. Findings consistent with severe concentric hypertrophy. Moderate global hypokinesis present. Grade I diastolic dysfunction with normal LAP.     Mitral Valve: Mildly thickened leaflet, at the anterior leaflet. Mildly calcified leaflet, at the anterior leaflet. Severe annular calcification at the posterior leaflet of the mitral valve. Mild to moderate regurgitation. Images report reviewed by me:  CT LT Foot 1/29/2023   (Most Recent) Results from JAVED GERBER  MARISA Encounter encounter on 01/28/23    CT FOOT LT W CONT    Narrative  INDICATION:  cellulitis/wound of left 4th toe    EXAM: CT left foot. No comparisons. Thin section axial images were obtained after the administration of intravenous  contrast. From these sagittal and coronal reformats were performed. CT dose  reduction was achieved through use of a standardized protocol tailored for this  examination and automatic exposure control for dose modulation. FINDINGS: There is diffuse edema in the dorsal soft tissues. A drainable fluid  collection is not identified. There are extensive vascular calcifications. No  fluid tracking proximally within the tendon sheaths. Scattered degenerative  changes of the midfoot osteochondral defect medial talar dome is chronic. Degenerative changes ankle joint. Cortical destruction is not demonstrated an  acute fracture is not demonstrated either. There is plantar and retrocalcaneal  spurring    Impression  1. Diffuse subcutaneous edema in the foot compatible with the clinical diagnosis  of cellulitis. No drainable abscess or evidence of osteomyelitis         CTA chest, Abd, Pelvis 1/29/2023  EXAM:  CTA CHEST W OR W WO CONT, CT ABD PELV W CONT   INDICATION:   respiratory failure, end-stage renal disease   COMPARISON: 11/15/2022. CHEST CTA:  FINDINGS:  CHEST:  THYROID: No nodule. MEDIASTINUM: No mass or lymphadenopathy. TABATHA: No mass or lymphadenopathy. THORACIC AORTA: No dissection or aneurysm. MAIN PULMONARY ARTERY: There is no evidence of pulmonary embolism. TRACHEA/BRONCHI: ET tube satisfactory position. ESOPHAGUS: No wall thickening or dilatation.   HEART: Dilatation of the left atrium and left ventricle. Coronary artery  calcification: Present. PLEURA: Small right pleural effusion. LUNGS: Significant bilateral lower lobe consolidation and substantial diffuse  bilateral pulmonary infiltrates. BONES: No destructive bone lesion. IMPRESSION  Significant bilateral lower lobe consolidation and bilateral  pulmonary infiltrates. Small right pleural effusion. No evidence of pulmonary  embolism. ABDOMEN/PELVIC CT:  FINDINGS:   LIVER: No mass or biliary dilatation. GALLBLADDER: Unremarkable. SPLEEN: No mass. PANCREAS: No mass or ductal dilatation. ADRENALS: Stable small left adrenal adenoma. No follow-up required. KIDNEYS: No mass, calculus, or hydronephrosis. STOMACH: Unremarkable. NG tube tip terminates within the fundus. SMALL BOWEL: No dilatation or wall thickening. COLON: No dilatation or wall thickening. APPENDIX: Within normal limits. PERITONEUM: No ascites or pneumoperitoneum. Peritoneal dialysis catheter is  coiled within the left lower quadrant. RETROPERITONEUM: No lymphadenopathy or aortic aneurysm. REPRODUCTIVE ORGANS: Unremarkable. URINARY BLADDER: Decompressed by Torres catheter. BONES: Degenerative changes are seen in the lumbar spine. ADDITIONAL COMMENTS: N/A  IMPRESSION:  Peritoneal dialysis catheter is coiled within the left lower quadrant. No acute  abnormality is identified. CXR reviewed by me:  XR 1/31 (Most Recent). CXR   Results from Hospital Encounter encounter on 01/28/23    XR CHEST PORT    Narrative  INDICATION: CHF    Portable AP view of the chest.    Direct comparison made to prior chest x-ray dated January 30, 2023. Cardiomediastinal silhouette is stable. There has been interval extubation and  interval removal of the nasogastric tube. There has been interval improved  bilateral aeration. There is mild residual pulmonary vascular prominence, but no  interstitial edema or focal airspace process.  No pleural fluid is visualized. There is no pneumothorax. Impression  Improved bilateral aeration. Please note: Voice-recognition software may have been used to generate this report, which may have resulted in some phonetic-based errors in grammar and contents. Even though attempts were made to correct all the mistakes, some may have been missed, and remained in the body of the document. Dragon software not working well since recent [employer], and care taken as much as possible to correct mistakes.       Rosaura Hess MD  1/31/2023

## 2023-02-01 LAB
ALBUMIN SERPL-MCNC: 2.1 G/DL (ref 3.4–5)
ALBUMIN/GLOB SERPL: 0.6 (ref 0.8–1.7)
ALP SERPL-CCNC: 97 U/L (ref 45–117)
ALT SERPL-CCNC: 16 U/L (ref 13–56)
ANION GAP SERPL CALC-SCNC: 12 MMOL/L (ref 3–18)
AST SERPL-CCNC: 35 U/L (ref 10–38)
BILIRUB SERPL-MCNC: 0.4 MG/DL (ref 0.2–1)
BUN SERPL-MCNC: 51 MG/DL (ref 7–18)
BUN/CREAT SERPL: 4 (ref 12–20)
CALCIUM SERPL-MCNC: 9 MG/DL (ref 8.5–10.1)
CHLORIDE SERPL-SCNC: 99 MMOL/L (ref 100–111)
CO2 SERPL-SCNC: 27 MMOL/L (ref 21–32)
CREAT SERPL-MCNC: 11.8 MG/DL (ref 0.6–1.3)
ERYTHROCYTE [DISTWIDTH] IN BLOOD BY AUTOMATED COUNT: 14.6 % (ref 11.6–14.5)
GLOBULIN SER CALC-MCNC: 3.7 G/DL (ref 2–4)
GLUCOSE BLD STRIP.AUTO-MCNC: 107 MG/DL (ref 70–110)
GLUCOSE BLD STRIP.AUTO-MCNC: 110 MG/DL (ref 70–110)
GLUCOSE BLD STRIP.AUTO-MCNC: 134 MG/DL (ref 70–110)
GLUCOSE BLD STRIP.AUTO-MCNC: 71 MG/DL (ref 70–110)
GLUCOSE BLD STRIP.AUTO-MCNC: 76 MG/DL (ref 70–110)
GLUCOSE SERPL-MCNC: 69 MG/DL (ref 74–99)
HCT VFR BLD AUTO: 27.3 % (ref 35–45)
HGB BLD-MCNC: 8.4 G/DL (ref 12–16)
MCH RBC QN AUTO: 28.1 PG (ref 24–34)
MCHC RBC AUTO-ENTMCNC: 30.8 G/DL (ref 31–37)
MCV RBC AUTO: 91.3 FL (ref 78–100)
NRBC # BLD: 0 K/UL (ref 0–0.01)
NRBC BLD-RTO: 0 PER 100 WBC
PLATELET # BLD AUTO: 296 K/UL (ref 135–420)
PMV BLD AUTO: 9.8 FL (ref 9.2–11.8)
POTASSIUM SERPL-SCNC: 4.1 MMOL/L (ref 3.5–5.5)
PROT SERPL-MCNC: 5.8 G/DL (ref 6.4–8.2)
RBC # BLD AUTO: 2.99 M/UL (ref 4.2–5.3)
SODIUM SERPL-SCNC: 138 MMOL/L (ref 136–145)
TROPONIN I SERPL HS-MCNC: 7882 NG/L (ref 0–54)
WBC # BLD AUTO: 9.5 K/UL (ref 4.6–13.2)

## 2023-02-01 PROCEDURE — 74011250636 HC RX REV CODE- 250/636: Performed by: INTERNAL MEDICINE

## 2023-02-01 PROCEDURE — 74011250637 HC RX REV CODE- 250/637: Performed by: FAMILY MEDICINE

## 2023-02-01 PROCEDURE — 97535 SELF CARE MNGMENT TRAINING: CPT

## 2023-02-01 PROCEDURE — 82962 GLUCOSE BLOOD TEST: CPT

## 2023-02-01 PROCEDURE — 74011250636 HC RX REV CODE- 250/636: Performed by: HOSPITALIST

## 2023-02-01 PROCEDURE — 77010033678 HC OXYGEN DAILY

## 2023-02-01 PROCEDURE — 97162 PT EVAL MOD COMPLEX 30 MIN: CPT

## 2023-02-01 PROCEDURE — 74011000258 HC RX REV CODE- 258: Performed by: INTERNAL MEDICINE

## 2023-02-01 PROCEDURE — 97116 GAIT TRAINING THERAPY: CPT

## 2023-02-01 PROCEDURE — 74011250636 HC RX REV CODE- 250/636: Performed by: FAMILY MEDICINE

## 2023-02-01 PROCEDURE — A4722 DIALYS SOL FLD VOL > 1999CC: HCPCS | Performed by: INTERNAL MEDICINE

## 2023-02-01 PROCEDURE — 84484 ASSAY OF TROPONIN QUANT: CPT

## 2023-02-01 PROCEDURE — 74011000250 HC RX REV CODE- 250: Performed by: FAMILY MEDICINE

## 2023-02-01 PROCEDURE — 85027 COMPLETE CBC AUTOMATED: CPT

## 2023-02-01 PROCEDURE — 65270000029 HC RM PRIVATE

## 2023-02-01 PROCEDURE — 97166 OT EVAL MOD COMPLEX 45 MIN: CPT

## 2023-02-01 PROCEDURE — 74011250637 HC RX REV CODE- 250/637: Performed by: INTERNAL MEDICINE

## 2023-02-01 PROCEDURE — 74011250637 HC RX REV CODE- 250/637: Performed by: HOSPITALIST

## 2023-02-01 PROCEDURE — 80053 COMPREHEN METABOLIC PANEL: CPT

## 2023-02-01 PROCEDURE — 36415 COLL VENOUS BLD VENIPUNCTURE: CPT

## 2023-02-01 RX ORDER — DIPHENHYDRAMINE HYDROCHLORIDE 50 MG/ML
12.5 INJECTION, SOLUTION INTRAMUSCULAR; INTRAVENOUS
Status: DISCONTINUED | OUTPATIENT
Start: 2023-02-01 | End: 2023-02-03 | Stop reason: HOSPADM

## 2023-02-01 RX ORDER — NYSTATIN 100000 [USP'U]/G
POWDER TOPICAL 3 TIMES DAILY
Status: DISCONTINUED | OUTPATIENT
Start: 2023-02-01 | End: 2023-02-01

## 2023-02-01 RX ORDER — NYSTATIN 100000 [USP'U]/ML
500000 SUSPENSION ORAL 4 TIMES DAILY
Status: DISCONTINUED | OUTPATIENT
Start: 2023-02-01 | End: 2023-02-03 | Stop reason: HOSPADM

## 2023-02-01 RX ORDER — CEPHALEXIN 250 MG/1
500 CAPSULE ORAL 3 TIMES DAILY
Status: DISCONTINUED | OUTPATIENT
Start: 2023-02-01 | End: 2023-02-03

## 2023-02-01 RX ORDER — INSULIN LISPRO 100 [IU]/ML
INJECTION, SOLUTION INTRAVENOUS; SUBCUTANEOUS
Status: DISCONTINUED | OUTPATIENT
Start: 2023-02-01 | End: 2023-02-03 | Stop reason: HOSPADM

## 2023-02-01 RX ADMIN — NYSTATIN 500000 UNITS: 100000 SUSPENSION ORAL at 22:49

## 2023-02-01 RX ADMIN — SODIUM CHLORIDE, SODIUM LACTATE, CALCIUM CHLORIDE, MAGNESIUM CHLORIDE AND DEXTROSE 2000 ML: 2.5; 538; 448; 18.3; 5.08 INJECTION, SOLUTION INTRAPERITONEAL at 17:16

## 2023-02-01 RX ADMIN — NYSTATIN 500000 UNITS: 100000 SUSPENSION ORAL at 17:27

## 2023-02-01 RX ADMIN — DIPHENHYDRAMINE HYDROCHLORIDE 12.5 MG: 50 INJECTION, SOLUTION INTRAMUSCULAR; INTRAVENOUS at 17:27

## 2023-02-01 RX ADMIN — BUMETANIDE 2 MG: 1 TABLET ORAL at 08:27

## 2023-02-01 RX ADMIN — PIPERACILLIN AND TAZOBACTAM 3.38 G: 3; .375 INJECTION, POWDER, LYOPHILIZED, FOR SOLUTION INTRAVENOUS at 11:38

## 2023-02-01 RX ADMIN — NIFEDIPINE 90 MG: 30 TABLET, FILM COATED, EXTENDED RELEASE ORAL at 08:28

## 2023-02-01 RX ADMIN — HYDRALAZINE HYDROCHLORIDE 50 MG: 50 TABLET, FILM COATED ORAL at 15:52

## 2023-02-01 RX ADMIN — ISOSORBIDE MONONITRATE 60 MG: 60 TABLET, EXTENDED RELEASE ORAL at 08:29

## 2023-02-01 RX ADMIN — HEPARIN SODIUM 5000 UNITS: 5000 INJECTION INTRAVENOUS; SUBCUTANEOUS at 04:06

## 2023-02-01 RX ADMIN — SODIUM CHLORIDE, PRESERVATIVE FREE 10 ML: 5 INJECTION INTRAVENOUS at 15:53

## 2023-02-01 RX ADMIN — SODIUM CHLORIDE, PRESERVATIVE FREE 10 ML: 5 INJECTION INTRAVENOUS at 06:19

## 2023-02-01 RX ADMIN — METOPROLOL SUCCINATE 50 MG: 50 TABLET, EXTENDED RELEASE ORAL at 08:27

## 2023-02-01 RX ADMIN — SODIUM CHLORIDE, SODIUM LACTATE, CALCIUM CHLORIDE, MAGNESIUM CHLORIDE AND DEXTROSE 2000 ML: 2.5; 538; 448; 18.3; 5.08 INJECTION, SOLUTION INTRAPERITONEAL at 22:56

## 2023-02-01 RX ADMIN — ATORVASTATIN CALCIUM 80 MG: 20 TABLET, FILM COATED ORAL at 08:27

## 2023-02-01 RX ADMIN — HEPARIN SODIUM 5000 UNITS: 5000 INJECTION INTRAVENOUS; SUBCUTANEOUS at 18:49

## 2023-02-01 RX ADMIN — HYDRALAZINE HYDROCHLORIDE 50 MG: 50 TABLET, FILM COATED ORAL at 22:49

## 2023-02-01 RX ADMIN — BUMETANIDE 2 MG: 1 TABLET ORAL at 22:49

## 2023-02-01 RX ADMIN — CEPHALEXIN 500 MG: 250 CAPSULE ORAL at 22:49

## 2023-02-01 RX ADMIN — FAMOTIDINE 20 MG: 10 INJECTION, SOLUTION INTRAVENOUS at 08:28

## 2023-02-01 RX ADMIN — HEPARIN SODIUM 5000 UNITS: 5000 INJECTION INTRAVENOUS; SUBCUTANEOUS at 11:38

## 2023-02-01 RX ADMIN — METOPROLOL SUCCINATE 50 MG: 50 TABLET, EXTENDED RELEASE ORAL at 22:49

## 2023-02-01 RX ADMIN — CALCITRIOL 0.25 MCG: 0.25 CAPSULE ORAL at 08:28

## 2023-02-01 RX ADMIN — CLONIDINE HYDROCHLORIDE 0.2 MG: 0.1 TABLET ORAL at 22:49

## 2023-02-01 RX ADMIN — CINACALCET HYDROCHLORIDE 60 MG: 30 TABLET, FILM COATED ORAL at 08:27

## 2023-02-01 RX ADMIN — CALCIUM ACETATE 1334 MG: 667 CAPSULE ORAL at 11:38

## 2023-02-01 RX ADMIN — SODIUM CHLORIDE, SODIUM LACTATE, CALCIUM CHLORIDE, MAGNESIUM CHLORIDE AND DEXTROSE 2000 ML: 2.5; 538; 448; 18.3; 5.08 INJECTION, SOLUTION INTRAPERITONEAL at 08:31

## 2023-02-01 RX ADMIN — CEPHALEXIN 500 MG: 250 CAPSULE ORAL at 17:27

## 2023-02-01 RX ADMIN — CALCIUM ACETATE 1334 MG: 667 CAPSULE ORAL at 17:02

## 2023-02-01 RX ADMIN — CLONIDINE HYDROCHLORIDE 0.2 MG: 0.1 TABLET ORAL at 08:28

## 2023-02-01 RX ADMIN — HYDRALAZINE HYDROCHLORIDE 50 MG: 50 TABLET, FILM COATED ORAL at 08:28

## 2023-02-01 NOTE — PROGRESS NOTES
1930- Report and care received, assessment completed per flow sheet. Alert, oriented, NAD.     2332- Reassessment without change. 0406- Reassessment without change. 0617- FSBS=71, NAD, apple juice provided.

## 2023-02-01 NOTE — PROGRESS NOTES
Problem: Mobility Impaired (Adult and Pediatric)  Goal: *Acute Goals and Plan of Care (Insert Text)  Description: Physical Therapy Goals   Initiated 2/1/2023 and to be accomplished within 7 day(s)  1. Patient will move from supine <> sit with mod I in prep for out of bed activity and change of position. 2.  Patient will perform sit<> stand with S with LRAD in prep for transfers/ambulation. 3.  Patient will transfer from bed <> chair with S with LRAD for time up in chair for completion of ADL activity. 4.  Patient will ambulate 100 feet with S/LRAD for improved functional mobility at discharge. 5.  Patient will ascend/descend 3-5 stairs with handrail(s) with minimal assistance/contact guard assist for home re-entry as needed. Outcome: Progressing Towards Goal      PHYSICAL THERAPY EVALUATION    Patient: Gaye Devine (57 y.o. female)  Date: 2/1/2023  Primary Diagnosis: Acute hypoxemic respiratory failure ([facility] [address] [J96.01]  Precautions: Fall  PLOF: amb independently with L surgical boot, no other AD PTA. Lives with SO and son in [address] [place] [address] with threshold entry and 15 steps to upstairs bedroom, L HR.    ASSESSMENT :  Based on the objective data described below, the patient is seen on medical unit, recently transferred from ICU. Pt presents with decreased independence in functional mobility, r/t decr'd functional strength LE's, decr'd balance and decr'd activity tolerance in presence of above dx. Patient reports pain 5/10 pre and 0/10 post session dorsal 4th toe/met head region. Demonstrates transition to sit EOB with S, transfers to stand with CGA and able to complete GT/RW/CGA/min A to/from bathroom. Kristyn slow, step to gt pattern with vc required for safety. Requires min A for toilet transfers. Returned to bedside and left seated with OT present in room; all needs in reach, and nurse Holli notified. Also note pt with large area for red blotches/?purpura. Nurse notified of same.   Pt educated in PT [facility] and agreeable to same. Recommend HHPT for follow up physical therapy upon discharge to reach maximal level of independence/safety with functional mobility. Pt Education: Role of physical therapy in acute care setting, fall prevention and safety/technique during functional mobility tasks    Patient will benefit from skilled intervention to address the above impairments. Patient's rehabilitation potential is considered to be Good  Factors which may influence rehabilitation potential include:   []         None noted  []         Mental ability/status  [x]         Medical condition  []         Home/family situation and support systems  []         Safety awareness  [x]         Pain tolerance/management  []         Other:      PLAN :  Recommendations and Planned Interventions:   [x]           Bed Mobility Training             []    Neuromuscular Re-Education  [x]           Transfer Training                   []    Orthotic/Prosthetic Training  [x]           Gait Training                          []    Modalities  [x]           Therapeutic Exercises           []    Edema Management/Control  [x]           Therapeutic Activities            []    Family Training/Education  [x]           Patient Education  []           Other (comment):    Frequency/Duration: Patient will be followed by physical therapy 1-2 times per day/4-7 days per week to address goals. Discharge Recommendations: Home Physical Therapy  Further Equipment Recommendations for Discharge: rolling walker    AMPAC: 15/20    This AMPAC score should be considered in conjunction with interdisciplinary team recommendations to determine the most appropriate discharge setting. Patient's social support, diagnosis, medical stability, and prior level of function should also be taken into consideration. SUBJECTIVE:   Patient stated Sleepy.     OBJECTIVE DATA SUMMARY:     Past Medical History:   Diagnosis Date    BMI 34.0-34.9,adult 1/29/2023    Cardiomyopathy [facility])     Chronic kidney disease     Coronary arteriosclerosis     DM (diabetes mellitus) ([facility] [address]     Hypertension     Tachycardia    History reviewed. No pertinent surgical history. Barriers to Learning/Limitations: yes;  physical  Compensate with: Verbal Cues  Home Situation:  Home Situation  Home Environment: Private residence  # Steps to Enter: 1  Rails to Enter: No  One/Two Story Residence: Two story  # of Interior Steps: 15  Interior Rails: Left  Living Alone: No  Support Systems: Spouse/Significant Other, Child(shiloh)  Patient Expects to be Discharged to[de-identified] Home with family assistance  Current DME Used/Available at Home: None  Tub or Shower Type: Tub/Shower combination  Critical Behavior:  Neurologic State: Alert  Orientation Level: Oriented X4  Cognition: Appropriate decision making; Appropriate for age attention/concentration  Safety/Judgement: Awareness of environment  Psychosocial  Patient Behaviors: Calm; Cooperative  Purposeful Interaction: Yes  Pt Identified Daily Priority: Clinical issues (comment)  Caritas Process: Nurture loving kindness;Establish trust;Attend basic human needs;Create healing environment;Supportive expression; Teaching/learning  Caring Interventions: Reassure  Reassure: Informing;Caring rounds  Therapeutic Modalities: Intentional therapeutic touch  Skin Condition/Temp: Warm;Dry  Skin Integrity: Other (comment) (red splotches RUE near armpit)  Skin Integumentary  Skin Color: Appropriate for ethnicity  Skin Condition/Temp: Warm;Dry  Skin Integrity: Other (comment) (red splotches RUE near armpit)  Turgor: Non-tenting  Strength:    Strength: Generally decreased, functional  Tone & Sensation:   Sensation: Impaired (h/o neuropathy, but intact to LT grossly LE's)  Range Of Motion:  AROM: Generally decreased, functional  Functional Mobility:  Bed Mobility:  Supine to Sit: Supervision  Scooting: Supervision  Transfers:  Sit to Stand: Contact guard assistance  Stand to Sit: Contact guard assistance  Balance:   Sitting: Intact  Standing: Intact; With support  Ambulation/Gait Training:  Distance (ft): 24 Feet (ft)  Assistive Device: Gait belt;Walker, rolling  Ambulation - Level of Assistance: Contact guard assistance;Minimal assistance  Gait Description (WDL): Exceptions to WDL  Gait Abnormalities: Decreased step clearance; Step to gait  Speed/Kristyn: Slow  Step Length: Right shortened;Left shortened  Interventions: Safety awareness training; Tactile cues; Verbal cues  Pain:  Pain level pre-treatment: 5/10   Pain level post-treatment: 0/10   Pain Intervention(s) : Medication (see MAR); Rest, Ice, Repositioning  Response to intervention: Nurse notified, See doc flow  Activity Tolerance:   Fair   Please refer to the flowsheet for vital signs taken during this treatment. After treatment:   [x]         Patient left in no apparent distress sitting up EOB with OT present  []         Patient left in no apparent distress in bed  [x]         Call bell left within reach  [x]         Nursing notified  []         Caregiver present  []         Bed alarm activated  []         SCDs applied    COMMUNICATION/EDUCATION:   [x]         Role of Physical Therapy in the acute care setting. [x]         Fall prevention education was provided and the patient/caregiver indicated understanding. [x]         Patient/family have participated as able in goal setting and plan of care. [x]         Patient/family agree to work toward stated goals and plan of care. []         Patient understands intent and goals of therapy, but is neutral about his/her participation. []         Patient is unable to participate in goal setting/plan of care: ongoing with therapy staff.  []         Other:     Thank you for this referral.  Kayleigh Roman, PT   Time Calculation: 36 mins      Eval Complexity: History: HIGH Complexity :3+ comorbidities / personal factors will impact the outcome/ POC Exam:MEDIUM Complexity : 3 Standardized tests and measures addressing body structure, function, activity limitation and / or participation in recreation  Presentation: MEDIUM Complexity : Evolving with changing characteristics  Clinical Decision Making:Medium Complexity    Overall Complexity:MEDIUM    [facility] AM-PAC® Basic Mobility Inpatient Short Form (6-Clicks) Version 2    How much HELP from another person does the patient currently need    (If the patient hasn't done an activity recently, how much help from another person do you think he/she would need if he/she tried?)   Total (Total A or Dep)   A Lot  (Mod to Max A)   A Little (Sup or Min A)   None (Mod I to I)   Turning from your back to your side while in a flat bed without using bedrails? [] 1 [] 2 [x] 3 [] 4   2. Moving from lying on your back to sitting on the side of a flat bed without using bedrails? [] 1 [] 2 [x] 3 [] 4   3. Moving to and from a bed to a chair (including a wheelchair)? [] 1 [] 2 [x] 3 [] 4   4. Standing up from a chair using your arms (e.g., wheelchair, or bedside chair)? [] 1 [] 2 [x] 3 [] 4   5. Walking in hospital room? [] 1 [] 2 [x] 3 [] 4   6. Climbing 3-5 steps with a railing?+   [] 1 [] 2 [] 3 [] 4   +If stair climbing cannot be assessed, skip item #6. Sum responses from items 1-5. Based on an AM-PAC score of **/24 (or 15/20 if omitting stairs) and their current functional mobility deficits, it is recommended that the patient have 2-3 sessions per week of Physical Therapy at d/c to increase the patient's independence.

## 2023-02-01 NOTE — PROGRESS NOTES
Hospitalist Progress Note    Patient: Wilfred East MRN: [id number]  CSN: [id number]    YOB: 1980  Age: 43 y.o. Sex: female    DOA: 1/28/2023 LOS:  LOS: 3 days          Chief Complaint:  Resp failure        Assessment/Plan     44 y/o female with multiple medical problems to include progressive CAD not amenable to stenting followed by [facility], CHF with EF of 40%, ESRD on peritoneal dialysis, HTN, type 2 diabetes mellitus, and obesity is admitted with acute hypoxemic and hypercapneic respiratory failure due to acute CHF exacerbation and sepsis. She also has a diabetic foot ulcer with cellulitis of the left fourth toe. She was recently started on gabapentin prior to symptom onset         Pulmonary-acute hypoxemic and hypercapneic respiratory failure due to volume overload and CHF exacerbation. CTA chest neg for PE  Patient extubtated   Bedside swallow passed     CV-CHF exacerbation with underlying ischemic cardiomyopathy and CAD. EF 40%,  Cardiology on case  beta blocker, imdur, hydralazine for uncontrolled HTN     GI-continue SUP  Had constipation prior for 5 days with poor PD    lactulose prn     Renal-ESRD on peritoneal dialysis. Rocaltrol  Sensipar  phoslo     Heme-chronic anemia due to ESRD. blood transfusion 1/29, Hgb improved     ID-sepsis with markedly elevated lactic acid. Blood, fluid cx are all neg  Deescalate abx to keflex for foot  Nystatin for vaginitis     Endo-type 2 diabetes mellitus. Sliding scale insulin ordered.      Start PT    May be able to transfer to floor     Full code     Disposition :  Patient Active Problem List   Diagnosis Code    Acute pulmonary edema (HCC) J81.0    Chest pain R07.9    ESRD (end stage renal disease) on dialysis ([facility] [address] N18.6, Z99.2    Pulmonary edema J81.1    Cardiomyopathy (HCC) I42.9    HTN (hypertension) I10    Type 2 diabetes mellitus (HCC) E11.9    PAD (peripheral artery disease) (HCC) I73.9    CAD (coronary artery disease) I25.10    Acute respiratory failure with hypoxia and hypercapnia ([facility]) J96.01, J96.02    Hypertensive emergency I16.1    Acute exacerbation of CHF (congestive heart failure) ([facility]) I50.9    BMI 34.0-34.9,adult Z68.34    Peritoneal dialysis status ([facility] Z99.2    Diabetic ulcer of left foot associated with type 2 diabetes mellitus, with fat layer exposed ([facility] E11.621, L97.522    Sepsis ([facility]) A41.9    Cellulitis of fourth toe, left L03.032    Chronic anemia D64.9       Subjective:  Having nursing care this am  Sipping on liquids  No new events  On NC 02      Review of systems:    Constitutional: denies fevers  Respiratory: denies SOB, cough  Cardiovascular: denies chest pain  Gastrointestinal: denies nausea, vomiting, diarrhea      Vital signs/Intake and Output:  Visit Vitals  BP (!) 163/100   Pulse 97   Temp 98.4 °F (36.9 °C)   Resp 17   Ht 5' 7\" (1.702 m)   Wt 99.8 kg (220 lb)   SpO2 100%   BMI 34.46 kg/m²     Current Shift:  No intake/output data recorded. Last three shifts:  01/30 1901 - 02/01 0700  In: 8960 [P.O.:760;  I.V.:200]  Out: 9575 [Urine:1105]    Exam:    General: weakened AAF NAD sitting up, mild periorbital edema noted  CVS:Regular rate and rhythm, no M/R/G, S1/S2 heard, no thrill  Lungs:Clear to auscultation bilaterally, no wheezes, rhonchi, or rales  Abdomen: Soft, Nontender, No distention  Extremities: No C/C/E, pulses palpable 2+  Neuro:grossly normal , follows commands  Psych:appropriate                Labs: Results:       Chemistry Recent Labs     02/01/23  0508 01/31/23  0504 01/30/23  0812   GLU 69* 109* 75    141 139   K 4.1 4.3 4.9   CL 99* 101 98*   CO2 27 26 30   BUN 51* 63* 69*   CREA 11.80* 12.90* 13.60*   CA 9.0 10.0 10.7*  10.4*   AGAP 12 14 11   BUCR 4* 5* 5*   AP 97 94 92   TP 5.8* 6.1* 5.9*   ALB 2.1* 2.3* 2.3*   GLOB 3.7 3.8 3.6   AGRAT 0.6* 0.6* 0.6*      CBC w/Diff Recent Labs     02/01/23  0508 01/31/23  0504 01/30/23  0812   WBC 9.5 9.3 8.9   RBC 2.99* 3.31* 3.04*   HGB 8. 4* 9.3* 8.5*   HCT 27.3* 30.9* 28.1*    316 284      Cardiac Enzymes No results for input(s): CPK, CKND1, NAVIN in the last 72 hours. No lab exists for component: CKRMB, TROIP   Coagulation Recent Labs     01/29/23  1045   PTP 14.6   INR 1.1   APTT <20.0*       Lipid Panel No results found for: CHOL, CHOLPOCT, CHOLX, CHLST, CHOLV, 906878, HDL, HDLP, LDL, LDLC, DLDLP, 281172, VLDLC, VLDL, TGLX, TRIGL, TRIGP, TGLPOCT, CHHD, CHHDX   BNP No results for input(s): BNPP in the last 72 hours.    Liver Enzymes Recent Labs     02/01/23  0508   TP 5.8*   ALB 2.1*   AP 97      Thyroid Studies No results found for: T4, T3U, TSH, TSHEXT     Procedures/imaging: see electronic medical records for all procedures/Xrays and details which were not copied into this note but were reviewed prior to creation of Og Aparicio MD

## 2023-02-01 NOTE — PROGRESS NOTES
Nephrology Progress Note    Patient: Mari Emery      Subjectives:    Mari Emery is a 43 y.o. female with a past medical history significant for CAD/MI, CM 40% EF, DM type 2, obesity, ESRD on PD at [facility], who presented with worsening SOB/ resp distress. Pt could not tolerate bipap and eventually required intubation, admitted to ICU. Noticed to have elevated BP as well. No f/c. Imaging c/w volume overload and neg for PE. Nephrology was consulted for further evaluation and management of her ESRD. S/p extubation. Patient says she had poor UF/drainage prior to admission. Attributes this to constipation, now improved. Denies non-compliance with PD Prescription. No technical problem with exchanges overnight. No cp or sob.     Inpatient meds:  Current Facility-Administered Medications   Medication Dose Route Frequency    insulin lispro (HUMALOG) injection   SubCUTAneous AC&HS    bumetanide (BUMEX) tablet 2 mg  2 mg Oral BID    peritoneal dialysis DEXTROSE 2.5% (2.5 mEq/L low calcium) solution 2,000 mL  2,000 mL IntraPERitoneal QID    atorvastatin (LIPITOR) tablet 80 mg  80 mg Oral DAILY    calcitRIOL (ROCALTROL) capsule 0.25 mcg  0.25 mcg Oral DAILY    cinacalcet (SENSIPAR) tablet 60 mg  60 mg Oral DAILY    [Held by provider] levothyroxine (SYNTHROID) tablet 200 mcg  200 mcg Oral ACB    NIFEdipine ER (PROCARDIA XL) tablet 90 mg  90 mg Oral DAILY    calcium acetate(phosphat bind) (PHOSLO) capsule 1,334 mg  2 Capsule Oral TID WITH MEALS    isosorbide mononitrate ER (IMDUR) tablet 60 mg  60 mg Oral 7am    metoprolol succinate (TOPROL-XL) XL tablet 50 mg  50 mg Oral BID    hydrALAZINE (APRESOLINE) tablet 50 mg  50 mg Oral TID    famotidine (PF) (PEPCID) 20 mg in 0.9% sodium chloride 10 mL injection  20 mg IntraVENous DAILY    cloNIDine HCL (CATAPRES) tablet 0.2 mg  0.2 mg Oral BID    morphine injection 4 mg  4 mg IntraVENous Q4H PRN    sodium chloride (NS) flush 5-40 mL  5-40 mL IntraVENous Q8H sodium chloride (NS) flush 5-40 mL  5-40 mL IntraVENous PRN    acetaminophen (TYLENOL) tablet 650 mg  650 mg Oral Q6H PRN    Or    acetaminophen (TYLENOL) suppository 650 mg  650 mg Rectal Q6H PRN    polyethylene glycol (MIRALAX) packet 17 g  17 g Oral DAILY PRN    ondansetron (ZOFRAN ODT) tablet 4 mg  4 mg Oral Q8H PRN    Or    ondansetron (ZOFRAN) injection 4 mg  4 mg IntraVENous Q6H PRN    heparin (porcine) injection 5,000 Units  5,000 Units SubCUTAneous Q8H    glucose chewable tablet 16 g  16 g Oral PRN    glucagon (GLUCAGEN) injection 1 mg  1 mg IntraMUSCular PRN    dextrose 10% infusion 0-250 mL  0-250 mL IntraVENous PRN    0.9% sodium chloride infusion 250 mL  250 mL IntraVENous PRN    hydrALAZINE (APRESOLINE) 20 mg/mL injection 10 mg  10 mg IntraVENous Q4H PRN    piperacillin-tazobactam (ZOSYN) 3.375 g in 0.9% sodium chloride (MBP/ADV) 100 mL MBP  3.375 g IntraVENous Q12H    sodium chloride (NS) flush 5-10 mL  5-10 mL IntraVENous PRN             Vital signs:   Visit Vitals  BP (!) 163/100   Pulse 97   Temp 98.4 °F (36.9 °C)   Resp 17   Ht 5' 7\" (1.702 m)   Wt 99.8 kg (220 lb)   SpO2 100%   BMI 34.46 kg/m²       Intake/Output Summary (Last 24 hours) at 2/1/2023 3496  Last data filed at 2/1/2023 3688  Gross per 24 hour   Intake 2860 ml   Output 3100 ml   Net -240 ml         Physical Exam:    General: No resp distress   HENT: Atraumatic and normocephalic   Eyes: Normal conjunctiva, EOMI   Neck: Supple with no JVD   Cardiovascular: Normal S1 & S2, no m/r/g   Pulmonary/Chest Wall: Clear to auscultation bilaterally   Abdominal: Soft and non-tender, normal active bowel sound   +PD cath    Musculoskeletal: No pedal edema   Skin: No rash   Neurological: More alert       Data Review:  CMP:   Lab Results   Component Value Date/Time     02/01/2023 05:08 AM    K 4.1 02/01/2023 05:08 AM    CL 99 (L) 02/01/2023 05:08 AM    CO2 27 02/01/2023 05:08 AM    AGAP 12 02/01/2023 05:08 AM    GLU 69 (L) 02/01/2023 05:08 AM BUN 51 (H) 02/01/2023 05:08 AM    CREA 11.80 (H) 02/01/2023 05:08 AM    CA 9.0 02/01/2023 05:08 AM    ALB 2.1 (L) 02/01/2023 05:08 AM    TP 5.8 (L) 02/01/2023 05:08 AM    GLOB 3.7 02/01/2023 05:08 AM    AGRAT 0.6 (L) 02/01/2023 05:08 AM    ALT 16 02/01/2023 05:08 AM     CBC:   Lab Results   Component Value Date/Time    WBC 9.5 02/01/2023 05:08 AM    HGB 8.4 (L) 02/01/2023 05:08 AM    HCT 27.3 (L) 02/01/2023 05:08 AM     02/01/2023 05:08 AM     All Cardiac Markers in the last 24 hours: No results found for: CPK, CK, CKMMB, CKMB, RCK3, CKMBT, CKNDX, CKND1, NAVIN, TROPT, TROIQ, JUAN CARLOS, TROPT, TNIPOC, BNP, BNPP  Recent Glucose Results:   Lab Results   Component Value Date/Time    GLU 69 (L) 02/01/2023 05:08 AM     ABG:   No results found for: PH, PHI, PCO2, PCO2I, PO2, PO2I, HCO3, HCO3I, FIO2, FIO2I    Liver Panel:   Lab Results   Component Value Date/Time    ALB 2.1 (L) 02/01/2023 05:08 AM    TP 5.8 (L) 02/01/2023 05:08 AM    GLOB 3.7 02/01/2023 05:08 AM    AGRAT 0.6 (L) 02/01/2023 05:08 AM    ALT 16 02/01/2023 05:08 AM    AP 97 02/01/2023 05:08 AM     Pancreatic Markers: No results found for: AMYLPOCT, AML, LIPPOCT, LPSE      CT:      Significant bilateral lower lobe consolidation and bilateral  pulmonary infiltrates. Small right pleural effusion. No evidence of pulmonary embolism. Peritoneal dialysis catheter is coiled within the left lower quadrant. No acute abnormality is identified. Impression:     ESRD on home PD  Respiratory failure required intubation: now extubated  Pulm Edema, improved. Net UF 4.8L so far since admission. CXR improved. Hyperkalemia, mild: resolved  Anemia in CKD  HTN: BP not well controlled   DM type 2  Hypoalbuminemia  Lactic acidosis, resolved  H.o.  CAD/ MI  SHPT:      Plan:       Continue manual PD to 2.5L of 2.5 % dextrose 4 times/day  Cont Bumex 2mg po bid  Cont BP control  Will resume SCOTT when BP is better controlled (SBP <160)  Continue Calcitriol and Sensipar 60mg daily  Strict I/O  AM renal panel, cbc  PCCM following    MD Thomas Phelps  [phone]

## 2023-02-01 NOTE — PROGRESS NOTES
Cardiology Progress Note        Patient: Thai Manriquez        Sex: female          DOA: 1/28/2023  YOB: 1980      Age:  43 y.o.        LOS:  LOS: 2 days       Patient seen and examined, chart reviewed    Assessment/Plan     Patient Active Problem List   Diagnosis Code    Acute pulmonary edema ([facility] J81.0    Chest pain R07.9    ESRD (end stage renal disease) on dialysis ([facility] N18.6, Z99.2    Pulmonary edema J81.1    Cardiomyopathy ([facility] I42.9    HTN (hypertension) I10    Type 2 diabetes mellitus ([facility] E11.9    PAD (peripheral artery disease) ([facility] I73.9    CAD (coronary artery disease) I25.10    Acute respiratory failure with hypoxia and hypercapnia (HCC) J96.01, J96.02    Hypertensive emergency I16.1    Acute exacerbation of CHF (congestive heart failure) ([facility] I50.9    BMI 34.0-34.9,adult Z68.34    Peritoneal dialysis status ([facility] Z99.2    Diabetic ulcer of left foot associated with type 2 diabetes mellitus, with fat layer exposed ([facility] E11.621, L97.522    Sepsis (HCC) A41.9    Cellulitis of fourth toe, left L03.032    Chronic anemia D64.9       Abnormal troponin most likely non-STEMI type II  Echocardiogram was done in 11/2022 reported       Left Ventricle: Moderately reduced left ventricular systolic function with a visually estimated EF of 35 - 40%. Left ventricle size is normal. Moderate septal thickening. Severe posterior thickening. Moderate global hypokinesis present. Grade II diastolic dysfunction with increased LAP. Right Ventricle: Right ventricle size is normal. Mildly increased wall thickness. Aortic Valve: Severely thickened cusp. Moderately calcified right and noncoronary cusps. Minimal to moderate stenosis of the aortic valve. Mitral Valve: Mildly thickened leaflet, at the posterior leaflet. Severe annular calcification at the posterior leaflet of the mitral valve. Moderate regurgitation.  Mild to moderate stenosis noted, measurement is compromised with severe calcification. Planimetry was not performed. Left Atrium: Left atrium is severely dilated. Right Atrium: Right atrium is moderately dilated. PA systolic pressure is 40 mmHg. Cardiac catheterization was done in November 2022 reported     Hemodynamics: Ao: 158/81 mmHg. Mean 112 mmHg. LV: 162/12 mmHg. LVEDP: 25   mmHg. No aortic valve gradient was noted on the pullback of the   pigtail catheter. Coronary Arteries:        Left Main Artery: Widely patent     Left Anterior Descending Artery: Proximal segments patent. Mid vessel contains prior stents which are patent. Distal segments patent. Moderate sized first diagonal branch which is patent. Moderate-sized second diagonal branch which contains prior stents is 100% occluded. Left Circumflex Artery: Moderate size AV vessel. Proximal and mid are patent. Distal segment contains a 20% stenosis just after the origin of an obtuse marginal branch. Large branching   obtuse marginal branch from the mid AV groove vessel. Proximal segment contains a concentric discrete 70% stenosis. Distal vessel contains luminal irregularities. Right Coronary Artery: Dominant, moderate size vessel. Proximal and mid segments patent. Distal vessel bifurcates into 2 branches at the acute margin. The ongoing right branch is diffusely diseased with a long 80% stenosis, followed by a   discrete 90% stenosis. The acute marginal branch contains   proximal and mid 99% stenoses. 1.  Progressive coronary disease since prior catheterization   7/14/2021. Second diagonal branch which was previously stented is now 100% occluded. First obtuse marginal branch contains a ostial/proximal 70% stenosis. Distal right coronary artery demonstrates 2 branches with the ongoing vessel diffusely diseased with discrete distal 90% stenosis. The acute marginal branch contains proximal and mid 99% stenoses.    2.  Ischemic cardiomyopathy ejection fraction 40% 3.  No aortic stenosis   4. Mild mitral regurgitation   5. Successful administration of moderate sedation without   complications     Plan: Routine post cardiac cath orders. Patient's disease is   mostly branch vessel and distal vessel disease. The diagonal is 100% chronic total occlusion, the distal right coronary artery   disease is very diffuse. Thus, this anatomy is unfavorable for   PCI and CABG. The obtuse marginal branch potentially could be considered for PCI although would leave behind significant   residual coronary disease. Patient needs aggressive guideline   directed risk factor modification. In addition with her LV   systolic dysfunction she will also need guideline directed   medical therapy for LV dysfunction and chronic systolic heart   failure. Status post extubated     Plan:     Continue metoprolol succinate, nifedipine, isosorbide mononitrate and atorvastatin. Continue dialysis as per nephrologist   Resume aspirin if no evidence of any bleeding                Subjective:    cc:   Denies any chest pain or shortness of breath, I feel tired      REVIEW OF SYSTEMS:     General: No fevers or chills. Cardiovascular: No chest pain,No palpitations, No orthopnea, No PND, positive leg swelling, No claudication  Pulmonary: No  dyspnea. Gastrointestinal: No nausea, vomiting, bleeding  Neurology: No Dizziness    Objective:      Visit Vitals  BP (!) 140/95   Pulse 94   Temp 98.2 °F (36.8 °C)   Resp 14   Ht 5' 7\" (1.702 m)   Wt 99.8 kg (220 lb)   SpO2 100%   BMI 34.46 kg/m²     Body mass index is 34.46 kg/m². Physical Exam:  General Appearance: Comfortable, not using accessory muscles of respiration. HEENT: SARMAD. HEAD: Atraumatic  NECK: No JVD, no thyroidomeglay. CAROTIDS: no bruit  LUNGS:  bilateral conducted sounds   HEART: S1+S2 audible, no murmur, no pericardial rub. ABD: Non-tender, BS Audible    EXT:  bilateral lower extremity  trace edema, and no cyanosis.   NEUROLOGICAL: Alert, follows verbal commands    Medication:  Current Facility-Administered Medications   Medication Dose Route Frequency    bumetanide (BUMEX) tablet 2 mg  2 mg Oral BID    peritoneal dialysis DEXTROSE 2.5% (2.5 mEq/L low calcium) solution 2,000 mL  2,000 mL IntraPERitoneal QID    atorvastatin (LIPITOR) tablet 80 mg  80 mg Oral DAILY    calcitRIOL (ROCALTROL) capsule 0.25 mcg  0.25 mcg Oral DAILY    cinacalcet (SENSIPAR) tablet 60 mg  60 mg Oral DAILY    [Held by provider] levothyroxine (SYNTHROID) tablet 200 mcg  200 mcg Oral ACB    NIFEdipine ER (PROCARDIA XL) tablet 90 mg  90 mg Oral DAILY    [Held by provider] calcium acetate(phosphat bind) (PHOSLO) capsule 1,334 mg  2 Capsule Oral TID WITH MEALS    isosorbide mononitrate ER (IMDUR) tablet 60 mg  60 mg Oral 7am    metoprolol succinate (TOPROL-XL) XL tablet 50 mg  50 mg Oral BID    hydrALAZINE (APRESOLINE) tablet 50 mg  50 mg Oral TID    famotidine (PF) (PEPCID) 20 mg in 0.9% sodium chloride 10 mL injection  20 mg IntraVENous DAILY    cloNIDine HCL (CATAPRES) tablet 0.2 mg  0.2 mg Oral BID    morphine injection 4 mg  4 mg IntraVENous Q4H PRN    sodium chloride (NS) flush 5-40 mL  5-40 mL IntraVENous Q8H    sodium chloride (NS) flush 5-40 mL  5-40 mL IntraVENous PRN    acetaminophen (TYLENOL) tablet 650 mg  650 mg Oral Q6H PRN    Or    acetaminophen (TYLENOL) suppository 650 mg  650 mg Rectal Q6H PRN    polyethylene glycol (MIRALAX) packet 17 g  17 g Oral DAILY PRN    ondansetron (ZOFRAN ODT) tablet 4 mg  4 mg Oral Q8H PRN    Or    ondansetron (ZOFRAN) injection 4 mg  4 mg IntraVENous Q6H PRN    heparin (porcine) injection 5,000 Units  5,000 Units SubCUTAneous Q8H    insulin lispro (HUMALOG) injection   SubCUTAneous Q6H    glucose chewable tablet 16 g  16 g Oral PRN    glucagon (GLUCAGEN) injection 1 mg  1 mg IntraMUSCular PRN    dextrose 10% infusion 0-250 mL  0-250 mL IntraVENous PRN    0.9% sodium chloride infusion 250 mL  250 mL IntraVENous PRN chlorhexidine (PERIDEX) 0.12 % mouthwash 10 mL  10 mL Oral Q12H    hydrALAZINE (APRESOLINE) 20 mg/mL injection 10 mg  10 mg IntraVENous Q4H PRN    piperacillin-tazobactam (ZOSYN) 3.375 g in 0.9% sodium chloride (MBP/ADV) 100 mL MBP  3.375 g IntraVENous Q12H    sodium chloride (NS) flush 5-10 mL  5-10 mL IntraVENous PRN               Lab/Data Reviewed:       Recent Labs     01/31/23  0504 01/30/23  0812 01/29/23  1830 01/29/23  0647   WBC 9.3 8.9  --  7.1   HGB 9.3* 8.5* 8.2* 6.3*   HCT 30.9* 28.1* 26.6* 20.7*    284  --  251       Recent Labs     01/31/23  0504 01/30/23  0812 01/29/23  0647    139 136   K 4.3 4.9 5.7*    98* 97*   CO2 26 30 25   * 75 160*   BUN 63* 69* 73*   CREA 12.90* 13.60* 14.20*   CA 10.0 10.7*  10.4* 9.6       34 minutes of critical care time spent in the direct evaluation and treatment of this high risk patient. The reason for providing this level of medical care for this critically ill patient was due a critical illness that impaired one or more vital organ systems such that there was a high probability of imminent or life threatening deterioration in the patient's condition. This care involved high complexity decision making to assess, manipulate, and support vital system functions, to treat this degree vital organ system failure and to prevent further life threatening deterioration of the patient's condition.     Signed By: Phoebe Russ MD     January 31, 2023

## 2023-02-01 NOTE — PROGRESS NOTES
[facility]  Pulmonary, Critical Care, and Sleep Medicine    Name: Leeanna Capone MRN: [id number]   : 1980 Hospital: [facility] [place]    Date: 2023  Room: [address] [place] [address] Note                                              Consult requesting physician: Dr. Tracy Varma  Reason for Consult: Acute respiratory failure on ventilator support      IMPRESSION:   Acute respiratory failure with hypoxemia and hypercapnia  Acute exacerbation of CHF  Bilateral pulmonary infiltrates  End-stage renal disease on peritoneal dialysis  Hypertensive emergency  Anemia of chronic disease  Left foot ulcer/cellulitis  Peripheral arterial disease  Hypertension  Type 2 diabetes mellitus  Coronary artery disease      Patient Active Problem List   Diagnosis Code    Acute pulmonary edema (HCC) J81.0    Chest pain R07.9    ESRD (end stage renal disease) on dialysis ([facility] N18.6, Z99.2    Pulmonary edema J81.1    Cardiomyopathy ([facility] I42.9    HTN (hypertension) I10    Type 2 diabetes mellitus (HCC) E11.9    PAD (peripheral artery disease) (HCC) I73.9    CAD (coronary artery disease) I25.10    Acute respiratory failure with hypoxia and hypercapnia (HCC) J96.01, J96.02    Hypertensive emergency I16.1    Acute exacerbation of CHF (congestive heart failure) ([facility] I50.9    BMI 34.0-34.9,adult Z68.34    Peritoneal dialysis status ([facility] Z99.2    Diabetic ulcer of left foot associated with type 2 diabetes mellitus, with fat layer exposed ([facility] E11.621, L97.522    Sepsis (HCC) A41.9    Cellulitis of fourth toe, left L03.032    Chronic anemia D64.9         Code status: Full code      RECOMMENDATIONS:   Respiratory: Patient present with acute respiratory distress, with volume overload, bilateral pulmonary infiltrates on chest CT, and intubated in the ER. Patient extubated . Respirations normal; on room air; chest x-ray -showed significant improvement in pulm edema.   CVS: Known patient with coronary artery disease and cardiomyopathy/low EF; EKG on admission-nil acute. Antihypertensives-clonidine 0.2 mg, hydralazine 50 mg, metoprolol XL 50 mg orally, nifedipine XL 90 mg; patient also on diuretic Bumex 2 mg twice daily; Imdur 60 mg daily. Prior EF of 35-40%; mild pulmonary hypertension; repeat echo 1/30- shows severely decreased LV function-EF 25-30%; moderate global hypokinesis; no pulmonary hypertension reported. Cardiology-Dr. Venus Bhatia on case. ID: Treat as sepsis; lactic acidosis has resolved. Blood cultures and respiratory cultures negative. Peritoneal fluid culture-negative. On IV Zosyn; DC to oral Augmentin on discharge to complete 5-7 days of antibiotic therapy. Hematology/Oncology: Anemia of chronic disease. Hemoglobin 8.4; platelets normal.  Coags-INR normal, PTT low. No active bleeding issues. Renal: Nephrology on case. Patient on peritoneal dialysis. Potassium and bicarb-normal.  GI: CT abdomen-nil acute. Endocrine: Monitor BS. SSI. Neurology: Normal mentation. Toxicology: UDS-negative. Skin/Wound: Wound care for left foot ulcer. Vascular: Bilateral lower extremity arterial ultrasound-no evidence of stenosis. Nutrition: SLP cleared for oral diet. Prophylaxis: DVT Prophylaxis: Sc Heparin. GI Prophylaxis: Protonix. PT OT consulted. Lines/Tubes: PIVs; RT arm midline  ETT: 1/29-1/30. Torres: 1/29- DC Torres catheter-ordered. Other drains: Chronic PD catheter. Advance Directive/Palliative Care: consulted: Prognosis guarded overall. Quality Care: PPI, DVT prophylaxis, HOB elevated, Infection control all reviewed and addressed. Care of plan d/w RN. Discussed with patient and updated management plans. Patient stable to be transferred to telemetry; I would sign off-please call if needed. High complexity decision making was performed during the evaluation of this patient at high risk for decompensation with multiple organ involvement.            Subjective/History of Present Illness:     Patient is a 43 y.o. female with PMHx significant for multiple medical problems. Patient last admission was in November 2022 with NSTEMI and fluid overload, treated with peritoneal dialysis at that time. The patient has history of coronary artery disease, not amenable for coronary stenting-[facility]; cardiomyopathy with EF 40%, end-stage renal disease on peritoneal dialysis, hypertension, type 2 diabetes mellitus, obesity. The patient came to the ER last night with worsening shortness of breath symptoms. The patient was in respiratory distress, and could not tolerate BiPAP therapy. She was hypertensive with blood pressure 229/186 mmHg max. Patient subsequently needed to be intubated. Patient underwent CT studies-reviewed below. Patient has left foot ulcer. 2/1/2023  Patient seen in the ICU. Patient extubated 1/30. Breathing seems to be normal; on room air. No acute issues overnight. No chest pain or palpitations. Mild nonproductive cough  Stable hemodynamics; telemetry-sinus rhythm. Review of Systems: No change  -No fever or chills  - No chest pain or palpitation  - No vomiting or diarrhea  - No abdominal pains  - No hemoptysis       Allergies   Allergen Reactions    Coreg [Carvedilol] Nausea and Vomiting    Lortab [Hydrocodone-Acetaminophen] Nausea and Vomiting      Past Medical History:   Diagnosis Date    BMI 34.0-34.9,adult 1/29/2023    Cardiomyopathy ([facility]     Chronic kidney disease     Coronary arteriosclerosis     DM (diabetes mellitus) ([facility]     Hypertension     Tachycardia       History reviewed. No pertinent surgical history.    Social History     Tobacco Use    Smoking status: Never    Smokeless tobacco: Never   Substance Use Topics    Alcohol use: Yes     Comment: rarely      Family History   Problem Relation Age of Onset    Diabetes Mother     Heart Disease Mother     Heart Attack Mother     Heart Attack Father     Heart Disease Father     Diabetes Father Prior to Admission medications    Medication Sig Start Date End Date Taking? Authorizing Provider   bisacodyL 5 mg tab Take 5 mg by mouth two (2) times a day. Provider, Historical   metoclopramide HCl (REGLAN) 5 mg tablet Take 5 mg by mouth as needed for Nausea or Vomiting. Provider, Historical   sodium bicarbonate 650 mg tablet Take 650 mg by mouth two (2) times a day. Provider, Historical   albuterol (Ventolin HFA) 90 mcg/actuation inhaler Take 2 Puffs by inhalation every six (6) hours as needed for Wheezing. Provider, Historical   atorvastatin (LIPITOR) 80 mg tablet Take 80 mg by mouth daily. Provider, Historical   clopidogreL (Plavix) 75 mg tab Take 75 mg by mouth daily. Provider, Historical   cloNIDine HCL (CATAPRES) 0.2 mg tablet Take 0.2 mg by mouth two (2) times a day. Provider, Historical   hydrALAZINE (APRESOLINE) 100 mg tablet Take 100 mg by mouth three (3) times daily. Provider, Historical   mupirocin (BACTROBAN) 2 % ointment Apply  to affected area three (3) times daily. Provider, Historical   candesartan (ATACAND) 32 mg tablet Take 32 mg by mouth daily. Indications: high blood pressure    Provider, Historical   isosorbide mononitrate ER (IMDUR) 120 mg CR tablet Take 120 mg by mouth every morning. Provider, Historical   metoprolol succinate (TOPROL-XL) 100 mg tablet Take 100 mg by mouth two (2) times a day. Provider, Historical   insulin aspart U-100 (NOVOLOG) 100 unit/mL injection 15 Units by SubCUTAneous route Before breakfast, lunch, and dinner. Provider, Historical   fluticasone propionate (FLOVENT HFA) 44 mcg/actuation inhaler Take 1 Puff by inhalation as needed. Provider, Historical   calcium acetate,phosphat bind, (PHOSLO) 667 mg cap Take 2 Capsules by mouth three (3) times daily (with meals). Provider, Historical   calcitRIOL (ROCALTROL) 0.25 mcg capsule Take 0.25 mcg by mouth daily.     Provider, Historical   NIFEdipine ER (PROCARDIA XL) 90 mg ER tablet Take 90 mg by mouth daily. Provider, Historical   levothyroxine (SYNTHROID) 200 mcg tablet Take 200 mcg by mouth Daily (before breakfast). Provider, Historical   cinacalcet (SENSIPAR) 30 mg tablet Take 60 mg by mouth daily. Provider, Historical   bumetanide (BUMEX) 2 mg tablet Take 2 mg by mouth two (2) times a day. Provider, Historical   aspirin 81 mg chewable tablet Take 81 mg by mouth daily.     Provider, Historical     Current Facility-Administered Medications   Medication Dose Route Frequency    insulin lispro (HUMALOG) injection   SubCUTAneous AC&HS    bumetanide (BUMEX) tablet 2 mg  2 mg Oral BID    peritoneal dialysis DEXTROSE 2.5% (2.5 mEq/L low calcium) solution 2,000 mL  2,000 mL IntraPERitoneal QID    atorvastatin (LIPITOR) tablet 80 mg  80 mg Oral DAILY    calcitRIOL (ROCALTROL) capsule 0.25 mcg  0.25 mcg Oral DAILY    cinacalcet (SENSIPAR) tablet 60 mg  60 mg Oral DAILY    [Held by provider] levothyroxine (SYNTHROID) tablet 200 mcg  200 mcg Oral ACB    NIFEdipine ER (PROCARDIA XL) tablet 90 mg  90 mg Oral DAILY    calcium acetate(phosphat bind) (PHOSLO) capsule 1,334 mg  2 Capsule Oral TID WITH MEALS    isosorbide mononitrate ER (IMDUR) tablet 60 mg  60 mg Oral 7am    metoprolol succinate (TOPROL-XL) XL tablet 50 mg  50 mg Oral BID    hydrALAZINE (APRESOLINE) tablet 50 mg  50 mg Oral TID    famotidine (PF) (PEPCID) 20 mg in 0.9% sodium chloride 10 mL injection  20 mg IntraVENous DAILY    cloNIDine HCL (CATAPRES) tablet 0.2 mg  0.2 mg Oral BID    sodium chloride (NS) flush 5-40 mL  5-40 mL IntraVENous Q8H    heparin (porcine) injection 5,000 Units  5,000 Units SubCUTAneous Q8H    piperacillin-tazobactam (ZOSYN) 3.375 g in 0.9% sodium chloride (MBP/ADV) 100 mL MBP  3.375 g IntraVENous Q12H         Objective:   Vital Signs:    Visit Vitals  BP (!) 153/91   Pulse 96   Temp 98.3 °F (36.8 °C)   Resp 18   Ht 5' 7\" (1.702 m)   Wt 99.8 kg (220 lb)   SpO2 96%   BMI 34.46 kg/m²       O2 Device: None (Room air)   O2 Flow Rate (L/min): 2 l/min   Temp (24hrs), Av.2 °F (36.8 °C), Min:97.7 °F (36.5 °C), Max:98.4 °F (36.9 °C)       Intake/Output:   Last shift:      701 - 1900  In: 500 [P.O.:500]  Out: -     Last 3 shifts: 1901 -  0700  In: 8960 [P.O.:760;  I.V.:200]  Out: 9575 [Urine:1105]      Intake/Output Summary (Last 24 hours) at 2023 1104  Last data filed at 2023 0800  Gross per 24 hour   Intake 3360 ml   Output 3100 ml   Net 260 ml         Last 3 Recorded Weights in this Encounter    23 0400 23 0400 23 0946   Weight: 89.7 kg (197 lb 12 oz) 91 kg (200 lb 9.9 oz) 99.8 kg (220 lb)          Physical Exam:   Comfortable; on room air; acyanotic  HEENT: pupils not dilated, reactive, no scleral jaundice  Neck: No adenopathy or thyroid swelling  CVS: Normal heart sounds; S1 and S2 with no murmur; JVD not elevated  RS: Good air entry bilateral; no wheezing; mild bibasal crackles; normal respirations  Abd: Soft, nontender, mildly distended from peritoneal dialysis fluid, no guarding or rigidity   Neuro: non focal, awake, alert  Extrm: Mild bilateral chronic/indurated leg edema   Skin: no rash; left foot ulcer at the junction of fourth and fifth toe-dressing present  Lymphatic: no cervical or supraclavicular adenopathy  Vasc: DPs not palpable         Data:       Recent Results (from the past 24 hour(s))   GLUCOSE, POC    Collection Time: 23 12:36 PM   Result Value Ref Range    Glucose (POC) 119 (H) 70 - 110 mg/dL   GLUCOSE, POC    Collection Time: 23  6:33 PM   Result Value Ref Range    Glucose (POC) 98 70 - 110 mg/dL   GLUCOSE, POC    Collection Time: 23 11:31 PM   Result Value Ref Range    Glucose (POC) 149 (H) 70 - 110 mg/dL   CBC W/O DIFF    Collection Time: 23  5:08 AM   Result Value Ref Range    WBC 9.5 4.6 - 13.2 K/uL    RBC 2.99 (L) 4.20 - 5.30 M/uL    HGB 8.4 (L) 12.0 - 16.0 g/dL    HCT 27.3 (L) 35.0 - 45.0 %    MCV 91.3 78.0 - 100.0 FL    MCH 28.1 24.0 - 34.0 PG    MCHC 30.8 (L) 31.0 - 37.0 g/dL    RDW 14.6 (H) 11.6 - 14.5 %    PLATELET 707 603 - 137 K/uL    MPV 9.8 9.2 - 11.8 FL    NRBC 0.0 0  WBC    ABSOLUTE NRBC 0.00 0.00 - 4.99 K/uL   METABOLIC PANEL, COMPREHENSIVE    Collection Time: 02/01/23  5:08 AM   Result Value Ref Range    Sodium 138 136 - 145 mmol/L    Potassium 4.1 3.5 - 5.5 mmol/L    Chloride 99 (L) 100 - 111 mmol/L    CO2 27 21 - 32 mmol/L    Anion gap 12 3.0 - 18 mmol/L    Glucose 69 (L) 74 - 99 mg/dL    BUN 51 (H) 7.0 - 18 MG/DL    Creatinine 11.80 (H) 0.6 - 1.3 MG/DL    BUN/Creatinine ratio 4 (L) 12 - 20      eGFR 4 (L) >60 ml/min/1.73m2    Calcium 9.0 8.5 - 10.1 MG/DL    Bilirubin, total 0.4 0.2 - 1.0 MG/DL    ALT (SGPT) 16 13 - 56 U/L    AST (SGOT) 35 10 - 38 U/L    Alk.  phosphatase 97 45 - 117 U/L    Protein, total 5.8 (L) 6.4 - 8.2 g/dL    Albumin 2.1 (L) 3.4 - 5.0 g/dL    Globulin 3.7 2.0 - 4.0 g/dL    A-G Ratio 0.6 (L) 0.8 - 1.7     GLUCOSE, POC    Collection Time: 02/01/23  6:16 AM   Result Value Ref Range    Glucose (POC) 71 70 - 110 mg/dL   GLUCOSE, POC    Collection Time: 02/01/23  8:07 AM   Result Value Ref Range    Glucose (POC) 76 70 - 110 mg/dL         Chemistry Recent Labs     02/01/23  0508 01/31/23  0836 01/31/23  0504 01/30/23  0812   GLU 69*  --  109* 75     --  141 139   K 4.1  --  4.3 4.9   CL 99*  --  101 98*   CO2 27  --  26 30   BUN 51*  --  63* 69*   CREA 11.80*  --  12.90* 13.60*   CA 9.0  --  10.0 10.7*  10.4*   MG  --  2.4  --   --    PHOS  --  8.9*  --   --    AGAP 12  --  14 11   BUCR 4*  --  5* 5*   AP 97  --  94 92   TP 5.8*  --  6.1* 5.9*   ALB 2.1*  --  2.3* 2.3*   GLOB 3.7  --  3.8 3.6   AGRAT 0.6*  --  0.6* 0.6*          Lactic Acid Lactic acid   Date Value Ref Range Status   01/31/2023 1.4 0.4 - 2.0 MMOL/L Final     Recent Labs     01/31/23  0836 01/31/23  0504 01/30/23  1348   LAC 1.4 1.8 1.9          Liver Enzymes Protein, total   Date Value Ref Range Status 02/01/2023 5.8 (L) 6.4 - 8.2 g/dL Final     Albumin   Date Value Ref Range Status   02/01/2023 2.1 (L) 3.4 - 5.0 g/dL Final     Globulin   Date Value Ref Range Status   02/01/2023 3.7 2.0 - 4.0 g/dL Final     A-G Ratio   Date Value Ref Range Status   02/01/2023 0.6 (L) 0.8 - 1.7   Final     Alk. phosphatase   Date Value Ref Range Status   02/01/2023 97 45 - 117 U/L Final     Recent Labs     02/01/23  0508 01/31/23  0504 01/30/23  0812   TP 5.8* 6.1* 5.9*   ALB 2.1* 2.3* 2.3*   GLOB 3.7 3.8 3.6   AGRAT 0.6* 0.6* 0.6*   AP 97 94 92          CBC w/Diff Recent Labs     02/01/23  0508 01/31/23  0504 01/30/23  0812   WBC 9.5 9.3 8.9   RBC 2.99* 3.31* 3.04*   HGB 8.4* 9.3* 8.5*   HCT 27.3* 30.9* 28.1*    316 284            Urinalysis Lab Results   Component Value Date/Time    Color YELLOW 01/29/2023 12:38 AM    Appearance CLEAR 01/29/2023 12:38 AM    Specific gravity 1.011 01/29/2023 12:38 AM    pH (UA) 8.0 01/29/2023 12:38 AM    Protein 300 (A) 01/29/2023 12:38 AM    Glucose 100 (A) 01/29/2023 12:38 AM    Ketone Negative 01/29/2023 12:38 AM    Bilirubin Negative 01/29/2023 12:38 AM    Urobilinogen 0.2 01/29/2023 12:38 AM    Nitrites Negative 01/29/2023 12:38 AM    Leukocyte Esterase Negative 01/29/2023 12:38 AM    Epithelial cells 2+ 01/29/2023 12:38 AM    Bacteria FEW (A) 01/29/2023 12:38 AM    WBC Negative 01/29/2023 12:38 AM    RBC 0 to 3 01/29/2023 12:38 AM          Culture data during this hospitalization.    All Micro Results       Procedure Component Value Units Date/Time    CULTURE, BLOOD [[id number]] Collected: 01/28/23 2320    Order Status: Completed Specimen: Blood Updated: 02/01/23 1016     Special Requests: NO SPECIAL REQUESTS        Culture result: NO GROWTH 4 DAYS       CULTURE, BLOOD [[id number]] Collected: 01/28/23 2320    Order Status: Completed Specimen: Blood Updated: 02/01/23 1016     Special Requests: NO SPECIAL REQUESTS        Culture result: NO GROWTH 4 DAYS       CULTURE, BODY FLUID Catalino Kidd STAIN [[id number]] Collected: 01/30/23 0526    Order Status: Completed Specimen: Body Fluid from Peritoneal [place] Updated: 01/31/23 1132     Special Requests: NO SPECIAL REQUESTS        GRAM STAIN NO WBC'S SEEN         NO ORGANISMS SEEN        Culture result: NO GROWTH THUS FAR       CULTURE, Fanny Rota STAIN [[id number]] Collected: 01/29/23 0038    Order Status: Completed Specimen: Wound from Foot Updated: 01/31/23 1033     Special Requests: NO SPECIAL REQUESTS        GRAM STAIN NO WBC'S SEEN         NO ORGANISMS SEEN        Culture result:       HEAVY MIXED SKIN MICHAEL ISOLATED          CULTURE, RESPIRATORY/SPUTUM/BRONCH Buzz Patch STAIN [[id number]] Collected: 01/28/23 2320    Order Status: Completed Specimen: Sputum Updated: 01/31/23 0816     Special Requests: NO SPECIAL REQUESTS        GRAM STAIN NO WBC'S SEEN         NO DEFINITE ORGANISM SEEN               RARE EPITHELIAL CELLS SEEN           Culture result:       MODERATE NORMAL RESPIRATORY MICHAEL          COVID-19 WITH INFLUENZA A/B [[id number]] Collected: 01/28/23 2320    Order Status: Completed Specimen: Nasopharyngeal Updated: 01/28/23 5016     SARS-CoV-2 by PCR Not detected        Comment: Not Detected results do not preclude SARS-CoV-2 infection and should not be used as the sole basis for patient management decisions. Results must be combined with clinical observations, patient history, and epidemiological information. Influenza A by PCR Not detected        Influenza B by PCR Not detected        Comment: Testing was performed using yunior Josie SARS-CoV-2 and Influenza A/B nucleic acid assay. This test is a multiplex Real-Time Reverse Transcriptase Polymerase Chain Reaction (RT-PCR) based in vitro diagnostic test intended for the qualitative detection of nucleic acids from SARS-CoV-2, Influenza A, and Influenza B in nasopharyngeal for use under the FDA's Emergency Use Authorization(EAU) only.        Fact sheet for Patients: FindDrives.pl  Fact sheet for Healthcare Providers: FindDrives.pl                  LE Doppler 1/30 Interpretation Summary  No evidence of significant stenosis noted in the bilateral lower extremity arteries assessed,  Color flow filling defect secondary to the calcified vessels bilaterally. ECHO 1/30 Interpretation Summary  Result status: Final result     Left Ventricle: Severely reduced left ventricular systolic function with a visually estimated EF of 25 - 30%. Left ventricle size is normal. Severely increased wall thickness. Findings consistent with severe concentric hypertrophy. Moderate global hypokinesis present. Grade I diastolic dysfunction with normal LAP. Mitral Valve: Mildly thickened leaflet, at the anterior leaflet. Mildly calcified leaflet, at the anterior leaflet. Severe annular calcification at the posterior leaflet of the mitral valve. Mild to moderate regurgitation. Images report reviewed by me:  CT LT Foot 1/29/2023   (Most Recent) Results from [facility] Encounter encounter on 01/28/23    CT FOOT LT W CONT    Narrative  INDICATION:  cellulitis/wound of left 4th toe    EXAM: CT left foot. No comparisons. Thin section axial images were obtained after the administration of intravenous  contrast. From these sagittal and coronal reformats were performed. CT dose  reduction was achieved through use of a standardized protocol tailored for this  examination and automatic exposure control for dose modulation. FINDINGS: There is diffuse edema in the dorsal soft tissues. A drainable fluid  collection is not identified. There are extensive vascular calcifications. No  fluid tracking proximally within the tendon sheaths. Scattered degenerative  changes of the midfoot osteochondral defect medial talar dome is chronic. Degenerative changes ankle joint.  Cortical destruction is not demonstrated an  acute fracture is not demonstrated either. There is plantar and retrocalcaneal  spurring    Impression  1. Diffuse subcutaneous edema in the foot compatible with the clinical diagnosis  of cellulitis. No drainable abscess or evidence of osteomyelitis         CTA chest, Abd, Pelvis 1/29/2023  EXAM:  CTA CHEST W OR W WO CONT, CT ABD PELV W CONT   INDICATION:   respiratory failure, end-stage renal disease   COMPARISON: 11/15/2022. CHEST CTA:  FINDINGS:  CHEST:  THYROID: No nodule. MEDIASTINUM: No mass or lymphadenopathy. TABATHA: No mass or lymphadenopathy. THORACIC AORTA: No dissection or aneurysm. MAIN PULMONARY ARTERY: There is no evidence of pulmonary embolism. TRACHEA/BRONCHI: ET tube satisfactory position. ESOPHAGUS: No wall thickening or dilatation. HEART: Dilatation of the left atrium and left ventricle. Coronary artery  calcification: Present. PLEURA: Small right pleural effusion. LUNGS: Significant bilateral lower lobe consolidation and substantial diffuse  bilateral pulmonary infiltrates. BONES: No destructive bone lesion. IMPRESSION  Significant bilateral lower lobe consolidation and bilateral  pulmonary infiltrates. Small right pleural effusion. No evidence of pulmonary  embolism. ABDOMEN/PELVIC CT:  FINDINGS:   LIVER: No mass or biliary dilatation. GALLBLADDER: Unremarkable. SPLEEN: No mass. PANCREAS: No mass or ductal dilatation. ADRENALS: Stable small left adrenal adenoma. No follow-up required. KIDNEYS: No mass, calculus, or hydronephrosis. STOMACH: Unremarkable. NG tube tip terminates within the fundus. SMALL BOWEL: No dilatation or wall thickening. COLON: No dilatation or wall thickening. APPENDIX: Within normal limits. PERITONEUM: No ascites or pneumoperitoneum. Peritoneal dialysis catheter is  coiled within the left lower quadrant. RETROPERITONEUM: No lymphadenopathy or aortic aneurysm. REPRODUCTIVE ORGANS: Unremarkable. URINARY BLADDER: Decompressed by Torres catheter.   BONES: Degenerative changes are seen in the lumbar spine. ADDITIONAL COMMENTS: N/A  IMPRESSION:  Peritoneal dialysis catheter is coiled within the left lower quadrant. No acute  abnormality is identified. CXR reviewed by me:  XR 1/31 (Most Recent). CXR   Results from Hospital Encounter encounter on 01/28/23    XR CHEST PORT    Narrative  INDICATION: CHF    Portable AP view of the chest.    Direct comparison made to prior chest x-ray dated January 30, 2023. Cardiomediastinal silhouette is stable. There has been interval extubation and  interval removal of the nasogastric tube. There has been interval improved  bilateral aeration. There is mild residual pulmonary vascular prominence, but no  interstitial edema or focal airspace process. No pleural fluid is visualized. There is no pneumothorax. Impression  Improved bilateral aeration. Please note: Voice-recognition software may have been used to generate this report, which may have resulted in some phonetic-based errors in grammar and contents. Even though attempts were made to correct all the mistakes, some may have been missed, and remained in the body of the document. Dragon software not working well since recent [employer], and care taken as much as possible to correct mistakes.       Haile Wade MD  2/1/2023

## 2023-02-01 NOTE — PROGRESS NOTES
Hospitalist Progress Note    Patient: Sami Noel MRN: [id number]  CSN: [id number]    YOB: 1980  Age: 43 y.o. Sex: female    DOA: 1/28/2023 LOS:  LOS: 2 days          Chief Complaint:    Resp failure      Assessment/Plan        44 y/o female with multiple medical problems to include progressive CAD not amenable to stenting followed by [facility], CHF with EF of 40%, ESRD on peritoneal dialysis, HTN, type 2 diabetes mellitus, and obesity is admitted with acute hypoxemic and hypercapneic respiratory failure due to acute CHF exacerbation and sepsis. She also has a diabetic foot ulcer with cellulitis of the left fourth toe. She was recently started on gabapentin prior to symptom onset         Pulmonary-acute hypoxemic and hypercapneic respiratory failure due to volume overload and CHF exacerbation. CTA chest neg for PE  Patient extubtated today  Bedside swallow passed     CV-CHF exacerbation with underlying cardiomyopathy and CAD. echocardiogram pending and trend troponins. Cardiology on case  Resume her beta blocker, imdur, hydralazine for uncontrolled HTN     GI-no issues, continue SUP  Had constipation prior for 5 days with poor PD   Start lactulose prn     Renal-ESRD on peritoneal dialysis. rocaltrol     Heme-chronic anemia due to ESRD. R  eceived blood transfusion 1/29, Hgb improved     ID-sepsis with markedly elevated lactic acid. Follow blood, sputum, and peritoneal dialysis cultures. Empiric antibiotic treatment with zosyn and vancomycin. CT foot shows cellulitis, CT abd is neg     Endo-type 2 diabetes mellitus. Sliding scale insulin ordered.      F/E/N-no IV fluids due to volume overload/replete prn     Full code    D/w nurse this am     Prognosis guarded  Disposition :  Patient Active Problem List   Diagnosis Code    Acute pulmonary edema (HCC) J81.0    Chest pain R07.9    ESRD (end stage renal disease) on dialysis ([facility] N18.6, Z99.2    Pulmonary edema J81.1 Cardiomyopathy ([facility] I42.9    HTN (hypertension) I10    Type 2 diabetes mellitus ([facility]) E11.9    PAD (peripheral artery disease) ([facility]) I73.9    CAD (coronary artery disease) I25.10    Acute respiratory failure with hypoxia and hypercapnia ([facility]) J96.01, J96.02    Hypertensive emergency I16.1    Acute exacerbation of CHF (congestive heart failure) ([facility]) I50.9    BMI 34.0-34.9,adult Z68.34    Peritoneal dialysis status ([facility] Z99.2    Diabetic ulcer of left foot associated with type 2 diabetes mellitus, with fat layer exposed ([facility] E11.621, L97.522    Sepsis ([facility]) A41.9    Cellulitis of fourth toe, left L03.032    Chronic anemia D64.9       Subjective:    She has elevated BP's  Sedation being turned down  has not woken up yet  No new events reported    Review of systems:    UTO due to illness      Vital signs/Intake and Output:  Visit Vitals  BP (!) 140/95   Pulse 94   Temp 98.2 °F (36.8 °C)   Resp 14   Ht 5' 7\" (1.702 m)   Wt 99.8 kg (220 lb)   SpO2 100%   BMI 34.46 kg/m²     Current Shift:  No intake/output data recorded.   Last three shifts:  01/30 0701 - 01/31 1900  In: 50910 [I.V.:449]  Out: 22575 [Urine:1080]    Exam:    General: s well developed AAF off vent, NAD on 2l nc  CVS:Regular rate and rhythm, no M/R/G, S1/S2 heard, no thrill  Lungs:Clear to auscultation bilaterally, no wheezes, rhonchi, or rales  Abdomen: Soft, Nontender, No distention, PD catheter  Extremities: No C/C/E, pulses palpable 2+  Neuro:oriented follows command                Labs: Results:       Chemistry Recent Labs     01/31/23  0504 01/30/23  0812 01/29/23  0647   * 75 160*    139 136   K 4.3 4.9 5.7*    98* 97*   CO2 26 30 25   BUN 63* 69* 73*   CREA 12.90* 13.60* 14.20*   CA 10.0 10.7*  10.4* 9.6   AGAP 14 11 14   BUCR 5* 5* 5*   AP 94 92 103   TP 6.1* 5.9* 5.5*   ALB 2.3* 2.3* 2.1*   GLOB 3.8 3.6 3.4   AGRAT 0.6* 0.6* 0.6*        CBC w/Diff Recent Labs     01/31/23  0504 01/30/23  9452 01/29/23  1830 01/29/23  2997 01/28/23  2320   WBC 9.3 8.9  --  7.1 12.5   RBC 3.31* 3.04*  --  2.25* 2.70*   HGB 9.3* 8.5* 8.2* 6.3* 7.7*   HCT 30.9* 28.1* 26.6* 20.7* 26.2*    284  --  251 390   GRANS  --   --   --   --  75*   LYMPH  --   --   --   --  9*   EOS  --   --   --   --  14*        Cardiac Enzymes No results for input(s): CPK, CKND1, NAVIN in the last 72 hours. No lab exists for component: CKRMB, TROIP   Coagulation Recent Labs     01/29/23  1045   PTP 14.6   INR 1.1   APTT <20.0*         Lipid Panel No results found for: CHOL, CHOLPOCT, CHOLX, CHLST, CHOLV, 168220, HDL, HDLP, LDL, LDLC, DLDLP, 608677, VLDLC, VLDL, TGLX, TRIGL, TRIGP, TGLPOCT, CHHD, CHHDX   BNP No results for input(s): BNPP in the last 72 hours.    Liver Enzymes Recent Labs     01/31/23  0504   TP 6.1*   ALB 2.3*   AP 94        Thyroid Studies No results found for: T4, T3U, TSH, TSHEXT, TSHEXT     Procedures/imaging: see electronic medical records for all procedures/Xrays and details which were not copied into this note but were reviewed prior to creation of Gabriella Saxena MD

## 2023-02-01 NOTE — PROGRESS NOTES
Comprehensive Nutrition Assessment    Type and Reason for Visit: Reassess    Nutrition Recommendations/Plan:   Continue w/ PO diet order to meet needs. Continue to monitor % meal intake. Malnutrition Assessment:  Malnutrition Status: At risk for malnutrition (specify) (01/30/23 1018)      Nutrition Assessment:    Pt extubated 1/30. Cleared by SLP for soft and bite sized 1/31/23. Per RN- eating better today. 100% breakfast.    Nutrition Related Findings:    GFR 4, BUN 51, cr 11.80. Lipitor, calcitriol, calcium acetate, pepcid, humalog, synthroid, toprol xl. Current Nutrition Intake & Therapies:  Average Meal Intake: %     ADULT DIET Dysphagia - Soft & Bite Sized; No Drinking Straws    Anthropometric Measures:  Height: 5' 7\" (170.2 cm)  Ideal Body Weight (IBW): 135 lbs (61 kg)     Current Body Wt:  99.8 kg (220 lb) (1/30/23), 163 % IBW. Bed scale  Current BMI (kg/m2): 34.4  Usual Body Weight: 90.7 kg (200 lb) (12/2022)  % Weight Change (Calculated): 10  Weight Adjustment: No adjustment                 BMI Category: Obese class 1 (BMI 30.0-34. 9)    Estimated Daily Nutrient Needs:  Energy Requirements Based On: Formula (MSJ x1.2-1.4)  Weight Used for Energy Requirements: Current  Energy (kcal/day): 2029-2367  Weight Used for Protein Requirements: Current (1.2-1.3g; increased needs r/t PD)  Protein (g/day): 119-130  Method Used for Fluid Requirements: 1 ml/kcal  Fluid (ml/day): 2029-2367    Nutrition Diagnosis:   No nutrition diagnosis at this time     Nutrition Interventions:   Food and/or Nutrient Delivery: Continue current diet  Nutrition Education/Counseling: No recommendations at this time  Coordination of Nutrition Care: Continue to monitor while inpatient, Interdisciplinary rounds       Goals:     Goals: PO intake 50% or greater, by next RD assessment       Nutrition Monitoring and Evaluation:   Behavioral-Environmental Outcomes: None identified  Food/Nutrient Intake Outcomes: Food and nutrient intake, Diet advancement/tolerance  Physical Signs/Symptoms Outcomes: Biochemical data, Meal time behavior, Nutrition focused physical findings, Skin, Weight    Discharge Planning:    Continue current diet    Blu Schultz RD

## 2023-02-01 NOTE — PROGRESS NOTES
TRANSFER - OUT REPORT:    Verbal report given to SUAD De La Garza(name) on Imani Alexander  being transferred to (unit) for routine progression of care       Report consisted of patients Situation, Background, Assessment and   Recommendations(SBAR). Information from the following report(s) SBAR, Kardex, Intake/Output, MAR, Recent Results, and Cardiac Rhythm SR-ST  was reviewed with the receiving nurse. Lines:   Peripheral IV 01/29/23 Anterior;Right Forearm (Active)   Site Assessment Clean, dry, & intact 01/31/23 1930   Phlebitis Assessment 0 01/31/23 1930   Infiltration Assessment 0 01/31/23 1930   Dressing Status Clean, dry, & intact 01/31/23 1930   Dressing Type Transparent 01/31/23 1930   Hub Color/Line Status Capped 01/31/23 1930   Action Taken Open ports on tubing capped 01/31/23 1600   Alcohol Cap Used Yes 01/31/23 1600        Opportunity for questions and clarification was provided.       Patient transported with:   Monitor  Registered Nurse  All personal belongings transported up with patient

## 2023-02-02 LAB
ALBUMIN SERPL-MCNC: 1.9 G/DL (ref 3.4–5)
ALBUMIN/GLOB SERPL: 0.4 (ref 0.8–1.7)
ALP SERPL-CCNC: 105 U/L (ref 45–117)
ALT SERPL-CCNC: 15 U/L (ref 13–56)
ANION GAP SERPL CALC-SCNC: 10 MMOL/L (ref 3–18)
AST SERPL-CCNC: 27 U/L (ref 10–38)
BILIRUB SERPL-MCNC: 0.3 MG/DL (ref 0.2–1)
BUN SERPL-MCNC: 51 MG/DL (ref 7–18)
BUN/CREAT SERPL: 4 (ref 12–20)
CALCIUM SERPL-MCNC: 8.9 MG/DL (ref 8.5–10.1)
CHLORIDE SERPL-SCNC: 98 MMOL/L (ref 100–111)
CO2 SERPL-SCNC: 27 MMOL/L (ref 21–32)
CREAT SERPL-MCNC: 11.5 MG/DL (ref 0.6–1.3)
ERYTHROCYTE [DISTWIDTH] IN BLOOD BY AUTOMATED COUNT: 14.3 % (ref 11.6–14.5)
GLOBULIN SER CALC-MCNC: 4.3 G/DL (ref 2–4)
GLUCOSE BLD STRIP.AUTO-MCNC: 124 MG/DL (ref 70–110)
GLUCOSE BLD STRIP.AUTO-MCNC: 136 MG/DL (ref 70–110)
GLUCOSE BLD STRIP.AUTO-MCNC: 149 MG/DL (ref 70–110)
GLUCOSE BLD STRIP.AUTO-MCNC: 154 MG/DL (ref 70–110)
GLUCOSE BLD STRIP.AUTO-MCNC: 155 MG/DL (ref 70–110)
GLUCOSE SERPL-MCNC: 101 MG/DL (ref 74–99)
HCT VFR BLD AUTO: 26.5 % (ref 35–45)
HGB BLD-MCNC: 8.1 G/DL (ref 12–16)
MCH RBC QN AUTO: 28 PG (ref 24–34)
MCHC RBC AUTO-ENTMCNC: 30.6 G/DL (ref 31–37)
MCV RBC AUTO: 91.7 FL (ref 78–100)
NRBC # BLD: 0 K/UL (ref 0–0.01)
NRBC BLD-RTO: 0 PER 100 WBC
PLATELET # BLD AUTO: 324 K/UL (ref 135–420)
PMV BLD AUTO: 10.2 FL (ref 9.2–11.8)
POTASSIUM SERPL-SCNC: 3.9 MMOL/L (ref 3.5–5.5)
PROT SERPL-MCNC: 6.2 G/DL (ref 6.4–8.2)
RBC # BLD AUTO: 2.89 M/UL (ref 4.2–5.3)
SODIUM SERPL-SCNC: 135 MMOL/L (ref 136–145)
WBC # BLD AUTO: 8.5 K/UL (ref 4.6–13.2)

## 2023-02-02 PROCEDURE — 74011250637 HC RX REV CODE- 250/637: Performed by: INTERNAL MEDICINE

## 2023-02-02 PROCEDURE — 36415 COLL VENOUS BLD VENIPUNCTURE: CPT

## 2023-02-02 PROCEDURE — 74011250636 HC RX REV CODE- 250/636: Performed by: INTERNAL MEDICINE

## 2023-02-02 PROCEDURE — 85027 COMPLETE CBC AUTOMATED: CPT

## 2023-02-02 PROCEDURE — 90945 DIALYSIS ONE EVALUATION: CPT

## 2023-02-02 PROCEDURE — 80053 COMPREHEN METABOLIC PANEL: CPT

## 2023-02-02 PROCEDURE — 74011250637 HC RX REV CODE- 250/637: Performed by: FAMILY MEDICINE

## 2023-02-02 PROCEDURE — 65270000029 HC RM PRIVATE

## 2023-02-02 PROCEDURE — 74011250636 HC RX REV CODE- 250/636: Performed by: FAMILY MEDICINE

## 2023-02-02 PROCEDURE — 82962 GLUCOSE BLOOD TEST: CPT

## 2023-02-02 PROCEDURE — 92526 ORAL FUNCTION THERAPY: CPT

## 2023-02-02 PROCEDURE — 97116 GAIT TRAINING THERAPY: CPT

## 2023-02-02 PROCEDURE — A4722 DIALYS SOL FLD VOL > 1999CC: HCPCS | Performed by: INTERNAL MEDICINE

## 2023-02-02 PROCEDURE — 74011636637 HC RX REV CODE- 636/637: Performed by: HOSPITALIST

## 2023-02-02 PROCEDURE — 74011000250 HC RX REV CODE- 250: Performed by: FAMILY MEDICINE

## 2023-02-02 PROCEDURE — 74011250637 HC RX REV CODE- 250/637: Performed by: HOSPITALIST

## 2023-02-02 RX ORDER — GUAIFENESIN 100 MG/5ML
81 LIQUID (ML) ORAL DAILY
Status: DISCONTINUED | OUTPATIENT
Start: 2023-02-02 | End: 2023-02-03 | Stop reason: HOSPADM

## 2023-02-02 RX ADMIN — SODIUM CHLORIDE, PRESERVATIVE FREE 10 ML: 5 INJECTION INTRAVENOUS at 13:32

## 2023-02-02 RX ADMIN — NYSTATIN 500000 UNITS: 100000 SUSPENSION ORAL at 12:18

## 2023-02-02 RX ADMIN — SODIUM CHLORIDE, SODIUM LACTATE, CALCIUM CHLORIDE, MAGNESIUM CHLORIDE AND DEXTROSE 2000 ML: 2.5; 538; 448; 18.3; 5.08 INJECTION, SOLUTION INTRAPERITONEAL at 22:16

## 2023-02-02 RX ADMIN — SODIUM CHLORIDE, SODIUM LACTATE, CALCIUM CHLORIDE, MAGNESIUM CHLORIDE AND DEXTROSE 2000 ML: 2.5; 538; 448; 18.3; 5.08 INJECTION, SOLUTION INTRAPERITONEAL at 10:13

## 2023-02-02 RX ADMIN — HEPARIN SODIUM 5000 UNITS: 5000 INJECTION INTRAVENOUS; SUBCUTANEOUS at 22:15

## 2023-02-02 RX ADMIN — SODIUM CHLORIDE, PRESERVATIVE FREE 10 ML: 5 INJECTION INTRAVENOUS at 06:45

## 2023-02-02 RX ADMIN — CALCITRIOL 0.25 MCG: 0.25 CAPSULE ORAL at 08:01

## 2023-02-02 RX ADMIN — SODIUM CHLORIDE, SODIUM LACTATE, CALCIUM CHLORIDE, MAGNESIUM CHLORIDE AND DEXTROSE 2000 ML: 2.5; 538; 448; 18.3; 5.08 INJECTION, SOLUTION INTRAPERITONEAL at 13:00

## 2023-02-02 RX ADMIN — HEPARIN SODIUM 5000 UNITS: 5000 INJECTION INTRAVENOUS; SUBCUTANEOUS at 03:41

## 2023-02-02 RX ADMIN — HYDRALAZINE HYDROCHLORIDE 50 MG: 50 TABLET, FILM COATED ORAL at 08:01

## 2023-02-02 RX ADMIN — ACETAMINOPHEN 650 MG: 325 TABLET ORAL at 17:46

## 2023-02-02 RX ADMIN — CLONIDINE HYDROCHLORIDE 0.2 MG: 0.1 TABLET ORAL at 08:01

## 2023-02-02 RX ADMIN — CEPHALEXIN 500 MG: 250 CAPSULE ORAL at 16:10

## 2023-02-02 RX ADMIN — CALCIUM ACETATE 1334 MG: 667 CAPSULE ORAL at 12:18

## 2023-02-02 RX ADMIN — ASPIRIN 81 MG: 81 TABLET, CHEWABLE ORAL at 09:14

## 2023-02-02 RX ADMIN — CEPHALEXIN 500 MG: 250 CAPSULE ORAL at 22:15

## 2023-02-02 RX ADMIN — CINACALCET HYDROCHLORIDE 60 MG: 30 TABLET, FILM COATED ORAL at 12:18

## 2023-02-02 RX ADMIN — CALCIUM ACETATE 1334 MG: 667 CAPSULE ORAL at 08:02

## 2023-02-02 RX ADMIN — ISOSORBIDE MONONITRATE 60 MG: 60 TABLET, EXTENDED RELEASE ORAL at 06:44

## 2023-02-02 RX ADMIN — SODIUM CHLORIDE, PRESERVATIVE FREE 10 ML: 5 INJECTION INTRAVENOUS at 22:17

## 2023-02-02 RX ADMIN — HEPARIN SODIUM 5000 UNITS: 5000 INJECTION INTRAVENOUS; SUBCUTANEOUS at 12:18

## 2023-02-02 RX ADMIN — METOPROLOL SUCCINATE 50 MG: 50 TABLET, EXTENDED RELEASE ORAL at 09:14

## 2023-02-02 RX ADMIN — SODIUM CHLORIDE, SODIUM LACTATE, CALCIUM CHLORIDE, MAGNESIUM CHLORIDE AND DEXTROSE 2000 ML: 2.5; 538; 448; 18.3; 5.08 INJECTION, SOLUTION INTRAPERITONEAL at 18:00

## 2023-02-02 RX ADMIN — BUMETANIDE 2 MG: 1 TABLET ORAL at 22:15

## 2023-02-02 RX ADMIN — HYDRALAZINE HYDROCHLORIDE 50 MG: 50 TABLET, FILM COATED ORAL at 22:15

## 2023-02-02 RX ADMIN — SODIUM CHLORIDE, PRESERVATIVE FREE 10 ML: 5 INJECTION INTRAVENOUS at 03:42

## 2023-02-02 RX ADMIN — BUMETANIDE 2 MG: 1 TABLET ORAL at 08:01

## 2023-02-02 RX ADMIN — SODIUM CHLORIDE, SODIUM LACTATE, CALCIUM CHLORIDE, MAGNESIUM CHLORIDE AND DEXTROSE 2000 ML: 2.5; 538; 448; 18.3; 5.08 INJECTION, SOLUTION INTRAPERITONEAL at 03:40

## 2023-02-02 RX ADMIN — NIFEDIPINE 90 MG: 30 TABLET, FILM COATED, EXTENDED RELEASE ORAL at 08:01

## 2023-02-02 RX ADMIN — CLONIDINE HYDROCHLORIDE 0.2 MG: 0.1 TABLET ORAL at 22:15

## 2023-02-02 RX ADMIN — CALCIUM ACETATE 1334 MG: 667 CAPSULE ORAL at 16:10

## 2023-02-02 RX ADMIN — INSULIN LISPRO 2 UNITS: 100 INJECTION, SOLUTION INTRAVENOUS; SUBCUTANEOUS at 16:14

## 2023-02-02 RX ADMIN — NYSTATIN 500000 UNITS: 100000 SUSPENSION ORAL at 22:15

## 2023-02-02 RX ADMIN — HYDRALAZINE HYDROCHLORIDE 50 MG: 50 TABLET, FILM COATED ORAL at 16:10

## 2023-02-02 RX ADMIN — INSULIN LISPRO 2 UNITS: 100 INJECTION, SOLUTION INTRAVENOUS; SUBCUTANEOUS at 08:00

## 2023-02-02 RX ADMIN — NYSTATIN 500000 UNITS: 100000 SUSPENSION ORAL at 17:46

## 2023-02-02 RX ADMIN — ATORVASTATIN CALCIUM 80 MG: 20 TABLET, FILM COATED ORAL at 08:01

## 2023-02-02 RX ADMIN — METOPROLOL SUCCINATE 50 MG: 50 TABLET, EXTENDED RELEASE ORAL at 22:15

## 2023-02-02 RX ADMIN — FAMOTIDINE 20 MG: 10 INJECTION, SOLUTION INTRAVENOUS at 08:00

## 2023-02-02 RX ADMIN — LEVOTHYROXINE SODIUM 200 MCG: 0.1 TABLET ORAL at 06:44

## 2023-02-02 RX ADMIN — CEPHALEXIN 500 MG: 250 CAPSULE ORAL at 08:01

## 2023-02-02 RX ADMIN — NYSTATIN 500000 UNITS: 100000 SUSPENSION ORAL at 08:00

## 2023-02-02 NOTE — PROGRESS NOTES
Hospitalist Progress Note    Patient: Wilfred East MRN: [id number]  CSN: [id number]    YOB: 1980  Age: 43 y.o. Sex: female    DOA: 1/28/2023 LOS:  LOS: 4 days          Chief Complaint:    SOB      Assessment/Plan     44 y/o female with multiple medical problems to include progressive CAD not amenable to stenting followed by [address], CHF with EF of 40%, ESRD on peritoneal dialysis, HTN, type 2 diabetes mellitus, and obesity is admitted with acute hypoxemic and hypercapneic respiratory failure due to acute CHF exacerbation and sepsis. She also has a diabetic foot ulcer with cellulitis of the left fourth toe. She was recently started on gabapentin prior to symptom onset         acute hypoxemic and hypercapneic respiratory failure due to volume overload and CHF exacerbation. CTA chest neg for PE  resolved     Systolic CHF exacerbation with underlying ischemic cardiomyopathy and CAD. EF 40%, improved    HTN-continue home meds     ESRD on peritoneal dialysis. Rocaltrol  Sensipar  Phoslo  Peritoneal dialysis     chronic anemia due to ESRD. blood transfusion 1/29, Hgb improved     sepsis with markedly elevated lactic acid. Blood, fluid cx are all neg  Deescalate abx to keflex for foot  Nystatin for vaginitis     type 2 diabetes mellitus. Sliding scale insulin ordered.      ambulate     Full code    D/c planning  Disposition :  Patient Active Problem List   Diagnosis Code    Acute pulmonary edema (HCC) J81.0    Chest pain R07.9    ESRD (end stage renal disease) on dialysis ([facility] N18.6, Z99.2    Pulmonary edema J81.1    Cardiomyopathy (HCC) I42.9    HTN (hypertension) I10    Type 2 diabetes mellitus (HCC) E11.9    PAD (peripheral artery disease) ([facility]) I73.9    CAD (coronary artery disease) I25.10    Acute respiratory failure with hypoxia and hypercapnia (HCC) J96.01, J96.02    Hypertensive emergency I16.1    Acute exacerbation of CHF (congestive heart failure) ([facility] I50.9    BMI 34.0-34.9,adult Z68.34    Peritoneal dialysis status ([facility] Z99.2    Diabetic ulcer of left foot associated with type 2 diabetes mellitus, with fat layer exposed ([facility] [address] E11.621, L97.522    Sepsis (HCC) A41.9    Cellulitis of fourth toe, left L03.032    Chronic anemia D64.9       Subjective:  I feel better  Denies CP, SOB, nausea, HA  Happy she is doing better      Review of systems:    Constitutional: denies fevers, chills  Respiratory: denies SOB, cough  Cardiovascular: denies chest pain, palpitations  Gastrointestinal: denies nausea, vomiting, diarrhea      Vital signs/Intake and Output:  Visit Vitals  BP (!) 152/89   Pulse 86   Temp 98.2 °F (36.8 °C)   Resp 16   Ht 5' 7\" (1.702 m)   Wt 99.8 kg (220 lb)   SpO2 100%   BMI 34.46 kg/m²     Current Shift:  No intake/output data recorded.   Last three shifts:  01/31 1901 - 02/02 0700  In: 9290 [P.O.:1290]  Out: 8400 [Urine:400]    Exam:    General: Well developed, alert, NAD, OX3  CVS:Regular rate and rhythm, no M/R/G, S1/S2 heard, no thrill  Lungs:Clear to auscultation bilaterally, no wheezes, rhonchi, or rales  Abdomen: Soft, Nontender, No distention, Normal Bowel sounds  Extremities: No C/C/E, pulses palpable 2+  Skin:papular flat rash looks like broken capillaries right UE, no lesions raised  Neuro:grossly normal , follows commands  Psych:appropriate                Labs: Results:       Chemistry Recent Labs     02/02/23  0339 02/01/23  0508 01/31/23  0504   * 69* 109*   * 138 141   K 3.9 4.1 4.3   CL 98* 99* 101   CO2 27 27 26   BUN 51* 51* 63*   CREA 11.50* 11.80* 12.90*   CA 8.9 9.0 10.0   AGAP 10 12 14   BUCR 4* 4* 5*    97 94   TP 6.2* 5.8* 6.1*   ALB 1.9* 2.1* 2.3*   GLOB 4.3* 3.7 3.8   AGRAT 0.4* 0.6* 0.6*      CBC w/Diff Recent Labs     02/02/23  0339 02/01/23  0508 01/31/23  0504   WBC 8.5 9.5 9.3   RBC 2.89* 2.99* 3.31*   HGB 8.1* 8.4* 9.3*   HCT 26.5* 27.3* 30.9*    296 316      Cardiac Enzymes No results for input(s): CPK, CKND1, NAVIN in the last 72 hours. No lab exists for component: CKRMB, TROIP   Coagulation No results for input(s): PTP, INR, APTT, INREXT in the last 72 hours. Lipid Panel No results found for: CHOL, CHOLPOCT, CHOLX, CHLST, CHOLV, 974442, HDL, HDLP, LDL, LDLC, DLDLP, 856197, VLDLC, VLDL, TGLX, TRIGL, TRIGP, TGLPOCT, CHHD, CHHDX   BNP No results for input(s): BNPP in the last 72 hours.    Liver Enzymes Recent Labs     02/02/23  0339   TP 6.2*   ALB 1.9*         Thyroid Studies No results found for: T4, T3U, TSH, TSHEXT     Procedures/imaging: see electronic medical records for all procedures/Xrays and details which were not copied into this note but were reviewed prior to creation of Marc German MD

## 2023-02-02 NOTE — PROGRESS NOTES
Problem: Dysphagia (Adult)  Description: Patient will:  1. Tolerate PO trials with 0 s/s overt distress in 4/5 trials. (Goal met 2/2/23)  2. Utilize compensatory swallow strategies/maneuvers (decrease bite/sip, size/rate, alt. liq/sol) with min cues in 4/5 trials. (Goal met 2/2/23)  3. Perform oral-motor/laryngeal exercises to increase oropharyngeal swallow function with min cues. (Goal not met - swallow improved since initial eval s/p extubation)  4. Complete an objective swallow study (i.e., MBSS) to assess swallow integrity, r/o aspiration, and determine of safest LRD, min A. (N/a)    Rec:     Regular, thin  Aspiration precautions  HOB >45 during po intake, remain >30 for 30-45 minutes after po   Oral care TID  Meds float in puree  Outcome: Progressing Towards Goal  Outcome: Resolved/Met    [facility]    Patient: Dai Nowak (43 y.o. female)  Date: 2/2/2023  Diagnosis: Acute hypoxemic respiratory failure ([facility] [address] [J96.01] Acute respiratory failure with hypoxia and hypercapnia (HCC)      Precautions: Aspiration, Fall  PLOF: as per H&P    ASSESSMENT:  Patient seen by ST for dysphagia management and to assess diet tolerance/safety. Patient's diet was upgraded to regular/thin this morning. No difficulties/discomfort reported by the patient re: swallowing. Patient previously reporting painful swallow after extubation. Patient upright in chair. Vocal quality has returned to baseline. Oral prep skills appear WNL with solid/mixed/thin liquids. Patient able to manage two consistencies at once (mixed) without difficulty. Swallow initiation appears timely. No overt s/sx of aspiration noted across consistencies. Reviewed swallowing/aspiration/reflux precautions with the patient. Oral care completed. Patient assisted back to bed. Patient tolerating LR diet and will be d/c from [address] [place] [address] at this time. Please re-consult if needs arise. D/w RN.     Progression toward goals:  [x] Improving appropriately - goals met/approximated  []         Not making progress/Not appropriate - will d/c POC     PLAN:  Recommendations and Planned Interventions:  Maximum therapeutic gains met; safest, least restrictive diet achieved. D/C ST intervention at this time. Discharge Recommendations: To Be Determined     SUBJECTIVE:   Patient stated I'm so tired. OBJECTIVE:   Cognitive and Communication Status:  Neurologic State: Alert  Orientation Level: Oriented X4  Cognition: Follows commands  Perception: Appears intact  Perseveration: No perseveration noted  Safety/Judgement: Awareness of environment  Dysphagia Treatment:  Oral Assessment:  Oral Assessment  Labial: Decreased seal  Dentition: Natural, Intact  Oral Hygiene:  (fair)  Lingual: Decreased strength, Decreased rate  Velum: No impairment  Mandible: No impairment  Gag Reflex:  (did not test)  P.O. Trials:   Patient Position:  (seated upright in chair)   Vocal quality prior to P.O.: No impairment   Consistency Presented: Thin liquid, Solid, Mixed consistency   How Presented: Self-fed/presented, Straw, Successive swallows, Spoon       Bolus Acceptance: No impairment   Bolus Formation/Control: No impairment   Type of Impairment: Mastication (prolonged mastication likely 2/ fatigue, weakness)   Propulsion: No impairment   Oral Residue: None   Initiation of Swallow: No impairment   Laryngeal Elevation: Functional   Aspiration Signs/Symptoms: None   Pharyngeal Phase Characteristics: Easily fatigued , Poor endurance   Effective Modifications:  Alternate liquids/solids, Small sips and bites   Cues for Modifications: Minimal         Oral Phase Severity: No impairment   Pharyngeal Phase Severity :  (suspect)    PAIN:  Pain level pre-treatment: 0/10   Pain level post-treatment: 0/10     After treatment:   []              Patient left in no apparent distress sitting up in chair  [x]              Patient left in no apparent distress in bed  [x] Call bell left within reach  [x]              Nursing notified  []              Caregiver present  []              Bed alarm activated      COMMUNICATION/EDUCATION:   [x] Aspiration precautions; swallow safety; compensatory techniques  []        Patient unable to participate in education; education ongoing with staff  []  Posted safety precautions in patient's room.   [] Oral-motor/laryngeal strengthening exercises    Thank you for this referral,  Paulo Wise SLP  Time Calculation: 15 mins

## 2023-02-02 NOTE — PROGRESS NOTES
Problem: Self Care Deficits Care Plan (Adult)  Goal: *Acute Goals and Plan of Care (Insert Text)  Description: Occupational Therapy Goals  Initiated 2/1/2023 within 7 day(s). 1.  Patient will perform grooming with supervision/set-up standing at sink for 5 minutes or more. 2.  Patient will perform lower body dressing with supervision/set-up. 3.  Patient will perform toilet transfers with supervision/set-up. 4.  Patient will perform all aspects of toileting with supervision/set-up. 5.  Patient will participate in upper extremity therapeutic exercise/activities with supervision/set-up for 10 minutes. 6.  Patient will utilize energy conservation techniques during functional activities with verbal, visual, and tactile cues. OCCUPATIONAL THERAPY EVALUATION    Patient: Thai Manriquez (43 y.o. female)  Date: 2/1/2023  Primary Diagnosis: Acute hypoxemic respiratory failure ([facility] [address] [J96.01]       Precautions:   Fall  PLOF: independent with ADLs and transfers     ASSESSMENT :  Based on the objective data described below, the patient presents with  decreased strength, endurance, and balance for carryover of ADLs and transfers following above mentioned medical diagnosis. Pt presented supine in bed and agrees to participate with therapy. Pt performed bed mobility, supine to sit, sit<>stand transfers, LB dressing, toilet transfers, toileting and grooming with min-CGA during this session. Pt was left seated EOB at the end of session in NAD. Pt's call bell and room telephone left within reach. Education: Pt education on safety with transfers, fall prevention and to call for assistance was provided. Pt verbalized understanding for the same. Patient will benefit from skilled intervention to address the above impairments.   Patient's rehabilitation potential is considered to be Good  Factors which may influence rehabilitation potential include:   []             None noted  []             Mental ability/status  [x]             Medical condition  []             Home/family situation and support systems  []             Safety awareness  []             Pain tolerance/management  []             Other:      PLAN :  Recommendations and Planned Interventions:   [x]               Self Care Training                  [x]      Therapeutic Activities  [x]               Functional Mobility Training   []      Cognitive Retraining  [x]               Therapeutic Exercises           [x]      Endurance Activities  [x]               Balance Training                    []      Neuromuscular Re-Education  []               Visual/Perceptual Training     [x]      Home Safety Training  [x]               Patient Education                   [x]      Family Training/Education  []               Other (comment):    Frequency/Duration: Patient will be followed by occupational therapy 1-2 times per day/4-7 days per week to address goals. Discharge Recommendations: Rehab vs Home Health  Further Equipment Recommendations for Discharge: N/A    AMPAC: 19/24    This AMPAC score should be considered in conjunction with interdisciplinary team recommendations to determine the most appropriate discharge setting. Patient's social support, diagnosis, medical stability, and prior level of function should also be taken into consideration. SUBJECTIVE:   Patient stated I am doing alright.     OBJECTIVE DATA SUMMARY:     Past Medical History:   Diagnosis Date    BMI 34.0-34.9,adult 1/29/2023    Cardiomyopathy ([facility] [address]     Chronic kidney disease     Coronary arteriosclerosis     DM (diabetes mellitus) ([facility] [address]     Hypertension     Tachycardia    History reviewed. No pertinent surgical history.   Barriers to Learning/Limitations: None  Compensate with: visual, verbal, tactile, kinesthetic cues/model    Home Situation:   Home Situation  Home Environment: Private residence  # Steps to Enter: 1  Rails to Enter: No  One/Two Story Residence: Two story  # of Interior Steps: 13  Interior Rails: Left  Lift Chair Available: No  Living Alone: No  Support Systems: Spouse/Significant Other, Child(shiloh)  Patient Expects to be Discharged to[de-identified] Home with home health  Current DME Used/Available at Home: None  Tub or Shower Type: Tub/Shower combination  []  Right hand dominant   []  Left hand dominant    Cognitive/Behavioral Status:  Neurologic State: Alert  Orientation Level: Oriented X4  Cognition: Appropriate for age attention/concentration; Follows commands  Safety/Judgement: Fall prevention    Skin: intact  Edema: none  Vision/Perceptual:    Tracking: Able to track stimulus in all quadrants w/o difficulty    Coordination: BUE  Coordination: Within functional limits  Fine Motor Skills-Upper: Left Intact; Right Intact    Gross Motor Skills-Upper: Left Intact; Right Intact  Balance:  Sitting: Intact  Standing: Impaired; With support  Standing - Static: Good  Standing - Dynamic : Fair  Strength: BUE  Strength: Generally decreased, functional  Tone & Sensation: BUE  Sensation: Intact  Range of Motion: BUE  AROM: Generally decreased, functional  Functional Mobility and Transfers for ADLs:  Bed Mobility:  Supine to Sit: Supervision  Scooting: Supervision  Transfers:  Sit to Stand: Contact guard assistance  Stand to Sit: Contact guard assistance   Toilet Transfer : Contact guard assistance; Additional time  ADL Assessment:   Feeding: Independent    Oral Facial Hygiene/Grooming: Contact guard assistance    Upper Body Dressing: Supervision    Lower Body Dressing: Minimum assistance    Toileting: Contact guard assistance  ADL Intervention:  Grooming  Grooming Assistance: Contact guard assistance  Position Performed: Standing  Washing Hands: Contact guard assistance    Lower Body Dressing Assistance  Dressing Assistance: Contact guard assistance  Shoes with Velcro: Contact guard assistance  Slip on Shoes with Back: Contact guard assistance  Leg Crossed Method Used: Yes  Position Performed: Seated edge of bed  Cues: Don;Doff    Toileting  Toileting Assistance: Minimum assistance;Contact guard assistance  Bladder Hygiene: Contact guard assistance  Clothing Management: Minimum assistance    Cognitive Retraining  Safety/Judgement: Fall prevention  Pain:  Pain level pre-treatment: 0/10   Pain level post-treatment: 0/10   Pain Intervention(s): Medication (see MAR); Rest, Ice, Repositioning   Response to intervention: Nurse notified, See doc flow    Activity Tolerance:   Fair     Please refer to the flowsheet for vital signs taken during this treatment. After treatment:   [x] Patient left in no apparent distress sitting up EOB  [] Patient left in no apparent distress in bed  [x] Call bell left within reach  [x] Nursing notified  [] Caregiver present  [] Bed alarm activated    COMMUNICATION/EDUCATION:   [x] Role of Occupational Therapy in the acute care setting  [x] Home safety education was provided and the patient/caregiver indicated understanding. [x] Patient/family have participated as able in goal setting and plan of care. [x] Patient/family agree to work toward stated goals and plan of care. [] Patient understands intent and goals of therapy, but is neutral about his/her participation. [] Patient is unable to participate in goal setting and plan of care. Thank you for this referral.  Brayden Dominguez, OTR/L  Time Calculation: 26 mins    Eval Complexity: History: LOW Complexity : Brief history review ; Examination: LOW Complexity : 1-3 performance deficits relating to physical, cognitive , or psychosocial skils that result in activity limitations and / or participation restrictions ;    Decision Making:LOW Complexity : No comorbidities that affect functional and no verbal or physical assistance needed to complete eval tasks     [facility]-PAC® Daily Activity Inpatient Short Form (6-Clicks)*    How much HELP from another person does the patient currently need    (If the patient hasn't done an activity recently, how much help from another person do you think he/she would need if he/she tried?)   Total (Total A or Dep)   A Lot  (Mod to Max A)   A Little (Sup or Min A)   None (Mod I to I)   Putting on and taking off regular lower body clothing? [] 1 [] 2 [x] 3 [] 4   2. Bathing (including washing, rinsing,      drying)? [] 1 [] 2 [x] 3 [] 4   3. Toileting, which includes using toilet, bedpan or urinal?   [] 1 [] 2 [x] 3 [] 4   4. Putting on and taking off regular upper body clothing? [] 1 [] 2 [x] 3 [] 4   5. Taking care of personal grooming such as brushing teeth? [] 1 [] 2 [x] 3 [] 4   6. Eating meals? [] 1 [] 2 [] 3 [x] 4     Based on an AM-PAC score of **/24 and their current ADL deficits; it is recommended that the patient have 5-7 sessions per week of Occupational Therapy at d/c to increase the patient's independence. Currently, this patient demonstrates the potential endurance, and/or tolerance for 3 hours of therapy each day at d/c. Based on an AM-PAC score of **/24 and their current ADL deficits; it is recommended that the patient have 3-5 sessions per week of Occupational Therapy at d/c to increase the patient's independence. Based on an AM-PAC score of **/24 and their current ADL deficits; it is recommended that the patient have 2-3 sessions per week of Occupational Therapy at d/c to increase the patient's independence. At this time and based on an AM-PAC score of **/24, no further OT is recommended upon discharge due to (i.e. patient at baseline functional statusetc). Recommend patient returns to prior setting with prior services.

## 2023-02-02 NOTE — PROGRESS NOTES
Speech Therapy Treatment Attempt     Chart reviewed. Attempted Speech Therapy Treatment, however, patient unable to be seen due to:  []  Nausea/vomiting  []  Eating  []  Pain  []  Patient too lethargic  []  Off Unit for testing/procedure  []  Dialysis treatment in progress  []  Telemetry Results  [x]  Other: attempt 9:50 - patient resting; reports improved swallow and tolerance of regular diet     Will f/u later as patient's schedule allows.   Fausto Alvarado, SLP

## 2023-02-02 NOTE — PROGRESS NOTES
Received return call from Dr. Vladimir Wilson, nurse explained to MD pt PD order has no dwell time for each exchange or overnight dwell time. MD reports to nurse pt PD should be done around the clock 4 times per day every 2 hours. Nurse asked for further clarification because every 2 hours would be more than 4 times per day. MD then asked if nurse understood Nicky Muñoz, nurse relayed to MD fluency of the Nicky Muñoz language, nurse then asked again for the order to be clarified with specific dwell times for each exchange. MD instructed nurse to use math and divide times however it's done in the hospital. No new or revised PD orders obtained at this time, nursing supervisor made aware.

## 2023-02-02 NOTE — PROGRESS NOTES
Patient tolerated PD x 2 overnight. Denied pain or discomfort. BP elevated overnight. Received Isosorbide 60 mg po and schedule for more hypertensive medications later this morning and PD. Will continue to monitor. Patient Vitals for the past 12 hrs:   Temp Pulse Resp BP SpO2   02/02/23 0644 -- 86 -- (!) 152/89 --   02/02/23 0353 98.2 °F (36.8 °C) 87 16 (!) 159/88 100 %   02/01/23 2242 97.8 °F (36.6 °C) 94 16 (!) 156/93 99 %   02/01/23 2108 97.8 °F (36.6 °C) 88 16 (!) 162/93 99 %      Bedside and Verbal shift change report given to SUAD Simpson (oncoming nurse) by Marita Velasco (offgoing nurse). Report included the following information SBAR, Kardex, Intake/Output, and MAR.

## 2023-02-02 NOTE — PROGRESS NOTES
Physician Progress Note      Amarilys Gonzales  CSN #:                  [id number]  :                       1980  ADMIT DATE:       2023 10:20 PM  [address] [place] DATE:  RESPONDING  PROVIDER #:        Yari Wyatt MD          QUERY TEXT:    Pt admitted with sepsis and has CHF exacerbation documented. If possible, please document in progress notes and discharge summary further specificity regarding the type and acuity of CHF:    The medical record reflects the following:    Risk Factors: CHF, DM, ESRD    Clinical Indicators:  > CHF exacerbation with underlying ischemic cardiomyopathy and CAD. EF 40%, Cardiology on case  beta blocker, Imdur, hydralazine for uncontrolled HTN  > Left Ventricle: Severely reduced left ventricular systolic function with a visually estimated EF of 25 - 30%. Left ventricle size is normal. Severely increased wall thickness. Findings consistent with severe concentric hypertrophy. Moderate global hypokinesis present. Grade I diastolic dysfunction with normal LAP. ? Treatment: receiving echocardiogram, Kimberly Keene RN, BSN, [place], [facility] Pedro Ziegler, [facility] [email or web address]  Options provided:  -- Acute on Chronic Systolic CHF/HFrEF  -- Acute on Chronic Diastolic CHF/HFpEF  -- Acute on Chronic Systolic and Diastolic CHF  -- Other - I will add my own diagnosis  -- Disagree - Not applicable / Not valid  -- Disagree - Clinically unable to determine / Unknown  -- Refer to Clinical Documentation Reviewer    PROVIDER RESPONSE TEXT:    This patient is in acute on chronic systolic CHF/HFrEF.     Query created by: Renetta Holder on 2023 2:23 PM      Electronically signed by:  Yari Wyatt MD 2023 12:03 PM

## 2023-02-02 NOTE — PROGRESS NOTES
Problem: Mobility Impaired (Adult and Pediatric)  Goal: *Acute Goals and Plan of Care (Insert Text)  Description: Physical Therapy Goals   Initiated 2/1/2023 and to be accomplished within 7 day(s)  1. Patient will move from supine <> sit with mod I in prep for out of bed activity and change of position. 2.  Patient will perform sit<> stand with S with LRAD in prep for transfers/ambulation. 3.  Patient will transfer from bed <> chair with S with LRAD for time up in chair for completion of ADL activity. 4.  Patient will ambulate 100 feet with S/LRAD for improved functional mobility at discharge. 5.  Patient will ascend/descend 3-5 stairs with handrail(s) with minimal assistance/contact guard assist for home re-entry as needed. Outcome: Progressing Towards Goal    PHYSICAL THERAPY TREATMENT    Patient: Abhay Miranda (50 y.o. female)  Date: 2/2/2023  Diagnosis: Acute hypoxemic respiratory failure ([address] [J96.01]   Precautions: Fall  PLOF: Independent w/o AD    ASSESSMENT:  Pt was seen on 3S unit with above diagnoses. Pt found reclined in bed resting. Pt supine to sit EOB independently. No onset of symptoms at EOB. Gait training was performed this date, 68ft into [place] with RW/S/vc for safety. Pt would be appropriate for gait trial w/ QC for amb next session taking special consideration for fatigue level and L toe pain level. Pt was left sitting up in chair with legs elevated on another chair with all needs in reach, nurse Holli notified. Reminded pt to call for assistance when ready to transfer back into bed. Pre session Pt reported pain in L 4th toe 2/10, 0/10 post amb, however, had increased sharp pain through the L toe as PT was leaving, nurse aware and bringing pain meds.    Progression toward goals:   [x]      Improving appropriately and progressing toward goals  []      Improving slowly and progressing toward goals  []      Not making progress toward goals and plan of care will be adjusted PLAN:  Patient continues to benefit from skilled intervention to address the above impairments. Continue treatment per established plan of care. Discharge Recommendations: Home Physical Therapy  Further Equipment Recommendations for Discharge:  rolling walker and N/A    AMPAC: 19/20    This AMPAC score should be considered in conjunction with interdisciplinary team recommendations to determine the most appropriate discharge setting. Patient's social support, diagnosis, medical stability, and prior level of function should also be taken into consideration. SUBJECTIVE:   Patient stated I feel tired today.     OBJECTIVE DATA SUMMARY:   Critical Behavior:  Neurologic State: Alert  Orientation Level: Oriented X4  Cognition: Appropriate for age attention/concentration  Safety/Judgement: Fall prevention  Functional Mobility Training:  Transfers:  Sit to Stand: Modified independent  Stand to Sit: Modified independent  Bed to Chair: Modified independent  Balance:  Sitting: Intact  Ambulation/Gait Training:  Distance (ft): 68 Feet (ft)  Assistive Device: Walker, rolling;Gait belt  Ambulation - Level of Assistance: Supervision  Gait Description (WDL): Exceptions to WDL  Gait Abnormalities: Step to gait  Speed/Kristyn: Slow  Interventions: Safety awareness training;Verbal cues  Pain:  Pain level pre-treatment: 2/10  Pain level post-treatment: 0/10   Pain Intervention(s): Medication (see MAR); Rest, Ice, Repositioning: feet elevated   Response to intervention: Nurse notified, See doc flow  Activity Tolerance:   Good, improving  Please refer to the flowsheet for vital signs taken during this treatment.   After treatment:   [x] Patient left in no apparent distress sitting up in chair  [] Patient left in no apparent distress in bed  [x] Call bell left within reach  [x] Nursing notified  [] Caregiver present  [] Bed alarm activated  [] SCDs applied      COMMUNICATION/EDUCATION:   [x]         Role of Physical Therapy in the acute care setting. [x]         Fall prevention education was provided and the patient/caregiver indicated understanding. [x]         Patient/family have participated as able in working toward goals and plan of care. [x]         Patient/family agree to work toward stated goals and plan of care. []         Patient understands intent and goals of therapy, but is neutral about his/her participation. []         Patient is unable to participate in stated goals/plan of care: ongoing with therapy staff.  []         Other:        Mohsen Lopez, BRIDGER   Time Calculation: 27 mins    [address] [place] [address] [address] [id number] AM-PAC® Basic Mobility Inpatient Short Form (6-Clicks) Version 2    How much HELP from another person does the patient currently need    (If the patient hasn't done an activity recently, how much help from another person do you think he/she would need if he/she tried?)   Total (Total A or Dep)   A Lot  (Mod to Max A)   A Little (Sup or Min A)   None (Mod I to I)   Turning from your back to your side while in a flat bed without using bedrails? [] 1 [] 2 [] 3 [x] 4   2. Moving from lying on your back to sitting on the side of a flat bed without using bedrails? [] 1 [] 2 [] 3 [x] 4   3. Moving to and from a bed to a chair (including a wheelchair)? [] 1 [] 2 [] 3 [x] 4   4. Standing up from a chair using your arms (e.g., wheelchair, or bedside chair)? [] 1 [] 2 [] 3 [x] 4   5. Walking in hospital room? [] 1 [] 2 [x] 3 [] 4   6. Climbing 3-5 steps with a railing?+   [] 1 [] 2 [] 3 [] 4   +If stair climbing cannot be assessed, skip item #6. Sum responses from items 1-5. Based on an AM-PAC score of **/24 (or 19/20 if omitting stairs) and their current functional mobility deficits, it is recommended that the patient have 2-3 sessions per week of Physical Therapy at d/c to increase the patient's independence.

## 2023-02-02 NOTE — ANTIMICROBIAL STEWARDSHIP
Antimicrobial Stewardship Chart review: With focus on prescribed Antimicrobials, reviewed clinical presentation that includes fever and VS curve or trend, labs, Microbiology, imaging reports and notes as appropriate. Based on this brief analysis, here below are the suggestion. Diagnostic indication in chart for the Antimicrobial/s: foot cellulitis    CURRENT ANTIMICROBIALS: Zosyn-> Keflex    DISCONTINUE THE FOLLOWING ANTIBIOTIC(S):       Rationale:    (  )  No evidence of bacterial infection                          (  )  Microbiology report does not support use                          (  )  Narrowing broad-spectrum empiric coverage                          (  )  Therapeutic duplication: overlapping antimicrobial spectrum                          (  )  Clinical situation dictates change                          (  )  Prolonged duration of therapy not needed                          (  )  Allergy/toxicity/drug interaction present                          (  ) More clinically/cost effective therapy available  ADD ANTIBIOTICS  Rationale:        (  ) Microbiology report supports use                          (  ) Expanded coverage needed: gm positive /negative / anaerobic / atypical infection possible                          (  ) More clinicaly/cost effective therapy than current regimen                          (  ) Needed for synergy                          (  ) Double coverage needed                          (  ) Less toxic therapy                          (  ) Antifungal therapy needed    No  Change in Antibiotics: ( )                               COMMENTS:       This communication is not a consultation. If a thorough analysis of the case is desired, please request an ID consultation. Cate Terry MD  [facility])  Office #:     [phone]  [facility]   Office Fax: [phone]
no

## 2023-02-02 NOTE — PROGRESS NOTES
Cardiology Progress Note        Patient: Thai Manriquez        Sex: female          DOA: 1/28/2023  YOB: 1980      Age:  43 y.o.        LOS:  LOS: 3 days       Patient seen and examined, chart reviewed    Assessment/Plan     Patient Active Problem List   Diagnosis Code    Acute pulmonary edema ([facility] J81.0    Chest pain R07.9    ESRD (end stage renal disease) on dialysis ([facility] N18.6, Z99.2    Pulmonary edema J81.1    Cardiomyopathy ([facility] I42.9    HTN (hypertension) I10    Type 2 diabetes mellitus ([facility] E11.9    PAD (peripheral artery disease) ([facility] I73.9    CAD (coronary artery disease) I25.10    Acute respiratory failure with hypoxia and hypercapnia (HCC) J96.01, J96.02    Hypertensive emergency I16.1    Acute exacerbation of CHF (congestive heart failure) ([facility] I50.9    BMI 34.0-34.9,adult Z68.34    Peritoneal dialysis status ([facility] Z99.2    Diabetic ulcer of left foot associated with type 2 diabetes mellitus, with fat layer exposed ([facility] E11.621, L97.522    Sepsis (HCC) A41.9    Cellulitis of fourth toe, left L03.032    Chronic anemia D64.9       Abnormal troponin most likely non-STEMI type II  Echocardiogram was done in 11/2022 reported       Left Ventricle: Moderately reduced left ventricular systolic function with a visually estimated EF of 35 - 40%. Left ventricle size is normal. Moderate septal thickening. Severe posterior thickening. Moderate global hypokinesis present. Grade II diastolic dysfunction with increased LAP. Right Ventricle: Right ventricle size is normal. Mildly increased wall thickness. Aortic Valve: Severely thickened cusp. Moderately calcified right and noncoronary cusps. Minimal to moderate stenosis of the aortic valve. Mitral Valve: Mildly thickened leaflet, at the posterior leaflet. Severe annular calcification at the posterior leaflet of the mitral valve. Moderate regurgitation.  Mild to moderate stenosis noted, measurement is compromised with severe calcification. Planimetry was not performed. Left Atrium: Left atrium is severely dilated. Right Atrium: Right atrium is moderately dilated. PA systolic pressure is 40 mmHg. Cardiac catheterization was done in November 2022 reported     Hemodynamics: Ao: 158/81 mmHg. Mean 112 mmHg. LV: 162/12 mmHg. LVEDP: 25   mmHg. No aortic valve gradient was noted on the pullback of the   pigtail catheter. Coronary Arteries:        Left Main Artery: Widely patent     Left Anterior Descending Artery: Proximal segments patent. Mid vessel contains prior stents which are patent. Distal segments patent. Moderate sized first diagonal branch which is patent. Moderate-sized second diagonal branch which contains prior stents is 100% occluded. Left Circumflex Artery: Moderate size AV vessel. Proximal and mid are patent. Distal segment contains a 20% stenosis just after the origin of an obtuse marginal branch. Large branching   obtuse marginal branch from the mid AV groove vessel. Proximal segment contains a concentric discrete 70% stenosis. Distal vessel contains luminal irregularities. Right Coronary Artery: Dominant, moderate size vessel. Proximal and mid segments patent. Distal vessel bifurcates into 2 branches at the acute margin. The ongoing right branch is diffusely diseased with a long 80% stenosis, followed by a   discrete 90% stenosis. The acute marginal branch contains   proximal and mid 99% stenoses. 1.  Progressive coronary disease since prior catheterization   7/14/2021. Second diagonal branch which was previously stented is now 100% occluded. First obtuse marginal branch contains a ostial/proximal 70% stenosis. Distal right coronary artery demonstrates 2 branches with the ongoing vessel diffusely diseased with discrete distal 90% stenosis. The acute marginal branch contains proximal and mid 99% stenoses.    2.  Ischemic cardiomyopathy ejection fraction 40% 3.  No aortic stenosis   4. Mild mitral regurgitation   5. Successful administration of moderate sedation without   complications     Plan: Routine post cardiac cath orders. Patient's disease is   mostly branch vessel and distal vessel disease. The diagonal is 100% chronic total occlusion, the distal right coronary artery   disease is very diffuse. Thus, this anatomy is unfavorable for   PCI and CABG. The obtuse marginal branch potentially could be considered for PCI although would leave behind significant   residual coronary disease. Patient needs aggressive guideline   directed risk factor modification. In addition with her LV   systolic dysfunction she will also need guideline directed   medical therapy for LV dysfunction and chronic systolic heart   failure. Status post extubated     Plan:     Continue metoprolol succinate, nifedipine, isosorbide mononitrate and atorvastatin. Continue dialysis as per nephrologist   Resume aspirin if no evidence of any bleeding                Subjective:    cc:   Denies any chest pain or shortness of breath, I feel tired      REVIEW OF SYSTEMS:     General: No fevers or chills. Cardiovascular: No chest pain,No palpitations, No orthopnea, No PND, positive leg swelling, No claudication  Pulmonary: No  dyspnea. Gastrointestinal: No nausea, vomiting, bleeding  Neurology: No Dizziness    Objective:      Visit Vitals  /85   Pulse 95   Temp 97.7 °F (36.5 °C)   Resp 17   Ht 5' 7\" (1.702 m)   Wt 99.8 kg (220 lb)   SpO2 99%   BMI 34.46 kg/m²     Body mass index is 34.46 kg/m². Physical Exam:  General Appearance: Comfortable, not using accessory muscles of respiration. HEENT: SARMAD. HEAD: Atraumatic  NECK: No JVD, no thyroidomeglay. CAROTIDS: no bruit  LUNGS:  bilateral conducted sounds   HEART: S1+S2 audible, no murmur, no pericardial rub. ABD: Non-tender, BS Audible    EXT:  bilateral lower extremity  trace edema, and no cyanosis.   NEUROLOGICAL: Alert, follows verbal commands    Medication:  Current Facility-Administered Medications   Medication Dose Route Frequency    insulin lispro (HUMALOG) injection   SubCUTAneous AC&HS    diphenhydrAMINE (BENADRYL) injection 12.5 mg  12.5 mg IntraVENous Q6H PRN    cephALEXin (KEFLEX) capsule 500 mg  500 mg Oral TID    nystatin (MYCOSTATIN) 100,000 unit/mL oral suspension 500,000 Units  500,000 Units Oral QID    bumetanide (BUMEX) tablet 2 mg  2 mg Oral BID    peritoneal dialysis DEXTROSE 2.5% (2.5 mEq/L low calcium) solution 2,000 mL  2,000 mL IntraPERitoneal QID    atorvastatin (LIPITOR) tablet 80 mg  80 mg Oral DAILY    calcitRIOL (ROCALTROL) capsule 0.25 mcg  0.25 mcg Oral DAILY    cinacalcet (SENSIPAR) tablet 60 mg  60 mg Oral DAILY    levothyroxine (SYNTHROID) tablet 200 mcg  200 mcg Oral ACB    NIFEdipine ER (PROCARDIA XL) tablet 90 mg  90 mg Oral DAILY    calcium acetate(phosphat bind) (PHOSLO) capsule 1,334 mg  2 Capsule Oral TID WITH MEALS    isosorbide mononitrate ER (IMDUR) tablet 60 mg  60 mg Oral 7am    metoprolol succinate (TOPROL-XL) XL tablet 50 mg  50 mg Oral BID    hydrALAZINE (APRESOLINE) tablet 50 mg  50 mg Oral TID    famotidine (PF) (PEPCID) 20 mg in 0.9% sodium chloride 10 mL injection  20 mg IntraVENous DAILY    cloNIDine HCL (CATAPRES) tablet 0.2 mg  0.2 mg Oral BID    morphine injection 4 mg  4 mg IntraVENous Q4H PRN    sodium chloride (NS) flush 5-40 mL  5-40 mL IntraVENous Q8H    sodium chloride (NS) flush 5-40 mL  5-40 mL IntraVENous PRN    acetaminophen (TYLENOL) tablet 650 mg  650 mg Oral Q6H PRN    Or    acetaminophen (TYLENOL) suppository 650 mg  650 mg Rectal Q6H PRN    polyethylene glycol (MIRALAX) packet 17 g  17 g Oral DAILY PRN    ondansetron (ZOFRAN ODT) tablet 4 mg  4 mg Oral Q8H PRN    Or    ondansetron (ZOFRAN) injection 4 mg  4 mg IntraVENous Q6H PRN    heparin (porcine) injection 5,000 Units  5,000 Units SubCUTAneous Q8H    glucose chewable tablet 16 g  16 g Oral PRN    glucagon (GLUCAGEN) injection 1 mg  1 mg IntraMUSCular PRN    dextrose 10% infusion 0-250 mL  0-250 mL IntraVENous PRN    0.9% sodium chloride infusion 250 mL  250 mL IntraVENous PRN    hydrALAZINE (APRESOLINE) 20 mg/mL injection 10 mg  10 mg IntraVENous Q4H PRN    sodium chloride (NS) flush 5-10 mL  5-10 mL IntraVENous PRN               Lab/Data Reviewed:       Recent Labs     02/01/23  0508 01/31/23  0504 01/30/23  0812   WBC 9.5 9.3 8.9   HGB 8.4* 9.3* 8.5*   HCT 27.3* 30.9* 28.1*    316 284       Recent Labs     02/01/23  0508 01/31/23  0504 01/30/23  0812    141 139   K 4.1 4.3 4.9   CL 99* 101 98*   CO2 27 26 30   GLU 69* 109* 75   BUN 51* 63* 69*   CREA 11.80* 12.90* 13.60*   CA 9.0 10.0 10.7*  10.4*           Signed By: Tosha Abbott MD     February 1, 2023

## 2023-02-02 NOTE — PROGRESS NOTES
Nephrology Progress Note    Patient: Nina Farley      Subjectives:    Nina Farley is a 43 y.o. female with a past medical history significant for CAD/MI, CM 40% EF, DM type 2, obesity, ESRD on PD at [facility], who presented with worsening SOB/ resp distress. Pt could not tolerate bipap and eventually required intubation, admitted to ICU. Noticed to have elevated BP as well. No f/c. Imaging c/w volume overload and neg for PE. Nephrology was consulted for further evaluation and management of her ESRD. S/p extubation. Patient says she had poor UF/drainage prior to admission. Attributes this to constipation, now improved. Denies non-compliance with PD Prescription. No PD exchange problem overnight. No cp or sob. No new complnts.     Inpatient meds:  Current Facility-Administered Medications   Medication Dose Route Frequency    aspirin chewable tablet 81 mg  81 mg Oral DAILY    insulin lispro (HUMALOG) injection   SubCUTAneous AC&HS    diphenhydrAMINE (BENADRYL) injection 12.5 mg  12.5 mg IntraVENous Q6H PRN    cephALEXin (KEFLEX) capsule 500 mg  500 mg Oral TID    nystatin (MYCOSTATIN) 100,000 unit/mL oral suspension 500,000 Units  500,000 Units Oral QID    bumetanide (BUMEX) tablet 2 mg  2 mg Oral BID    peritoneal dialysis DEXTROSE 2.5% (2.5 mEq/L low calcium) solution 2,000 mL  2,000 mL IntraPERitoneal QID    atorvastatin (LIPITOR) tablet 80 mg  80 mg Oral DAILY    calcitRIOL (ROCALTROL) capsule 0.25 mcg  0.25 mcg Oral DAILY    cinacalcet (SENSIPAR) tablet 60 mg  60 mg Oral DAILY    levothyroxine (SYNTHROID) tablet 200 mcg  200 mcg Oral ACB    NIFEdipine ER (PROCARDIA XL) tablet 90 mg  90 mg Oral DAILY    calcium acetate(phosphat bind) (PHOSLO) capsule 1,334 mg  2 Capsule Oral TID WITH MEALS    isosorbide mononitrate ER (IMDUR) tablet 60 mg  60 mg Oral 7am    metoprolol succinate (TOPROL-XL) XL tablet 50 mg  50 mg Oral BID    hydrALAZINE (APRESOLINE) tablet 50 mg  50 mg Oral TID    famotidine (PF) (PEPCID) 20 mg in 0.9% sodium chloride 10 mL injection  20 mg IntraVENous DAILY    cloNIDine HCL (CATAPRES) tablet 0.2 mg  0.2 mg Oral BID    morphine injection 4 mg  4 mg IntraVENous Q4H PRN    sodium chloride (NS) flush 5-40 mL  5-40 mL IntraVENous Q8H    sodium chloride (NS) flush 5-40 mL  5-40 mL IntraVENous PRN    acetaminophen (TYLENOL) tablet 650 mg  650 mg Oral Q6H PRN    Or    acetaminophen (TYLENOL) suppository 650 mg  650 mg Rectal Q6H PRN    polyethylene glycol (MIRALAX) packet 17 g  17 g Oral DAILY PRN    ondansetron (ZOFRAN ODT) tablet 4 mg  4 mg Oral Q8H PRN    Or    ondansetron (ZOFRAN) injection 4 mg  4 mg IntraVENous Q6H PRN    heparin (porcine) injection 5,000 Units  5,000 Units SubCUTAneous Q8H    glucose chewable tablet 16 g  16 g Oral PRN    glucagon (GLUCAGEN) injection 1 mg  1 mg IntraMUSCular PRN    dextrose 10% infusion 0-250 mL  0-250 mL IntraVENous PRN    0.9% sodium chloride infusion 250 mL  250 mL IntraVENous PRN    hydrALAZINE (APRESOLINE) 20 mg/mL injection 10 mg  10 mg IntraVENous Q4H PRN    sodium chloride (NS) flush 5-10 mL  5-10 mL IntraVENous PRN             Vital signs:   Visit Vitals  BP (!) 145/79   Pulse 87   Temp 97.6 °F (36.4 °C)   Resp 18   Ht 5' 7\" (1.702 m)   Wt 99.8 kg (220 lb)   SpO2 100%   BMI 34.46 kg/m²       Intake/Output Summary (Last 24 hours) at 2/2/2023 1027  Last data filed at 2/2/2023 1051  Gross per 24 hour   Intake 6030 ml   Output 5600 ml   Net 430 ml         Physical Exam:    General: No resp distress   HENT: Atraumatic and normocephalic   Eyes: Normal conjunctiva, EOMI   Neck: Supple with no JVD   Cardiovascular: Normal S1 & S2, no m/r/g   Pulmonary/Chest Wall: Clear to auscultation bilaterally   Abdominal: Soft and non-tender, normal active bowel sound   +PD cath    Musculoskeletal: No pedal edema   Skin: No rash/ lesions   Neurological: AAOx3       Data Review:  CMP:   Lab Results   Component Value Date/Time     (L) 02/02/2023 03:39 AM    K 3.9 02/02/2023 03:39 AM    CL 98 (L) 02/02/2023 03:39 AM    CO2 27 02/02/2023 03:39 AM    AGAP 10 02/02/2023 03:39 AM     (H) 02/02/2023 03:39 AM    BUN 51 (H) 02/02/2023 03:39 AM    CREA 11.50 (H) 02/02/2023 03:39 AM    CA 8.9 02/02/2023 03:39 AM    ALB 1.9 (L) 02/02/2023 03:39 AM    TP 6.2 (L) 02/02/2023 03:39 AM    GLOB 4.3 (H) 02/02/2023 03:39 AM    AGRAT 0.4 (L) 02/02/2023 03:39 AM    ALT 15 02/02/2023 03:39 AM     CBC:   Lab Results   Component Value Date/Time    WBC 8.5 02/02/2023 03:39 AM    HGB 8.1 (L) 02/02/2023 03:39 AM    HCT 26.5 (L) 02/02/2023 03:39 AM     02/02/2023 03:39 AM     All Cardiac Markers in the last 24 hours: No results found for: CPK, CK, CKMMB, CKMB, RCK3, CKMBT, CKNDX, CKND1, NAVIN, TROPT, TROIQ, JUAN CARLOS, TROPT, TNIPOC, BNP, BNPP  Recent Glucose Results:   Lab Results   Component Value Date/Time     (H) 02/02/2023 03:39 AM     ABG:   No results found for: PH, PHI, PCO2, PCO2I, PO2, PO2I, HCO3, HCO3I, FIO2, FIO2I    Liver Panel:   Lab Results   Component Value Date/Time    ALB 1.9 (L) 02/02/2023 03:39 AM    TP 6.2 (L) 02/02/2023 03:39 AM    GLOB 4.3 (H) 02/02/2023 03:39 AM    AGRAT 0.4 (L) 02/02/2023 03:39 AM    ALT 15 02/02/2023 03:39 AM     02/02/2023 03:39 AM     Pancreatic Markers: No results found for: AMYLPOCT, AML, LIPPOCT, LPSE      CT:      Significant bilateral lower lobe consolidation and bilateral  pulmonary infiltrates. Small right pleural effusion. No evidence of pulmonary embolism. Peritoneal dialysis catheter is coiled within the left lower quadrant. No acute abnormality is identified. Impression:     ESRD on home PD  Respiratory failure required intubation: now extubated  Pulm Edema, improved. Net UF 4.8L so far since admission. CXR improved. Hyperkalemia, mild: resolved  Anemia in CKD  HTN: BP not well controlled   DM type 2  Hypoalbuminemia  Lactic acidosis, resolved  H.o.  CAD/ MI  SHPT:      Plan:       Continue manual PD: use 2.5L of 2.5 % dextrose 4 times/day  Cont Bumex 2mg po bid  Cont BP control  Weekly SCOTT  Continue Calcitriol and Sensipar 60mg daily  Strict I/O  AM renal panel, cbc  D/w pt    MD Thomas Zeng  [phone]

## 2023-02-02 NOTE — PROGRESS NOTES
Pt ordered peritoneal dialysis 4 times per day, need clarification on dwell times. Nephrologist on call for Thomas Vang paged through answering service at this tme, awaiting return call.

## 2023-02-02 NOTE — PROGRESS NOTES
D/C Plan: [facility] with physician follow up and a RW    Noted pt transfer to [facility]. Chart reviewed. Noted therapy recommendation for HH and a RW. .  CM met with pt at bedside to discuss transition of care recommendation. Pt is in agreement with HH and a RW. CM offered FOC and pt has no preference as long as the [facility] agency partners with pt's insurance. CMS has been notified to assist.  Referral has been sent to [facility]. Anticipate pt will transition home with physician follow up and [facility] with in the next 24-48 hours. Pt's family will transport pt home when discharged. Pt will be provided a RW by care management before discharge.   CM to continue to follow and assist.    Care Management Interventions  Transition of Care Consult (CM Consult): Discharge Planning  Support Systems: Spouse/Significant Other, Child(shiloh)  The Plan for Transition of Care is Related to the Following Treatment Goals : acute respiratory failure with hypoxia  Discharge Location  Patient Expects to be Discharged to[de-identified] Home with home health

## 2023-02-03 ENCOUNTER — HOME HEALTH ADMISSION (OUTPATIENT)
Dept: HOME HEALTH SERVICES | Facility: HOME HEALTH | Age: 43
End: 2023-02-03
Payer: MEDICAID

## 2023-02-03 VITALS
RESPIRATION RATE: 16 BRPM | HEART RATE: 93 BPM | OXYGEN SATURATION: 100 % | SYSTOLIC BLOOD PRESSURE: 141 MMHG | HEIGHT: 67 IN | WEIGHT: 180.8 LBS | BODY MASS INDEX: 28.38 KG/M2 | DIASTOLIC BLOOD PRESSURE: 81 MMHG | TEMPERATURE: 97.8 F

## 2023-02-03 LAB
BACTERIA SPEC CULT: NORMAL
ERYTHROCYTE [DISTWIDTH] IN BLOOD BY AUTOMATED COUNT: 14.1 % (ref 11.6–14.5)
GLUCOSE BLD STRIP.AUTO-MCNC: 137 MG/DL (ref 70–110)
GLUCOSE BLD STRIP.AUTO-MCNC: 84 MG/DL (ref 70–110)
GRAM STN SPEC: NORMAL
GRAM STN SPEC: NORMAL
HCT VFR BLD AUTO: 25.2 % (ref 35–45)
HGB BLD-MCNC: 7.9 G/DL (ref 12–16)
MCH RBC QN AUTO: 28.5 PG (ref 24–34)
MCHC RBC AUTO-ENTMCNC: 31.3 G/DL (ref 31–37)
MCV RBC AUTO: 91 FL (ref 78–100)
NRBC # BLD: 0 K/UL (ref 0–0.01)
NRBC BLD-RTO: 0 PER 100 WBC
PLATELET # BLD AUTO: 331 K/UL (ref 135–420)
PMV BLD AUTO: 9.9 FL (ref 9.2–11.8)
RBC # BLD AUTO: 2.77 M/UL (ref 4.2–5.3)
SERVICE CMNT-IMP: NORMAL
WBC # BLD AUTO: 8 K/UL (ref 4.6–13.2)

## 2023-02-03 PROCEDURE — 74011000250 HC RX REV CODE- 250: Performed by: FAMILY MEDICINE

## 2023-02-03 PROCEDURE — 85027 COMPLETE CBC AUTOMATED: CPT

## 2023-02-03 PROCEDURE — 74011250637 HC RX REV CODE- 250/637: Performed by: HOSPITALIST

## 2023-02-03 PROCEDURE — A4722 DIALYS SOL FLD VOL > 1999CC: HCPCS | Performed by: INTERNAL MEDICINE

## 2023-02-03 PROCEDURE — 74011250636 HC RX REV CODE- 250/636: Performed by: INTERNAL MEDICINE

## 2023-02-03 PROCEDURE — 36415 COLL VENOUS BLD VENIPUNCTURE: CPT

## 2023-02-03 PROCEDURE — 82962 GLUCOSE BLOOD TEST: CPT

## 2023-02-03 PROCEDURE — 74011250637 HC RX REV CODE- 250/637: Performed by: FAMILY MEDICINE

## 2023-02-03 PROCEDURE — 74011250637 HC RX REV CODE- 250/637: Performed by: INTERNAL MEDICINE

## 2023-02-03 PROCEDURE — 74011250636 HC RX REV CODE- 250/636: Performed by: FAMILY MEDICINE

## 2023-02-03 RX ORDER — CEPHALEXIN 250 MG/1
500 CAPSULE ORAL EVERY 12 HOURS
Status: DISCONTINUED | OUTPATIENT
Start: 2023-02-03 | End: 2023-02-03 | Stop reason: HOSPADM

## 2023-02-03 RX ORDER — FAMOTIDINE 20 MG/1
20 TABLET, FILM COATED ORAL DAILY
Status: DISCONTINUED | OUTPATIENT
Start: 2023-02-04 | End: 2023-02-03 | Stop reason: HOSPADM

## 2023-02-03 RX ORDER — METOPROLOL SUCCINATE 50 MG/1
50 TABLET, EXTENDED RELEASE ORAL 2 TIMES DAILY
Qty: 60 TABLET | Refills: 1 | Status: SHIPPED | OUTPATIENT
Start: 2023-02-03

## 2023-02-03 RX ORDER — NYSTATIN 100000 [USP'U]/ML
500000 SUSPENSION ORAL 4 TIMES DAILY
Qty: 100 ML | Refills: 0 | Status: SHIPPED | OUTPATIENT
Start: 2023-02-03 | End: 2023-02-08

## 2023-02-03 RX ORDER — ISOSORBIDE MONONITRATE 60 MG/1
60 TABLET, EXTENDED RELEASE ORAL
Qty: 30 TABLET | Refills: 1 | Status: SHIPPED
Start: 2023-02-04

## 2023-02-03 RX ORDER — CEPHALEXIN 500 MG/1
500 CAPSULE ORAL EVERY 12 HOURS
Qty: 6 CAPSULE | Refills: 0 | Status: SHIPPED | OUTPATIENT
Start: 2023-02-03 | End: 2023-02-06

## 2023-02-03 RX ADMIN — CALCITRIOL 0.25 MCG: 0.25 CAPSULE ORAL at 08:00

## 2023-02-03 RX ADMIN — ATORVASTATIN CALCIUM 80 MG: 20 TABLET, FILM COATED ORAL at 08:04

## 2023-02-03 RX ADMIN — NIFEDIPINE 90 MG: 30 TABLET, FILM COATED, EXTENDED RELEASE ORAL at 08:01

## 2023-02-03 RX ADMIN — CALCIUM ACETATE 1334 MG: 667 CAPSULE ORAL at 07:59

## 2023-02-03 RX ADMIN — BUMETANIDE 2 MG: 1 TABLET ORAL at 08:05

## 2023-02-03 RX ADMIN — SODIUM CHLORIDE, SODIUM LACTATE, CALCIUM CHLORIDE, MAGNESIUM CHLORIDE AND DEXTROSE 2000 ML: 2.5; 538; 448; 18.3; 5.08 INJECTION, SOLUTION INTRAPERITONEAL at 09:00

## 2023-02-03 RX ADMIN — HEPARIN SODIUM 5000 UNITS: 5000 INJECTION INTRAVENOUS; SUBCUTANEOUS at 12:30

## 2023-02-03 RX ADMIN — ASPIRIN 81 MG: 81 TABLET, CHEWABLE ORAL at 08:00

## 2023-02-03 RX ADMIN — EPOETIN ALFA-EPBX 10000 UNITS: 10000 INJECTION, SOLUTION INTRAVENOUS; SUBCUTANEOUS at 06:41

## 2023-02-03 RX ADMIN — CLONIDINE HYDROCHLORIDE 0.2 MG: 0.1 TABLET ORAL at 08:56

## 2023-02-03 RX ADMIN — ISOSORBIDE MONONITRATE 60 MG: 60 TABLET, EXTENDED RELEASE ORAL at 06:41

## 2023-02-03 RX ADMIN — CALCIUM ACETATE 1334 MG: 667 CAPSULE ORAL at 13:44

## 2023-02-03 RX ADMIN — SODIUM CHLORIDE, PRESERVATIVE FREE 10 ML: 5 INJECTION INTRAVENOUS at 13:44

## 2023-02-03 RX ADMIN — SODIUM CHLORIDE, PRESERVATIVE FREE 10 ML: 5 INJECTION INTRAVENOUS at 05:44

## 2023-02-03 RX ADMIN — SODIUM CHLORIDE 10 ML: 9 INJECTION, SOLUTION INTRAMUSCULAR; INTRAVENOUS; SUBCUTANEOUS at 08:02

## 2023-02-03 RX ADMIN — NYSTATIN 500000 UNITS: 100000 SUSPENSION ORAL at 08:04

## 2023-02-03 RX ADMIN — FAMOTIDINE 20 MG: 10 INJECTION, SOLUTION INTRAVENOUS at 08:03

## 2023-02-03 RX ADMIN — NYSTATIN 500000 UNITS: 100000 SUSPENSION ORAL at 12:29

## 2023-02-03 RX ADMIN — ACETAMINOPHEN 650 MG: 325 TABLET ORAL at 09:02

## 2023-02-03 RX ADMIN — SODIUM CHLORIDE, SODIUM LACTATE, CALCIUM CHLORIDE, MAGNESIUM CHLORIDE AND DEXTROSE 2000 ML: 2.5; 538; 448; 18.3; 5.08 INJECTION, SOLUTION INTRAPERITONEAL at 13:44

## 2023-02-03 RX ADMIN — CINACALCET HYDROCHLORIDE 60 MG: 30 TABLET, FILM COATED ORAL at 08:04

## 2023-02-03 RX ADMIN — CEPHALEXIN 500 MG: 250 CAPSULE ORAL at 08:05

## 2023-02-03 RX ADMIN — HYDRALAZINE HYDROCHLORIDE 50 MG: 50 TABLET, FILM COATED ORAL at 08:04

## 2023-02-03 RX ADMIN — HEPARIN SODIUM 5000 UNITS: 5000 INJECTION INTRAVENOUS; SUBCUTANEOUS at 02:22

## 2023-02-03 RX ADMIN — METOPROLOL SUCCINATE 50 MG: 50 TABLET, EXTENDED RELEASE ORAL at 08:04

## 2023-02-03 RX ADMIN — LEVOTHYROXINE SODIUM 200 MCG: 0.1 TABLET ORAL at 06:41

## 2023-02-03 NOTE — PROGRESS NOTES
Hospitalist Progress Note    Patient: Felipe Shah MRN: [id number]  [facility]: [id number]    YOB: 1980  Age: 43 y.o. Sex: female    DOA: 1/28/2023 LOS:  LOS: 5 days                    Assessment/Plan        42 y/o female with multiple medical problems to include progressive CAD not amenable to stenting followed by [facility], CHF with EF of 40%, ESRD on peritoneal dialysis, HTN, type 2 diabetes mellitus, and obesity is admitted with acute hypoxemic and hypercapneic respiratory failure due to acute CHF exacerbation and sepsis. She also has a diabetic foot ulcer with cellulitis of the left fourth toe.  She was recently started on gabapentin prior to symptom onset       patient has been offered heart cath here to delineate ischemic heart disease possibly contributing to flash pulm edema, and she declines    We have discussed risk of not completing that here if she has corornary disease/blockages to include recurring heart failure, resp failure, MI, death    She wants to continue with outpatient plan for 2/7 to do it with Dr Cuong Tong

## 2023-02-03 NOTE — PROGRESS NOTES
Cardiology Progress Note        Patient: Edi Yang        Sex: female          DOA: 1/28/2023  YOB: 1980      Age:  43 y.o.        LOS:  LOS: 4 days       Patient seen and examined, chart reviewed    Assessment/Plan     Patient Active Problem List   Diagnosis Code    Acute pulmonary edema ([facility] J81.0    Chest pain R07.9    ESRD (end stage renal disease) on dialysis ([facility] N18.6, Z99.2    Pulmonary edema J81.1    Cardiomyopathy ([facility] I42.9    HTN (hypertension) I10    Type 2 diabetes mellitus ([facility] E11.9    PAD (peripheral artery disease) ([facility] I73.9    CAD (coronary artery disease) I25.10    Acute respiratory failure with hypoxia and hypercapnia (HCC) J96.01, J96.02    Hypertensive emergency I16.1    Acute exacerbation of CHF (congestive heart failure) ([facility] I50.9    BMI 34.0-34.9,adult Z68.34    Peritoneal dialysis status ([facility] Z99.2    Diabetic ulcer of left foot associated with type 2 diabetes mellitus, with fat layer exposed ([facility] E11.621, L97.522    Sepsis ([facility]) A41.9    Cellulitis of fourth toe, left L03.032    Chronic anemia D64.9       Abnormal troponin most likely non-STEMI type II  Echocardiogram was done in 11/2022 reported       Left Ventricle: Moderately reduced left ventricular systolic function with a visually estimated EF of 35 - 40%. Left ventricle size is normal. Moderate septal thickening. Severe posterior thickening. Moderate global hypokinesis present. Grade II diastolic dysfunction with increased LAP. Right Ventricle: Right ventricle size is normal. Mildly increased wall thickness. Aortic Valve: Severely thickened cusp. Moderately calcified right and noncoronary cusps. Minimal to moderate stenosis of the aortic valve. Mitral Valve: Mildly thickened leaflet, at the posterior leaflet. Severe annular calcification at the posterior leaflet of the mitral valve. Moderate regurgitation.  Mild to moderate stenosis noted, measurement is compromised with severe calcification. Planimetry was not performed. Left Atrium: Left atrium is severely dilated. Right Atrium: Right atrium is moderately dilated. PA systolic pressure is 40 mmHg. Cardiac catheterization was done in November 2022 reported     Hemodynamics: Ao: 158/81 mmHg. Mean 112 mmHg. LV: 162/12 mmHg. LVEDP: 25   mmHg. No aortic valve gradient was noted on the pullback of the   pigtail catheter. Coronary Arteries:        Left Main Artery: Widely patent     Left Anterior Descending Artery: Proximal segments patent. Mid vessel contains prior stents which are patent. Distal segments patent. Moderate sized first diagonal branch which is patent. Moderate-sized second diagonal branch which contains prior stents is 100% occluded. Left Circumflex Artery: Moderate size AV vessel. Proximal and mid are patent. Distal segment contains a 20% stenosis just after the origin of an obtuse marginal branch. Large branching   obtuse marginal branch from the mid AV groove vessel. Proximal segment contains a concentric discrete 70% stenosis. Distal vessel contains luminal irregularities. Right Coronary Artery: Dominant, moderate size vessel. Proximal and mid segments patent. Distal vessel bifurcates into 2 branches at the acute margin. The ongoing right branch is diffusely diseased with a long 80% stenosis, followed by a   discrete 90% stenosis. The acute marginal branch contains   proximal and mid 99% stenoses. 1.  Progressive coronary disease since prior catheterization   7/14/2021. Second diagonal branch which was previously stented is now 100% occluded. First obtuse marginal branch contains a ostial/proximal 70% stenosis. Distal right coronary artery demonstrates 2 branches with the ongoing vessel diffusely diseased with discrete distal 90% stenosis. The acute marginal branch contains proximal and mid 99% stenoses.    2.  Ischemic cardiomyopathy ejection fraction 40% 3.  No aortic stenosis   4. Mild mitral regurgitation   5. Successful administration of moderate sedation without   complications     Plan: Routine post cardiac cath orders. Patient's disease is   mostly branch vessel and distal vessel disease. The diagonal is 100% chronic total occlusion, the distal right coronary artery   disease is very diffuse. Thus, this anatomy is unfavorable for   PCI and CABG. The obtuse marginal branch potentially could be considered for PCI although would leave behind significant   residual coronary disease. Patient needs aggressive guideline   directed risk factor modification. In addition with her LV   systolic dysfunction she will also need guideline directed   medical therapy for LV dysfunction and chronic systolic heart   failure. Status post extubated     Plan:     Continue  Aspirin, metoprolol succinate, nifedipine, isosorbide mononitrate and atorvastatin. Continue dialysis as per nephrologist   In view of acute decompensated heart failure with pulmonary edema could be from underlying ischemic heart disease. Advised about left heart catheterization, right heart catheterization, coronary angiogram plus or minus PCI. All risks, benefits and alternatives explained to patient and she verbally understood. Patient is scheduled for cardiac catheterization as an outpatient with  Dr. Elsa Danielle. on 7 February 2023. Patient is not sure about going for procedure at this time. Continue current medication            Subjective:    cc:   Denies any chest pain or shortness of breath      REVIEW OF SYSTEMS:     General: No fevers or chills. Cardiovascular: No chest pain,No palpitations, No orthopnea, No PND, positive leg swelling, No claudication  Pulmonary: No  dyspnea.    Gastrointestinal: No nausea, vomiting, bleeding  Neurology: No Dizziness    Objective:      Visit Vitals  /77 (BP 1 Location: Left upper arm, BP Patient Position: At rest)   Pulse 92   Temp 98.1 °F (36.7 °C)   Resp 16   Ht 5' 7\" (1.702 m)   Wt 99.8 kg (220 lb)   SpO2 100%   BMI 34.46 kg/m²     Body mass index is 34.46 kg/m². Physical Exam:  General Appearance: Comfortable, not using accessory muscles of respiration. HEENT: SARMAD. HEAD: Atraumatic  NECK: No JVD, no thyroidomeglay. CAROTIDS: no bruit  LUNGS:  bilateral conducted sounds   HEART: S1+S2 audible, no murmur, no pericardial rub. ABD: Non-tender, BS Audible    EXT:  bilateral lower extremity  trace edema, and no cyanosis.   NEUROLOGICAL: Alert, follows verbal commands    Medication:  Current Facility-Administered Medications   Medication Dose Route Frequency    aspirin chewable tablet 81 mg  81 mg Oral DAILY    insulin lispro (HUMALOG) injection   SubCUTAneous AC&HS    diphenhydrAMINE (BENADRYL) injection 12.5 mg  12.5 mg IntraVENous Q6H PRN    cephALEXin (KEFLEX) capsule 500 mg  500 mg Oral TID    nystatin (MYCOSTATIN) 100,000 unit/mL oral suspension 500,000 Units  500,000 Units Oral QID    bumetanide (BUMEX) tablet 2 mg  2 mg Oral BID    peritoneal dialysis DEXTROSE 2.5% (2.5 mEq/L low calcium) solution 2,000 mL  2,000 mL IntraPERitoneal QID    atorvastatin (LIPITOR) tablet 80 mg  80 mg Oral DAILY    calcitRIOL (ROCALTROL) capsule 0.25 mcg  0.25 mcg Oral DAILY    cinacalcet (SENSIPAR) tablet 60 mg  60 mg Oral DAILY    levothyroxine (SYNTHROID) tablet 200 mcg  200 mcg Oral ACB    NIFEdipine ER (PROCARDIA XL) tablet 90 mg  90 mg Oral DAILY    calcium acetate(phosphat bind) (PHOSLO) capsule 1,334 mg  2 Capsule Oral TID WITH MEALS    isosorbide mononitrate ER (IMDUR) tablet 60 mg  60 mg Oral 7am    metoprolol succinate (TOPROL-XL) XL tablet 50 mg  50 mg Oral BID    hydrALAZINE (APRESOLINE) tablet 50 mg  50 mg Oral TID    famotidine (PF) (PEPCID) 20 mg in 0.9% sodium chloride 10 mL injection  20 mg IntraVENous DAILY    cloNIDine HCL (CATAPRES) tablet 0.2 mg  0.2 mg Oral BID    morphine injection 4 mg  4 mg IntraVENous Q4H PRN    sodium chloride (NS) flush 5-40 mL  5-40 mL IntraVENous Q8H    sodium chloride (NS) flush 5-40 mL  5-40 mL IntraVENous PRN    acetaminophen (TYLENOL) tablet 650 mg  650 mg Oral Q6H PRN    Or    acetaminophen (TYLENOL) suppository 650 mg  650 mg Rectal Q6H PRN    polyethylene glycol (MIRALAX) packet 17 g  17 g Oral DAILY PRN    ondansetron (ZOFRAN ODT) tablet 4 mg  4 mg Oral Q8H PRN    Or    ondansetron (ZOFRAN) injection 4 mg  4 mg IntraVENous Q6H PRN    heparin (porcine) injection 5,000 Units  5,000 Units SubCUTAneous Q8H    glucose chewable tablet 16 g  16 g Oral PRN    glucagon (GLUCAGEN) injection 1 mg  1 mg IntraMUSCular PRN    dextrose 10% infusion 0-250 mL  0-250 mL IntraVENous PRN    0.9% sodium chloride infusion 250 mL  250 mL IntraVENous PRN    hydrALAZINE (APRESOLINE) 20 mg/mL injection 10 mg  10 mg IntraVENous Q4H PRN    sodium chloride (NS) flush 5-10 mL  5-10 mL IntraVENous PRN               Lab/Data Reviewed:       Recent Labs     02/02/23  0339 02/01/23  0508 01/31/23  0504   WBC 8.5 9.5 9.3   HGB 8.1* 8.4* 9.3*   HCT 26.5* 27.3* 30.9*    296 316       Recent Labs     02/02/23  0339 02/01/23  0508 01/31/23  0504   * 138 141   K 3.9 4.1 4.3   CL 98* 99* 101   CO2 27 27 26   * 69* 109*   BUN 51* 51* 63*   CREA 11.50* 11.80* 12.90*   CA 8.9 9.0 10.0           Signed By: Maria Eugenia Padilla MD     February 2, 2023

## 2023-02-03 NOTE — PROGRESS NOTES
Pharmacy Dosing Services: Cephalexin    Pharmacist Renal Dosing Progress Note for Cephalexin   Physician Dr. Jovanni Jacobo    The following medication: Cephalexin was automatically dose-adjusted per [facility] P&T Committee Protocol, with respect to renal function. Consult provided for this   43 y.o. , female , for the indication of SSTI. Pt Weight:   Wt Readings from Last 1 Encounters:   02/03/23 82 kg (180 lb 12.8 oz)         Previous Regimen Cephalexin 500mg TID   Serum Creatinine Lab Results   Component Value Date/Time    Creatinine 11.50 (H) 02/02/2023 03:39 AM       Creatinine Clearance Estimated Creatinine Clearance: 7 mL/min (A) (based on SCr of 11.5 mg/dL (H)). BUN Lab Results   Component Value Date/Time    BUN 51 (H) 02/02/2023 03:39 AM       Dosage changed to:  Cephalexin 500mg q12h        Pharmacy to continue to monitor patient daily. Will make dosage adjustments based upon changing renal function.   Signed RYAN CrandallD. Contact information:

## 2023-02-03 NOTE — PROGRESS NOTES
Problem: Falls - Risk of  Goal: *Absence of Falls  Description: Document Jovani Nolasco Fall Risk and appropriate interventions in the flowsheet.   Outcome: Progressing Towards Goal  Note: Fall Risk Interventions:                                Problem: Pain  Goal: *Control of Pain  2/3/2023 0200 by Onofre Patterson, RN  Outcome: Progressing Towards Goal  2/3/2023 0158 by Onofre Patterson, RN  Outcome: Progressing Towards Goal

## 2023-02-03 NOTE — WOUND CARE
IP WOUND CONSULT    Dilip Cole  MEDICAL RECORD NUMBER:  [id number]  AGE: 43 y.o. GENDER: female  : 1980  TODAY'S DATE:  2/3/2023    GENERAL     [x] Follow-up   [] New Consult    [x] Present on Admission  [] Ronaldo Alexander is a 43 y.o. female referred by:   [] Physician  [x] Nursing  [] Other:         PAST MEDICAL HISTORY    Past Medical History:   Diagnosis Date    BMI 34.0-34.9,adult 2023    Cardiomyopathy ([facility]     Chronic kidney disease     Coronary arteriosclerosis     DM (diabetes mellitus) ([facility]     Hypertension     Tachycardia         PAST SURGICAL HISTORY    History reviewed. No pertinent surgical history. FAMILY HISTORY    Family History   Problem Relation Age of Onset    Diabetes Mother     Heart Disease Mother     Heart Attack Mother     Heart Attack Father     Heart Disease Father     Diabetes Father        SOCIAL HISTORY    Social History     Tobacco Use    Smoking status: Never    Smokeless tobacco: Never   Substance Use Topics    Alcohol use: Yes     Comment: rarely       ALLERGIES    Allergies   Allergen Reactions    Coreg [Carvedilol] Nausea and Vomiting    Lortab [Hydrocodone-Acetaminophen] Nausea and Vomiting       MEDICATIONS    No current facility-administered medications on file prior to encounter. Current Outpatient Medications on File Prior to Encounter   Medication Sig Dispense Refill    bisacodyL 5 mg tab Take 5 mg by mouth two (2) times a day. metoclopramide HCl (REGLAN) 5 mg tablet Take 5 mg by mouth as needed for Nausea or Vomiting.      sodium bicarbonate 650 mg tablet Take 650 mg by mouth two (2) times a day. albuterol (Ventolin HFA) 90 mcg/actuation inhaler Take 2 Puffs by inhalation every six (6) hours as needed for Wheezing. atorvastatin (LIPITOR) 80 mg tablet Take 80 mg by mouth daily. clopidogreL (Plavix) 75 mg tab Take 75 mg by mouth daily.       cloNIDine HCL (CATAPRES) 0.2 mg tablet Take 0.2 mg by mouth two (2) times a day. hydrALAZINE (APRESOLINE) 100 mg tablet Take 100 mg by mouth three (3) times daily. mupirocin (BACTROBAN) 2 % ointment Apply  to affected area three (3) times daily. candesartan (ATACAND) 32 mg tablet Take 32 mg by mouth daily. Indications: high blood pressure      insulin aspart U-100 (NOVOLOG) 100 unit/mL injection 15 Units by SubCUTAneous route Before breakfast, lunch, and dinner. fluticasone propionate (FLOVENT HFA) 44 mcg/actuation inhaler Take 1 Puff by inhalation as needed. calcium acetate,phosphat bind, (PHOSLO) 667 mg cap Take 2 Capsules by mouth three (3) times daily (with meals). calcitRIOL (ROCALTROL) 0.25 mcg capsule Take 0.25 mcg by mouth daily. NIFEdipine ER (PROCARDIA XL) 90 mg ER tablet Take 90 mg by mouth daily. levothyroxine (SYNTHROID) 200 mcg tablet Take 200 mcg by mouth Daily (before breakfast). cinacalcet (SENSIPAR) 30 mg tablet Take 60 mg by mouth daily. bumetanide (BUMEX) 2 mg tablet Take 2 mg by mouth two (2) times a day. aspirin 81 mg chewable tablet Take 81 mg by mouth daily. [DISCONTINUED] isosorbide mononitrate ER (IMDUR) 120 mg CR tablet Take 120 mg by mouth every morning. [DISCONTINUED] metoprolol succinate (TOPROL-XL) 100 mg tablet Take 100 mg by mouth two (2) times a day. Wt Readings from Last 3 Encounters:   02/03/23 82 kg (180 lb 12.8 oz)   11/18/22 92.8 kg (204 lb 9.4 oz)   09/11/18 96.6 kg (213 lb)       [email or web address] Vitals  BP (!) 141/81 (BP 1 Location: Left upper arm, BP Patient Position: At rest)   Pulse 93   Temp 97.8 °F (36.6 °C)   Resp 16   Ht 5' 7\" (1.702 m)   Wt 82 kg (180 lb 12.8 oz)   SpO2 100%   BMI 28.32 kg/m²       ASSESSMENT     Skin impairment Identification:  Type: diabetic    Contributing Factors:  ESRD    Wound Foot Left; Anterior 01/30/23 (Active)   Wound Image   02/03/23 1357   Wound Etiology Diabetic 02/03/23 1357   Dressing Status Clean;Dry; Intact 02/03/23 1350 Cleansed Irrigated with saline 02/03/23 1357   Dressing/Treatment Collagen with Ag;Moisten with saline;Silicone border 42/36/39 1357   Wound Length (cm) 1 cm 02/03/23 1357   Wound Width (cm) 0.8 cm 02/03/23 1357   Wound Depth (cm) 0.2 cm 02/03/23 1357   Wound Surface Area (cm^2) 0.8 cm^2 02/03/23 1357   Change in Wound Size % 23.81 02/03/23 1357   Wound Volume (cm^3) 0.16 cm^3 02/03/23 1357   Wound Healing % 70 02/03/23 1357   Wound Assessment Dry;Pale granulation tissue 02/03/23 1357   Drainage Amount None 02/03/23 1357   Wound Odor None 02/03/23 1357   Shanda-Wound/Incision Assessment Dry/flaky 02/03/23 1357   Edges Defined edges 02/03/23 1357   Wound Thickness Description Full thickness 02/03/23 1357   Number of days: 4          PLAN     Skin Care & Pressure Relief Recommendations  Minimize layers of linen  Pads under patient to optimize support surface    Recommendations: keep dressing intact    Teaching completed with:   [x] Patient           [] Family member       [] Caregiver          [x] Nursing  [] Other    Patient/Caregiver Teaching:  Level of patient/caregiver understanding able to:   [x] Indicates understanding       [] Needs reinforcement  [] Unsuccessful      [] Verbal Understanding  [] Demonstrated understanding       [] No evidence of learning  [] Refused teaching         [] N/A       Electronically signed by Hayley Chow RN on 2/3/2023 at 4:24 PM

## 2023-02-03 NOTE — PROGRESS NOTES
Nephrology Progress Note    Patient: Srinivasan Clayton      Subjectives:    Srinivasan Clayton is a 43 y.o. female with a past medical history significant for CAD/MI, CM 40% EF, DM type 2, obesity, ESRD on PD at [facility], who presented with worsening SOB/ resp distress. Pt could not tolerate bipap and eventually required intubation, admitted to ICU. Noticed to have elevated BP as well. No f/c. Imaging c/w volume overload and neg for PE. Nephrology was consulted for further evaluation and management of her ESRD. S/p extubation. Patient says she had poor UF/drainage prior to admission. Attributes this to constipation, now improved. Denies non-compliance with PD Prescription.    - No new complnts. PD exchange going well . No cp or sob.      Inpatient meds:  Current Facility-Administered Medications   Medication Dose Route Frequency    [START ON 2/4/2023] famotidine (PEPCID) tablet 20 mg  20 mg Oral DAILY    cephALEXin (KEFLEX) capsule 500 mg  500 mg Oral Q12H    aspirin chewable tablet 81 mg  81 mg Oral DAILY    epoetin lucinda-epbx (RETACRIT) injection 10,000 Units  10,000 Units SubCUTAneous Q7D    insulin lispro (HUMALOG) injection   SubCUTAneous AC&HS    diphenhydrAMINE (BENADRYL) injection 12.5 mg  12.5 mg IntraVENous Q6H PRN    nystatin (MYCOSTATIN) 100,000 unit/mL oral suspension 500,000 Units  500,000 Units Oral QID    bumetanide (BUMEX) tablet 2 mg  2 mg Oral BID    peritoneal dialysis DEXTROSE 2.5% (2.5 mEq/L low calcium) solution 2,000 mL  2,000 mL IntraPERitoneal QID    atorvastatin (LIPITOR) tablet 80 mg  80 mg Oral DAILY    calcitRIOL (ROCALTROL) capsule 0.25 mcg  0.25 mcg Oral DAILY    cinacalcet (SENSIPAR) tablet 60 mg  60 mg Oral DAILY    levothyroxine (SYNTHROID) tablet 200 mcg  200 mcg Oral ACB    NIFEdipine ER (PROCARDIA XL) tablet 90 mg  90 mg Oral DAILY    calcium acetate(phosphat bind) (PHOSLO) capsule 1,334 mg  2 Capsule Oral TID WITH MEALS    isosorbide mononitrate ER (IMDUR) tablet 60 mg 60 mg Oral 7am    metoprolol succinate (TOPROL-XL) XL tablet 50 mg  50 mg Oral BID    hydrALAZINE (APRESOLINE) tablet 50 mg  50 mg Oral TID    cloNIDine HCL (CATAPRES) tablet 0.2 mg  0.2 mg Oral BID    morphine injection 4 mg  4 mg IntraVENous Q4H PRN    sodium chloride (NS) flush 5-40 mL  5-40 mL IntraVENous Q8H    sodium chloride (NS) flush 5-40 mL  5-40 mL IntraVENous PRN    acetaminophen (TYLENOL) tablet 650 mg  650 mg Oral Q6H PRN    Or    acetaminophen (TYLENOL) suppository 650 mg  650 mg Rectal Q6H PRN    polyethylene glycol (MIRALAX) packet 17 g  17 g Oral DAILY PRN    ondansetron (ZOFRAN ODT) tablet 4 mg  4 mg Oral Q8H PRN    Or    ondansetron (ZOFRAN) injection 4 mg  4 mg IntraVENous Q6H PRN    heparin (porcine) injection 5,000 Units  5,000 Units SubCUTAneous Q8H    glucose chewable tablet 16 g  16 g Oral PRN    glucagon (GLUCAGEN) injection 1 mg  1 mg IntraMUSCular PRN    dextrose 10% infusion 0-250 mL  0-250 mL IntraVENous PRN    0.9% sodium chloride infusion 250 mL  250 mL IntraVENous PRN    hydrALAZINE (APRESOLINE) 20 mg/mL injection 10 mg  10 mg IntraVENous Q4H PRN    sodium chloride (NS) flush 5-10 mL  5-10 mL IntraVENous PRN             Vital signs:   Visit Vitals  /66 (BP 1 Location: Left upper arm, BP Patient Position: At rest)   Pulse 92   Temp 97.9 °F (36.6 °C)   Resp 16   Ht 5' 7\" (1.702 m)   Wt 82 kg (180 lb 12.8 oz)   SpO2 100%   BMI 28.32 kg/m²       Intake/Output Summary (Last 24 hours) at 2/3/2023 1239  Last data filed at 2/3/2023 0912  Gross per 24 hour   Intake 6000 ml   Output 5700 ml   Net 300 ml         Physical Exam:    General: No resp distress   HENT: Atraumatic and normocephalic   Eyes: Normal conjunctiva, EOMI   Neck: Supple with no JVD   Cardiovascular: Normal S1 & S2, no m/r/g   Pulmonary/Chest Wall: Clear to auscultation bilaterally   Abdominal: Soft and non-tender, normal active bowel sound   +PD cath    Musculoskeletal: No pedal edema   Skin: No rash/ lesions Neurological: AAOx3       Data Review:  CMP:   No results found for: NA, K, CL, CO2, AGAP, GLU, BUN, CREA, GFRAA, GFRNA, CA, MG, PHOS, ALB, TBIL, TP, ALB, GLOB, AGRAT, ALT    CBC:   Lab Results   Component Value Date/Time    WBC 8.0 02/03/2023 02:05 AM    HGB 7.9 (L) 02/03/2023 02:05 AM    HCT 25.2 (L) 02/03/2023 02:05 AM     02/03/2023 02:05 AM     All Cardiac Markers in the last 24 hours: No results found for: CPK, CK, CKMMB, CKMB, RCK3, CKMBT, CKNDX, CKND1, NAVIN, TROPT, TROIQ, JUAN CARLOS, TROPT, TNIPOC, BNP, BNPP  Recent Glucose Results:   No results found for: GLU    ABG:   No results found for: PH, PHI, PCO2, PCO2I, PO2, PO2I, HCO3, HCO3I, FIO2, FIO2I    Liver Panel:   No results found for: ALB, CBIL, TBIL, TP, GLOB, AGRAT, ASTPOC, ALTPOC, ALT, AP    Pancreatic Markers: No results found for: AMYLPOCT, AML, LIPPOCT, LPSE      CT:      Significant bilateral lower lobe consolidation and bilateral  pulmonary infiltrates. Small right pleural effusion. No evidence of pulmonary embolism. Peritoneal dialysis catheter is coiled within the left lower quadrant. No acute abnormality is identified. Impression:     ESRD on home PD  Respiratory failure required intubation: now extubated  Pulm Edema, improved. Net UF 4.8L so far since admission. CXR improved. Hyperkalemia, mild: resolved  Anemia in CKD  HTN: BP not well controlled   DM type 2  Hypoalbuminemia  Lactic acidosis, resolved  H.o. CAD/ MI  SHPT:  .  On Calcitriol and Sensipar     Plan:   Continue manual PD: use 2.5L of 2.5 % dextrose 4 times/day  Cont BP control  Weekly SCOTT  Strict I/O  D/w pt    MD Thomas Da Silva  [phone]

## 2023-02-03 NOTE — PROGRESS NOTES
2323  Bedside and verbal shift change report given to Reeda Hodgkins, RN (on coming nurse) by Magy Akers RN (off going nurse). Report included the following information SBAR, Kardex, Intake/Output and MAR.    0036  PD output 2000 mL. Yellow/clear. Pt reports no s/s of complication or abd pain.    0709  Bedside and verbal shift change report given by SUAD Siegel (off going nurse) to SUAD De La Garza(on coming nurse). Report included the following information SBAR, Kardex, Intake/Output and MAR.

## 2023-02-03 NOTE — PROGRESS NOTES
1200: Pt asleep, did not arouse to verbal stimuli, will follow up for PT.    1258: Pt sitting up EOB eating lunch, will follow up as schedule permits.

## 2023-02-03 NOTE — PROGRESS NOTES
IV to PO Conversion Recommendation     Patient: Becki Villareal   MRN#: [id number]   Admission Date: 387374     IV TO PO  Medication(s) Famotidine   Oral Meds  Yes   Diet:     Active Orders   Diet    ADULT DIET Regular      TEMP 98.2 °F (36.8 °C)   WBC Lab Results   Component Value Date/Time    WBC 8.0 02/03/2023 02:05 AM           Pharmacy Dosing Services:  Summary:   Does the patient meet all criteria for IV to PO switch as listed below? Yes      Is the patient excluded from IV to PO automatic switch based on criteria listed below? No       Criteria for IV to PO switch - Nonantibiotics   Functioning GI tract   Taking scheduled oral medications  Toleratind duet more advanced than clear liquids  2. No signs/symptoms of shock  No vasopressor support        3. No seizures for >72 hours (antiepileptic medications only)    Exclusion Criteria   Patient is being treated for an infection that requires IV therapy such as: endocarditis, osteomyelitis, meningitis, pancreatitis (antibiotics only)   NG tube with continous suction   Nausea and/or vomiting or severe diarrhea within past 24 hours  Malabsorption syndromes, partial or total gastrectomy, ileus, gastric outlet or bowel obstruction  Active GI bleeding   Significant painful oral ulceration  Unable to swallow  On total parenteral nutrition with a NPO order  NPO  Febrile in last 24 hours (antibiotics only)  Clinically deteriorating or unstable (antibiotics only)      Assessment/Recommendation:    Recommend changing Famotidine 20mg IV daily to Famotidine 20mg PO daily    This IV to PO conversion is based on the [facility] P&T approved automatic conversion policy for eligible patients. Please call with questions.     MADELIN Min  Clinical Pharmacist  [phone]

## 2023-02-04 NOTE — DISCHARGE SUMMARY
[facility] SUMMARY    Name:  Merle Kumar  MR#:   [id number]  :  1980  ACCOUNT #:  [de-identified]  ADMIT DATE:  2023  DISCHARGE DATE:  2023    The patient is going home with Home Health. She is a dialysis patient, on peritoneal dialysis. She is in stable condition. Her diet is regular, low potassium. Her activity is as tolerated, but out of work until next week when cleared by Cardiology. Her followup is with Dr. Monika Matthews on , for cardiac catheterization, already scheduled and with [facility], [facility], Dr. Adelina Crockett on , [address] on 02/10. She actually also has an appointment with Dr. Milton Gilbert, her PCP, on . Dr. Ritchie Alicia is her cardiologist.    DISCHARGE DIAGNOSES:  1. Acute respiratory failure due to acute volume overload, now resolved. 2.  Peritoneal dialysis, end-stage renal disease. 3.  Hypertension. 4.  Early sepsis. 5.  Diabetes mellitus. 6.  Lactic acidosis. 7.  History of coronary artery disease and myocardial infarction. 8.  Secondary hyperparathyroidism. 9.  Hyperkalemia. CONSULTANTS:  1.  Dr. Johnson, Nephrology. 2.  Dr. Kerri Mix, Cardiology. HOSPITAL SUMMARY:  This is a 61-year-old [facility] female, on peritoneal dialysis, who came into the hospital very ill. When she came into the emergency room, she has had evidence for shortness of breath and acute volume overload. She has progressive coronary disease that is not amenable to stenting, and she is followed by [facility] as well as Dr. Monika Matthews. She has known coronary disease and congestive heart failure with an EF of 40%. She is a type 2 diabetic with hypertension, on peritoneal dialysis. She was admitted to the Intensive Care Unit because of progressive acute hypoxemic and hypercapnic respiratory failure. She was intubated. The patient had intravenous antibiotic therapy. Nephrology was consulted.   She had recently been started on gabapentin, which was stopped from here, and her family was updated as well. Palliative Care was consulted. She is a full code. The patient fortunately was able to dialyze during her intubation period with volume reduction and stability of her respiratory status, so that she could wean from the ventilator. She was transfused for significant anemia and low blood count. She was seen by Cardiology as well. Blood pressure medications were resumed and the patient had antibiotics. She does have a chronic wound on her foot, that is being managed by Wound Care, and there was some concern about infection secondary to that. No systemic evidence for infection. Antibiotics have been able to be changed to p.o. at this time. Once she was extubated, she ended up passing her swallow test and was transitioned to the floor where she has been stable over the last couple of days. She has been anxious to go home since yesterday, but we held her for continued observation considering her severe illness when she came into the hospital, but she is anxious to leave the hospital today. SaO2 is 100% on room air, respiratory rate is 16, blood pressure 127/66, pulse 92, temperature 97.9. She is awake and alert today. No acute distress. Abdomen is soft and nontender with normal bowel sounds. Lungs are clear. No wheezes or rales. Cardiac exam, regular rate and rhythm. Lower extremities, no pitting edema. Testing that she has had here includes an H and H repeat at 7.9 and 25.2. White count and platelet count are normal.  Blood sugars 137. Her potassium is 3.9. Sodium 134. BUN and creatinine are consistent with end-stage renal disease. Albumin is 1.9. Troponin came down from over 12,000 to 7882. The patient's urine drug screen was clear. She had a CT angiogram of her chest when she came in that showed peritoneal dialysis catheter coiled in the left lower quadrant, but no acute abnormality, otherwise. There were bilateral pulmonary infiltrates, small right pleural effusion, but no evidence for pulmonary embolism. The last chest x-ray she has had on 01/31, showed improved bilateral aeration. She had a duplex of her lower extremities for arterial study. There is no evidence of significant stenosis in the bilateral lower extremities. An echocardiogram was done also that showed an ejection fraction of 25% to 30% with grade I diastolic dysfunction, and she had cultures of her wound that show heavy mixed skin paige. Respiratory culture showed normal respiratory paige. Body fluid culture from the catheter of her peritoneal dialysis is no growth. Blood cultures are no growth six days. Influenza was negative and COVID was negative as well. The patient is stable to discharge home today as long as she has close followup with Cardiology. Of note, Cardiology has followed her here and recommended an inpatient cardiac cath. She states she would rather have it done with Dr. Ladi Oliver on the 7th, which is already scheduled. We do know she has congestive heart failure and coronary disease, and this has been not amenable to stenting prior, but nonetheless she, with this acute flash pulmonary edema and respiratory failure secondary to that, has been recommended strongly to have that done. Again, she declines it being done here. She wants to go home and follow up as an outpatient. We have discussed returning to the emergency room for any signs or symptoms of shortness of breath, chest pain, palpitations, or significant weakness or dizziness. She understands those instructions. She does have her outpatient appointment with Dr. Ladi Oliver, Cardiology, on 02/07, this Tuesday. I have asked the patient again this afternoon and she continues to decline. I have discussed that with Dr. Robert Roberts as well. She does accept the risk of not having it done here in the hospital.  She will continue her peritoneal dialysis at home.   She has worked with Physical Therapy here. She is tolerating her diet. They recommended home physical therapy. Discharge medication should be essentially the same as when she came into the hospital on at this point and that is including;  1. Albuterol two puffs every six hours as needed for wheezing. 2.  Bisacodyl 5 mg twice daily as needed. 3.  Atacand 32 mg daily. 4.  Plavix 75 mg daily. 5.  Flovent one puff as needed. 6.  She has NovoLog insulin to use before breakfast, lunch, and dinner as needed. 7.  Reglan 5 mg as needed for nausea or vomiting. 8.  Sodium bicarbonate 650 mg twice daily. 9.  Aspirin 81 mg daily. 10.  Lipitor 80 mg daily. 11.  Bumex 2 mg twice daily. 12.  Rocaltrol 0.25 mcg daily. 13.  PhosLo two caps three times daily. 14.  Sensipar 60 mg daily. 15.  Catapres 0.2 mg twice daily. 16.  Hydralazine 100 mg three times daily. 17.  Imdur 120 mg daily. 18.  Synthroid 200 mcg daily. 19.  Toprol- mg twice daily. 20.  Procardia 90 mg daily. Of note, her Imdur has been changed to 60 mg daily and her Toprol has been changed to 50 mg twice daily. The patient requested nystatin swish and swallow for reducing chances of oral thrush and no signs or symptoms of bleeding. She has tolerated the transfusion here, likely for notable acute on chronic anemia due to end-stage renal disease. We are calling in Keflex 500 mg twice daily for six more doses to complete treatment of possible foot infection, and she did have a CT scan of her left foot that showed diffuse edema. No drainable abscess or evidence for osteomyelitis. A CT abdomen and pelvis was completed as well and that shows what was noted above in the text of the discharge summary. I spent 45 minutes on discharge time today. She is going home with Home Health. She is stable for release from the hospital at this point in time and followup is as noted.       MD ARINA [id number]  D:  02/03/2023 14:45  T:  02/04/2023 7:58  JOB #:  [id number]  CC:  Dr. Cari Alvarez

## 2023-02-05 ENCOUNTER — HOME CARE VISIT (OUTPATIENT)
Dept: SCHEDULING | Facility: HOME HEALTH | Age: 43
End: 2023-02-05
Payer: MEDICAID

## 2023-02-05 PROCEDURE — G0162 HHC RN E&M PLAN SVS, 15 MIN: HCPCS

## 2023-02-06 VITALS
DIASTOLIC BLOOD PRESSURE: 80 MMHG | WEIGHT: 181 LBS | SYSTOLIC BLOOD PRESSURE: 140 MMHG | HEART RATE: 91 BPM | OXYGEN SATURATION: 98 % | TEMPERATURE: 98.1 F | HEIGHT: 68 IN | RESPIRATION RATE: 18 BRPM | BODY MASS INDEX: 27.43 KG/M2

## 2023-02-07 ENCOUNTER — HOME CARE VISIT (OUTPATIENT)
Dept: SCHEDULING | Facility: HOME HEALTH | Age: 43
End: 2023-02-07
Payer: MEDICAID

## 2023-02-07 VITALS
TEMPERATURE: 98.6 F | HEART RATE: 85 BPM | DIASTOLIC BLOOD PRESSURE: 72 MMHG | SYSTOLIC BLOOD PRESSURE: 140 MMHG | OXYGEN SATURATION: 99 %

## 2023-02-07 PROCEDURE — G0151 HHCP-SERV OF PT,EA 15 MIN: HCPCS

## 2023-02-07 NOTE — Clinical Note
Therapy Functional Score Assessment  Question   Score   Grooming  0      Upper Dressing 0   Lower Dressing 0     Bathing  4    Toilet Transfer  0   Transfer  0        Ambulation  0   Dyspnea                     1   Pain Interfering with activity 3  Est number therapy visits      1

## 2023-02-07 NOTE — HOME HEALTH
bld sugar 78  angiogram scheduled tomorrow  patient c/o pain in leg and she thinks she has a blockage. leg is shiny and feels like a rubber band is wrapped around her leg has N/T  when she stands up it hurts.   cant stand for long periods of time  works for [employer]  patient does peritoneal dialysis 4 times daily

## 2023-02-07 NOTE — HOME HEALTH
PMHx: List of Comorbidities:    Acute  pulmonary edema ([facility]) J81.0    Chest pain R07.9    ESRD (end stage renal disease) on dialysis ([address] N18.6, Z99.2    Pulmonary edema J81.1    Cardiomyopathy ([facility]) I42.9    HTN (hypertension) I10    Type 2 diabetes mellitus ([facility]) E11.9    PAD (peripheral artery disease) ([facility]) I73.9    CAD (coronary artery disease) I25.10    Acute respiratory failure with hypoxia and hypercapnia ([facility]) J96.01, J96.02    Hypertensive emergency I16 .1    Acute exacerbation of CHF (congestive heart failure) ([facility]) I50.9    BMI 34.0-34.9,adult Z68.34    Peritoneal dialysis status ([facility]) Z99.2    Diabetic ulcer of left foot associated with type 2 diabetes mellitus, with fat layer exposed ([facility]) E11.621, L97.522    Sepsis ([facility]) A41.9    Cellulitis of fourth toe, left L03.032    Chronic anemia D64.9     SUBJECTIVE: I am doing well today. My BS was 68. I came home from the hospital in car without any problems getting in and out. I have had the wound on my foot since July   LIVING SITUATION: Pt lives with  and kids in a multi level home with 1 step at entrence without railings. Pt bedroom and bathroom are on the 2nd floor with a flight of steps with 1 HR   REQUIRES CAREGIVER ASSISTANCE FOR: transportation, IADLS   PLOF: Pt was I with amb without A DEV. Amb with a cast shoe. Pt was I with dressing and bathing   MEDICATIONS REVIEWED AND RECONCILED: no changes   NEXT MD APPT:  2/7/23  EQUIPMENT: RW, cast shoe   ROM:B LE WLF   STRENGTH: B hip flexors, quads, hamstrings 5/5   WOUNDS: Bandage on top of L foot clean dry and intact, no drainage noted .  Port in stomach for dilayis clean dry and intact   BED MOBILITY:supine <> sit MOD I  TRANSFERS:sit to stand from low couch and commode with B UE support for push off MOD I. sit to stand from bed and chair with and without B UE support for push off MOD I   GAIT: Pt amb 150ft on flat level surfaces without A DEV with recipical gait pattern antalgic gait L LE decreased L step length noted and decreaesed L HS noted at inital contact. No balance checks noted with amb. Antalgic gait from L foot ulcer. Pt reports that she has been amb like this since July   STAIRS:Pt went up and down a flight of steps with step too gait pattern MOD I with U UE support on HR. Pt HR was 88 with O2 sat of 99%  BALANCE: Pt scored 25/28 on Tinetti Balance Assessment placing pt at low  risk for falls. PATIENT RESPONE TO TX:  Pt pain level remained the same throughout tx session   PATIENT LEVEL OF UNDERSTANDING OF EDUCATION PROVIDED Educated pt to wear cast shoe at all times when amb for safety   PATIENT EDUCATION PROVIDED THIS VISIT: safety, HEP, walking, deep breathing,   HEP consisting of:  1. Walking every hour during the day with   Written HEP issued, patient/caregiver verbalized understanding. PLAN: Pt is at baseline level of function. Stength B LE B hip flexors, quads, hamstrings 5/5. Pt is MOD I with all bed mobility. Pt is transfering sit to stand from low couch and commode with B UE support for push off MOD I. sit to stand from bed and chair with and without B UE support for push off MOD I. Pt amb 150ft on flat level surfaces without A DEV with recipical gait pattern antalgic gait L LE decreased L step length noted and decreaesed L HS noted at inital contact. No balance checks noted with amb. Antalgic gait from L foot ulcer. Pt reports that she has been amb like this since July: Pt went up and down a flight of steps with step too gait pattern MOD I with U UE support on HR. Pt HR was 88 with O2 sat of 99%. Pt scored 25/28 on Tinetti Balance Assessment placing pt at low  risk for falls. Skilled HHPT services not indicated at this time.    office notifed of DC from [address] as pt is at baseline level of function            DISCHARGE PLANNING DISCUSSED: Discharge to self and family under MD supervision once all goals have been met or patient has reached max potential. Patient/caregiver verbalized understanding.

## 2023-02-07 NOTE — Clinical Note
Pt is at baseline level of function. Stength B LE B hip flexors, quads, hamstrings 5/5. Pt is MOD I with all bed mobility. Pt is transfering sit to stand from low couch and commode with B UE support for push off MOD I. sit to stand from bed and chair with and without B UE support for push off MOD I. Pt amb 150ft on flat level surfaces without A DEV with recipical gait pattern antalgic gait L LE decreased L step length noted and decreaesed L HS noted at inital contact. No balance checks noted with amb. Antalgic gait from L foot ulcer. Pt reports that she has been amb like this since July: Pt went up and down a flight of steps with step too gait pattern MOD I with U UE support on HR. Pt HR was 88 with O2 sat of 99%. Pt scored 25/28 on Tinetti Balance Assessment placing pt at low  risk for falls. Skilled HHPT services not indicated at this time.    office notifed of DC from [address] as pt is at baseline level of function

## 2023-02-09 ENCOUNTER — HOME CARE VISIT (OUTPATIENT)
Dept: HOME HEALTH SERVICES | Facility: HOME HEALTH | Age: 43
End: 2023-02-09
Payer: MEDICAID

## 2023-02-13 ENCOUNTER — HOME CARE VISIT (OUTPATIENT)
Dept: SCHEDULING | Facility: HOME HEALTH | Age: 43
End: 2023-02-13
Payer: MEDICAID

## 2023-02-13 PROCEDURE — G0299 HHS/HOSPICE OF RN EA 15 MIN: HCPCS

## 2023-02-15 VITALS
OXYGEN SATURATION: 93 % | HEART RATE: 84 BPM | RESPIRATION RATE: 18 BRPM | DIASTOLIC BLOOD PRESSURE: 80 MMHG | TEMPERATURE: 98 F | SYSTOLIC BLOOD PRESSURE: 148 MMHG

## 2023-02-16 NOTE — HOME HEALTH
Skilled reason for visit: Discharging patient today. Patient is not homebound. Patient drives herself and visits the wound care clinic weekly and SN does not perform any wound care on her. She also drives other places. Patient states that she knows how to manage her diabetes and her kidney disease and heart failure. Her medications are available in the home. She is able to and does reach out to her PCP when she needs assistance. Caregiver involvement:  ADL's, house keeping, transportation, meal prep. .    Medications reviewed and all medications are available in the home this visit. The following education was provided regarding medications: All medications educated on  and reconciled. All medications prescribed are in the home and patient taking as prescribed. Common uses and side effects reviewed with patient  . MD notified of any discrepancies/look a-like medications/medication interactions: none  Medications are effective at this time.       Home health supplies by type and quantity ordered/delivered this visit include: none this visit    Patient education provided this visit: Reviewed CHF protocol    Sharps education provided: Clinician instructed patient/CG on proper disposal of sharps: Containers should be made of hard plastic, be puncture-resistant and leakproof,   such as a laundry detergent or bleach bottle.  When the container is ¾ full, it should be sealed with tape and labeled   DO NOT RECYCLE prior to discarding in the regular trash.        Patient level of understanding of education provided: Patient states that she understands all education      Skilled Care Performed this visit: SN assessment, education      Patient response to procedure performed:  Patient responded well to visit without any increase in pain or discomfort      Agency Progress toward goals: progressing    Patient's Progress towards personal goals: progressing    Home exercise program: performs    Continued need for the following skills: Nursing    Plan for next visit: patient is discharged this visit    Patient and/or caregiver notified and agrees to changes in the Plan of Care YES/NO/NA: YES      The following discharge planning was discussed with the pt/caregiver:  Will d/c to home/self care when goals are met and patient is stable

## 2023-05-11 NOTE — PROGRESS NOTES
Patient is requesting refills :     albuterol 108 (90 Base) MCG/ACT inhaler    traMADol (ULTRAM) 50 MG tablet    [facility] [place]        Transported patient from ER to CT, and from CT to ICU. Transport was successful. No complications. Patient is stable and resting comfortably. Airway is patent and secure. No respiratory distress is noted at this time.

## 2024-01-24 NOTE — PROGRESS NOTES
Pharmacy Dosing Services: IV to PO Conversion    This patient meets P & T approved criteria for conversion from IV to oral therapy for the following medication:  Pantoprazole    Pt has regular diet and is taking other oral medications. Order adjusted to:  Pantoprazole 40 mg PO q12h    (Prior order:  Pantoprazole 40 mg IV q12h)    Pharmacy will continue to monitor the patient's status daily.   Signed Shaun Hamilton Contact information:  [phone] What Type Of Note Output Would You Prefer (Optional)?: Standard Output How Severe Is Your Skin Lesion?: mild treated_been_treated Is This A New Presentation, Or A Follow-Up?: Skin Lesions